# Patient Record
Sex: FEMALE | URBAN - METROPOLITAN AREA
[De-identification: names, ages, dates, MRNs, and addresses within clinical notes are randomized per-mention and may not be internally consistent; named-entity substitution may affect disease eponyms.]

---

## 2017-02-21 ENCOUNTER — INPATIENT (INPATIENT)
Facility: HOSPITAL | Age: 67
LOS: 9 days | Discharge: HOME HEALTH | End: 2017-03-03
Attending: INTERNAL MEDICINE | Admitting: HOSPITALIST
Payer: MEDICARE

## 2017-02-21 VITALS
RESPIRATION RATE: 17 BRPM | HEART RATE: 88 BPM | DIASTOLIC BLOOD PRESSURE: 58 MMHG | OXYGEN SATURATION: 99 % | SYSTOLIC BLOOD PRESSURE: 103 MMHG | TEMPERATURE: 98 F

## 2017-02-21 LAB
ALBUMIN SERPL ELPH-MCNC: 3.2 G/DL — LOW (ref 3.3–5)
ALP SERPL-CCNC: 248 U/L — HIGH (ref 40–120)
ALT FLD-CCNC: 152 U/L — HIGH (ref 12–78)
ANION GAP SERPL CALC-SCNC: 14 MMOL/L — SIGNIFICANT CHANGE UP (ref 5–17)
APTT BLD: 36.8 SEC — SIGNIFICANT CHANGE UP (ref 27.5–37.4)
AST SERPL-CCNC: 313 U/L — HIGH (ref 15–37)
BASOPHILS # BLD AUTO: 0 K/UL — SIGNIFICANT CHANGE UP (ref 0–0.2)
BASOPHILS NFR BLD AUTO: 0.3 % — SIGNIFICANT CHANGE UP (ref 0–2)
BILIRUB SERPL-MCNC: 5.9 MG/DL — HIGH (ref 0.2–1.2)
BUN SERPL-MCNC: 59 MG/DL — HIGH (ref 7–23)
CALCIUM SERPL-MCNC: 9.1 MG/DL — SIGNIFICANT CHANGE UP (ref 8.5–10.1)
CHLORIDE SERPL-SCNC: 91 MMOL/L — LOW (ref 96–108)
CO2 SERPL-SCNC: 22 MMOL/L — SIGNIFICANT CHANGE UP (ref 22–31)
CREAT SERPL-MCNC: 2.44 MG/DL — HIGH (ref 0.5–1.3)
EOSINOPHIL # BLD AUTO: 0 K/UL — SIGNIFICANT CHANGE UP (ref 0–0.5)
EOSINOPHIL NFR BLD AUTO: 0.2 % — SIGNIFICANT CHANGE UP (ref 0–6)
GLUCOSE SERPL-MCNC: 116 MG/DL — HIGH (ref 70–99)
HCT VFR BLD CALC: 41.3 % — SIGNIFICANT CHANGE UP (ref 34.5–45)
HGB BLD-MCNC: 14.2 G/DL — SIGNIFICANT CHANGE UP (ref 11.5–15.5)
INR BLD: 1.4 RATIO — HIGH (ref 0.88–1.16)
LACTATE SERPL-SCNC: 2.1 MMOL/L — HIGH (ref 0.7–2)
LIDOCAIN IGE QN: 287 U/L — SIGNIFICANT CHANGE UP (ref 73–393)
LYMPHOCYTES # BLD AUTO: 1.6 K/UL — SIGNIFICANT CHANGE UP (ref 1–3.3)
LYMPHOCYTES # BLD AUTO: 13 % — SIGNIFICANT CHANGE UP (ref 13–44)
MACROCYTES BLD QL: SLIGHT — SIGNIFICANT CHANGE UP
MANUAL DIF COMMENT BLD-IMP: SIGNIFICANT CHANGE UP
MCHC RBC-ENTMCNC: 34.3 GM/DL — SIGNIFICANT CHANGE UP (ref 32–36)
MCHC RBC-ENTMCNC: 36.8 PG — HIGH (ref 27–34)
MCV RBC AUTO: 107.2 FL — HIGH (ref 80–100)
MONOCYTES # BLD AUTO: 0.2 K/UL — SIGNIFICANT CHANGE UP (ref 0–0.9)
MONOCYTES NFR BLD AUTO: 1.5 % — LOW (ref 2–14)
NEUTROPHILS # BLD AUTO: 10.2 K/UL — HIGH (ref 1.8–7.4)
NEUTROPHILS NFR BLD AUTO: 85 % — HIGH (ref 43–77)
PLAT MORPH BLD: NORMAL — SIGNIFICANT CHANGE UP
PLATELET # BLD AUTO: 84 K/UL — LOW (ref 150–400)
POTASSIUM SERPL-MCNC: 3.7 MMOL/L — SIGNIFICANT CHANGE UP (ref 3.5–5.3)
POTASSIUM SERPL-SCNC: 3.7 MMOL/L — SIGNIFICANT CHANGE UP (ref 3.5–5.3)
PROT SERPL-MCNC: 7.4 GM/DL — SIGNIFICANT CHANGE UP (ref 6–8.3)
PROTHROM AB SERPL-ACNC: 15.4 SEC — HIGH (ref 10–13.1)
RBC # BLD: 3.86 M/UL — SIGNIFICANT CHANGE UP (ref 3.8–5.2)
RBC # FLD: 13.4 % — SIGNIFICANT CHANGE UP (ref 10.3–14.5)
RBC BLD AUTO: (no result)
SODIUM SERPL-SCNC: 127 MMOL/L — LOW (ref 135–145)
WBC # BLD: 12 K/UL — HIGH (ref 3.8–10.5)
WBC # FLD AUTO: 12 K/UL — HIGH (ref 3.8–10.5)

## 2017-02-21 PROCEDURE — 99285 EMERGENCY DEPT VISIT HI MDM: CPT

## 2017-02-21 PROCEDURE — 71010: CPT | Mod: 26

## 2017-02-21 PROCEDURE — 74176 CT ABD & PELVIS W/O CONTRAST: CPT | Mod: 26

## 2017-02-21 RX ORDER — SODIUM CHLORIDE 9 MG/ML
1000 INJECTION INTRAMUSCULAR; INTRAVENOUS; SUBCUTANEOUS ONCE
Qty: 0 | Refills: 0 | Status: COMPLETED | OUTPATIENT
Start: 2017-02-21 | End: 2017-02-21

## 2017-02-21 RX ORDER — PIPERACILLIN AND TAZOBACTAM 4; .5 G/20ML; G/20ML
3.38 INJECTION, POWDER, LYOPHILIZED, FOR SOLUTION INTRAVENOUS ONCE
Qty: 0 | Refills: 0 | Status: COMPLETED | OUTPATIENT
Start: 2017-02-21 | End: 2017-02-21

## 2017-02-21 RX ORDER — SODIUM CHLORIDE 9 MG/ML
3 INJECTION INTRAMUSCULAR; INTRAVENOUS; SUBCUTANEOUS ONCE
Qty: 0 | Refills: 0 | Status: COMPLETED | OUTPATIENT
Start: 2017-02-21 | End: 2017-02-21

## 2017-02-21 RX ADMIN — PIPERACILLIN AND TAZOBACTAM 200 GRAM(S): 4; .5 INJECTION, POWDER, LYOPHILIZED, FOR SOLUTION INTRAVENOUS at 22:54

## 2017-02-21 RX ADMIN — SODIUM CHLORIDE 1000 MILLILITER(S): 9 INJECTION INTRAMUSCULAR; INTRAVENOUS; SUBCUTANEOUS at 18:31

## 2017-02-21 RX ADMIN — SODIUM CHLORIDE 1000 MILLILITER(S): 9 INJECTION INTRAMUSCULAR; INTRAVENOUS; SUBCUTANEOUS at 21:00

## 2017-02-21 RX ADMIN — SODIUM CHLORIDE 3 MILLILITER(S): 9 INJECTION INTRAMUSCULAR; INTRAVENOUS; SUBCUTANEOUS at 18:31

## 2017-02-21 NOTE — ED ADULT NURSE REASSESSMENT NOTE - NS ED NURSE REASSESS COMMENT FT1
report rec'd from Christopher OTERO RN , pt pending CT scan tolerating oral contrast at this time will cont to monitor

## 2017-02-21 NOTE — ED STATDOCS - DETAILS:
I, Jose Guadalupe Palacios DO, performed the initial face to face bedside interview with this patient regarding history of present illness and determined that the patient should be seen in the main ED.  The history, was documented by the scribe in my presence and I attest to the accuracy of the documentation.

## 2017-02-21 NOTE — ED PROVIDER NOTE - DETAILS:
I, Duke Smalls, performed the initial face to face bedside interview with this patient regarding history of present illness and determined that the patient should be seen in the main ED.  The history, was documented by the scribe in my presence and I attest to the accuracy of the documentation.

## 2017-02-21 NOTE — ED PROVIDER NOTE - CARE PLAN
Principal Discharge DX:	Proctocolitis without complication  Secondary Diagnosis:	Acute renal failure, unspecified acute renal failure type  Secondary Diagnosis:	Hepatitis

## 2017-02-21 NOTE — ED PROVIDER NOTE - PROGRESS NOTE DETAILS
Obed Hauser: Discussed case with Dr. Salomon, agrees with Hospitalist admission. I Jorge Hauser, am scribing for and in the presence of Dr. Smalls the following sections: Progress note.

## 2017-02-21 NOTE — ED ADULT TRIAGE NOTE - CHIEF COMPLAINT QUOTE
Pt states that she has had nausea, vomitting and diarrhea for the past two weeks. Was seen by Dr. Martinez and was sent to ED today for worsening symptoms and to r/o bowel obstruction. pt is alert and orientedx4,  pt was given a decongestant medication two weeks ago and started on antiviotics yesterday. Pt states she has not been able to eat in 2 weeks

## 2017-02-21 NOTE — ED STATDOCS - PROGRESS NOTE DETAILS
65 y/o female with hx of rheumatoid arthritis presents to the ED c/o n/v/d, generalized weakness, minimal PO intake, and chills for 3 weeks.  No recent travel. Saw PMD yesterday and given abx.  Takes methotrexate and prednisone. Currently pt has no other complaints and denies dysuria, blood in stool, CP and SOB. PMD= [place].

## 2017-02-21 NOTE — ED PROVIDER NOTE - OBJECTIVE STATEMENT
65 y/o F presents to ED c/o --- 65 y/o F with a PMHx of RA on methotrexate, prednisone presents to ED c/o 3 weeks dry heaving. Unable to tolerate PO except for ice cold water. Pt also has had generalized weakness, chills, intermittent loose stools for weeks. Denies abd pain, hematuria, melena and has no other constitutional complaints at this time. Sent for admission by Dr. Lees. PMD- [place].

## 2017-02-21 NOTE — ED PROVIDER NOTE - NS ED MD SCRIBE ATTENDING SCRIBE SECTIONS
HISTORY OF PRESENT ILLNESS/PROGRESS NOTE/DISPOSITION/RESULTS/PHYSICAL EXAM/CONSULTATIONS/SHIFT CHANGE/PAST MEDICAL/SURGICAL/SOCIAL HISTORY/REVIEW OF SYSTEMS

## 2017-02-22 LAB
ALBUMIN SERPL ELPH-MCNC: 2.4 G/DL — LOW (ref 3.3–5)
ALBUMIN SERPL ELPH-MCNC: 2.7 G/DL — LOW (ref 3.3–5)
ALP SERPL-CCNC: 182 U/L — HIGH (ref 40–120)
ALP SERPL-CCNC: 208 U/L — HIGH (ref 40–120)
ALT FLD-CCNC: 117 U/L — HIGH (ref 12–78)
ALT FLD-CCNC: 135 U/L — HIGH (ref 12–78)
ANION GAP SERPL CALC-SCNC: 11 MMOL/L — SIGNIFICANT CHANGE UP (ref 5–17)
ANION GAP SERPL CALC-SCNC: 11 MMOL/L — SIGNIFICANT CHANGE UP (ref 5–17)
ANISOCYTOSIS BLD QL: SLIGHT — SIGNIFICANT CHANGE UP
APPEARANCE UR: (no result)
AST SERPL-CCNC: 246 U/L — HIGH (ref 15–37)
AST SERPL-CCNC: 270 U/L — HIGH (ref 15–37)
BACTERIA # UR AUTO: (no result)
BASOPHILS # BLD AUTO: 0 K/UL — SIGNIFICANT CHANGE UP (ref 0–0.2)
BASOPHILS NFR BLD AUTO: 0.2 % — SIGNIFICANT CHANGE UP (ref 0–2)
BILIRUB DIRECT SERPL-MCNC: 4.3 MG/DL — HIGH (ref 0–0.2)
BILIRUB DIRECT SERPL-MCNC: 4.3 MG/DL — HIGH (ref 0–0.2)
BILIRUB INDIRECT FLD-MCNC: 1.7 MG/DL — HIGH (ref 0.2–1)
BILIRUB SERPL-MCNC: 6 MG/DL — HIGH (ref 0.2–1.2)
BILIRUB SERPL-MCNC: 6.3 MG/DL — HIGH (ref 0.2–1.2)
BILIRUB UR-MCNC: (no result)
BUN SERPL-MCNC: 43 MG/DL — HIGH (ref 7–23)
BUN SERPL-MCNC: 51 MG/DL — HIGH (ref 7–23)
CALCIUM SERPL-MCNC: 8.3 MG/DL — LOW (ref 8.5–10.1)
CALCIUM SERPL-MCNC: 8.4 MG/DL — LOW (ref 8.5–10.1)
CHLORIDE SERPL-SCNC: 100 MMOL/L — SIGNIFICANT CHANGE UP (ref 96–108)
CHLORIDE SERPL-SCNC: 95 MMOL/L — LOW (ref 96–108)
CHLORIDE UR-SCNC: <20 MMOL/L — SIGNIFICANT CHANGE UP
CHOLEST SERPL-MCNC: 160 MG/DL — SIGNIFICANT CHANGE UP (ref 10–199)
CO2 SERPL-SCNC: 23 MMOL/L — SIGNIFICANT CHANGE UP (ref 22–31)
CO2 SERPL-SCNC: 24 MMOL/L — SIGNIFICANT CHANGE UP (ref 22–31)
COLOR SPEC: YELLOW — SIGNIFICANT CHANGE UP
COMMENT - URINE: SIGNIFICANT CHANGE UP
CREAT ?TM UR-MCNC: 94 MG/DL — SIGNIFICANT CHANGE UP
CREAT SERPL-MCNC: 1.64 MG/DL — HIGH (ref 0.5–1.3)
CREAT SERPL-MCNC: 2.09 MG/DL — HIGH (ref 0.5–1.3)
DIFF PNL FLD: (no result)
EOSINOPHIL # BLD AUTO: 0.1 K/UL — SIGNIFICANT CHANGE UP (ref 0–0.5)
EOSINOPHIL NFR BLD AUTO: 0.4 % — SIGNIFICANT CHANGE UP (ref 0–6)
EPI CELLS # UR: SIGNIFICANT CHANGE UP
GIANT PLATELETS BLD QL SMEAR: PRESENT — SIGNIFICANT CHANGE UP
GLUCOSE SERPL-MCNC: 121 MG/DL — HIGH (ref 70–99)
GLUCOSE SERPL-MCNC: 142 MG/DL — HIGH (ref 70–99)
GLUCOSE UR QL: NEGATIVE MG/DL — SIGNIFICANT CHANGE UP
HAV IGM SER-ACNC: SIGNIFICANT CHANGE UP
HBV CORE IGM SER-ACNC: SIGNIFICANT CHANGE UP
HBV SURFACE AG SER-ACNC: SIGNIFICANT CHANGE UP
HCT VFR BLD CALC: 34 % — LOW (ref 34.5–45)
HCV AB S/CO SERPL IA: 0.24 S/CO — SIGNIFICANT CHANGE UP
HCV AB SERPL-IMP: SIGNIFICANT CHANGE UP
HDLC SERPL-MCNC: 11 MG/DL — LOW (ref 40–125)
HGB BLD-MCNC: 12.4 G/DL — SIGNIFICANT CHANGE UP (ref 11.5–15.5)
KETONES UR-MCNC: (no result)
LACTATE SERPL-SCNC: 1.1 MMOL/L — SIGNIFICANT CHANGE UP (ref 0.7–2)
LEUKOCYTE ESTERASE UR-ACNC: (no result)
LIPID PNL WITH DIRECT LDL SERPL: 125 MG/DL — SIGNIFICANT CHANGE UP
LYMPHOCYTES # BLD AUTO: 1.2 K/UL — SIGNIFICANT CHANGE UP (ref 1–3.3)
LYMPHOCYTES # BLD AUTO: 9.2 % — LOW (ref 13–44)
MACROCYTES BLD QL: SLIGHT — SIGNIFICANT CHANGE UP
MAGNESIUM SERPL-MCNC: 2.6 MG/DL — HIGH (ref 1.8–2.4)
MANUAL DIF COMMENT BLD-IMP: SIGNIFICANT CHANGE UP
MCHC RBC-ENTMCNC: 36.6 GM/DL — HIGH (ref 32–36)
MCHC RBC-ENTMCNC: 38.4 PG — HIGH (ref 27–34)
MCV RBC AUTO: 104.9 FL — HIGH (ref 80–100)
MICROCYTES BLD QL: SLIGHT — SIGNIFICANT CHANGE UP
MONOCYTES # BLD AUTO: 0.2 K/UL — SIGNIFICANT CHANGE UP (ref 0–0.9)
MONOCYTES NFR BLD AUTO: 1.9 % — LOW (ref 2–14)
NEUTROPHILS # BLD AUTO: 11.6 K/UL — HIGH (ref 1.8–7.4)
NEUTROPHILS NFR BLD AUTO: 88.2 % — HIGH (ref 43–77)
NITRITE UR-MCNC: NEGATIVE — SIGNIFICANT CHANGE UP
OSMOLALITY SERPL: 284 MOS/KG — SIGNIFICANT CHANGE UP (ref 275–300)
PH UR: 5 — SIGNIFICANT CHANGE UP (ref 4.8–8)
PHOSPHATE SERPL-MCNC: 2.1 MG/DL — LOW (ref 2.5–4.5)
PLAT MORPH BLD: (no result)
PLATELET # BLD AUTO: 55 K/UL — LOW (ref 150–400)
PLATELET COUNT - ESTIMATE: (no result)
POIKILOCYTOSIS BLD QL AUTO: SLIGHT — SIGNIFICANT CHANGE UP
POTASSIUM SERPL-MCNC: 3.5 MMOL/L — SIGNIFICANT CHANGE UP (ref 3.5–5.3)
POTASSIUM SERPL-MCNC: 3.6 MMOL/L — SIGNIFICANT CHANGE UP (ref 3.5–5.3)
POTASSIUM SERPL-SCNC: 3.5 MMOL/L — SIGNIFICANT CHANGE UP (ref 3.5–5.3)
POTASSIUM SERPL-SCNC: 3.6 MMOL/L — SIGNIFICANT CHANGE UP (ref 3.5–5.3)
POTASSIUM UR-SCNC: 28 MMOL/L — SIGNIFICANT CHANGE UP
PROT SERPL-MCNC: 5.7 GM/DL — LOW (ref 6–8.3)
PROT SERPL-MCNC: 6.7 GM/DL — SIGNIFICANT CHANGE UP (ref 6–8.3)
PROT UR-MCNC: 15 MG/DL
RAPID RVP RESULT: SIGNIFICANT CHANGE UP
RBC # BLD: 3.24 M/UL — LOW (ref 3.8–5.2)
RBC # FLD: 13.6 % — SIGNIFICANT CHANGE UP (ref 10.3–14.5)
RBC BLD AUTO: (no result)
RBC CASTS # UR COMP ASSIST: (no result) /HPF (ref 0–4)
SODIUM SERPL-SCNC: 130 MMOL/L — LOW (ref 135–145)
SODIUM SERPL-SCNC: 134 MMOL/L — LOW (ref 135–145)
SODIUM UR-SCNC: <20 MMOL/L — SIGNIFICANT CHANGE UP
SP GR SPEC: 1.01 — SIGNIFICANT CHANGE UP (ref 1.01–1.02)
TARGETS BLD QL SMEAR: SLIGHT — SIGNIFICANT CHANGE UP
TOTAL CHOLESTEROL/HDL RATIO MEASUREMENT: 14.5 RATIO — HIGH (ref 3.3–7.1)
TRIGL SERPL-MCNC: 122 MG/DL — SIGNIFICANT CHANGE UP (ref 10–149)
UROBILINOGEN FLD QL: 4 MG/DL
WBC # BLD: 13.1 K/UL — HIGH (ref 3.8–10.5)
WBC # FLD AUTO: 13.1 K/UL — HIGH (ref 3.8–10.5)
WBC UR QL: (no result)

## 2017-02-22 PROCEDURE — 76705 ECHO EXAM OF ABDOMEN: CPT | Mod: 26

## 2017-02-22 RX ORDER — ONDANSETRON 8 MG/1
4 TABLET, FILM COATED ORAL EVERY 6 HOURS
Qty: 0 | Refills: 0 | Status: DISCONTINUED | OUTPATIENT
Start: 2017-02-22 | End: 2017-03-03

## 2017-02-22 RX ORDER — FOLIC ACID 0.8 MG
1 TABLET ORAL DAILY
Qty: 0 | Refills: 0 | Status: DISCONTINUED | OUTPATIENT
Start: 2017-02-22 | End: 2017-03-03

## 2017-02-22 RX ORDER — METRONIDAZOLE 500 MG
500 TABLET ORAL EVERY 8 HOURS
Qty: 0 | Refills: 0 | Status: DISCONTINUED | OUTPATIENT
Start: 2017-02-22 | End: 2017-02-23

## 2017-02-22 RX ORDER — CIPROFLOXACIN LACTATE 400MG/40ML
400 VIAL (ML) INTRAVENOUS ONCE
Qty: 0 | Refills: 0 | Status: COMPLETED | OUTPATIENT
Start: 2017-02-22 | End: 2017-02-22

## 2017-02-22 RX ORDER — SODIUM CHLORIDE 9 MG/ML
1000 INJECTION INTRAMUSCULAR; INTRAVENOUS; SUBCUTANEOUS
Qty: 0 | Refills: 0 | Status: DISCONTINUED | OUTPATIENT
Start: 2017-02-22 | End: 2017-02-23

## 2017-02-22 RX ORDER — HEPARIN SODIUM 5000 [USP'U]/ML
5000 INJECTION INTRAVENOUS; SUBCUTANEOUS EVERY 12 HOURS
Qty: 0 | Refills: 0 | Status: DISCONTINUED | OUTPATIENT
Start: 2017-02-22 | End: 2017-02-22

## 2017-02-22 RX ORDER — CIPROFLOXACIN LACTATE 400MG/40ML
VIAL (ML) INTRAVENOUS
Qty: 0 | Refills: 0 | Status: DISCONTINUED | OUTPATIENT
Start: 2017-02-22 | End: 2017-02-23

## 2017-02-22 RX ORDER — THIAMINE MONONITRATE (VIT B1) 100 MG
100 TABLET ORAL DAILY
Qty: 0 | Refills: 0 | Status: COMPLETED | OUTPATIENT
Start: 2017-02-22 | End: 2017-02-25

## 2017-02-22 RX ORDER — CIPROFLOXACIN LACTATE 400MG/40ML
400 VIAL (ML) INTRAVENOUS EVERY 24 HOURS
Qty: 0 | Refills: 0 | Status: DISCONTINUED | OUTPATIENT
Start: 2017-02-23 | End: 2017-02-23

## 2017-02-22 RX ADMIN — Medication 500 MILLIGRAM(S): at 10:53

## 2017-02-22 RX ADMIN — Medication 2 MILLIGRAM(S): at 18:56

## 2017-02-22 RX ADMIN — Medication 5 MILLIGRAM(S): at 13:43

## 2017-02-22 RX ADMIN — Medication 1 TABLET(S): at 18:56

## 2017-02-22 RX ADMIN — Medication 100 MILLIGRAM(S): at 18:56

## 2017-02-22 RX ADMIN — SODIUM CHLORIDE 100 MILLILITER(S): 9 INJECTION INTRAMUSCULAR; INTRAVENOUS; SUBCUTANEOUS at 10:52

## 2017-02-22 RX ADMIN — Medication 1 MILLIGRAM(S): at 18:56

## 2017-02-22 RX ADMIN — Medication 200 MILLIGRAM(S): at 10:53

## 2017-02-22 RX ADMIN — Medication 2 MILLIGRAM(S): at 22:05

## 2017-02-22 RX ADMIN — Medication 500 MILLIGRAM(S): at 22:06

## 2017-02-22 NOTE — CONSULT NOTE ADULT - SUBJECTIVE AND OBJECTIVE BOX
HPI:  66y female w/ PMH HTN, RA, on MTX who states she was sent to ED by her PMD b/c of cough.  Discussed w/ PMD/PA who state she called w/ c/o worsening diarrhea and they advised her to come to ED.    Pt reports having chronic diarrhea for a year which she denies has recently worsened.  However, overall a very poor historian in this regard. Came to ER where labs showed elevated LFTs and CT c/w cirrhosis.  Notes drinking 3 glasses of vodka a day. On MTX since 2009 for RA.      PAST MEDICAL & SURGICAL HISTORY:  HTN (hypertension)  No significant past surgical history      MEDICATIONS  (STANDING):  sodium chloride 0.9%. 1000milliLiter(s) IV Continuous <Continuous>  ciprofloxacin   IVPB  IV Intermittent   metroNIDAZOLE    Tablet 500milliGRAM(s) Oral every 8 hours  predniSONE   Tablet 5milliGRAM(s) Oral daily    MEDICATIONS  (PRN):  ondansetron Injectable 4milliGRAM(s) IV Push every 6 hours PRN Nausea and/or Vomiting  oxyCODONE  5 mG/acetaminophen 325 mG 1Tablet(s) Oral every 6 hours PRN Moderate Pain (4 - 6)      Allergies    sulfa drugs (Unknown)    Intolerances        SOCIAL HISTORY:  3 drinks of vodka a day    FAMILY HISTORY:  No pertinent family history in first degree relatives      As above  Otherwise unremarkable    Vital Signs Last 24 Hrs  T(C): 36.4, Max: 36.8 (02-22 @ 07:22)  T(F): 97.6, Max: 98.3 (02-22 @ 11:04)  HR: 81 (67 - 85)  BP: 99/58 (97/54 - 115/68)  BP(mean): --  RR: 18 (14 - 18)  SpO2: 98% (98% - 100%)    Constitutional: A little disheveled  Respiratory: CTAB  Cardiovascular: S1 and S2, RRR  Gastrointestinal: BS+, soft, NT/ND  Extremities: No peripheral edema  Psychiatric: Normal mood, normal affect  Skin: No rashes    LABS:                        12.4   13.1  )-----------( 55       ( 22 Feb 2017 11:29 )             34.0     22 Feb 2017 13:17    130    |  95     |  51     ----------------------------<  142    3.5     |  24     |  2.09     Ca    8.4        22 Feb 2017 13:17    TPro  6.7    /  Alb  2.7    /  TBili  6.3    /  DBili  4.3    /  AST  270    /  ALT  135    /  AlkPhos  208    22 Feb 2017 13:17    PT/INR - ( 21 Feb 2017 18:07 )   PT: 15.4 sec;   INR: 1.40 ratio         PTT - ( 21 Feb 2017 18:07 )  PTT:36.8 sec  LIVER FUNCTIONS - ( 22 Feb 2017 13:17 )  Alb: 2.7 g/dL / Pro: 6.7 gm/dL / ALK PHOS: 208 U/L / ALT: 135 U/L / AST: 270 U/L / GGT: x             RADIOLOGY & ADDITIONAL STUDIES:  CT abd/pel  1. Proctocolitis. No bowel obstruction.   2. Hepatic morphology suspicious for cirrhosis and fatty liver.   3. Cholelithiasis without cholecystitis.

## 2017-02-22 NOTE — H&P ADULT - NSHPREVIEWOFSYSTEMS_GEN_ALL_CORE
General: No fevers, chills;  generalized weakness  HEENT: No HA, neck pain, no visual changes, no hearing loss   Resp: cough resolved, No  wheezing, hemoptysis; No shortness of breath  CV: No chest pain or palpitations  GI:  diarrhea few days ago, denies abd pain/n/v  : No f/u/d, reports good uop  Neuro: no numbness  MSK: chronic arthragias due to RA   SKIN: No itching, rashes, lesions  All other ROS negative unless indicated above.

## 2017-02-22 NOTE — CONSULT NOTE ADULT - ASSESSMENT
Imp:  Chronic diarrhea with proctocolitis on imaging -- prior workup unavailable to me at this time  Cirrhosis, and likely superimposed alcoholic hepatitis, Maddrey score 17, does not meet criteria for steroids at this time    Rec:  Observe for diarrhea  Check hepatic serologies, follow LFTs  Stop MTX permanently  Stop alcohol - d/w patient  D/W Dr. Joseph

## 2017-02-22 NOTE — H&P ADULT - NSHPSOCIALHISTORY_GEN_ALL_CORE
etoh-   tobacco- denies etoh or tobacco use  -lives at home w/ significant other  - ambulates w/ cane;  limited mobility

## 2017-02-22 NOTE — H&P ADULT - ASSESSMENT
66y female admitted with    *diarrhea, proctocolitis   - CT abd noted  - IVF  - stool studies pending  - IV abx renally dosed  - blood cx pending  - repeat lactate and cbc pending     *elevated LFTs, abd pain  - CT abd suspicious for cirrhosis, fatty liver;  cholelithiasis w/o cholecystitis  - repeat lfts, fractionate bili  - RUQ US to eval for cholecystitis, cbd dilation  - check hep panel, lipids  - IVF, IV abx  - GI consult     *hyponatremia  - likely hypovolemic due to decreased intake  - repeat bmp now  - IVF  - check serum osm, urine studies    *RADHA  - baseline Cr 0.91 in 2013  - likely prerenal due to hypovolemia  - IVF, repeat bmp     *dvt px  - heparin sc 66y female admitted with    *diarrhea, proctocolitis   (pt reports diarrhea is chronic)  - CT abd noted  - IVF  - stool studies pending  - IV abx renally dosed  - blood cx pending  - repeat lactate and cbc pending   - GI eval     *elevated LFTs, abd pain  - CT abd suspicious for cirrhosis, fatty liver;  cholelithiasis w/o cholecystitis  - ?also possibly adverse effect of methotrexate   - repeat lfts, fractionate bili  - RUQ US to eval for cholecystitis, cbd dilation  - check hep panel, lipids  - IVF, IV abx  - GI consult     *hyponatremia  - likely hypovolemic due to decreased intake  - repeat bmp now  - IVF  - check serum osm, urine studies    *RADHA  - baseline Cr 0.91 in 2013  - likely prerenal due to hypovolemia  - hold triamterene   - renally dose meds  - IVF, repeat bmp     *RA  -hold mtx (took last Tues- weekly dose)  - continue daily dose prednisone    *HTN  - hold triamterene due to RADHA    *dvt px  - heparin sc 66y female admitted with    *diarrhea, proctocolitis   (pt reports diarrhea is chronic)  - CT abd noted  - IVF  - stool studies pending  - IV abx renally dosed  - blood cx pending  - repeat lactate and cbc pending   - GI eval     *elevated LFTs, abd pain  - CT abd suspicious for cirrhosis, fatty liver;  cholelithiasis w/o cholecystitis  - ?also possibly adverse effect of methotrexate   - repeat lfts, fractionate bili  - RUQ US to eval for cholecystitis, cbd dilation  - check hep panel, lipids  - IVF, IV abx  - GI consult     *hyponatremia  - likely hypovolemic due to decreased intake  - repeat bmp now  - IVF  - check serum osm, urine studies    *RADHA  - baseline Cr 0.91 in 2013  - likely prerenal due to hypovolemia  - hold triamterene   - renally dose meds  - IVF, repeat bmp     *RA  -hold mtx (took last Tues- weekly dose)  - continue daily dose prednisone    *HTN  - hold triamterene due to RADHA    *dvt px  -scds

## 2017-02-22 NOTE — ED ADULT NURSE REASSESSMENT NOTE - NS ED NURSE REASSESS COMMENT FT1
results of CT discussed with pt pt pending admission to the hospital, patient of MD Lees awaiting admission orders pt assisted with bedside commode, will cont to monitor

## 2017-02-22 NOTE — H&P ADULT - NSHPPHYSICALEXAM_GEN_ALL_CORE
General: ill appearing female, nad  Neuro: AAOx3, no focal deficits  HEENT: NCAT, PERRLA, EOMI, dry mucous membranes  Neck: Soft and supple, No JVD  Respiratory: CTA b/l, no w/r/r  Cardiovascular: S1 and S2, RRR, no m/g/r  Gastrointestinal: +BS, soft, generalized tenderness to palpations, no rebound tenderness  Extremities: chronic changes, no c/c  Vascular: 2+ peripheral pulses  Musculoskeletal: 5/5 strength b/l UE and LE, sensation intact  Skin: No rashes

## 2017-02-22 NOTE — H&P ADULT - HISTORY OF PRESENT ILLNESS
66y female w/ PMH HTN, RA presents w/ diarrhea for 1 week and has been unable to tolerated po intake.  Pt called PMD who advised her to come to ED.  Pt 66y female w/ PMH HTN, RA who states she was sent to ED by her PMD b/c of cough.  Discussed w/ PMD/PA who state she called w/ c/o worsening diarrhea and they advised her to come to ED.    Pt reports having chronic diarrhea for a year which she denies has recently worsened.  She does admit to decreased overall intake within past few weeks but states that is due to her upper resp infection (phlegm, cough x 3 weeks improving w/ mucinex and resp viral panel outpatient negative).    She denies abdominal pain, nausea/vomiting, urinary f/u/d, fevers/chills.  C/o chronic pain due to RA for which she takes MTX weekly (last dose last Tues) and prednisone daily (last dose yesterday).  No recent change in meds.      In ED she reports feeling better and requests ice cream.  Denies cough, cp, sob, palp, abd pain, f/c, urinary f/u/d. Reports last episode diarrhea ~3days ago.

## 2017-02-23 LAB
AFP-TM SERPL-MCNC: 6.7 NG/ML — SIGNIFICANT CHANGE UP
ANION GAP SERPL CALC-SCNC: 11 MMOL/L — SIGNIFICANT CHANGE UP (ref 5–17)
BUN SERPL-MCNC: 35 MG/DL — HIGH (ref 7–23)
CALCIUM SERPL-MCNC: 8.4 MG/DL — LOW (ref 8.5–10.1)
CHLORIDE SERPL-SCNC: 102 MMOL/L — SIGNIFICANT CHANGE UP (ref 96–108)
CO2 SERPL-SCNC: 26 MMOL/L — SIGNIFICANT CHANGE UP (ref 22–31)
CREAT SERPL-MCNC: 1.41 MG/DL — HIGH (ref 0.5–1.3)
CULTURE RESULTS: SIGNIFICANT CHANGE UP
GLUCOSE SERPL-MCNC: 103 MG/DL — HIGH (ref 70–99)
HAV IGM SER-ACNC: SIGNIFICANT CHANGE UP
HBV CORE IGM SER-ACNC: SIGNIFICANT CHANGE UP
HBV SURFACE AG SER-ACNC: SIGNIFICANT CHANGE UP
HCV AB S/CO SERPL IA: 0.28 S/CO — SIGNIFICANT CHANGE UP
HCV AB SERPL-IMP: SIGNIFICANT CHANGE UP
LEGIONELLA AB SER-ACNC: NEGATIVE — SIGNIFICANT CHANGE UP
MAGNESIUM SERPL-MCNC: 2.6 MG/DL — HIGH (ref 1.8–2.4)
PHOSPHATE SERPL-MCNC: 1.7 MG/DL — LOW (ref 2.5–4.5)
POTASSIUM SERPL-MCNC: 3.3 MMOL/L — LOW (ref 3.5–5.3)
POTASSIUM SERPL-SCNC: 3.3 MMOL/L — LOW (ref 3.5–5.3)
SODIUM SERPL-SCNC: 139 MMOL/L — SIGNIFICANT CHANGE UP (ref 135–145)
SPECIMEN SOURCE: SIGNIFICANT CHANGE UP

## 2017-02-23 PROCEDURE — 93010 ELECTROCARDIOGRAM REPORT: CPT

## 2017-02-23 RX ORDER — SODIUM CHLORIDE 9 MG/ML
1000 INJECTION INTRAMUSCULAR; INTRAVENOUS; SUBCUTANEOUS
Qty: 0 | Refills: 0 | Status: DISCONTINUED | OUTPATIENT
Start: 2017-02-23 | End: 2017-02-28

## 2017-02-23 RX ORDER — POTASSIUM PHOSPHATE, MONOBASIC POTASSIUM PHOSPHATE, DIBASIC 236; 224 MG/ML; MG/ML
15 INJECTION, SOLUTION INTRAVENOUS
Qty: 0 | Refills: 0 | Status: COMPLETED | OUTPATIENT
Start: 2017-02-23 | End: 2017-02-24

## 2017-02-23 RX ADMIN — SODIUM CHLORIDE 100 MILLILITER(S): 9 INJECTION INTRAMUSCULAR; INTRAVENOUS; SUBCUTANEOUS at 02:01

## 2017-02-23 RX ADMIN — Medication 1 TABLET(S): at 12:18

## 2017-02-23 RX ADMIN — Medication 1.5 MILLIGRAM(S): at 18:34

## 2017-02-23 RX ADMIN — POTASSIUM PHOSPHATE, MONOBASIC POTASSIUM PHOSPHATE, DIBASIC 62.5 MILLIMOLE(S): 236; 224 INJECTION, SOLUTION INTRAVENOUS at 16:42

## 2017-02-23 RX ADMIN — Medication 2 MILLIGRAM(S): at 10:08

## 2017-02-23 RX ADMIN — Medication 5 MILLIGRAM(S): at 10:09

## 2017-02-23 RX ADMIN — Medication 2 MILLIGRAM(S): at 02:01

## 2017-02-23 RX ADMIN — Medication 100 MILLIGRAM(S): at 12:18

## 2017-02-23 RX ADMIN — SODIUM CHLORIDE 100 MILLILITER(S): 9 INJECTION INTRAMUSCULAR; INTRAVENOUS; SUBCUTANEOUS at 12:18

## 2017-02-23 RX ADMIN — SODIUM CHLORIDE 75 MILLILITER(S): 9 INJECTION INTRAMUSCULAR; INTRAVENOUS; SUBCUTANEOUS at 18:34

## 2017-02-23 RX ADMIN — Medication 2 MILLIGRAM(S): at 10:18

## 2017-02-23 RX ADMIN — Medication 1 MILLIGRAM(S): at 12:18

## 2017-02-23 NOTE — PROGRESS NOTE ADULT - SUBJECTIVE AND OBJECTIVE BOX
Patient is a 66y old  Female who presents with a chief complaint of weakness, abd pain, diarrhea (22 Feb 2017 15:54)      Subective:  No definite diarrhea. Patient confused. No bleeding. No specific complaints. On EtOH protocol    PAST MEDICAL & SURGICAL HISTORY:  HTN (hypertension)  Rheumatoid arthritis  Alcohol abuse      MEDICATIONS  (STANDING):  ciprofloxacin   IVPB 400milliGRAM(s) IV Intermittent every 24 hours  sodium chloride 0.9%. 1000milliLiter(s) IV Continuous <Continuous>  ciprofloxacin   IVPB  IV Intermittent   metroNIDAZOLE    Tablet 500milliGRAM(s) Oral every 8 hours  predniSONE   Tablet 5milliGRAM(s) Oral daily  LORazepam     Tablet 2milliGRAM(s) Oral every 4 hours  LORazepam     Tablet 1.5milliGRAM(s) Oral every 4 hours  LORazepam     Tablet milliGRAM(s) Oral   folic acid 1milliGRAM(s) Oral daily  multivitamin 1Tablet(s) Oral daily  thiamine 100milliGRAM(s) Oral daily    MEDICATIONS  (PRN):  ondansetron Injectable 4milliGRAM(s) IV Push every 6 hours PRN Nausea and/or Vomiting  oxyCODONE  5 mG/acetaminophen 325 mG 1Tablet(s) Oral every 6 hours PRN Moderate Pain (4 - 6)      REVIEW OF SYSTEMS:    RESPIRATORY: No shortness of breath  CARDIOVASCULAR: No chest pain  All other review of systems is negative unless indicated above.    Vital Signs Last 24 Hrs  T(C): 36.8, Max: 37 (02-23 @ 04:15)  T(F): 98.3, Max: 98.6 (02-23 @ 04:15)  HR: 90 (67 - 93)  BP: 101/47 (99/58 - 110/55)  BP(mean): --  RR: 16 (14 - 18)  SpO2: 97% (94% - 100%)    PHYSICAL EXAM:    Constitutional: NAD, well-developed  Respiratory: CTAB  Cardiovascular: S1 and S2, RRR  Gastrointestinal: BS+, soft, NT/ND  Extremities: No peripheral edema  Psychiatric: confused but calm. Slightly tremulous.    LABS:                        12.4   13.1  )-----------( 55       ( 22 Feb 2017 11:29 )             34.0     22 Feb 2017 19:47    134    |  100    |  43     ----------------------------<  121    3.6     |  23     |  1.64     Ca    8.3        22 Feb 2017 19:47  Phos  2.1       22 Feb 2017 19:47  Mg     2.6       22 Feb 2017 19:47    TPro  5.7    /  Alb  2.4    /  TBili  6.0    /  DBili  4.3    /  AST  246    /  ALT  117    /  AlkPhos  182    22 Feb 2017 19:47    PT/INR - ( 21 Feb 2017 18:07 )   PT: 15.4 sec;   INR: 1.40 ratio         PTT - ( 21 Feb 2017 18:07 )  PTT:36.8 sec  LIVER FUNCTIONS - ( 22 Feb 2017 19:47 )  Alb: 2.4 g/dL / Pro: 5.7 gm/dL / ALK PHOS: 182 U/L / ALT: 117 U/L / AST: 246 U/L / GGT: x

## 2017-02-23 NOTE — PROGRESS NOTE ADULT - SUBJECTIVE AND OBJECTIVE BOX
History and Physical:   Source of Information	Chart(s), Patient, Physician/Provider	  Outpatient Providers	Dr. Lees- PMD (but mostly sees DEVIN Garcia)	    Language:  · Patient/Family of Limited English Proficiency	No	      History of Present Illness:  Chief Complaint/Reason for Admission: I was sent by PMD for cough and problem w/ my kidneys.  Diarrhea.	  History of Present Illness: 	   66y female w/ PMH HTN, RA who states she was sent to ED by her PMD b/c of cough.  Discussed w/ PMD/PA who state she called w/ c/o worsening diarrhea and they advised her to come to ED.    Pt reports having chronic diarrhea for a year which she denies has recently worsened.  She does admit to decreased overall intake within past few weeks but states that is due to her upper resp infection (phlegm, cough x 3 weeks improving w/ mucinex and resp viral panel outpatient negative).    She denies abdominal pain, nausea/vomiting, urinary f/u/d, fevers/chills.  C/o chronic pain due to RA for which she takes MTX weekly (last dose last Tues) and prednisone daily (last dose yesterday).  No recent change in meds.      In ED she reports feeling better and requests ice cream.  Denies cough, cp, sob, palp, abd pain, f/c, urinary f/u/d. Reports last episode diarrhea ~3days ago.     2/23-        Review of Systems:  Review of Systems: General: No fevers, chills;  generalized weakness HEENT: No HA, neck pain, no visual changes, no hearing loss  Resp: cough resolved, No  wheezing, hemoptysis; No shortness of breath CV: No chest pain or palpitations GI:  diarrhea few days ago, denies abd pain/n/v : No f/u/d, reports good uop Neuro: no numbness MSK: chronic arthragias due to RA  SKIN: No itching, rashes, lesions All other ROS negative unless indicated above.	          Vital Signs Last 24 Hrs  T(C): 36.9, Max: 37 (02-23 @ 04:15)  T(F): 98.4, Max: 98.6 (02-23 @ 04:15)  HR: 93 (81 - 93)  BP: 114/77 (99/58 - 114/77)  RR: 16 (16 - 18)  SpO2: 96% (94% - 98%)    Physical Exam: General: ill appearing female, nad Neuro: AAOx3, no focal deficits HEENT: NCAT, PERRLA, EOMI, dry mucous membranes Neck: Soft and supple, No JVD Respiratory: CTA b/l, no w/r/r Cardiovascular: S1 and S2, RRR, no m/g/r Gastrointestinal: +BS, soft, generalized tenderness to palpations, no rebound tenderness Extremities: chronic changes, no c/c Vascular: 2+ peripheral pulses Musculoskeletal: 5/5 strength b/l UE and LE, sensation intact Skin: No rashes	          LABS: All Labs Reviewed:                        12.4   13.1  )-----------( 55       ( 22 Feb 2017 11:29 )             34.0     23 Feb 2017 07:40    139    |  102    |  35     ----------------------------<  103    3.3     |  26     |  1.41     Ca    8.4        23 Feb 2017 07:40  Phos  1.7       23 Feb 2017 07:40  Mg     2.6       23 Feb 2017 07:40    TPro  5.7    /  Alb  2.4    /  TBili  6.0    /  DBili  4.3    /  AST  246    /  ALT  117    /  AlkPhos  182    22 Feb 2017 19:47    PT/INR - ( 21 Feb 2017 18:07 )   PT: 15.4 sec;   INR: 1.40 ratio         PTT - ( 21 Feb 2017 18:07 )  PTT:36.8 sec          LABS: All Labs Reviewed:                      14.2  12.0  )-----------( 84       ( 21 Feb 2017 18:07 )            41.3   21 Feb 2017 18:07  127    |  91     |  59    ----------------------------<  116   3.7     |  22     |  2.44   Ca    9.1        21 Feb 2017 18:07  TPro  7.4    /  Alb  3.2    /  TBili  5.9    /  DBili  x      /  AST  313    /  ALT  152    /  AlkPhos  248    21 Feb 2017 18:07  PT/INR - ( 21 Feb 2017 18:07 )   PT: 15.4 sec;   INR: 1.40 ratio      PTT - ( 21 Feb 2017 18:07 )  PTT:36.8 sec  Lactate, Blood: 1.1 mmol/L (02.22.17 @ 11:29)  Acute Hepatitis Panel (02.22.17 @ 11:29)   Hepatitis C Virus Interpretation: Nonreact: Hepatitis C AB  Lipid Profile (02.22.17 @ 12:00)   HDL/Total Cholesterol Ratio Measurement: 14.5 RATIO   Cholesterol, Serum: 160 mg/dL   Triglycerides, Serum: 122 mg/dL   HDL Cholesterol, Serum: 11: TEST REPEATED. mg/dL   Direct LDL: 125: LDL Cholesterol --- Interpretive Comment (for adults 18 and over)     EXAM:  CT ABDOMEN AND PELVIS OC       02/21/2017  IMPRESSION:  1. Proctocolitis. No bowel obstruction.  2. Hepatic morphology suspicious for cirrhosis and fatty liver.  Cholelithiasis without cholecystitis.   EXAM:  US ABDOMEN LIMITED     02/22/2017   Evaluation of the study is markedly limited by overlying  bowel gas and patient condition. No definite acute abnormalities are  identified, however repeat ultrasound may when patient is n.p.o. 	    		  66y female admitted with    *diarrhea, proctocolitis   (pt reports diarrhea is chronic)- resolved   - CT abd noted  - IVF, IV abx   - stool studies pending  - blood cx pending  - lactate normalized  - GI eval appreciated      *elevated LFTs due to etoh abuse, etoh hepatitis/cirrhosis   - CT abd suspicious for cirrhosis, fatty liver;  cholelithiasis w/o cholecystitis  - repeat lfts, fractionate bili- improving   - check hep panel, lipids- nml   - etoh protocol, IVF, thiamine, MVI, folate  - GI consult appreciated    *hyponatremia  - likely hypovolemic due to decreased intake  - resolved w/ IVF     *RADHA  - baseline Cr 0.91 in 2013  - likely prerenal due to hypovolemia  - hold triamterene   - improving w/ IVF      *RA  -hold mtx (took last Tues- weekly dose)  - continue daily dose prednisone    *HTN  - hold triamterene due to RADHA    *dvt px  -scds    PT eval. History and Physical:   Source of Information	Chart(s), Patient, Physician/Provider	  Outpatient Providers	Dr. Lees- PMD (but mostly sees DEVIN Garcia)	    Language:  · Patient/Family of Limited English Proficiency	No	      History of Present Illness:  Chief Complaint/Reason for Admission: I was sent by PMD for cough and problem w/ my kidneys.  Diarrhea.	  History of Present Illness: 	   66y female w/ PMH HTN, RA who states she was sent to ED by her PMD b/c of cough.  Discussed w/ PMD/PA who state she called w/ c/o worsening diarrhea and they advised her to come to ED.    Pt reports having chronic diarrhea for a year which she denies has recently worsened.  She does admit to decreased overall intake within past few weeks but states that is due to her upper resp infection (phlegm, cough x 3 weeks improving w/ mucinex and resp viral panel outpatient negative).    She denies abdominal pain, nausea/vomiting, urinary f/u/d, fevers/chills.  C/o chronic pain due to RA for which she takes MTX weekly (last dose last Tues) and prednisone daily (last dose yesterday).  No recent change in meds.      In ED she reports feeling better and requests ice cream.  Denies cough, cp, sob, palp, abd pain, f/c, urinary f/u/d. Reports last episode diarrhea ~3days ago.     2/23-        Review of Systems:  Review of Systems: General: No fevers, chills;  generalized weakness HEENT: No HA, neck pain, no visual changes, no hearing loss  Resp: cough resolved, No  wheezing, hemoptysis; No shortness of breath CV: No chest pain or palpitations GI:  diarrhea few days ago, denies abd pain/n/v : No f/u/d, reports good uop Neuro: no numbness MSK: chronic arthragias due to RA  SKIN: No itching, rashes, lesions All other ROS negative unless indicated above.	          Vital Signs Last 24 Hrs  T(C): 36.9, Max: 37 (02-23 @ 04:15)  T(F): 98.4, Max: 98.6 (02-23 @ 04:15)  HR: 93 (81 - 93)  BP: 114/77 (99/58 - 114/77)  RR: 16 (16 - 18)  SpO2: 96% (94% - 98%)    Physical Exam: General: ill appearing female, nad Neuro: AAOx3, no focal deficits HEENT: NCAT, PERRLA, EOMI, dry mucous membranes Neck: Soft and supple, No JVD Respiratory: CTA b/l, no w/r/r Cardiovascular: S1 and S2, RRR, no m/g/r Gastrointestinal: +BS, soft, generalized tenderness to palpations, no rebound tenderness Extremities: chronic changes, no c/c Vascular: 2+ peripheral pulses Musculoskeletal: 5/5 strength b/l UE and LE, sensation intact Skin: No rashes	          LABS: All Labs Reviewed:                        12.4   13.1  )-----------( 55       ( 22 Feb 2017 11:29 )             34.0     23 Feb 2017 07:40    139    |  102    |  35     ----------------------------<  103    3.3     |  26     |  1.41     Ca    8.4        23 Feb 2017 07:40  Phos  1.7       23 Feb 2017 07:40  Mg     2.6       23 Feb 2017 07:40    TPro  5.7    /  Alb  2.4    /  TBili  6.0    /  DBili  4.3    /  AST  246    /  ALT  117    /  AlkPhos  182    22 Feb 2017 19:47    PT/INR - ( 21 Feb 2017 18:07 )   PT: 15.4 sec;   INR: 1.40 ratio         PTT - ( 21 Feb 2017 18:07 )  PTT:36.8 sec          LABS: All Labs Reviewed:                      14.2  12.0  )-----------( 84       ( 21 Feb 2017 18:07 )            41.3   21 Feb 2017 18:07  127    |  91     |  59    ----------------------------<  116   3.7     |  22     |  2.44   Ca    9.1        21 Feb 2017 18:07  TPro  7.4    /  Alb  3.2    /  TBili  5.9    /  DBili  x      /  AST  313    /  ALT  152    /  AlkPhos  248    21 Feb 2017 18:07  PT/INR - ( 21 Feb 2017 18:07 )   PT: 15.4 sec;   INR: 1.40 ratio      PTT - ( 21 Feb 2017 18:07 )  PTT:36.8 sec  Lactate, Blood: 1.1 mmol/L (02.22.17 @ 11:29)  Acute Hepatitis Panel (02.22.17 @ 11:29)   Hepatitis C Virus Interpretation: Nonreact: Hepatitis C AB  Lipid Profile (02.22.17 @ 12:00)   HDL/Total Cholesterol Ratio Measurement: 14.5 RATIO   Cholesterol, Serum: 160 mg/dL   Triglycerides, Serum: 122 mg/dL   HDL Cholesterol, Serum: 11: TEST REPEATED. mg/dL   Direct LDL: 125: LDL Cholesterol --- Interpretive Comment (for adults 18 and over)     EXAM:  CT ABDOMEN AND PELVIS OC       02/21/2017  IMPRESSION:  1. Proctocolitis. No bowel obstruction.  2. Hepatic morphology suspicious for cirrhosis and fatty liver.  Cholelithiasis without cholecystitis.   EXAM:  US ABDOMEN LIMITED     02/22/2017   Evaluation of the study is markedly limited by overlying  bowel gas and patient condition. No definite acute abnormalities are  identified, however repeat ultrasound may when patient is n.p.o. 	    		  66y female admitted with    *diarrhea, proctocolitis   (pt reports diarrhea is chronic)- resolved   - CT abd noted  - IVF, IV abx   - stool studies pending  - blood cx pending  - lactate normalized  - GI eval appreciated      *elevated LFTs due to etoh abuse, etoh hepatitis/cirrhosis   - CT abd suspicious for cirrhosis, fatty liver;  cholelithiasis w/o cholecystitis  - repeat lfts, fractionate bili- improving   - check hep panel, lipids- nml   - etoh protocol, IVF, thiamine, MVI, folate  - GI consult appreciated    *hyponatremia  - likely hypovolemic due to decreased intake  - resolved w/ IVF     *RADHA  - baseline Cr 0.91 in 2013  - likely prerenal due to hypovolemia  - hold triamterene   - improving w/ IVF      *RA  -hold mtx (took last Tues- weekly dose)  - continue daily dose prednisone    *HTN  - hold triamterene due to RADHA    *hypokalemia/hypophos  - replete    *dvt px  -scds    PT eval. History and Physical:   Source of Information	Chart(s), Patient, Physician/Provider	  Outpatient Providers	Dr. Lees- PMD (but mostly sees DEVIN Garcia)	    Language:  · Patient/Family of Limited English Proficiency	No	      History of Present Illness:  Chief Complaint/Reason for Admission: I was sent by PMD for cough and problem w/ my kidneys.  Diarrhea.	  History of Present Illness: 	   66y female w/ PMH HTN, RA who states she was sent to ED by her PMD b/c of cough.  Discussed w/ PMD/PA who state she called w/ c/o worsening diarrhea and they advised her to come to ED.    Pt reports having chronic diarrhea for a year which she denies has recently worsened.  She does admit to decreased overall intake within past few weeks but states that is due to her upper resp infection (phlegm, cough x 3 weeks improving w/ mucinex and resp viral panel outpatient negative).    She denies abdominal pain, nausea/vomiting, urinary f/u/d, fevers/chills.  C/o chronic pain due to RA for which she takes MTX weekly (last dose last Tues) and prednisone daily (last dose yesterday).  No recent change in meds.      In ED she reports feeling better and requests ice cream.  Denies cough, cp, sob, palp, abd pain, f/c, urinary f/u/d. Reports last episode diarrhea ~3days ago.     2/23-thirsty.  Offers no other complaints.         Review of Systems:  10pt ROS neg except for above. 	          Vital Signs Last 24 Hrs  T(C): 36.9, Max: 37 (02-23 @ 04:15)  T(F): 98.4, Max: 98.6 (02-23 @ 04:15)  HR: 93 (81 - 93)  BP: 114/77 (99/58 - 114/77)  RR: 16 (16 - 18)  SpO2: 96% (94% - 98%)    Physical Exam: General: ill appearing female, nad Neuro: AAOx3, no focal deficits HEENT: NCAT, PERRLA, EOMI, dry mucous membranes Neck: Soft and supple, No JVD Respiratory: CTA b/l, no w/r/r Cardiovascular: S1 and S2, RRR, no m/g/r Gastrointestinal: +BS, soft, generalized tenderness to palpations, no rebound tenderness Extremities: chronic changes, no c/c Vascular: 2+ peripheral pulses Musculoskeletal: 5/5 strength b/l UE and LE, sensation intact Skin: No rashes	          LABS: All Labs Reviewed:                        12.4   13.1  )-----------( 55       ( 22 Feb 2017 11:29 )             34.0     23 Feb 2017 07:40    139    |  102    |  35     ----------------------------<  103    3.3     |  26     |  1.41     Ca    8.4        23 Feb 2017 07:40  Phos  1.7       23 Feb 2017 07:40  Mg     2.6       23 Feb 2017 07:40    TPro  5.7    /  Alb  2.4    /  TBili  6.0    /  DBili  4.3    /  AST  246    /  ALT  117    /  AlkPhos  182    22 Feb 2017 19:47    PT/INR - ( 21 Feb 2017 18:07 )   PT: 15.4 sec;   INR: 1.40 ratio         PTT - ( 21 Feb 2017 18:07 )  PTT:36.8 sec          LABS: All Labs Reviewed:                      14.2  12.0  )-----------( 84       ( 21 Feb 2017 18:07 )            41.3   21 Feb 2017 18:07  127    |  91     |  59    ----------------------------<  116   3.7     |  22     |  2.44   Ca    9.1        21 Feb 2017 18:07  TPro  7.4    /  Alb  3.2    /  TBili  5.9    /  DBili  x      /  AST  313    /  ALT  152    /  AlkPhos  248    21 Feb 2017 18:07  PT/INR - ( 21 Feb 2017 18:07 )   PT: 15.4 sec;   INR: 1.40 ratio      PTT - ( 21 Feb 2017 18:07 )  PTT:36.8 sec  Lactate, Blood: 1.1 mmol/L (02.22.17 @ 11:29)  Acute Hepatitis Panel (02.22.17 @ 11:29)   Hepatitis C Virus Interpretation: Nonreact: Hepatitis C AB  Lipid Profile (02.22.17 @ 12:00)   HDL/Total Cholesterol Ratio Measurement: 14.5 RATIO   Cholesterol, Serum: 160 mg/dL   Triglycerides, Serum: 122 mg/dL   HDL Cholesterol, Serum: 11: TEST REPEATED. mg/dL   Direct LDL: 125: LDL Cholesterol --- Interpretive Comment (for adults 18 and over)     EXAM:  CT ABDOMEN AND PELVIS OC       02/21/2017  IMPRESSION:  1. Proctocolitis. No bowel obstruction.  2. Hepatic morphology suspicious for cirrhosis and fatty liver.  Cholelithiasis without cholecystitis.   EXAM:  US ABDOMEN LIMITED     02/22/2017   Evaluation of the study is markedly limited by overlying  bowel gas and patient condition. No definite acute abnormalities are  identified, however repeat ultrasound may when patient is n.p.o. 	    		  66y female admitted with    *diarrhea, proctocolitis   (pt reports diarrhea is chronic)- resolved   - CT abd noted  - IVF, IV abx   - stool studies pending  - blood cx pending  - lactate normalized  - GI eval appreciated      *elevated LFTs due to etoh abuse, etoh hepatitis/cirrhosis   - CT abd suspicious for cirrhosis, fatty liver;  cholelithiasis w/o cholecystitis  - repeat lfts, fractionate bili- improving   - check hep panel, lipids- nml   - etoh protocol, IVF, thiamine, MVI, folate  - GI consult appreciated    *hyponatremia  - likely hypovolemic due to decreased intake  - resolved w/ IVF     *RADHA  - baseline Cr 0.91 in 2013  - likely prerenal due to hypovolemia  - hold triamterene   - improving w/ IVF      *RA  -hold mtx (took last Tues- weekly dose)  - continue daily dose prednisone    *HTN  - hold triamterene due to RADHA    *hypokalemia/hypophos  - replete    *dvt px  -scds    PT eval. History and Physical:   Source of Information	Chart(s), Patient, Physician/Provider	  Outpatient Providers	Dr. Lees- PMD (but mostly sees DEVIN Garcia)	    Language:  · Patient/Family of Limited English Proficiency	No	      History of Present Illness:  Chief Complaint/Reason for Admission: I was sent by PMD for cough and problem w/ my kidneys.  Diarrhea.	  History of Present Illness: 	   66y female w/ PMH HTN, RA who states she was sent to ED by her PMD b/c of cough.  Discussed w/ PMD/PA who state she called w/ c/o worsening diarrhea and they advised her to come to ED.    Pt reports having chronic diarrhea for a year which she denies has recently worsened.  She does admit to decreased overall intake within past few weeks but states that is due to her upper resp infection (phlegm, cough x 3 weeks improving w/ mucinex and resp viral panel outpatient negative).    She denies abdominal pain, nausea/vomiting, urinary f/u/d, fevers/chills.  C/o chronic pain due to RA for which she takes MTX weekly (last dose last Tues) and prednisone daily (last dose yesterday).  No recent change in meds.      In ED she reports feeling better and requests ice cream.  Denies cough, cp, sob, palp, abd pain, f/c, urinary f/u/d. Reports last episode diarrhea ~3days ago.     2/23-thirsty.  Offers no other complaints.         Review of Systems:  10pt ROS neg except for above. 	          Vital Signs Last 24 Hrs  T(C): 36.9, Max: 37 (02-23 @ 04:15)  T(F): 98.4, Max: 98.6 (02-23 @ 04:15)  HR: 93 (81 - 93)  BP: 114/77 (99/58 - 114/77)  RR: 16 (16 - 18)  SpO2: 96% (94% - 98%)    Physical Exam: General: ill appearing female, nad Neuro: AAOx3, no focal deficits HEENT: NCAT, PERRLA, EOMI, dry mucous membranes Neck: Soft and supple, No JVD Respiratory: CTA b/l, no w/r/r Cardiovascular: S1 and S2, RRR, no m/g/r Gastrointestinal: +BS, soft, generalized tenderness to palpations, no rebound tenderness Extremities: chronic changes, no c/c Vascular: 2+ peripheral pulses Musculoskeletal: 5/5 strength b/l UE and LE, sensation intact Skin: No rashes	          LABS: All Labs Reviewed:                        12.4   13.1  )-----------( 55       ( 22 Feb 2017 11:29 )             34.0     23 Feb 2017 07:40    139    |  102    |  35     ----------------------------<  103    3.3     |  26     |  1.41     Ca    8.4        23 Feb 2017 07:40  Phos  1.7       23 Feb 2017 07:40  Mg     2.6       23 Feb 2017 07:40    TPro  5.7    /  Alb  2.4    /  TBili  6.0    /  DBili  4.3    /  AST  246    /  ALT  117    /  AlkPhos  182    22 Feb 2017 19:47    PT/INR - ( 21 Feb 2017 18:07 )   PT: 15.4 sec;   INR: 1.40 ratio         PTT - ( 21 Feb 2017 18:07 )  PTT:36.8 sec          LABS: All Labs Reviewed:                      14.2  12.0  )-----------( 84       ( 21 Feb 2017 18:07 )            41.3   21 Feb 2017 18:07  127    |  91     |  59    ----------------------------<  116   3.7     |  22     |  2.44   Ca    9.1        21 Feb 2017 18:07  TPro  7.4    /  Alb  3.2    /  TBili  5.9    /  DBili  x      /  AST  313    /  ALT  152    /  AlkPhos  248    21 Feb 2017 18:07  PT/INR - ( 21 Feb 2017 18:07 )   PT: 15.4 sec;   INR: 1.40 ratio      PTT - ( 21 Feb 2017 18:07 )  PTT:36.8 sec  Lactate, Blood: 1.1 mmol/L (02.22.17 @ 11:29)  Acute Hepatitis Panel (02.22.17 @ 11:29)   Hepatitis C Virus Interpretation: Nonreact: Hepatitis C AB  Lipid Profile (02.22.17 @ 12:00)   HDL/Total Cholesterol Ratio Measurement: 14.5 RATIO   Cholesterol, Serum: 160 mg/dL   Triglycerides, Serum: 122 mg/dL   HDL Cholesterol, Serum: 11: TEST REPEATED. mg/dL   Direct LDL: 125: LDL Cholesterol --- Interpretive Comment (for adults 18 and over)     EXAM:  CT ABDOMEN AND PELVIS OC       02/21/2017  IMPRESSION:  1. Proctocolitis. No bowel obstruction.  2. Hepatic morphology suspicious for cirrhosis and fatty liver.  Cholelithiasis without cholecystitis.   EXAM:  US ABDOMEN LIMITED     02/22/2017   Evaluation of the study is markedly limited by overlying  bowel gas and patient condition. No definite acute abnormalities are  identified, however repeat ultrasound may when patient is n.p.o. 	    		  66y female admitted with    *diarrhea, proctocolitis   (pt reports diarrhea is chronic)- resolved   - CT abd noted  - IVF, IV abx   - stool studies pending  - blood cx pending  - lactate normalized  - GI eval appreciated      *elevated LFTs due to etoh abuse, etoh hepatitis/cirrhosis   - CT abd suspicious for cirrhosis, fatty liver;  cholelithiasis w/o cholecystitis  - repeat lfts, fractionate bili- improving   - check hep panel, lipids- nml   - etoh protocol, IVF, thiamine, MVI, folate  - GI consult appreciated    *thrombocytopenia  - due to cirrhosis  - d/c heparin sc  - monitor     *hyponatremia  - likely hypovolemic due to decreased intake  - resolved w/ IVF     *RADHA  - baseline Cr 0.91 in 2013  - likely prerenal due to hypovolemia  - hold triamterene   - improving w/ IVF      *RA  -hold mtx (took last Tues- weekly dose)  - continue daily dose prednisone    *HTN  - hold triamterene due to RADHA    *hypokalemia/hypophos  - replete    *dvt px  -scds    PT eval.

## 2017-02-24 LAB
-  AMIKACIN: SIGNIFICANT CHANGE UP
-  AMPICILLIN/SULBACTAM: SIGNIFICANT CHANGE UP
-  AMPICILLIN: SIGNIFICANT CHANGE UP
-  AZTREONAM: SIGNIFICANT CHANGE UP
-  CEFAZOLIN: SIGNIFICANT CHANGE UP
-  CEFEPIME: SIGNIFICANT CHANGE UP
-  CEFOXITIN: SIGNIFICANT CHANGE UP
-  CEFTAZIDIME: SIGNIFICANT CHANGE UP
-  CEFTRIAXONE: SIGNIFICANT CHANGE UP
-  CIPROFLOXACIN: SIGNIFICANT CHANGE UP
-  ERTAPENEM: SIGNIFICANT CHANGE UP
-  GENTAMICIN: SIGNIFICANT CHANGE UP
-  IMIPENEM: SIGNIFICANT CHANGE UP
-  LEVOFLOXACIN: SIGNIFICANT CHANGE UP
-  MEROPENEM: SIGNIFICANT CHANGE UP
-  NITROFURANTOIN: SIGNIFICANT CHANGE UP
-  PIPERACILLIN/TAZOBACTAM: SIGNIFICANT CHANGE UP
-  TOBRAMYCIN: SIGNIFICANT CHANGE UP
-  TRIMETHOPRIM/SULFAMETHOXAZOLE: SIGNIFICANT CHANGE UP
ALBUMIN SERPL ELPH-MCNC: 2.2 G/DL — LOW (ref 3.3–5)
ALP SERPL-CCNC: 181 U/L — HIGH (ref 40–120)
ALT FLD-CCNC: 106 U/L — HIGH (ref 12–78)
ANA PAT FLD IF-IMP: (no result)
ANA TITR SER: (no result)
ANION GAP SERPL CALC-SCNC: 10 MMOL/L — SIGNIFICANT CHANGE UP (ref 5–17)
ANISOCYTOSIS BLD QL: SLIGHT — SIGNIFICANT CHANGE UP
APTT BLD: 39.9 SEC — HIGH (ref 27.5–37.4)
AST SERPL-CCNC: 240 U/L — HIGH (ref 15–37)
BASOPHILS # BLD AUTO: 0.1 K/UL — SIGNIFICANT CHANGE UP (ref 0–0.2)
BASOPHILS NFR BLD AUTO: 0.6 % — SIGNIFICANT CHANGE UP (ref 0–2)
BILIRUB DIRECT SERPL-MCNC: 4 MG/DL — HIGH (ref 0–0.2)
BILIRUB INDIRECT FLD-MCNC: 1.8 MG/DL — HIGH (ref 0.2–1)
BILIRUB SERPL-MCNC: 5.8 MG/DL — HIGH (ref 0.2–1.2)
BUN SERPL-MCNC: 27 MG/DL — HIGH (ref 7–23)
CALCIUM SERPL-MCNC: 8.1 MG/DL — LOW (ref 8.5–10.1)
CHLORIDE SERPL-SCNC: 110 MMOL/L — HIGH (ref 96–108)
CO2 SERPL-SCNC: 23 MMOL/L — SIGNIFICANT CHANGE UP (ref 22–31)
CREAT SERPL-MCNC: 1.04 MG/DL — SIGNIFICANT CHANGE UP (ref 0.5–1.3)
CULTURE RESULTS: SIGNIFICANT CHANGE UP
CULTURE RESULTS: SIGNIFICANT CHANGE UP
D DIMER BLD IA.RAPID-MCNC: 1740 NG/ML DDU — HIGH
EOSINOPHIL # BLD AUTO: 0.3 K/UL — SIGNIFICANT CHANGE UP (ref 0–0.5)
EOSINOPHIL NFR BLD AUTO: 2.5 % — SIGNIFICANT CHANGE UP (ref 0–6)
FIBRINOGEN PPP-MCNC: 487 MG/DL — SIGNIFICANT CHANGE UP (ref 255–510)
GLUCOSE SERPL-MCNC: 91 MG/DL — SIGNIFICANT CHANGE UP (ref 70–99)
HCT VFR BLD CALC: 34.6 % — SIGNIFICANT CHANGE UP (ref 34.5–45)
HGB BLD-MCNC: 11.8 G/DL — SIGNIFICANT CHANGE UP (ref 11.5–15.5)
HYPOCHROMIA BLD QL: SLIGHT — SIGNIFICANT CHANGE UP
INR BLD: 1.64 RATIO — HIGH (ref 0.88–1.16)
LG PLATELETS BLD QL AUTO: SLIGHT — SIGNIFICANT CHANGE UP
LYMPHOCYTES # BLD AUTO: 1.8 K/UL — SIGNIFICANT CHANGE UP (ref 1–3.3)
LYMPHOCYTES # BLD AUTO: 18 % — SIGNIFICANT CHANGE UP (ref 13–44)
MACROCYTES BLD QL: SIGNIFICANT CHANGE UP
MANUAL DIF COMMENT BLD-IMP: SIGNIFICANT CHANGE UP
MCHC RBC-ENTMCNC: 34 GM/DL — SIGNIFICANT CHANGE UP (ref 32–36)
MCHC RBC-ENTMCNC: 37.3 PG — HIGH (ref 27–34)
MCV RBC AUTO: 109.8 FL — HIGH (ref 80–100)
METHOD TYPE: SIGNIFICANT CHANGE UP
MITOCHONDRIA AB SER-ACNC: SIGNIFICANT CHANGE UP
MONOCYTES # BLD AUTO: 1 K/UL — HIGH (ref 0–0.9)
MONOCYTES NFR BLD AUTO: 9.7 % — SIGNIFICANT CHANGE UP (ref 2–14)
MTX SERPL-SCNC: 0.03 UMOL/L — SIGNIFICANT CHANGE UP
NEUTROPHILS # BLD AUTO: 6.8 K/UL — SIGNIFICANT CHANGE UP (ref 1.8–7.4)
NEUTROPHILS NFR BLD AUTO: 69 % — SIGNIFICANT CHANGE UP (ref 43–77)
ORGANISM # SPEC MICROSCOPIC CNT: SIGNIFICANT CHANGE UP
ORGANISM # SPEC MICROSCOPIC CNT: SIGNIFICANT CHANGE UP
PHOSPHATE SERPL-MCNC: 2.9 MG/DL — SIGNIFICANT CHANGE UP (ref 2.5–4.5)
PLAT MORPH BLD: NORMAL — SIGNIFICANT CHANGE UP
PLATELET # BLD AUTO: 32 K/UL — LOW (ref 150–400)
PLATELET COUNT - ESTIMATE: (no result)
POTASSIUM SERPL-MCNC: 3.7 MMOL/L — SIGNIFICANT CHANGE UP (ref 3.5–5.3)
POTASSIUM SERPL-SCNC: 3.7 MMOL/L — SIGNIFICANT CHANGE UP (ref 3.5–5.3)
PROT SERPL-MCNC: 5.4 GM/DL — LOW (ref 6–8.3)
PROTHROM AB SERPL-ACNC: 18.1 SEC — HIGH (ref 10–13.1)
RBC # BLD: 3.15 M/UL — LOW (ref 3.8–5.2)
RBC # FLD: 14.6 % — HIGH (ref 10.3–14.5)
RBC BLD AUTO: (no result)
SMOOTH MUSCLE AB SER-ACNC: SIGNIFICANT CHANGE UP
SODIUM SERPL-SCNC: 143 MMOL/L — SIGNIFICANT CHANGE UP (ref 135–145)
SPECIMEN SOURCE: SIGNIFICANT CHANGE UP
SPECIMEN SOURCE: SIGNIFICANT CHANGE UP
TARGETS BLD QL SMEAR: SLIGHT — SIGNIFICANT CHANGE UP
WBC # BLD: 9.8 K/UL — SIGNIFICANT CHANGE UP (ref 3.8–10.5)
WBC # FLD AUTO: 9.8 K/UL — SIGNIFICANT CHANGE UP (ref 3.8–10.5)

## 2017-02-24 RX ORDER — CIPROFLOXACIN LACTATE 400MG/40ML
400 VIAL (ML) INTRAVENOUS ONCE
Qty: 0 | Refills: 0 | Status: COMPLETED | OUTPATIENT
Start: 2017-02-24 | End: 2017-02-24

## 2017-02-24 RX ORDER — CIPROFLOXACIN LACTATE 400MG/40ML
400 VIAL (ML) INTRAVENOUS EVERY 12 HOURS
Qty: 0 | Refills: 0 | Status: DISCONTINUED | OUTPATIENT
Start: 2017-02-25 | End: 2017-03-01

## 2017-02-24 RX ORDER — ACETAMINOPHEN 500 MG
650 TABLET ORAL ONCE
Qty: 0 | Refills: 0 | Status: COMPLETED | OUTPATIENT
Start: 2017-02-24 | End: 2017-02-24

## 2017-02-24 RX ORDER — CIPROFLOXACIN LACTATE 400MG/40ML
VIAL (ML) INTRAVENOUS
Qty: 0 | Refills: 0 | Status: DISCONTINUED | OUTPATIENT
Start: 2017-02-24 | End: 2017-03-01

## 2017-02-24 RX ADMIN — Medication 200 MILLIGRAM(S): at 15:23

## 2017-02-24 RX ADMIN — Medication 650 MILLIGRAM(S): at 01:46

## 2017-02-24 RX ADMIN — Medication 1.5 MILLIGRAM(S): at 13:05

## 2017-02-24 RX ADMIN — Medication 1 MILLIGRAM(S): at 13:04

## 2017-02-24 RX ADMIN — POTASSIUM PHOSPHATE, MONOBASIC POTASSIUM PHOSPHATE, DIBASIC 62.5 MILLIMOLE(S): 236; 224 INJECTION, SOLUTION INTRAVENOUS at 06:25

## 2017-02-24 RX ADMIN — SODIUM CHLORIDE 75 MILLILITER(S): 9 INJECTION INTRAMUSCULAR; INTRAVENOUS; SUBCUTANEOUS at 15:49

## 2017-02-24 RX ADMIN — Medication 1 MILLIGRAM(S): at 17:10

## 2017-02-24 RX ADMIN — Medication 1 TABLET(S): at 13:05

## 2017-02-24 RX ADMIN — Medication 5 MILLIGRAM(S): at 06:25

## 2017-02-24 RX ADMIN — Medication 1 MILLIGRAM(S): at 22:36

## 2017-02-24 RX ADMIN — Medication 100 MILLIGRAM(S): at 13:05

## 2017-02-24 RX ADMIN — SODIUM CHLORIDE 75 MILLILITER(S): 9 INJECTION INTRAMUSCULAR; INTRAVENOUS; SUBCUTANEOUS at 00:03

## 2017-02-24 NOTE — PROGRESS NOTE ADULT - SUBJECTIVE AND OBJECTIVE BOX
History and Physical:   Source of Information	Chart(s), Patient, Physician/Provider	  Outpatient Providers	Dr. Lees- PMD (but mostly sees DEVIN Garcia)	    Language:  · Patient/Family of Limited English Proficiency	No	      History of Present Illness:  Chief Complaint/Reason for Admission: I was sent by PMD for cough and problem w/ my kidneys.  Diarrhea.	  History of Present Illness: 	   66y female w/ PMH HTN, RA who states she was sent to ED by her PMD b/c of cough.  Discussed w/ PMD/PA who state she called w/ c/o worsening diarrhea and they advised her to come to ED.    Pt reports having chronic diarrhea for a year which she denies has recently worsened.  She does admit to decreased overall intake within past few weeks but states that is due to her upper resp infection (phlegm, cough x 3 weeks improving w/ mucinex and resp viral panel outpatient negative).    She denies abdominal pain, nausea/vomiting, urinary f/u/d, fevers/chills.  C/o chronic pain due to RA for which she takes MTX weekly (last dose last Tues) and prednisone daily (last dose yesterday).  No recent change in meds.      In ED she reports feeling better and requests ice cream.  Denies cough, cp, sob, palp, abd pain, f/c, urinary f/u/d. Reports last episode diarrhea ~3days ago.     2/23-thirsty.  Offers no other complaints.     2/24- lethargic but responding appropriately to questions.  Requests that I call her significant other to update him.  Tmax 101 overnight.  No complaints.   Now admits to drinking at least three 8oz glasses vodka on ice daily.         Review of Systems:  10pt ROS neg except for above. 	      vital Signs Last 24 Hrs  T(C): 37.3, Max: 38.4 (02-24 @ 01:00)  T(F): 99.2, Max: 101.1 (02-24 @ 01:00)  HR: 89 (89 - 100)  BP: 109/59 (109/59 - 132/53)  RR: 18 (16 - 18)  SpO2: 94% (93% - 95%)      Physical Exam: General: ill appearing female, nad, lethargic but responding to questions Neuro: AAOx3, no focal deficits HEENT: NCAT, PERRLA, EOMI, dry mucous membranes Neck: Soft and supple, No JVD Respiratory: CTA b/l, no w/r/r Cardiovascular: S1 and S2, RRR, no m/g/r Gastrointestinal: +BS, soft, generalized tenderness to palpations, no rebound tenderness Extremities: chronic changes, no c/c Vascular: 2+ peripheral pulses Musculoskeletal: 5/5 strength b/l UE and LE, sensation intact Skin: No rashes	          LABS: All Labs Reviewed:                        11.8   9.8   )-----------( 32       ( 24 Feb 2017 07:07 )             34.6     24 Feb 2017 07:07    143    |  110    |  27     ----------------------------<  91     3.7     |  23     |  1.04     Ca    8.1        24 Feb 2017 07:07  Phos  2.9       24 Feb 2017 07:07  Mg     2.6       23 Feb 2017 07:40    TPro  5.4    /  Alb  2.2    /  TBili  5.8    /  DBili  4.0    /  AST  240    /  ALT  106    /  AlkPhos  181    24 Feb 2017 07:07      Culture - Urine (02.22.17 @ 11:26)    -  Cefepime: S <=4    -  Ceftazidime: S <=1    -  Ceftriaxone: S <=1    -  Gentamicin: S <=4    -  Levofloxacin: S <=2    -  Nitrofurantoin: S <=32    -  Piperacillin/Tazobactam: S <=16    -  Ampicillin: S <=8    -  Ampicillin/Sulbactam: S <=8/4    -  Cefazolin: S <=8    -  Ciprofloxacin: S <=1    -  Tobramycin: S <=4    -  Aztreonam: S <=4    -  Meropenem: S <=1    -  Trimethoprim/Sulfamethoxazole: S <=2/38    -  Amikacin: S <=16    -  Cefoxitin: S <=8    -  Ertapenem: S <=1    -  Imipenem: S <=1    Specimen Source: .Urine None    Culture Results:   10,000 - 49,000 CFU/mL Escherichia coli  Revised urine counts reflect clinically significant counts  established by evidence based medicine.    Organism Identification: Escherichia coli    Organism: Escherichia coli    Method Type: JULIUS    Culture - Blood (02.22.17 @ 13:17)    Specimen Source: .Blood None    Culture Results:   No growth to date.                              12.4   13.1  )-----------( 55       ( 22 Feb 2017 11:29 )             34.0     23 Feb 2017 07:40    139    |  102    |  35     ----------------------------<  103    3.3     |  26     |  1.41     Ca    8.4        23 Feb 2017 07:40  Phos  1.7       23 Feb 2017 07:40  Mg     2.6       23 Feb 2017 07:40    TPro  5.7    /  Alb  2.4    /  TBili  6.0    /  DBili  4.3    /  AST  246    /  ALT  117    /  AlkPhos  182    22 Feb 2017 19:47    PT/INR - ( 21 Feb 2017 18:07 )   PT: 15.4 sec;   INR: 1.40 ratio         PTT - ( 21 Feb 2017 18:07 )  PTT:36.8 sec          LABS: All Labs Reviewed:                      14.2  12.0  )-----------( 84       ( 21 Feb 2017 18:07 )            41.3   21 Feb 2017 18:07  127    |  91     |  59    ----------------------------<  116   3.7     |  22     |  2.44   Ca    9.1        21 Feb 2017 18:07  TPro  7.4    /  Alb  3.2    /  TBili  5.9    /  DBili  x      /  AST  313    /  ALT  152    /  AlkPhos  248    21 Feb 2017 18:07  PT/INR - ( 21 Feb 2017 18:07 )   PT: 15.4 sec;   INR: 1.40 ratio      PTT - ( 21 Feb 2017 18:07 )  PTT:36.8 sec  Lactate, Blood: 1.1 mmol/L (02.22.17 @ 11:29)  Acute Hepatitis Panel (02.22.17 @ 11:29)   Hepatitis C Virus Interpretation: Nonreact: Hepatitis C AB  Lipid Profile (02.22.17 @ 12:00)   HDL/Total Cholesterol Ratio Measurement: 14.5 RATIO   Cholesterol, Serum: 160 mg/dL   Triglycerides, Serum: 122 mg/dL   HDL Cholesterol, Serum: 11: TEST REPEATED. mg/dL   Direct LDL: 125: LDL Cholesterol --- Interpretive Comment (for adults 18 and over)     EXAM:  CT ABDOMEN AND PELVIS OC       02/21/2017  IMPRESSION:  1. Proctocolitis. No bowel obstruction.  2. Hepatic morphology suspicious for cirrhosis and fatty liver.  Cholelithiasis without cholecystitis.  Culture - Blood (02.22.17 @ 13:17)   Specimen Source: .Blood None   Culture Results:  No growth to date.   EXAM:  US ABDOMEN LIMITED     02/22/2017   Evaluation of the study is markedly limited by overlying  bowel gas and patient condition. No definite acute abnormalities are  identified, however repeat ultrasound may when patient is n.p.o. 	    		  66y female admitted with    *diarrhea, proctocolitis   (pt reports diarrhea is chronic)- resolved   - CT abd noted  - IVF, IV abx   - stool studies negative   - blood cx ngtd  - lactate normalized  - GI eval appreciated     *ecoli uti  - IV abx, IVF  - blood cx ngtd    *elevated LFTs due to etoh abuse, etoh hepatitis/cirrhosis   - CT abd suspicious for cirrhosis, fatty liver;  cholelithiasis w/o cholecystitis  - repeat lfts- monitor   - hep panel, lipids- nml   - CIWA protocol, IVF, thiamine, MVI, folate  - GI consult appreciated    *thrombocytopenia  - due to cirrhosis; no splenomegaly  - monitor closely      *hyponatremia  - likely hypovolemic due to decreased intake  - resolved w/ IVF     *RADHA  - baseline Cr 0.91 in 2013  - likely prerenal due to hypovolemia  - hold triamterene   - resolved w/ IVF      *RA  -hold mtx (took last Tues- weekly dose)  - continue daily dose prednisone    *HTN  - hold triamterene due to RADHA    *hypokalemia/hypophos  - repleted    *dvt px  -scds    PT eval when more alert.

## 2017-02-24 NOTE — PROGRESS NOTE ADULT - SUBJECTIVE AND OBJECTIVE BOX
Patient is a 66y old  Female who presents with a chief complaint of weakness, abd pain, diarrhea (22 Feb 2017 15:54)      Subective:  No definite diarrhea. No bleeding. No specific complaints. Calm now.    PAST MEDICAL & SURGICAL HISTORY:  HTN (hypertension)  Rheumatoid arthritis  Alcohol abuse      MEDICATIONS  (STANDING):  ciprofloxacin   IVPB 400milliGRAM(s) IV Intermittent every 24 hours  sodium chloride 0.9%. 1000milliLiter(s) IV Continuous <Continuous>  ciprofloxacin   IVPB  IV Intermittent   metroNIDAZOLE    Tablet 500milliGRAM(s) Oral every 8 hours  predniSONE   Tablet 5milliGRAM(s) Oral daily  LORazepam     Tablet 2milliGRAM(s) Oral every 4 hours  LORazepam     Tablet 1.5milliGRAM(s) Oral every 4 hours  LORazepam     Tablet milliGRAM(s) Oral   folic acid 1milliGRAM(s) Oral daily  multivitamin 1Tablet(s) Oral daily  thiamine 100milliGRAM(s) Oral daily    MEDICATIONS  (PRN):  ondansetron Injectable 4milliGRAM(s) IV Push every 6 hours PRN Nausea and/or Vomiting  oxyCODONE  5 mG/acetaminophen 325 mG 1Tablet(s) Oral every 6 hours PRN Moderate Pain (4 - 6)      REVIEW OF SYSTEMS:    RESPIRATORY: No shortness of breath  CARDIOVASCULAR: No chest pain  All other review of systems is negative unless indicated above.    Vital Signs Last 24 Hrs  T(C): 36.8, Max: 37 (02-23 @ 04:15)  T(F): 98.3, Max: 98.6 (02-23 @ 04:15)  HR: 90 (67 - 93)  BP: 101/47 (99/58 - 110/55)  BP(mean): --  RR: 16 (14 - 18)  SpO2: 97% (94% - 100%)    PHYSICAL EXAM:    Constitutional: somnolent, arousable  Respiratory: CTAB  Cardiovascular: S1 and S2, RRR  Gastrointestinal: BS+, soft, NT/ND  Extremities: No peripheral edema    LABS:                        12.4   13.1  )-----------( 55       ( 22 Feb 2017 11:29 )             34.0     22 Feb 2017 19:47    134    |  100    |  43     ----------------------------<  121    3.6     |  23     |  1.64     Ca    8.3        22 Feb 2017 19:47  Phos  2.1       22 Feb 2017 19:47  Mg     2.6       22 Feb 2017 19:47    TPro  5.7    /  Alb  2.4    /  TBili  6.0    /  DBili  4.3    /  AST  246    /  ALT  117    /  AlkPhos  182    22 Feb 2017 19:47    PT/INR - ( 21 Feb 2017 18:07 )   PT: 15.4 sec;   INR: 1.40 ratio         PTT - ( 21 Feb 2017 18:07 )  PTT:36.8 sec  LIVER FUNCTIONS - ( 22 Feb 2017 19:47 )  Alb: 2.4 g/dL / Pro: 5.7 gm/dL / ALK PHOS: 182 U/L / ALT: 117 U/L / AST: 246 U/L / GGT: x

## 2017-02-25 LAB
ALBUMIN SERPL ELPH-MCNC: 2 G/DL — LOW (ref 3.3–5)
ALP SERPL-CCNC: 176 U/L — HIGH (ref 40–120)
ALT FLD-CCNC: 94 U/L — HIGH (ref 12–78)
ANION GAP SERPL CALC-SCNC: 9 MMOL/L — SIGNIFICANT CHANGE UP (ref 5–17)
APTT BLD: 39 SEC — HIGH (ref 27.5–37.4)
AST SERPL-CCNC: 212 U/L — HIGH (ref 15–37)
BILIRUB DIRECT SERPL-MCNC: 3.1 MG/DL — HIGH (ref 0–0.2)
BILIRUB INDIRECT FLD-MCNC: 1.4 MG/DL — HIGH (ref 0.2–1)
BILIRUB SERPL-MCNC: 4.5 MG/DL — HIGH (ref 0.2–1.2)
BUN SERPL-MCNC: 20 MG/DL — SIGNIFICANT CHANGE UP (ref 7–23)
CALCIUM SERPL-MCNC: 8 MG/DL — LOW (ref 8.5–10.1)
CHLORIDE SERPL-SCNC: 113 MMOL/L — HIGH (ref 96–108)
CO2 SERPL-SCNC: 23 MMOL/L — SIGNIFICANT CHANGE UP (ref 22–31)
CREAT SERPL-MCNC: 0.93 MG/DL — SIGNIFICANT CHANGE UP (ref 0.5–1.3)
GLUCOSE SERPL-MCNC: 120 MG/DL — HIGH (ref 70–99)
HCT VFR BLD CALC: 31.6 % — LOW (ref 34.5–45)
HCT VFR BLD CALC: 33 % — LOW (ref 34.5–45)
HGB BLD-MCNC: 10.8 G/DL — LOW (ref 11.5–15.5)
HGB BLD-MCNC: 11.2 G/DL — LOW (ref 11.5–15.5)
INR BLD: 1.74 RATIO — HIGH (ref 0.88–1.16)
MCHC RBC-ENTMCNC: 34 GM/DL — SIGNIFICANT CHANGE UP (ref 32–36)
MCHC RBC-ENTMCNC: 34.3 GM/DL — SIGNIFICANT CHANGE UP (ref 32–36)
MCHC RBC-ENTMCNC: 37.9 PG — HIGH (ref 27–34)
MCHC RBC-ENTMCNC: 38.1 PG — HIGH (ref 27–34)
MCV RBC AUTO: 110.4 FL — HIGH (ref 80–100)
MCV RBC AUTO: 112 FL — HIGH (ref 80–100)
PLATELET # BLD AUTO: 19 K/UL — CRITICAL LOW (ref 150–400)
PLATELET # BLD AUTO: 22 K/UL — LOW (ref 150–400)
POTASSIUM SERPL-MCNC: 3.9 MMOL/L — SIGNIFICANT CHANGE UP (ref 3.5–5.3)
POTASSIUM SERPL-SCNC: 3.9 MMOL/L — SIGNIFICANT CHANGE UP (ref 3.5–5.3)
PROT SERPL-MCNC: 5.1 GM/DL — LOW (ref 6–8.3)
PROTHROM AB SERPL-ACNC: 19.2 SEC — HIGH (ref 10–13.1)
RBC # BLD: 2.86 M/UL — LOW (ref 3.8–5.2)
RBC # BLD: 2.95 M/UL — LOW (ref 3.8–5.2)
RBC # FLD: 14.2 % — SIGNIFICANT CHANGE UP (ref 10.3–14.5)
RBC # FLD: 15.1 % — HIGH (ref 10.3–14.5)
SODIUM SERPL-SCNC: 145 MMOL/L — SIGNIFICANT CHANGE UP (ref 135–145)
WBC # BLD: 8.7 K/UL — SIGNIFICANT CHANGE UP (ref 3.8–10.5)
WBC # BLD: 9.4 K/UL — SIGNIFICANT CHANGE UP (ref 3.8–10.5)
WBC # FLD AUTO: 8.7 K/UL — SIGNIFICANT CHANGE UP (ref 3.8–10.5)
WBC # FLD AUTO: 9.4 K/UL — SIGNIFICANT CHANGE UP (ref 3.8–10.5)

## 2017-02-25 RX ORDER — ACETAMINOPHEN 500 MG
650 TABLET ORAL ONCE
Qty: 0 | Refills: 0 | Status: COMPLETED | OUTPATIENT
Start: 2017-02-25 | End: 2017-02-25

## 2017-02-25 RX ORDER — PREGABALIN 225 MG/1
1000 CAPSULE ORAL ONCE
Qty: 0 | Refills: 0 | Status: COMPLETED | OUTPATIENT
Start: 2017-02-25 | End: 2017-02-25

## 2017-02-25 RX ORDER — SODIUM FERRIC GLUCONAT/SUCROSE 62.5MG/5ML
125 AMPUL (ML) INTRAVENOUS DAILY
Qty: 0 | Refills: 0 | Status: COMPLETED | OUTPATIENT
Start: 2017-02-25 | End: 2017-02-27

## 2017-02-25 RX ORDER — PHYTONADIONE (VIT K1) 5 MG
5 TABLET ORAL ONCE
Qty: 0 | Refills: 0 | Status: COMPLETED | OUTPATIENT
Start: 2017-02-25 | End: 2017-02-25

## 2017-02-25 RX ADMIN — Medication 200 MILLIGRAM(S): at 05:05

## 2017-02-25 RX ADMIN — Medication 1 MILLIGRAM(S): at 02:59

## 2017-02-25 RX ADMIN — Medication 5 MILLIGRAM(S): at 05:04

## 2017-02-25 RX ADMIN — Medication 1 TABLET(S): at 11:22

## 2017-02-25 RX ADMIN — Medication 5 MILLIGRAM(S): at 11:21

## 2017-02-25 RX ADMIN — Medication 1 MILLIGRAM(S): at 02:58

## 2017-02-25 RX ADMIN — PREGABALIN 1000 MICROGRAM(S): 225 CAPSULE ORAL at 12:31

## 2017-02-25 RX ADMIN — Medication 110 MILLIGRAM(S): at 12:32

## 2017-02-25 RX ADMIN — SODIUM CHLORIDE 75 MILLILITER(S): 9 INJECTION INTRAMUSCULAR; INTRAVENOUS; SUBCUTANEOUS at 09:04

## 2017-02-25 RX ADMIN — Medication 1 MILLIGRAM(S): at 11:22

## 2017-02-25 RX ADMIN — Medication 650 MILLIGRAM(S): at 06:07

## 2017-02-25 RX ADMIN — Medication 100 MILLIGRAM(S): at 11:22

## 2017-02-25 RX ADMIN — Medication 200 MILLIGRAM(S): at 18:29

## 2017-02-25 RX ADMIN — Medication 0.5 MILLIGRAM(S): at 18:26

## 2017-02-25 NOTE — PROGRESS NOTE ADULT - SUBJECTIVE AND OBJECTIVE BOX
Patient is a 66y old  Female who presents with a chief complaint of weakness, abd pain, diarrhea (22 Feb 2017 15:54)      Subective:  No complaints. Calm but confused. Still getting ativan.    PAST MEDICAL & SURGICAL HISTORY:  HTN (hypertension)  Rheumatoid arthritis  Alcohol abuse  Alcoholic cirrhosis    MEDICATIONS  (STANDING):  ciprofloxacin   IVPB 400milliGRAM(s) IV Intermittent every 24 hours  sodium chloride 0.9%. 1000milliLiter(s) IV Continuous <Continuous>  ciprofloxacin   IVPB  IV Intermittent   metroNIDAZOLE    Tablet 500milliGRAM(s) Oral every 8 hours  predniSONE   Tablet 5milliGRAM(s) Oral daily  LORazepam     Tablet 2milliGRAM(s) Oral every 4 hours  LORazepam     Tablet 1.5milliGRAM(s) Oral every 4 hours  LORazepam     Tablet milliGRAM(s) Oral   folic acid 1milliGRAM(s) Oral daily  multivitamin 1Tablet(s) Oral daily  thiamine 100milliGRAM(s) Oral daily    MEDICATIONS  (PRN):  ondansetron Injectable 4milliGRAM(s) IV Push every 6 hours PRN Nausea and/or Vomiting  oxyCODONE  5 mG/acetaminophen 325 mG 1Tablet(s) Oral every 6 hours PRN Moderate Pain (4 - 6)      REVIEW OF SYSTEMS:    RESPIRATORY: No shortness of breath  CARDIOVASCULAR: No chest pain  All other review of systems is negative unless indicated above.    Vital Signs Last 24 Hrs  T(C): 36.8, Max: 37 (02-23 @ 04:15)  T(F): 98.3, Max: 98.6 (02-23 @ 04:15)  HR: 90 (67 - 93)  BP: 101/47 (99/58 - 110/55)  BP(mean): --  RR: 16 (14 - 18)  SpO2: 97% (94% - 100%)    PHYSICAL EXAM:    Constitutional: somnolent, awake, confused  Respiratory: CTAB  Cardiovascular: S1 and S2, RRR  Gastrointestinal: BS+, soft, NT/ND  Extremities: No peripheral edema    Hepatic Function Panel in AM (02.25.17 @ 07:17)    Indirect Reacting Bilirubin: 1.4 mg/dL    Protein Total, Serum: 5.1 gm/dL    Albumin, Serum: 2.0 g/dL    Bilirubin Total, Serum: 4.5 mg/dL    Bilirubin Direct, Serum: 3.1 mg/dL    Alkaline Phosphatase, Serum: 176 U/L    Aspartate Aminotransferase (AST/SGOT): 212 U/L    Alanine Aminotransferase (ALT/SGPT): 94 U/L

## 2017-02-25 NOTE — PROGRESS NOTE ADULT - NSHPATTENDINGPLANDISCUSS_GEN_ALL_CORE
patient, nurse, Jorge (pt's significant other, as per pt's request) patient, nurse, Jorge (pt's significant other, as per pt's request), GI,

## 2017-02-25 NOTE — PROGRESS NOTE ADULT - SUBJECTIVE AND OBJECTIVE BOX
History and Physical:   Source of Information	Chart(s), Patient, Physician/Provider	  Outpatient Providers	Dr. Lees- PMD (but mostly sees DEVIN Garcia)	    Language:  · Patient/Family of Limited English Proficiency	No	      History of Present Illness:  Chief Complaint/Reason for Admission: I was sent by PMD for cough and problem w/ my kidneys.  Diarrhea.	  History of Present Illness: 	   66y female w/ PMH HTN, RA who states she was sent to ED by her PMD b/c of cough.  Discussed w/ PMD/PA who state she called w/ c/o worsening diarrhea and they advised her to come to ED.    Pt reports having chronic diarrhea for a year which she denies has recently worsened.  She does admit to decreased overall intake within past few weeks but states that is due to her upper resp infection (phlegm, cough x 3 weeks improving w/ mucinex and resp viral panel outpatient negative).    She denies abdominal pain, nausea/vomiting, urinary f/u/d, fevers/chills.  C/o chronic pain due to RA for which she takes MTX weekly (last dose last Tues) and prednisone daily (last dose yesterday).  No recent change in meds.      In ED she reports feeling better and requests ice cream.  Denies cough, cp, sob, palp, abd pain, f/c, urinary f/u/d. Reports last episode diarrhea ~3days ago.     2/23-thirsty.  Offers no other complaints.     2/24- lethargic but responding appropriately to questions.  Requests that I call her significant other to update him.  Tmax 101 overnight.  No complaints.   Now admits to drinking at least three 8oz glasses vodka on ice daily.   2/25- lethargic yet slightly more alert today- seated in bed attempting to eat breakfast.  Tmax 101 early this morning.  No complaints.         Review of Systems:  10pt ROS neg except for above. 	      Vital Signs Last 24 Hrs  T(C): 36.4, Max: 38.3 (02-25 @ 04:40)  T(F): 97.5, Max: 101 (02-25 @ 04:40)  HR: 89 (86 - 110)  BP: 103/49 (103/49 - 127/58)  RR: 18 (16 - 19)  SpO2: 96% (94% - 97%)v    T(C): 37.3, Max: 38.4 (02-24 @ 01:00)  T(F): 99.2, Max: 101.1 (02-24 @ 01:00)  HR: 89 (89 - 100)  BP: 109/59 (109/59 - 132/53)  RR: 18 (16 - 18)  SpO2: 94% (93% - 95%)      Physical Exam: General: ill appearing female, nad, seated in bed eating breakfast, lethargic Neuro: AAOx3, but forgetful, no focal deficits/able to follow commands HEENT: NCAT, PERRLA, EOMI, moist mucous membranes/no petechiae Neck: Soft and supple, No JVD Respiratory: CTA b/l, no w/r/r Cardiovascular: S1 and S2, RRR, no m/g/r Gastrointestinal: +BS, soft, nonttp, no rebound tenderness Extremities: chronic changes, no c/c Vascular: 2+ peripheral pulses Musculoskeletal: 5/5 strength b/l UE and LE, sensation intact Skin: No rashes, no petechiae , no signs bleeding	            LABS: All Labs Reviewed:                        10.8   9.4   )-----------( 19       ( 25 Feb 2017 07:17 )             31.6     25 Feb 2017 07:17    145    |  113    |  20     ----------------------------<  120    3.9     |  23     |  0.93     Ca    8.0        25 Feb 2017 07:17  Phos  2.9       24 Feb 2017 07:07    TPro  5.1    /  Alb  2.0    /  TBili  4.5    /  DBili  3.1    /  AST  212    /  ALT  94     /  AlkPhos  176    25 Feb 2017 07:17    PT/INR - ( 25 Feb 2017 07:17 )   PT: 19.2 sec;   INR: 1.74 ratio         PTT - ( 25 Feb 2017 07:17 )  PTT:39.0 sec          Culture - Blood (02.22.17 @ 13:17)    Specimen Source: .Blood None    Culture Results:   No growth to date.    Culture - Stool (02.22.17 @ 13:14)    Specimen Source: .Stool None    Culture Results:   No enteric pathogens isolated.  (Stool culture examined for Salmonella,  Shigella, Campylobacter, Aeromonas, Plesiomonas,  Vibrio, E.coli O157 and Yersinia)    Culture - Urine (02.22.17 @ 11:26)    -  Amikacin: S <=16    -  Cefoxitin: S <=8    -  Ertapenem: S <=1    -  Imipenem: S <=1    -  Aztreonam: S <=4    -  Meropenem: S <=1    -  Trimethoprim/Sulfamethoxazole: S <=2/38    -  Cefepime: S <=4    -  Ceftazidime: S <=1    -  Ceftriaxone: S <=1    -  Gentamicin: S <=4    -  Levofloxacin: S <=2    -  Nitrofurantoin: S <=32    -  Piperacillin/Tazobactam: S <=16    -  Ampicillin: S <=8    -  Ampicillin/Sulbactam: S <=8/4    -  Cefazolin: S <=8    -  Ciprofloxacin: S <=1    -  Tobramycin: S <=4    Specimen Source: .Urine None    Culture Results:   10,000 - 49,000 CFU/mL Escherichia coli  Revised urine counts reflect clinically significant counts  established by evidence based medicine.    Organism Identification: Escherichia coli    Organism: Escherichia coli    Method Type: JULIUS                                11.8   9.8   )-----------( 32       ( 24 Feb 2017 07:07 )             34.6     24 Feb 2017 07:07    143    |  110    |  27     ----------------------------<  91     3.7     |  23     |  1.04     Ca    8.1        24 Feb 2017 07:07  Phos  2.9       24 Feb 2017 07:07  Mg     2.6       23 Feb 2017 07:40    TPro  5.4    /  Alb  2.2    /  TBili  5.8    /  DBili  4.0    /  AST  240    /  ALT  106    /  AlkPhos  181    24 Feb 2017 07:07                                12.4   13.1  )-----------( 55       ( 22 Feb 2017 11:29 )             34.0     23 Feb 2017 07:40    139    |  102    |  35     ----------------------------<  103    3.3     |  26     |  1.41     Ca    8.4        23 Feb 2017 07:40  Phos  1.7       23 Feb 2017 07:40  Mg     2.6       23 Feb 2017 07:40    TPro  5.7    /  Alb  2.4    /  TBili  6.0    /  DBili  4.3    /  AST  246    /  ALT  117    /  AlkPhos  182    22 Feb 2017 19:47    PT/INR - ( 21 Feb 2017 18:07 )   PT: 15.4 sec;   INR: 1.40 ratio         PTT - ( 21 Feb 2017 18:07 )  PTT:36.8 sec          Acute Hepatitis Panel (02.22.17 @ 11:29)   Hepatitis C Virus Interpretation: Nonreact: Hepatitis C AB  Lipid Profile (02.22.17 @ 12:00)   HDL/Total Cholesterol Ratio Measurement: 14.5 RATIO   Cholesterol, Serum: 160 mg/dL   Triglycerides, Serum: 122 mg/dL   HDL Cholesterol, Serum: 11: TEST REPEATED. mg/dL   Direct LDL: 125: LDL Cholesterol --- Interpretive Comment (for adults 18 and over)     EXAM:  CT ABDOMEN AND PELVIS OC       02/21/2017  IMPRESSION:  1. Proctocolitis. No bowel obstruction.  2. Hepatic morphology suspicious for cirrhosis and fatty liver.  Cholelithiasis without cholecystitis.   EXAM:  US ABDOMEN LIMITED     02/22/2017   Evaluation of the study is markedly limited by overlying  bowel gas and patient condition. No definite acute abnormalities are  identified, however repeat ultrasound may when patient is n.p.o. 	    		  66y female admitted with    *diarrhea, proctocolitis   (pt reports diarrhea is chronic)- resolved   - CT abd noted  - IVF, IVabx (for uti)  - stool studies negative   - blood cx ngtd  - lactate normalized  - GI eval appreciated     *ecoli uti, fevers  - continue IV abx  - blood cx ngtd  - if continues to spike temps will pancx again     *elevated LFTs due to etoh hepatitis/cirrhosis  - CT abd suspicious for cirrhosis, fatty liver;  cholelithiasis w/o cholecystitis  - also due to mtx (last dose 1 week ago, level normal)- d/c mtx   - lfts, tb improving slowly  - hep panel, lipids- nml   - CIWA protocol, thiamine, MVI, folate  - IVF until po intake improves  - GI consult appreciated    *coagulopathy  - likely due to liver failure  - monitor     *thrombocytopenia  - due to cirrhosis  - no active signs bleeding  - monitor closely  - repeat cbc this afternoon, may need to transfuse    *hyponatremia  - likely hypovolemic due to decreased intake  - resolved w/ IVF     *RADHA  - baseline Cr 0.91 in 2013  - likely prerenal due to hypovolemia  - hold triamterene   - resolved w/ IVF      *RA  -d/c mtx (took last Tues- weekly dose)  - continue daily dose prednisone  - discussed w/ pt's significant other- she will need to f/u w/ Rheum for other options     *HTN  - hold triamterene due to RADHA    *hypokalemia/hypophos  - repleted    *dvt px  -scds    PT eval when more alert. History and Physical:   Source of Information	Chart(s), Patient, Physician/Provider	  Outpatient Providers	Dr. Lees- PMD (but mostly sees DEVIN Garcia)	    Language:  · Patient/Family of Limited English Proficiency	No	      History of Present Illness:  Chief Complaint/Reason for Admission: I was sent by PMD for cough and problem w/ my kidneys.  Diarrhea.	  History of Present Illness: 	   66y female w/ PMH HTN, RA who states she was sent to ED by her PMD b/c of cough.  Discussed w/ PMD/PA who state she called w/ c/o worsening diarrhea and they advised her to come to ED.    Pt reports having chronic diarrhea for a year which she denies has recently worsened.  She does admit to decreased overall intake within past few weeks but states that is due to her upper resp infection (phlegm, cough x 3 weeks improving w/ mucinex and resp viral panel outpatient negative).    She denies abdominal pain, nausea/vomiting, urinary f/u/d, fevers/chills.  C/o chronic pain due to RA for which she takes MTX weekly (last dose last Tues) and prednisone daily (last dose yesterday).  No recent change in meds.      In ED she reports feeling better and requests ice cream.  Denies cough, cp, sob, palp, abd pain, f/c, urinary f/u/d. Reports last episode diarrhea ~3days ago.     2/23-thirsty.  Offers no other complaints.     2/24- lethargic but responding appropriately to questions.  Requests that I call her significant other to update him.  Tmax 101 overnight.  No complaints.   Now admits to drinking at least three 8oz glasses vodka on ice daily.   2/25- lethargic yet slightly more alert today- seated in bed attempting to eat breakfast.  Tmax 101 early this morning.  No complaints.         Review of Systems:  10pt ROS neg except for above. 	      Vital Signs Last 24 Hrs  T(C): 36.4, Max: 38.3 (02-25 @ 04:40)  T(F): 97.5, Max: 101 (02-25 @ 04:40)  HR: 89 (86 - 110)  BP: 103/49 (103/49 - 127/58)  RR: 18 (16 - 19)  SpO2: 96% (94% - 97%)v    T(C): 37.3, Max: 38.4 (02-24 @ 01:00)  T(F): 99.2, Max: 101.1 (02-24 @ 01:00)  HR: 89 (89 - 100)  BP: 109/59 (109/59 - 132/53)  RR: 18 (16 - 18)  SpO2: 94% (93% - 95%)      Physical Exam: General: ill appearing female, nad, seated in bed eating breakfast, lethargic Neuro: AAOx3, but forgetful, no focal deficits/able to follow commands HEENT: NCAT, PERRLA, EOMI, moist mucous membranes/no petechiae Neck: Soft and supple, No JVD Respiratory: CTA b/l, no w/r/r Cardiovascular: S1 and S2, RRR, no m/g/r Gastrointestinal: +BS, soft, nonttp, no rebound tenderness Extremities: chronic changes, no c/c Vascular: 2+ peripheral pulses Musculoskeletal: 5/5 strength b/l UE and LE, sensation intact Skin: No rashes, no petechiae , no signs bleeding	            LABS: All Labs Reviewed:                        10.8   9.4   )-----------( 19       ( 25 Feb 2017 07:17 )             31.6     25 Feb 2017 07:17    145    |  113    |  20     ----------------------------<  120    3.9     |  23     |  0.93     Ca    8.0        25 Feb 2017 07:17  Phos  2.9       24 Feb 2017 07:07    TPro  5.1    /  Alb  2.0    /  TBili  4.5    /  DBili  3.1    /  AST  212    /  ALT  94     /  AlkPhos  176    25 Feb 2017 07:17    PT/INR - ( 25 Feb 2017 07:17 )   PT: 19.2 sec;   INR: 1.74 ratio         PTT - ( 25 Feb 2017 07:17 )  PTT:39.0 sec  D-Dimer Assay, Quantitative (02.24.17 @ 13:25)    D-Dimer Assay, Quantitative: 1740  Fibrinogen Assay (02.24.17 @ 13:25)    Fibrinogen Assay: 487 mg/dL        Culture - Blood (02.22.17 @ 13:17)    Specimen Source: .Blood None    Culture Results:   No growth to date.    Culture - Stool (02.22.17 @ 13:14)    Specimen Source: .Stool None    Culture Results:   No enteric pathogens isolated.  (Stool culture examined for Salmonella,  Shigella, Campylobacter, Aeromonas, Plesiomonas,  Vibrio, E.coli O157 and Yersinia)    Culture - Urine (02.22.17 @ 11:26)    -  Amikacin: S <=16    -  Cefoxitin: S <=8    -  Ertapenem: S <=1    -  Imipenem: S <=1    -  Aztreonam: S <=4    -  Meropenem: S <=1    -  Trimethoprim/Sulfamethoxazole: S <=2/38    -  Cefepime: S <=4    -  Ceftazidime: S <=1    -  Ceftriaxone: S <=1    -  Gentamicin: S <=4    -  Levofloxacin: S <=2    -  Nitrofurantoin: S <=32    -  Piperacillin/Tazobactam: S <=16    -  Ampicillin: S <=8    -  Ampicillin/Sulbactam: S <=8/4    -  Cefazolin: S <=8    -  Ciprofloxacin: S <=1    -  Tobramycin: S <=4    Specimen Source: .Urine None    Culture Results:   10,000 - 49,000 CFU/mL Escherichia coli  Revised urine counts reflect clinically significant counts  established by evidence based medicine.    Organism Identification: Escherichia coli    Organism: Escherichia coli    Method Type: JULIUS                                11.8   9.8   )-----------( 32       ( 24 Feb 2017 07:07 )             34.6     24 Feb 2017 07:07    143    |  110    |  27     ----------------------------<  91     3.7     |  23     |  1.04     Ca    8.1        24 Feb 2017 07:07  Phos  2.9       24 Feb 2017 07:07  Mg     2.6       23 Feb 2017 07:40    TPro  5.4    /  Alb  2.2    /  TBili  5.8    /  DBili  4.0    /  AST  240    /  ALT  106    /  AlkPhos  181    24 Feb 2017 07:07                                12.4   13.1  )-----------( 55       ( 22 Feb 2017 11:29 )             34.0     23 Feb 2017 07:40    139    |  102    |  35     ----------------------------<  103    3.3     |  26     |  1.41     Ca    8.4        23 Feb 2017 07:40  Phos  1.7       23 Feb 2017 07:40  Mg     2.6       23 Feb 2017 07:40    TPro  5.7    /  Alb  2.4    /  TBili  6.0    /  DBili  4.3    /  AST  246    /  ALT  117    /  AlkPhos  182    22 Feb 2017 19:47    PT/INR - ( 21 Feb 2017 18:07 )   PT: 15.4 sec;   INR: 1.40 ratio         PTT - ( 21 Feb 2017 18:07 )  PTT:36.8 sec          Acute Hepatitis Panel (02.22.17 @ 11:29)   Hepatitis C Virus Interpretation: Nonreact: Hepatitis C AB  Lipid Profile (02.22.17 @ 12:00)   HDL/Total Cholesterol Ratio Measurement: 14.5 RATIO   Cholesterol, Serum: 160 mg/dL   Triglycerides, Serum: 122 mg/dL   HDL Cholesterol, Serum: 11: TEST REPEATED. mg/dL   Direct LDL: 125: LDL Cholesterol --- Interpretive Comment (for adults 18 and over)     EXAM:  CT ABDOMEN AND PELVIS OC       02/21/2017  IMPRESSION:  1. Proctocolitis. No bowel obstruction.  2. Hepatic morphology suspicious for cirrhosis and fatty liver.  Cholelithiasis without cholecystitis.   EXAM:  US ABDOMEN LIMITED     02/22/2017   Evaluation of the study is markedly limited by overlying  bowel gas and patient condition. No definite acute abnormalities are  identified, however repeat ultrasound may when patient is n.p.o. 	    		  66y female admitted with    *diarrhea, proctocolitis   (pt reports diarrhea is chronic)- resolved   - CT abd noted  - IVF, IVabx (for uti)  - stool studies negative   - blood cx ngtd  - lactate normalized  - GI eval appreciated     *ecoli uti, fevers  - continue IV abx  - blood cx ngtd  - if continues to spike temps will pancx again     *elevated LFTs due to etoh hepatitis/cirrhosis  - CT abd suspicious for cirrhosis, fatty liver;  cholelithiasis w/o cholecystitis  - also due to mtx (last dose 1 week ago, level normal)- d/c mtx   - lfts, tb improving slowly  - hep panel, lipids- nml   - CIWA protocol, thiamine, MVI, folate  - IVF until po intake improves  - GI consult appreciated    *coagulopathy  - possibly due to liver failure vs  infxn  - DIC panel 2/24 w/ increased PT, ddimer  - consulted w/ Heme/Onc yesterday    *thrombocytopenia  - possibly due to cirrhosis vs infx (dic panel as above)  - (of note, pt was on mtx but off since last tues- level nml and renal function stable/lfts improving)  - no active signs bleeding  - monitor closely  - repeat cbc this afternoon, may need to transfuse  - consulted w/ Heme/Onc yest    *hyponatremia  - likely hypovolemic due to decreased intake  - resolved w/ IVF     *RADHA  - baseline Cr 0.91 in 2013  - likely prerenal due to hypovolemia  - hold triamterene   - resolved w/ IVF      *RA  -d/c mtx (took last Tues- weekly dose)  - continue daily dose prednisone  - discussed w/ pt's significant other- she will need to f/u w/ Rheum for other options     *HTN  - hold triamterene due to RADHA    *hypokalemia/hypophos  - repleted    *dvt px  -scds    PT eval when more alert.

## 2017-02-26 DIAGNOSIS — F10.231 ALCOHOL DEPENDENCE WITH WITHDRAWAL DELIRIUM: ICD-10-CM

## 2017-02-26 LAB
ALBUMIN SERPL ELPH-MCNC: 2.1 G/DL — LOW (ref 3.3–5)
ALP SERPL-CCNC: 174 U/L — HIGH (ref 40–120)
ALT FLD-CCNC: 95 U/L — HIGH (ref 12–78)
AMMONIA BLD-MCNC: 18 UMOL/L — SIGNIFICANT CHANGE UP (ref 11–32)
ANION GAP SERPL CALC-SCNC: 11 MMOL/L — SIGNIFICANT CHANGE UP (ref 5–17)
APTT BLD: 47.7 SEC — HIGH (ref 27.5–37.4)
AST SERPL-CCNC: 207 U/L — HIGH (ref 15–37)
BILIRUB DIRECT SERPL-MCNC: 3 MG/DL — HIGH (ref 0–0.2)
BILIRUB INDIRECT FLD-MCNC: 1.4 MG/DL — HIGH (ref 0.2–1)
BILIRUB SERPL-MCNC: 4.4 MG/DL — HIGH (ref 0.2–1.2)
BUN SERPL-MCNC: 18 MG/DL — SIGNIFICANT CHANGE UP (ref 7–23)
CALCIUM SERPL-MCNC: 7.8 MG/DL — LOW (ref 8.5–10.1)
CHLORIDE SERPL-SCNC: 110 MMOL/L — HIGH (ref 96–108)
CO2 SERPL-SCNC: 21 MMOL/L — LOW (ref 22–31)
CREAT SERPL-MCNC: 0.8 MG/DL — SIGNIFICANT CHANGE UP (ref 0.5–1.3)
GLUCOSE SERPL-MCNC: 122 MG/DL — HIGH (ref 70–99)
HCT VFR BLD CALC: 34.3 % — LOW (ref 34.5–45)
HGB BLD-MCNC: 11.6 G/DL — SIGNIFICANT CHANGE UP (ref 11.5–15.5)
INR BLD: 1.58 RATIO — HIGH (ref 0.88–1.16)
MCHC RBC-ENTMCNC: 33.9 GM/DL — SIGNIFICANT CHANGE UP (ref 32–36)
MCHC RBC-ENTMCNC: 38 PG — HIGH (ref 27–34)
MCV RBC AUTO: 112.2 FL — HIGH (ref 80–100)
PLATELET # BLD AUTO: 29 K/UL — LOW (ref 150–400)
POTASSIUM SERPL-MCNC: 4.2 MMOL/L — SIGNIFICANT CHANGE UP (ref 3.5–5.3)
POTASSIUM SERPL-SCNC: 4.2 MMOL/L — SIGNIFICANT CHANGE UP (ref 3.5–5.3)
PROT SERPL-MCNC: 5.4 GM/DL — LOW (ref 6–8.3)
PROTHROM AB SERPL-ACNC: 17.5 SEC — HIGH (ref 10–13.1)
RBC # BLD: 3.06 M/UL — LOW (ref 3.8–5.2)
RBC # FLD: 15.8 % — HIGH (ref 10.3–14.5)
SODIUM SERPL-SCNC: 142 MMOL/L — SIGNIFICANT CHANGE UP (ref 135–145)
WBC # BLD: 10.8 K/UL — HIGH (ref 3.8–10.5)
WBC # FLD AUTO: 10.8 K/UL — HIGH (ref 3.8–10.5)

## 2017-02-26 RX ORDER — SODIUM CHLORIDE 9 MG/ML
1000 INJECTION INTRAMUSCULAR; INTRAVENOUS; SUBCUTANEOUS
Qty: 0 | Refills: 0 | Status: DISCONTINUED | OUTPATIENT
Start: 2017-02-26 | End: 2017-02-28

## 2017-02-26 RX ADMIN — Medication 110 MILLIGRAM(S): at 14:34

## 2017-02-26 RX ADMIN — SODIUM CHLORIDE 75 MILLILITER(S): 9 INJECTION INTRAMUSCULAR; INTRAVENOUS; SUBCUTANEOUS at 05:45

## 2017-02-26 RX ADMIN — Medication 200 MILLIGRAM(S): at 17:41

## 2017-02-26 RX ADMIN — Medication 0.5 MILLIGRAM(S): at 01:17

## 2017-02-26 RX ADMIN — Medication 5 MILLIGRAM(S): at 06:21

## 2017-02-26 RX ADMIN — Medication 0.5 MILLIGRAM(S): at 06:21

## 2017-02-26 RX ADMIN — Medication 0.5 MILLIGRAM(S): at 05:45

## 2017-02-26 RX ADMIN — Medication 200 MILLIGRAM(S): at 05:45

## 2017-02-26 RX ADMIN — Medication 1 TABLET(S): at 12:23

## 2017-02-26 RX ADMIN — Medication 1 MILLIGRAM(S): at 12:23

## 2017-02-26 NOTE — PROGRESS NOTE ADULT - SUBJECTIVE AND OBJECTIVE BOX
History and Physical:   Source of Information	Chart(s), Patient, Physician/Provider	  Outpatient Providers	Dr. Lees- PMD (but mostly sees DEVIN Garcia)	    Language:  · Patient/Family of Limited English Proficiency	No	      History of Present Illness:  Chief Complaint/Reason for Admission: I was sent by PMD for cough and problem w/ my kidneys.  Diarrhea.	  History of Present Illness: 	   66y female w/ PMH HTN, RA who states she was sent to ED by her PMD b/c of cough.  Discussed w/ PMD/PA who state she called w/ c/o worsening diarrhea and they advised her to come to ED.    Pt reports having chronic diarrhea for a year which she denies has recently worsened.  She does admit to decreased overall intake within past few weeks but states that is due to her upper resp infection (phlegm, cough x 3 weeks improving w/ mucinex and resp viral panel outpatient negative).    She denies abdominal pain, nausea/vomiting, urinary f/u/d, fevers/chills.  C/o chronic pain due to RA for which she takes MTX weekly (last dose last Tues) and prednisone daily (last dose yesterday).  No recent change in meds.      In ED she reports feeling better and requests ice cream.  Denies cough, cp, sob, palp, abd pain, f/c, urinary f/u/d. Reports last episode diarrhea ~3days ago.     2/23-thirsty.  Offers no other complaints.     2/24- lethargic but responding appropriately to questions.  Requests that I call her significant other to update him.  Tmax 101 overnight.  No complaints.   Now admits to drinking at least three 8oz glasses vodka on ice daily.   2/25- lethargic yet slightly more alert today- seated in bed attempting to eat breakfast.  Tmax 101 early this morning.  No complaints.  2/26- more alert, seated in bed asking about her triamterene.  Denies abd pain, diarrhea, n/v.   Afebrile o/n.         Review of Systems:  10pt ROS neg except for above.   Vital Signs Last 24 Hrs T(C): 37, Max: 37 (02-26 @ 02:15) T(F): 98.6, Max: 98.6 (02-26 @ 02:15) HR: 98 (74 - 114) BP: 96/48 (96/48 - 127/51) RR: 18 (16 - 18) SpO2: 94% (94% - 96%)	      Vital Signs Last 24 Hrs  T(C): 36.4, Max: 38.3 (02-25 @ 04:40)  T(F): 97.5, Max: 101 (02-25 @ 04:40)  HR: 89 (86 - 110)  BP: 103/49 (103/49 - 127/58)  RR: 18 (16 - 19)  SpO2: 96% (94% - 97%)        Physical Exam: General: ill appearing female, nad, seated in bed eating breakfast Neuro: AAOx3, but forgetful, no focal deficits/able to follow commands HEENT: NCAT, PERRLA, EOMI, moist mucous membranes/no petechiae Neck: Soft and supple, No JVD Respiratory: CTA b/l, no w/r/r Cardiovascular: S1 and S2, RRR, no m/g/r Gastrointestinal: +BS, soft, nonttp, no rebound tenderness Extremities: chronic changes, no c/c Vascular: 2+ peripheral pulses Musculoskeletal: 5/5 strength b/l UE and LE, sensation intact Skin: No rashes, few areas ecchymoses abd, extrem.	            LABS: All Labs Reviewed:                        11.2   8.7   )-----------( 22       ( 25 Feb 2017 12:42 )             33.0     25 Feb 2017 07:17    145    |  113    |  20     ----------------------------<  120    3.9     |  23     |  0.93     Ca    8.0        25 Feb 2017 07:17    TPro  5.4    /  Alb  2.1    /  TBili  4.4    /  DBili  3.0    /  AST  207    /  ALT  95     /  AlkPhos  174    26 Feb 2017 06:51    PT/INR - ( 25 Feb 2017 07:17 )   PT: 19.2 sec;   INR: 1.74 ratio         PTT - ( 25 Feb 2017 07:17 )  PTT:39.0 sec        Culture - Urine (02.22.17 @ 11:26)    -  Amikacin: S <=16    -  Cefoxitin: S <=8    -  Ertapenem: S <=1    -  Imipenem: S <=1    -  Aztreonam: S <=4    -  Meropenem: S <=1    -  Trimethoprim/Sulfamethoxazole: S <=2/38    -  Cefepime: S <=4    -  Ceftazidime: S <=1    -  Ceftriaxone: S <=1    -  Gentamicin: S <=4    -  Levofloxacin: S <=2    -  Nitrofurantoin: S <=32    -  Piperacillin/Tazobactam: S <=16    -  Ampicillin: S <=8    -  Ampicillin/Sulbactam: S <=8/4    -  Cefazolin: S <=8    -  Ciprofloxacin: S <=1    -  Tobramycin: S <=4    Specimen Source: .Urine None    Culture Results:   10,000 - 49,000 CFU/mL Escherichia coli  Revised urine counts reflect clinically significant counts  established by evidence based medicine.    Organism Identification: Escherichia coli    Organism: Escherichia coli    Method Type: JULIUS        Culture - Blood (02.22.17 @ 13:17)    Specimen Source: .Blood None    Culture Results:   No growth to date.       PTT - ( 25 Feb 2017 07:17 )  PTT:39.0 sec  D-Dimer Assay, Quantitative (02.24.17 @ 13:25)    D-Dimer Assay, Quantitative: 1740  Fibrinogen Assay (02.24.17 @ 13:25)    Fibrinogen Assay: 487 mg/dL    Culture - Stool (02.22.17 @ 13:14)    Specimen Source: .Stool None    Culture Results:   No enteric pathogens isolated.  (Stool culture examined for Salmonella,  Shigella, Campylobacter, Aeromonas, Plesiomonas,  Vibrio, E.coli O157 and Yersinia)          Acute Hepatitis Panel (02.22.17 @ 11:29)   Hepatitis C Virus Interpretation: Nonreact: Hepatitis C AB  Lipid Profile (02.22.17 @ 12:00)   HDL/Total Cholesterol Ratio Measurement: 14.5 RATIO   Cholesterol, Serum: 160 mg/dL   Triglycerides, Serum: 122 mg/dL   HDL Cholesterol, Serum: 11: TEST REPEATED. mg/dL   Direct LDL: 125: LDL Cholesterol --- Interpretive Comment (for adults 18 and over)    EXAM:  CT ABDOMEN AND PELVIS OC       02/21/2017  IMPRESSION:  1. Proctocolitis. No bowel obstruction.  2. Hepatic morphology suspicious for cirrhosis and fatty liver.  Cholelithiasis without cholecystitis.   EXAM:  US ABDOMEN LIMITED     02/22/2017   Evaluation of the study is markedly limited by overlying  bowel gas and patient condition. No definite acute abnormalities are  identified, however repeat ultrasound may when patient is n.p.o. 	    		  66y female admitted with    *diarrhea, proctocolitis   (pt reports diarrhea is chronic)- resolved   - CT abd noted  - IVF, IVabx (for uti)  - stool studies negative   - blood cx ngtd  - lactate normalized  - GI eval appreciated     *ecoli uti  - fevers resolved   - continue IV abx  - blood cx ngtd    *elevated LFTs due to etoh hepatitis/cirrhosis  - CT abd suspicious for cirrhosis, fatty liver;  cholelithiasis w/o cholecystitis  - also due to mtx (last dose 1 week ago, level normal)- d/c mtx   - lfts, tb improving slowly  - hep panel, lipids- nml   - CIWA protocol, thiamine, MVI, folate  - IVF until po intake improves  - GI consult appreciated    *coagulopathy  - possibly due to liver failure vs  infxn  - DIC panel 2/24 w/ increased PT, ddimer  - consulted w/ Heme/Onc yesterday  2/26-  d/w heme/onc- likely compensated DIC due to severe liver disease,  vit K given, monitor     *thrombocytopenia  - possibly due to cirrhosis vs infxn (dic panel as above)  - (of note, pt was on mtx but off since last tues- level nml and renal function stable/lfts improving)  - 2/26- d/w Heme/onc yesterday,  give IV iron few days, B12, folate, monitor     *hyponatremia  - likely hypovolemic due to decreased intake  - resolved w/ IVF     *RADHA  - baseline Cr 0.91 in 2013  - likely prerenal due to hypovolemia  - hold triamterene   - resolved w/ IVF      *RA  -d/c mtx (took last Tues- weekly dose)  - continue daily dose prednisone  - discussed w/ pt's significant other- she will need to f/u w/ Rheum for other options     *HTN  - hold triamterene due to RADHA    *hypokalemia/hypophos  - repleted    *dvt px  -scds    PT eval when more alert.     dispo 2/26- etoh hepatitis/ cirrhosis (new for pt).   CIWA protocol.   Monitor platelets.

## 2017-02-26 NOTE — PROGRESS NOTE ADULT - SUBJECTIVE AND OBJECTIVE BOX
Patient is a 66y old  Female who presents with a chief complaint of weakness, abd pain, diarrhea (22 Feb 2017 15:54)      Subective:  Feels better. Eating now. No diarrhea or bleeding.    PAST MEDICAL & SURGICAL HISTORY:  HTN (hypertension)  Rheumatoid arthritis      MEDICATIONS  (STANDING):  predniSONE   Tablet 5milliGRAM(s) Oral daily  LORazepam     Tablet 0.5milliGRAM(s) Oral every 4 hours  LORazepam     Tablet milliGRAM(s) Oral   folic acid 1milliGRAM(s) Oral daily  multivitamin 1Tablet(s) Oral daily  sodium chloride 0.9%. 1000milliLiter(s) IV Continuous <Continuous>  ciprofloxacin   IVPB     ciprofloxacin   IVPB 400milliGRAM(s) IV Intermittent every 12 hours  sodium ferric gluconate complex IVPB 125milliGRAM(s) IV Intermittent daily    MEDICATIONS  (PRN):  ondansetron Injectable 4milliGRAM(s) IV Push every 6 hours PRN Nausea and/or Vomiting  oxyCODONE  5 mG/acetaminophen 325 mG 1Tablet(s) Oral every 6 hours PRN Moderate Pain (4 - 6)      REVIEW OF SYSTEMS:    RESPIRATORY: No shortness of breath  CARDIOVASCULAR: No chest pain  All other review of systems is negative unless indicated above.    Vital Signs Last 24 Hrs  T(C): 37, Max: 37 (02-26 @ 02:15)  T(F): 98.6, Max: 98.6 (02-26 @ 02:15)  HR: 98 (74 - 114)  BP: 96/48 (96/48 - 127/51)  BP(mean): --  RR: 18 (16 - 18)  SpO2: 94% (94% - 96%)    PHYSICAL EXAM:    Constitutional: NAD, well-developed  Respiratory: CTAB  Cardiovascular: S1 and S2, RRR  Gastrointestinal: BS+, soft, NT/ND  Extremities: No peripheral edema  Psychiatric: Normal mood, normal affect    LABS:                        11.2   8.7   )-----------( 22       ( 25 Feb 2017 12:42 )             33.0     25 Feb 2017 07:17    145    |  113    |  20     ----------------------------<  120    3.9     |  23     |  0.93     Ca    8.0        25 Feb 2017 07:17    TPro  5.4    /  Alb  2.1    /  TBili  4.4    /  DBili  3.0    /  AST  207    /  ALT  95     /  AlkPhos  174    26 Feb 2017 06:51    PT/INR - ( 25 Feb 2017 07:17 )   PT: 19.2 sec;   INR: 1.74 ratio         PTT - ( 25 Feb 2017 07:17 )  PTT:39.0 sec  LIVER FUNCTIONS - ( 26 Feb 2017 06:51 )  Alb: 2.1 g/dL / Pro: 5.4 gm/dL / ALK PHOS: 174 U/L / ALT: 95 U/L / AST: 207 U/L / GGT: x             RADIOLOGY & ADDITIONAL STUDIES:

## 2017-02-26 NOTE — PROVIDER CONTACT NOTE (MEDICATION) - SITUATION
, regular, asymptomatic, on ciwa protocol, ativan held during day shift and at 2200 for lethargy. Pt has been awake since 2300. 6am Ativan 0.5 mg already given.

## 2017-02-27 LAB
ALBUMIN SERPL ELPH-MCNC: 2.1 G/DL — LOW (ref 3.3–5)
ALP SERPL-CCNC: 178 U/L — HIGH (ref 40–120)
ALT FLD-CCNC: 96 U/L — HIGH (ref 12–78)
ANION GAP SERPL CALC-SCNC: 9 MMOL/L — SIGNIFICANT CHANGE UP (ref 5–17)
ANISOCYTOSIS BLD QL: SLIGHT — SIGNIFICANT CHANGE UP
APTT BLD: 52.6 SEC — HIGH (ref 27.5–37.4)
AST SERPL-CCNC: 205 U/L — HIGH (ref 15–37)
BASOPHILS # BLD AUTO: 0.1 K/UL — SIGNIFICANT CHANGE UP (ref 0–0.2)
BASOPHILS NFR BLD AUTO: 0.4 % — SIGNIFICANT CHANGE UP (ref 0–2)
BILIRUB DIRECT SERPL-MCNC: 2.8 MG/DL — HIGH (ref 0–0.2)
BILIRUB INDIRECT FLD-MCNC: 1.5 MG/DL — HIGH (ref 0.2–1)
BILIRUB SERPL-MCNC: 4.3 MG/DL — HIGH (ref 0.2–1.2)
BUN SERPL-MCNC: 15 MG/DL — SIGNIFICANT CHANGE UP (ref 7–23)
CALCIUM SERPL-MCNC: 8.2 MG/DL — LOW (ref 8.5–10.1)
CHLORIDE SERPL-SCNC: 110 MMOL/L — HIGH (ref 96–108)
CO2 SERPL-SCNC: 23 MMOL/L — SIGNIFICANT CHANGE UP (ref 22–31)
CREAT SERPL-MCNC: 0.78 MG/DL — SIGNIFICANT CHANGE UP (ref 0.5–1.3)
CULTURE RESULTS: SIGNIFICANT CHANGE UP
EOSINOPHIL # BLD AUTO: 0.4 K/UL — SIGNIFICANT CHANGE UP (ref 0–0.5)
EOSINOPHIL NFR BLD AUTO: 3.1 % — SIGNIFICANT CHANGE UP (ref 0–6)
GLUCOSE SERPL-MCNC: 84 MG/DL — SIGNIFICANT CHANGE UP (ref 70–99)
HCT VFR BLD CALC: 35.8 % — SIGNIFICANT CHANGE UP (ref 34.5–45)
HGB BLD-MCNC: 12 G/DL — SIGNIFICANT CHANGE UP (ref 11.5–15.5)
INR BLD: 1.53 RATIO — HIGH (ref 0.88–1.16)
LG PLATELETS BLD QL AUTO: SLIGHT — SIGNIFICANT CHANGE UP
LYMPHOCYTES # BLD AUTO: 1.6 K/UL — SIGNIFICANT CHANGE UP (ref 1–3.3)
LYMPHOCYTES # BLD AUTO: 12.8 % — LOW (ref 13–44)
MACROCYTES BLD QL: SIGNIFICANT CHANGE UP
MANUAL DIF COMMENT BLD-IMP: SIGNIFICANT CHANGE UP
MANUAL DIF COMMENT BLD-IMP: SIGNIFICANT CHANGE UP
MCHC RBC-ENTMCNC: 33.6 GM/DL — SIGNIFICANT CHANGE UP (ref 32–36)
MCHC RBC-ENTMCNC: 38 PG — HIGH (ref 27–34)
MCV RBC AUTO: 113.1 FL — HIGH (ref 80–100)
MONOCYTES # BLD AUTO: 1.2 K/UL — HIGH (ref 0–0.9)
MONOCYTES NFR BLD AUTO: 9.9 % — SIGNIFICANT CHANGE UP (ref 2–14)
NEUTROPHILS # BLD AUTO: 9.1 K/UL — HIGH (ref 1.8–7.4)
NEUTROPHILS NFR BLD AUTO: 73.8 % — SIGNIFICANT CHANGE UP (ref 43–77)
PLAT MORPH BLD: (no result)
PLATELET # BLD AUTO: 53 K/UL — LOW (ref 150–400)
POTASSIUM SERPL-MCNC: 4.2 MMOL/L — SIGNIFICANT CHANGE UP (ref 3.5–5.3)
POTASSIUM SERPL-SCNC: 4.2 MMOL/L — SIGNIFICANT CHANGE UP (ref 3.5–5.3)
PROT SERPL-MCNC: 5.6 GM/DL — LOW (ref 6–8.3)
PROTHROM AB SERPL-ACNC: 16.9 SEC — HIGH (ref 10–13.1)
RBC # BLD: 3.17 M/UL — LOW (ref 3.8–5.2)
RBC # FLD: 15.7 % — HIGH (ref 10.3–14.5)
RBC BLD AUTO: (no result)
SODIUM SERPL-SCNC: 142 MMOL/L — SIGNIFICANT CHANGE UP (ref 135–145)
SPECIMEN SOURCE: SIGNIFICANT CHANGE UP
TARGETS BLD QL SMEAR: SLIGHT — SIGNIFICANT CHANGE UP
WBC # BLD: 12.3 K/UL — HIGH (ref 3.8–10.5)
WBC # FLD AUTO: 12.3 K/UL — HIGH (ref 3.8–10.5)

## 2017-02-27 RX ADMIN — Medication 110 MILLIGRAM(S): at 12:22

## 2017-02-27 RX ADMIN — Medication 0.5 MILLIGRAM(S): at 05:14

## 2017-02-27 RX ADMIN — SODIUM CHLORIDE 75 MILLILITER(S): 9 INJECTION INTRAMUSCULAR; INTRAVENOUS; SUBCUTANEOUS at 05:19

## 2017-02-27 RX ADMIN — Medication 200 MILLIGRAM(S): at 18:03

## 2017-02-27 RX ADMIN — Medication 200 MILLIGRAM(S): at 05:13

## 2017-02-27 RX ADMIN — Medication 1 TABLET(S): at 12:23

## 2017-02-27 RX ADMIN — Medication 1 MILLIGRAM(S): at 12:23

## 2017-02-27 RX ADMIN — Medication 5 MILLIGRAM(S): at 05:14

## 2017-02-27 NOTE — PROGRESS NOTE ADULT - SUBJECTIVE AND OBJECTIVE BOX
Patient is a 66y old  Female who presents with a chief complaint of weakness, abd pain, diarrhea (22 Feb 2017 15:54)      HPI:  66y female w/ PMH HTN, RA who states she was sent to ED by her PMD b/c of cough.  Discussed w/ PMD/PA who state she called w/ c/o worsening diarrhea and they advised her to come to ED.    Pt reports having chronic diarrhea for a year which she denies has recently worsened.  She does admit to decreased overall intake within past few weeks but states that is due to her upper resp infection (phlegm, cough x 3 weeks improving w/ mucinex and resp viral panel outpatient negative).    She denies abdominal pain, nausea/vomiting, urinary f/u/d, fevers/chills.  C/o chronic pain due to RA for which she takes MTX weekly (last dose last Tues) and prednisone daily (last dose yesterday).  No recent change in meds.      In ED she reports feeling better and requests ice cream.  Denies cough, cp, sob, palp, abd pain, f/c, urinary f/u/d. Reports last episode diarrhea ~3days ago. (22 Feb 2017 10:18)    pt is confused but pleasant  seems agitated and tremulous  wants to have BM  Per Rn, not having diarrhea    had colonoscopy for diarrhea last year, results will need to be checked in EMR ( I will obtain)      PAST MEDICAL & SURGICAL HISTORY:  HTN (hypertension)  Rheumatoid arthritis  No significant past surgical history      MEDICATIONS  (STANDING):  predniSONE   Tablet 5milliGRAM(s) Oral daily  LORazepam     Tablet 0.5milliGRAM(s) Oral every 12 hours  LORazepam     Tablet milliGRAM(s) Oral   folic acid 1milliGRAM(s) Oral daily  multivitamin 1Tablet(s) Oral daily  sodium chloride 0.9%. 1000milliLiter(s) IV Continuous <Continuous>  ciprofloxacin   IVPB     ciprofloxacin   IVPB 400milliGRAM(s) IV Intermittent every 12 hours  sodium ferric gluconate complex IVPB 125milliGRAM(s) IV Intermittent daily  sodium chloride 0.9%. 1000milliLiter(s) IV Continuous <Continuous>    MEDICATIONS  (PRN):  ondansetron Injectable 4milliGRAM(s) IV Push every 6 hours PRN Nausea and/or Vomiting  oxyCODONE  5 mG/acetaminophen 325 mG 1Tablet(s) Oral every 6 hours PRN Moderate Pain (4 - 6)      Allergies    sulfa drugs (Unknown)    Intolerances        SOCIAL HISTORY:ETOH use, will not quantify    FAMILY HISTORY:  No pertinent family history in first degree relatives      REVIEW OF SYSTEMS:    CONSTITUTIONAL: No weakness, fevers or chills  EYES/ENT: No visual changes;  No vertigo or throat pain   NECK: No pain or stiffness  RESPIRATORY: No cough, wheezing, hemoptysis; No shortness of breath  CARDIOVASCULAR: No chest pain or palpitations  GENITOURINARY: No dysuria, frequency or hematuria  NEUROLOGICAL: No numbness or weakness  SKIN: No itching, burning, rashes, or lesions   All other review of systems is negative unless indicated above, but pt is unreliable    Vital Signs Last 24 Hrs  T(C): 36.2, Max: 36.9 (02-26 @ 11:58)  T(F): 97.1, Max: 98.4 (02-26 @ 11:58)  HR: 95 (86 - 95)  BP: 100/50 (100/50 - 122/72)  BP(mean): --  RR: 16 (15 - 16)  SpO2: 97% (93% - 99%)    PHYSICAL EXAM:    Constitutional: NAD, well-developed, icteric  HEENT: EOMI, throat clear  Neck: No LAD, supple  Respiratory: CTA and P  Cardiovascular: S1 and S2, RRR, no M  Gastrointestinal: BS+, soft, NT/ND, neg HSM, liver edge firm  Extremities: No peripheral edema, neg clubing, cyanosis  Vascular: 2+ peripheral pulses  Neurological: A/O x 3, no focal deficits  Psychiatric: Normal mood, normal affect  Skin: No rashes    LABS:  CBC Full  -  ( 27 Feb 2017 06:38 )  WBC Count : 12.3 K/uL  Hemoglobin : 12.0 g/dL  Hematocrit : 35.8 %  Platelet Count - Automated : 53 K/uL  Mean Cell Volume : 113.1 fl  Mean Cell Hemoglobin : 38.0 pg  Mean Cell Hemoglobin Concentration : 33.6 gm/dL  Auto Neutrophil # : x  Auto Lymphocyte # : x  Auto Monocyte # : x  Auto Eosinophil # : x  Auto Basophil # : x  Auto Neutrophil % : x  Auto Lymphocyte % : x  Auto Monocyte % : x  Auto Eosinophil % : x  Auto Basophil % : x    27 Feb 2017 06:38    142    |  110    |  15     ----------------------------<  84     4.2     |  23     |  0.78     Ca    8.2        27 Feb 2017 06:38    TPro  5.6    /  Alb  2.1    /  TBili  4.3    /  DBili  2.8    /  AST  205    /  ALT  96     /  AlkPhos  178    27 Feb 2017 06:38    PT/INR - ( 27 Feb 2017 06:38 )   PT: 16.9 sec;   INR: 1.53 ratio         PTT - ( 27 Feb 2017 06:38 )  PTT:52.6 sec    02-23 @ 07:40  Hep A Igm Nonreact  Hep A total ab, IgA and M --  Hep B core Ab total --  Hep B core IgM Nonreact  Hep B DNA PCR --  Hep BSAg --  Hep BSAb --  Hep BSAb --  HCV Ab --  HCV RNA Log --  HCV RNA interp --            02-22 @ 11:29  Hep A Igm Nonreact  Hep A total ab, IgA and M --  Hep B core Ab total --  Hep B core IgM Nonreact  Hep B DNA PCR --  Hep BSAg --  Hep BSAb --  Hep BSAb --  HCV Ab --  HCV RNA Log --  HCV RNA interp --                RADIOLOGY & ADDITIONAL STUDIES:EXAM:  CT ABDOMEN AND PELVIS OC                            PROCEDURE DATE:  02/21/2017        INTERPRETATION:  CLINICAL INFORMATION: Persistent vomiting and diarrhea.   Trouble eating.    TECHNIQUE: Noncontrast CT of the abdomen and pelvis was performed with   coronal and sagittal reformats. Oral contrast was administered.    COMPARISON: CT abdomen and pelvis 1/8/2013.    FINDINGS:  Assessment of solid organs and viscera is limited without intravenous   contrast enhancement.    LOWER CHEST: Coronary artery calcifications.    HEPATOBILIARY: Nodular hepatic contour suspicious for cirrhosis. Fatty   liver. Sludge and stones in the gallbladder without signs of   cholecystitis. No biliary ductal dilation.  SPLEEN: Within normal limits.  PANCREAS: Within normal limits.  ADRENALS: Within normal limits.    KIDNEYS/URETERS/BLADDER: Within normal limits.  REPRODUCTIVE ORGANS: Uterus and adnexa within normal limits.    BOWEL/PERITONEUM:  Portions of the gastrointestinal tract are collapsed,   limiting evaluation. No bowel obstruction, inflammation or   pneumoperitoneum.  Appendix normal. The colon is collapsed, limiting   evaluation, however there is mild diffuse pericolonic fat stranding most   notably along the ascending colon, descending and sigmoid colon and   rectum which may indicate combination of portal hypertensive colopathy   and proctocolitis. There is colonic diverticulosis without diverticulitis.    VESSELS:  Moderate atherosclerotic calcifications. No abdominal aortic   aneurysm.  LYMPHATICS/RETROPERITONEUM: No lymphadenopathy. No retroperitoneal   hematoma.      SOFT TISSUES: Within normal limits.  BONES: Lumbar dextroscoliosis. Bilateral hip arthrosis, severe on the   right. Spinal degenerative changes.    IMPRESSION:   1. Proctocolitis. No bowel obstruction.   2. Hepatic morphology suspicious for cirrhosis and fatty liver.   Cholelithiasis without cholecystitis.                    VANDANA ARIAS   This document has been electronically signed. Feb 21 2017  9:37PM

## 2017-02-28 LAB
APTT BLD: 48.4 SEC — HIGH (ref 27.5–37.4)
INR BLD: 1.54 RATIO — HIGH (ref 0.88–1.16)
PROTHROM AB SERPL-ACNC: 17 SEC — HIGH (ref 10–13.1)

## 2017-02-28 RX ORDER — METHOTREXATE 2.5 MG/1
4 TABLET ORAL
Qty: 0 | Refills: 0 | COMMUNITY

## 2017-02-28 RX ORDER — METHOTREXATE 2.5 MG/1
5 TABLET ORAL
Qty: 0 | Refills: 0 | COMMUNITY

## 2017-02-28 RX ORDER — CLARITHROMYCIN 500 MG
1 TABLET ORAL
Qty: 0 | Refills: 0 | COMMUNITY

## 2017-02-28 RX ORDER — POTASSIUM CHLORIDE 20 MEQ
2 PACKET (EA) ORAL
Qty: 0 | Refills: 0 | COMMUNITY

## 2017-02-28 RX ORDER — NEBIVOLOL HYDROCHLORIDE 5 MG/1
1 TABLET ORAL
Qty: 0 | Refills: 0 | COMMUNITY

## 2017-02-28 RX ORDER — TRIAMTERENE 100 MG/1
375 CAPSULE ORAL
Qty: 0 | Refills: 0 | COMMUNITY

## 2017-02-28 RX ADMIN — Medication 1 TABLET(S): at 13:28

## 2017-02-28 RX ADMIN — Medication 200 MILLIGRAM(S): at 06:24

## 2017-02-28 RX ADMIN — Medication 1 MILLIGRAM(S): at 13:28

## 2017-02-28 RX ADMIN — Medication 200 MILLIGRAM(S): at 17:54

## 2017-02-28 RX ADMIN — Medication 5 MILLIGRAM(S): at 06:24

## 2017-02-28 NOTE — PROGRESS NOTE ADULT - SUBJECTIVE AND OBJECTIVE BOX
History of Present Illness:  Chief Complaint/Reason for Admission: I was sent by PMD for cough and problem w/ my kidneys.  Diarrhea.	  History of Present Illness: 	   66y female w/ PMH HTN, RA who states she was sent to ED by her PMD b/c of cough.  Discussed w/ PMD/PA who state she called w/ c/o worsening diarrhea and they advised her to come to ED.    Pt reports having chronic diarrhea for a year which she denies has recently worsened.  She does admit to decreased overall intake within past few weeks but states that is due to her upper resp infection (phlegm, cough x 3 weeks improving w/ mucinex and resp viral panel outpatient negative).     C/o chronic pain due to RA for which she takes MTX weekly (last dose last Tues) and prednisone daily (last dose yesterday).        2/28- no overnight events - no new complaints.           Review of Systems:  - negative      Vital Signs Last 24 Hrs  T(C): 36.7, Max: 36.9 (02-27 @ 23:00)  T(F): 98.1, Max: 98.5 (02-27 @ 23:00)  HR: 95 (86 - 95)  BP: 115/62 (97/44 - 120/58)  BP(mean): 70 (70 - 70)  RR: 16 (16 - 18)  SpO2: 97% (96% - 97%)      Physical Exam:  General: ill appearing female, nad, Neuro: AAOx3, but forgetful, no focal deficits/able to follow commands HEENT: NCAT, PERRLA, EOMI, moist mucous membranes/no petechiae Neck: Soft and supple, No JVD Respiratory: CTA b/l, no w/r/r Cardiovascular: S1 and S2, RRR, no m/g/r Gastrointestinal: +BS, soft, non tender- non distended, no rebound tenderness Extremities: chronic changes, no c/c Vascular: 2+ peripheral pulses Musculoskeletal: 5/5 strength b/l UE and LE, sensation intact Skin: No rashes, few areas ecchymoses abd, extrem.	            27 Feb 2017 06:38    142    |  110    |  15     ----------------------------<  84     4.2     |  23     |  0.78     Ca    8.2        27 Feb 2017 06:38    TPro  5.6    /  Alb  2.1    /  TBili  4.3    /  DBili  2.8    /  AST  205    /  ALT  96     /  AlkPhos  178    27 Feb 2017 06:38                          12.0   12.3  )-----------( 53       ( 27 Feb 2017 06:38 )             35.8       Culture - Urine (02.22.17 @ 11:26)    -  Amikacin: S <=16    -  Cefoxitin: S <=8    -  Ertapenem: S <=1    -  Imipenem: S <=1    -  Aztreonam: S <=4    -  Meropenem: S <=1    -  Trimethoprim/Sulfamethoxazole: S <=2/38    -  Cefepime: S <=4    -  Ceftazidime: S <=1    -  Ceftriaxone: S <=1    -  Gentamicin: S <=4    -  Levofloxacin: S <=2    -  Nitrofurantoin: S <=32    -  Piperacillin/Tazobactam: S <=16    -  Ampicillin: S <=8    -  Ampicillin/Sulbactam: S <=8/4    -  Cefazolin: S <=8    -  Ciprofloxacin: S <=1    -  Tobramycin: S <=4    Specimen Source: .Urine None    Culture Results:   10,000 - 49,000 CFU/mL Escherichia coli  Revised urine counts reflect clinically significant counts  established by evidence based medicine.    Organism Identification: Escherichia coli    Organism: Escherichia coli    Method Type: JULIUS        Culture - Blood (02.22.17 @ 13:17)    Specimen Source: .Blood None    Culture Results:   No growth to date.       PTT - ( 25 Feb 2017 07:17 )  PTT:39.0 sec  D-Dimer Assay, Quantitative (02.24.17 @ 13:25)    D-Dimer Assay, Quantitative: 1740  Fibrinogen Assay (02.24.17 @ 13:25)    Fibrinogen Assay: 487 mg/dL    Culture - Stool (02.22.17 @ 13:14)    Specimen Source: .Stool None    Culture Results:   No enteric pathogens isolated.  (Stool culture examined for Salmonella,  Shigella, Campylobacter, Aeromonas, Plesiomonas,  Vibrio, E.coli O157 and Yersinia)          Acute Hepatitis Panel (02.22.17 @ 11:29)   Hepatitis C Virus Interpretation: Nonreact: Hepatitis C AB  Lipid Profile (02.22.17 @ 12:00)   HDL/Total Cholesterol Ratio Measurement: 14.5 RATIO   Cholesterol, Serum: 160 mg/dL   Triglycerides, Serum: 122 mg/dL   HDL Cholesterol, Serum: 11: TEST REPEATED. mg/dL   Direct LDL: 125: LDL Cholesterol --- Interpretive Comment (for adults 18 and over)    EXAM:  CT ABDOMEN AND PELVIS OC       02/21/2017  IMPRESSION:  1. Proctocolitis. No bowel obstruction.  2. Hepatic morphology suspicious for cirrhosis and fatty liver.  Cholelithiasis without cholecystitis.  	    		  66y female admitted with    *diarrhea, proctocolitis   (pt reports diarrhea is chronic)- resolved   - CT abd noted  - continue cipro  - stool studies negative   - blood cx ngtd  - GI eval appreciated     *ecoli uti  - continue IV abx  - blood cx ngtd    *elevated LFTs due to etoh hepatitis/cirrhosis  - CT abd suspicious for cirrhosis, fatty liver;  cholelithiasis w/o cholecystitis  - also due to mtx (last dose 1 week ago, level normal)- d/c mtx   - lfts, tb improving slowly  - hep panel, lipids- nml   - [facility] protocol,- no requiring Ativan  - GI consult appreciated    *coagulopathy  - possibly due to liver failure vs  infxn  - DIC panel 2/24 w/ increased PT, ddimer  - consulted w/ Heme/Onc yesterday  2/26-  d/w heme/onc- likely compensated DIC due to severe liver disease,  vit K given, monitor     *thrombocytopenia  - possibly due to cirrhosis vs infxn (dic panel as above)  - (of note, pt was on mtx but off since last tues- level nml and renal function stable/lfts improving)  - 2/26- d/w Heme/onc yesterday,  give IV iron few days, B12, folate, monitor     *hyponatremia- resolved  - likely hypovolemic due to decreased intake      *RADHA  - baseline Cr 0.91 in 2013  - likely prerenal due to hypovolemia  - hold triamterene   - resolved w/ IVF      *RA  - d/c mtx (took last Tues- weekly dose)  - continue daily dose prednisone  - discussed w/ pt's significant other- she will need to f/u w/ Rheum for other options     *HTN  - hold triamterene due to RADHA        *dvt px  -scds      Case discussed with Dr Downs. PT consult today - awaiting recommendations.

## 2017-02-28 NOTE — PROGRESS NOTE ADULT - SUBJECTIVE AND OBJECTIVE BOX
Patient is a 66y old  Female who presents with a chief complaint of weakness, abd pain, diarrhea (22 Feb 2017 15:54)      HPI:  66y female w/ PMH HTN, RA who states she was sent to ED by her PMD b/c of cough.  Discussed w/ PMD/PA who state she called w/ c/o worsening diarrhea and they advised her to come to ED.    Pt reports having chronic diarrhea for a year which she denies has recently worsened.  She does admit to decreased overall intake within past few weeks but states that is due to her upper resp infection (phlegm, cough x 3 weeks improving w/ mucinex and resp viral panel outpatient negative).    She denies abdominal pain, nausea/vomiting, urinary f/u/d, fevers/chills.  C/o chronic pain due to RA for which she takes MTX weekly (last dose last Tues) and prednisone daily (last dose yesterday).  No recent change in meds.      In ED she reports feeling better and requests ice cream.  Denies cough, cp, sob, palp, abd pain, f/c, urinary f/u/d. Reports last episode diarrhea ~3days ago. (22 Feb 2017 10:18)    pt reports loose stool twice today, per RN, small amount with some loose and formed stool mixed  neg blood  neg abd pain  more alert and less agitated  fails WORLD backwards          PAST MEDICAL & SURGICAL HISTORY:  HTN (hypertension)  Rheumatoid arthritis  No significant past surgical history      MEDICATIONS  (STANDING):  predniSONE   Tablet 5milliGRAM(s) Oral daily  folic acid 1milliGRAM(s) Oral daily  multivitamin 1Tablet(s) Oral daily  sodium chloride 0.9%. 1000milliLiter(s) IV Continuous <Continuous>  ciprofloxacin   IVPB     ciprofloxacin   IVPB 400milliGRAM(s) IV Intermittent every 12 hours  sodium chloride 0.9%. 1000milliLiter(s) IV Continuous <Continuous>    MEDICATIONS  (PRN):  ondansetron Injectable 4milliGRAM(s) IV Push every 6 hours PRN Nausea and/or Vomiting  oxyCODONE  5 mG/acetaminophen 325 mG 1Tablet(s) Oral every 6 hours PRN Moderate Pain (4 - 6)      Allergies    sulfa drugs (Unknown)    Intolerances        SOCIAL HISTORY:ETOH abuse    FAMILY HISTORY:  No pertinent family history in first degree relatives      REVIEW OF SYSTEMS:    CONSTITUTIONAL: No weakness, fevers or chills  EYES/ENT: No visual changes;  No vertigo or throat pain   NECK: No pain or stiffness  RESPIRATORY: No cough, wheezing, hemoptysis; No shortness of breath  CARDIOVASCULAR: No chest pain or palpitations  GENITOURINARY: No dysuria, frequency or hematuria  NEUROLOGICAL: No numbness or weakness  SKIN: No itching, burning, rashes, or lesions   All other review of systems is negative unless indicated above.  not very reliable      Vital Signs Last 24 Hrs  T(C): 36.9, Max: 36.9 (02-27 @ 23:00)  T(F): 98.4, Max: 98.5 (02-27 @ 23:00)  HR: 90 (86 - 110)  BP: 97/44 (97/44 - 120/58)  BP(mean): 70 (70 - 70)  RR: 18 (16 - 18)  SpO2: 96% (94% - 96%)    PHYSICAL EXAM:    Constitutional: NAD, well-developed  HEENT: EOMI, throat clear  Neck: No LAD, supple  Respiratory: CTA and P  Cardiovascular: S1 and S2, RRR, no M  Gastrointestinal: BS+, soft, NT/ND, neg HSM,  Extremities: No peripheral edema, neg clubing, cyanosis  Vascular: 2+ peripheral pulses  Neurological: A/O x 3, no focal deficits  Psychiatric: Normal mood, normal affect  Skin: No rashes    LABS:  CBC Full  -  ( 27 Feb 2017 06:38 )  WBC Count : 12.3 K/uL  Hemoglobin : 12.0 g/dL  Hematocrit : 35.8 %  Platelet Count - Automated : 53 K/uL  Mean Cell Volume : 113.1 fl  Mean Cell Hemoglobin : 38.0 pg  Mean Cell Hemoglobin Concentration : 33.6 gm/dL  Auto Neutrophil # : 9.1 K/uL  Auto Lymphocyte # : 1.6 K/uL  Auto Monocyte # : 1.2 K/uL  Auto Eosinophil # : 0.4 K/uL  Auto Basophil # : 0.1 K/uL  Auto Neutrophil % : 73.8 %  Auto Lymphocyte % : 12.8 %  Auto Monocyte % : 9.9 %  Auto Eosinophil % : 3.1 %  Auto Basophil % : 0.4 %    27 Feb 2017 06:38    142    |  110    |  15     ----------------------------<  84     4.2     |  23     |  0.78     Ca    8.2        27 Feb 2017 06:38    TPro  5.6    /  Alb  2.1    /  TBili  4.3    /  DBili  2.8    /  AST  205    /  ALT  96     /  AlkPhos  178    27 Feb 2017 06:38    PT/INR - ( 27 Feb 2017 06:38 )   PT: 16.9 sec;   INR: 1.53 ratio         PTT - ( 27 Feb 2017 06:38 )  PTT:52.6 sec    02-23 @ 07:40  Hep A Igm Nonreact  Hep A total ab, IgA and M --  Hep B core Ab total --  Hep B core IgM Nonreact  Hep B DNA PCR --  Hep BSAg --  Hep BSAb --  Hep BSAb --  HCV Ab --  HCV RNA Log --  HCV RNA interp --            02-22 @ 11:29  Hep A Igm Nonreact  Hep A total ab, IgA and M --  Hep B core Ab total --  Hep B core IgM Nonreact  Hep B DNA PCR --  Hep BSAg --  Hep BSAb --  Hep BSAb --  HCV Ab --  HCV RNA Log --  HCV RNA interp --                RADIOLOGY & ADDITIONAL STUDIES:

## 2017-03-01 ENCOUNTER — RESULT REVIEW (OUTPATIENT)
Age: 67
End: 2017-03-01

## 2017-03-01 LAB
ANION GAP SERPL CALC-SCNC: 11 MMOL/L — SIGNIFICANT CHANGE UP (ref 5–17)
BUN SERPL-MCNC: 11 MG/DL — SIGNIFICANT CHANGE UP (ref 7–23)
CALCIUM SERPL-MCNC: 8.3 MG/DL — LOW (ref 8.5–10.1)
CHLORIDE SERPL-SCNC: 110 MMOL/L — HIGH (ref 96–108)
CO2 SERPL-SCNC: 20 MMOL/L — LOW (ref 22–31)
CREAT SERPL-MCNC: 0.69 MG/DL — SIGNIFICANT CHANGE UP (ref 0.5–1.3)
GLUCOSE SERPL-MCNC: 98 MG/DL — SIGNIFICANT CHANGE UP (ref 70–99)
HCT VFR BLD CALC: 33 % — LOW (ref 34.5–45)
HCT VFR BLD CALC: 35.7 % — SIGNIFICANT CHANGE UP (ref 34.5–45)
HGB BLD-MCNC: 11.4 G/DL — LOW (ref 11.5–15.5)
HGB BLD-MCNC: 12 G/DL — SIGNIFICANT CHANGE UP (ref 11.5–15.5)
MCHC RBC-ENTMCNC: 33.8 GM/DL — SIGNIFICANT CHANGE UP (ref 32–36)
MCHC RBC-ENTMCNC: 34.4 GM/DL — SIGNIFICANT CHANGE UP (ref 32–36)
MCHC RBC-ENTMCNC: 37.8 PG — HIGH (ref 27–34)
MCHC RBC-ENTMCNC: 38.4 PG — HIGH (ref 27–34)
MCV RBC AUTO: 111.6 FL — HIGH (ref 80–100)
MCV RBC AUTO: 111.9 FL — HIGH (ref 80–100)
PLATELET # BLD AUTO: 191 K/UL — SIGNIFICANT CHANGE UP (ref 150–400)
PLATELET # BLD AUTO: 221 K/UL — SIGNIFICANT CHANGE UP (ref 150–400)
POTASSIUM SERPL-MCNC: 4 MMOL/L — SIGNIFICANT CHANGE UP (ref 3.5–5.3)
POTASSIUM SERPL-SCNC: 4 MMOL/L — SIGNIFICANT CHANGE UP (ref 3.5–5.3)
RBC # BLD: 2.96 M/UL — LOW (ref 3.8–5.2)
RBC # BLD: 3.19 M/UL — LOW (ref 3.8–5.2)
RBC # FLD: 15.9 % — HIGH (ref 10.3–14.5)
RBC # FLD: 15.9 % — HIGH (ref 10.3–14.5)
SODIUM SERPL-SCNC: 141 MMOL/L — SIGNIFICANT CHANGE UP (ref 135–145)
WBC # BLD: 13.1 K/UL — HIGH (ref 3.8–10.5)
WBC # BLD: 14.4 K/UL — HIGH (ref 3.8–10.5)
WBC # FLD AUTO: 13.1 K/UL — HIGH (ref 3.8–10.5)
WBC # FLD AUTO: 14.4 K/UL — HIGH (ref 3.8–10.5)

## 2017-03-01 RX ORDER — CIPROFLOXACIN LACTATE 400MG/40ML
500 VIAL (ML) INTRAVENOUS EVERY 12 HOURS
Qty: 0 | Refills: 0 | Status: DISCONTINUED | OUTPATIENT
Start: 2017-03-01 | End: 2017-03-03

## 2017-03-01 RX ORDER — METRONIDAZOLE 500 MG
500 TABLET ORAL EVERY 8 HOURS
Qty: 0 | Refills: 0 | Status: DISCONTINUED | OUTPATIENT
Start: 2017-03-01 | End: 2017-03-03

## 2017-03-01 RX ORDER — SOD SULF/SODIUM/NAHCO3/KCL/PEG
4000 SOLUTION, RECONSTITUTED, ORAL ORAL ONCE
Qty: 0 | Refills: 0 | Status: COMPLETED | OUTPATIENT
Start: 2017-03-01 | End: 2017-03-01

## 2017-03-01 RX ADMIN — Medication 500 MILLIGRAM(S): at 17:09

## 2017-03-01 RX ADMIN — Medication 1 MILLIGRAM(S): at 12:25

## 2017-03-01 RX ADMIN — Medication 5 MILLIGRAM(S): at 06:33

## 2017-03-01 RX ADMIN — Medication 500 MILLIGRAM(S): at 14:23

## 2017-03-01 RX ADMIN — Medication 4000 MILLILITER(S): at 17:09

## 2017-03-01 RX ADMIN — Medication 1 TABLET(S): at 12:25

## 2017-03-01 RX ADMIN — Medication 200 MILLIGRAM(S): at 06:33

## 2017-03-01 RX ADMIN — Medication 500 MILLIGRAM(S): at 23:14

## 2017-03-01 NOTE — PROGRESS NOTE ADULT - SUBJECTIVE AND OBJECTIVE BOX
Patient is a 66y old  Female who presents with a chief complaint of weakness, abd pain, diarrhea (22 Feb 2017 15:54)      HPI:  66y female w/ PMH HTN, RA who states she was sent to ED by her PMD b/c of cough.  Discussed w/ PMD/PA who state she called w/ c/o worsening diarrhea and they advised her to come to ED.    Pt reports having chronic diarrhea for a year which she denies has recently worsened.  She does admit to decreased overall intake within past few weeks but states that is due to her upper resp infection (phlegm, cough x 3 weeks improving w/ mucinex and resp viral panel outpatient negative).    She denies abdominal pain, nausea/vomiting, urinary f/u/d, fevers/chills.  C/o chronic pain due to RA for which she takes MTX weekly (last dose last Tues) and prednisone daily (last dose yesterday).  No recent change in meds.      In ED she reports feeling better and requests ice cream.  Denies cough, cp, sob, palp, abd pain, f/c, urinary f/u/d. Reports last episode diarrhea ~3days ago. (22 Feb 2017 10:18)      pt more alert and comf  diarrhea improved and had formed BM yesterday  neg N V  neg blood  agreeable to ETOH dependence eval        PAST MEDICAL & SURGICAL HISTORY:  HTN (hypertension)  Rheumatoid arthritis  No significant past surgical history      MEDICATIONS  (STANDING):  predniSONE   Tablet 5milliGRAM(s) Oral daily  folic acid 1milliGRAM(s) Oral daily  multivitamin 1Tablet(s) Oral daily  ciprofloxacin   IVPB     ciprofloxacin   IVPB 400milliGRAM(s) IV Intermittent every 12 hours  polyethylene glycol/electrolyte Solution. 4000milliLiter(s) Oral once    MEDICATIONS  (PRN):  ondansetron Injectable 4milliGRAM(s) IV Push every 6 hours PRN Nausea and/or Vomiting  oxyCODONE  5 mG/acetaminophen 325 mG 1Tablet(s) Oral every 6 hours PRN Moderate Pain (4 - 6)      Allergies    sulfa drugs (Unknown)    Intolerances        SOCIAL HISTORY:ETOH use    FAMILY HISTORY:  No pertinent family history in first degree relatives      REVIEW OF SYSTEMS:    CONSTITUTIONAL: No weakness, fevers or chills  EYES/ENT: No visual changes;  No vertigo or throat pain   NECK: No pain or stiffness  RESPIRATORY: No cough, wheezing, hemoptysis; No shortness of breath  CARDIOVASCULAR: No chest pain or palpitations  GENITOURINARY: No dysuria, frequency or hematuria  NEUROLOGICAL: No numbness or weakness  SKIN: No itching, burning, rashes, or lesions   All other review of systems is negative unless indicated above.    Vital Signs Last 24 Hrs  T(C): 36.9, Max: 37 (02-28 @ 22:30)  T(F): 98.5, Max: 98.6 (02-28 @ 22:30)  HR: 100 (95 - 101)  BP: 111/58 (111/58 - 125/67)  BP(mean): --  RR: 20 (16 - 20)  SpO2: 96% (96% - 98%)    PHYSICAL EXAM:    Constitutional: NAD, well-developed  HEENT: EOMI, throat clear  Neck: No LAD, supple  Respiratory: CTA and P  Cardiovascular: S1 and S2, RRR, no M  Gastrointestinal: BS+, soft, NT/ND, neg HSM,  Extremities: No peripheral edema, neg clubing, cyanosis  Vascular: 2+ peripheral pulses  Neurological: A/O x 3, no focal deficits  Psychiatric: Normal mood, normal affect  Skin: No rashes    LABS:          PT/INR - ( 28 Feb 2017 07:42 )   PT: 17.0 sec;   INR: 1.54 ratio         PTT - ( 28 Feb 2017 07:42 )  PTT:48.4 sec    02-23 @ 07:40  Hep A Igm Nonreact  Hep A total ab, IgA and M --  Hep B core Ab total --  Hep B core IgM Nonreact  Hep B DNA PCR --  Hep BSAg --  Hep BSAb --  Hep BSAb --  HCV Ab --  HCV RNA Log --  HCV RNA interp --            02-22 @ 11:29  Hep A Igm Nonreact  Hep A total ab, IgA and M --  Hep B core Ab total --  Hep B core IgM Nonreact  Hep B DNA PCR --  Hep BSAg --  Hep BSAb --  Hep BSAb --  HCV Ab --  HCV RNA Log --  HCV RNA interp --                RADIOLOGY & ADDITIONAL STUDIES:  EXAM:  CT ABDOMEN AND PELVIS OC                            PROCEDURE DATE:  02/21/2017        INTERPRETATION:  CLINICAL INFORMATION: Persistent vomiting and diarrhea.   Trouble eating.    TECHNIQUE: Noncontrast CT of the abdomen and pelvis was performed with   coronal and sagittal reformats. Oral contrast was administered.    COMPARISON: CT abdomen and pelvis 1/8/2013.    FINDINGS:  Assessment of solid organs and viscera is limited without intravenous   contrast enhancement.    LOWER CHEST: Coronary artery calcifications.    HEPATOBILIARY: Nodular hepatic contour suspicious for cirrhosis. Fatty   liver. Sludge and stones in the gallbladder without signs of   cholecystitis. No biliary ductal dilation.  SPLEEN: Within normal limits.  PANCREAS: Within normal limits.  ADRENALS: Within normal limits.    KIDNEYS/URETERS/BLADDER: Within normal limits.  REPRODUCTIVE ORGANS: Uterus and adnexa within normal limits.    BOWEL/PERITONEUM:  Portions of the gastrointestinal tract are collapsed,   limiting evaluation. No bowel obstruction, inflammation or   pneumoperitoneum.  Appendix normal. The colon is collapsed, limiting   evaluation, however there is mild diffuse pericolonic fat stranding most   notably along the ascending colon, descending and sigmoid colon and   rectum which may indicate combination of portal hypertensive colopathy   and proctocolitis. There is colonic diverticulosis without diverticulitis.    VESSELS:  Moderate atherosclerotic calcifications. No abdominal aortic   aneurysm.  LYMPHATICS/RETROPERITONEUM: No lymphadenopathy. No retroperitoneal   hematoma.      SOFT TISSUES: Within normal limits.  BONES: Lumbar dextroscoliosis. Bilateral hip arthrosis, severe on the   right. Spinal degenerative changes.    IMPRESSION:   1. Proctocolitis. No bowel obstruction.   2. Hepatic morphology suspicious for cirrhosis and fatty liver.   Cholelithiasis without cholecystitis.                    VANDANA ARIAS   This document has been electronically signed. Feb 21 2017  9:37PM

## 2017-03-01 NOTE — PROGRESS NOTE ADULT - SUBJECTIVE AND OBJECTIVE BOX
History of Present Illness:  Chief Complaint/Reason for Admission: I was sent by PMD for cough and problem w/ my kidneys.  Diarrhea.	  History of Present Illness: 	   66y female w/ PMH HTN, RA who states she was sent to ED by her PMD b/c of cough.  Discussed w/ PMD/PA who state she called w/ c/o worsening diarrhea and they advised her to come to ED.    Pt reports having chronic diarrhea for a year which she denies has recently worsened.  She does admit to decreased overall intake within past few weeks but states that is due to her upper resp infection (phlegm, cough x 3 weeks improving w/ mucinex and resp viral panel outpatient negative).     C/o chronic pain due to RA for which she takes MTX weekly (last dose last Tues) and prednisone daily (last dose yesterday).        2/28- no overnight events - no new complaints.   3/1- no overnight events, complaining that she is weak. no diarrhea          Review of Systems:  - negative      Vital Signs Last 24 Hrs  T(C): 36.9, Max: 37 (02-28 @ 22:30)  T(F): 98.5, Max: 98.6 (02-28 @ 22:30)  HR: 100 (100 - 101)  BP: 111/58 (111/58 - 125/67)  BP(mean): --  RR: 20 (18 - 20)  SpO2: 96% (96% - 98%)    Physical Exam:  General: ill appearing female, nad, Neuro: AAOx3, but forgetful, no focal deficits/able to follow commands HEENT: NCAT, PERRLA, EOMI, moist mucous membranes/no petechiae Neck: Soft and supple, No JVD Respiratory: CTA b/l, no w/r/r Cardiovascular: S1 and S2, RRR, no m/g/r Gastrointestinal: +BS, soft, non tender- non distended, no rebound tenderness Extremities: chronic changes, no c/c Vascular: 2+ peripheral pulses Musculoskeletal: 5/5 strength b/l UE and LE, sensation intact Skin: No rashes, few areas ecchymoses abd, extremities	                                11.4   14.4  )-----------( 191      ( 01 Mar 2017 09:12 )             33.0       01 Mar 2017 09:12    141    |  110    |  11     ----------------------------<  98     4.0     |  20     |  0.69     Ca    8.3        01 Mar 2017 09:12      27 Feb 2017 06:38    142    |  110    |  15     ----------------------------<  84     4.2     |  23     |  0.78     Ca    8.2        27 Feb 2017 06:38    TPro  5.6    /  Alb  2.1    /  TBili  4.3    /  DBili  2.8    /  AST  205    /  ALT  96     /  AlkPhos  178    27 Feb 2017 06:38                          12.0   12.3  )-----------( 53       ( 27 Feb 2017 06:38 )             35.8       Culture - Urine (02.22.17 @ 11:26)    -  Amikacin: S <=16    -  Cefoxitin: S <=8    -  Ertapenem: S <=1    -  Imipenem: S <=1    -  Aztreonam: S <=4    -  Meropenem: S <=1    -  Trimethoprim/Sulfamethoxazole: S <=2/38    -  Cefepime: S <=4    -  Ceftazidime: S <=1    -  Ceftriaxone: S <=1    -  Gentamicin: S <=4    -  Levofloxacin: S <=2    -  Nitrofurantoin: S <=32    -  Piperacillin/Tazobactam: S <=16    -  Ampicillin: S <=8    -  Ampicillin/Sulbactam: S <=8/4    -  Cefazolin: S <=8    -  Ciprofloxacin: S <=1    -  Tobramycin: S <=4    Specimen Source: .Urine None    Culture Results:   10,000 - 49,000 CFU/mL Escherichia coli  Revised urine counts reflect clinically significant counts  established by evidence based medicine.    Organism Identification: Escherichia coli    Organism: Escherichia coli    Method Type: JULIUS        Culture - Blood (02.22.17 @ 13:17)    Specimen Source: .Blood None    Culture Results:   No growth to date.       PTT - ( 25 Feb 2017 07:17 )  PTT:39.0 sec  D-Dimer Assay, Quantitative (02.24.17 @ 13:25)    D-Dimer Assay, Quantitative: 1740  Fibrinogen Assay (02.24.17 @ 13:25)    Fibrinogen Assay: 487 mg/dL    Culture - Stool (02.22.17 @ 13:14)    Specimen Source: .Stool None    Culture Results:   No enteric pathogens isolated.  (Stool culture examined for Salmonella,  Shigella, Campylobacter, Aeromonas, Plesiomonas,  Vibrio, E.coli O157 and Yersinia)          Acute Hepatitis Panel (02.22.17 @ 11:29)   Hepatitis C Virus Interpretation: Nonreact: Hepatitis C AB  Lipid Profile (02.22.17 @ 12:00)   HDL/Total Cholesterol Ratio Measurement: 14.5 RATIO   Cholesterol, Serum: 160 mg/dL   Triglycerides, Serum: 122 mg/dL   HDL Cholesterol, Serum: 11: TEST REPEATED. mg/dL   Direct LDL: 125: LDL Cholesterol --- Interpretive Comment (for adults 18 and over)    EXAM:  CT ABDOMEN AND PELVIS OC       02/21/2017  IMPRESSION:  1. Proctocolitis. No bowel obstruction.  2. Hepatic morphology suspicious for cirrhosis and fatty liver.  Cholelithiasis without cholecystitis.  	    		  66y female admitted with    *diarrhea, proctocolitis  +leukocytosis  (pt reports diarrhea is chronic)- resolved   - CT abd noted  - start flagyl continue po cipro  - stool studies negative   - blood cx ngtd  - GI eval appreciated - plan for endoscopy and colonocopy in AM with GI  - NPO after midnight    *ecoli uti  - resolved  -+leukocysotis but afebrile    *elevated LFTs due to etoh hepatitis/cirrhosis  - CT abd suspicious for cirrhosis, fatty liver;  cholelithiasis w/o cholecystitis  - also due to mtx (last dose 1 week ago, level normal)- d/c mtx   - lfts, tb improving   - hep panel, lipids- nml   - CIWA protocol completed,- not requiring Ativan  - GI consult appreciated    *coagulopathy  - possibly due to liver failure vs  infxn  - DIC panel 2/24 w/ increased PT, ddimer  - consulted w/ Heme/Onc yesterday       *thrombocytopenia  - possibly due to cirrhosis vs infxn (dic panel as above)  - (of note, pt was on mtx but off since last tues- level nml and renal function stable/lfts improving)    *hyponatremia- resolved  - likely hypovolemic due to decreased intake      *RADHA  - baseline Cr 0.91 in 2013  - likely prerenal due to hypovolemia  - hold triamterene   - resolved w/ IVF      *RA  - d/c mtx (took last Tues- weekly dose)  - continue daily dose prednisone  - f/u with rheum as an outpatient    *HTN  - hold triamterene due to RADHA    *dvt px  -scds      Case discussed with Dr Downs. PT consult with recommendations for short term rehab. Plan expaliend to patient

## 2017-03-02 DIAGNOSIS — N17.9 ACUTE KIDNEY FAILURE, UNSPECIFIED: ICD-10-CM

## 2017-03-02 DIAGNOSIS — K51.80 OTHER ULCERATIVE COLITIS WITHOUT COMPLICATIONS: ICD-10-CM

## 2017-03-02 DIAGNOSIS — M06.9 RHEUMATOID ARTHRITIS, UNSPECIFIED: ICD-10-CM

## 2017-03-02 DIAGNOSIS — I10 ESSENTIAL (PRIMARY) HYPERTENSION: ICD-10-CM

## 2017-03-02 LAB
ANION GAP SERPL CALC-SCNC: 12 MMOL/L — SIGNIFICANT CHANGE UP (ref 5–17)
BUN SERPL-MCNC: 13 MG/DL — SIGNIFICANT CHANGE UP (ref 7–23)
CALCIUM SERPL-MCNC: 8 MG/DL — LOW (ref 8.5–10.1)
CHLORIDE SERPL-SCNC: 108 MMOL/L — SIGNIFICANT CHANGE UP (ref 96–108)
CO2 SERPL-SCNC: 22 MMOL/L — SIGNIFICANT CHANGE UP (ref 22–31)
CREAT SERPL-MCNC: 0.73 MG/DL — SIGNIFICANT CHANGE UP (ref 0.5–1.3)
GLUCOSE SERPL-MCNC: 95 MG/DL — SIGNIFICANT CHANGE UP (ref 70–99)
HCT VFR BLD CALC: 33.6 % — LOW (ref 34.5–45)
HGB BLD-MCNC: 11.4 G/DL — LOW (ref 11.5–15.5)
MCHC RBC-ENTMCNC: 34 GM/DL — SIGNIFICANT CHANGE UP (ref 32–36)
MCHC RBC-ENTMCNC: 37.9 PG — HIGH (ref 27–34)
MCV RBC AUTO: 111.4 FL — HIGH (ref 80–100)
PLATELET # BLD AUTO: 267 K/UL — SIGNIFICANT CHANGE UP (ref 150–400)
POTASSIUM SERPL-MCNC: 3.7 MMOL/L — SIGNIFICANT CHANGE UP (ref 3.5–5.3)
POTASSIUM SERPL-SCNC: 3.7 MMOL/L — SIGNIFICANT CHANGE UP (ref 3.5–5.3)
RBC # BLD: 3.02 M/UL — LOW (ref 3.8–5.2)
RBC # FLD: 16.8 % — HIGH (ref 10.3–14.5)
SODIUM SERPL-SCNC: 142 MMOL/L — SIGNIFICANT CHANGE UP (ref 135–145)
WBC # BLD: 11.2 K/UL — HIGH (ref 3.8–10.5)
WBC # FLD AUTO: 11.2 K/UL — HIGH (ref 3.8–10.5)

## 2017-03-02 PROCEDURE — 88312 SPECIAL STAINS GROUP 1: CPT | Mod: 26

## 2017-03-02 PROCEDURE — 88305 TISSUE EXAM BY PATHOLOGIST: CPT | Mod: 26

## 2017-03-02 RX ORDER — PROPRANOLOL HCL 160 MG
10 CAPSULE, EXTENDED RELEASE 24HR ORAL THREE TIMES A DAY
Qty: 0 | Refills: 0 | Status: DISCONTINUED | OUTPATIENT
Start: 2017-03-03 | End: 2017-03-03

## 2017-03-02 RX ADMIN — Medication 5 MILLIGRAM(S): at 05:29

## 2017-03-02 RX ADMIN — Medication 500 MILLIGRAM(S): at 14:00

## 2017-03-02 RX ADMIN — Medication 1 TABLET(S): at 11:07

## 2017-03-02 RX ADMIN — Medication 500 MILLIGRAM(S): at 22:15

## 2017-03-02 RX ADMIN — Medication 1 MILLIGRAM(S): at 11:07

## 2017-03-02 RX ADMIN — Medication 500 MILLIGRAM(S): at 17:41

## 2017-03-02 RX ADMIN — Medication 500 MILLIGRAM(S): at 05:29

## 2017-03-02 NOTE — PROGRESS NOTE ADULT - SUBJECTIVE AND OBJECTIVE BOX
3/2- patient seen and examined- s/p endoscopy and colonoscopy with GI- patient states that she feels weak  	  	  Vital Signs Last 24 Hrs  T(C): 36.6, Max: 36.9 (03-01 @ 11:51)  T(F): 97.9, Max: 98.4 (03-01 @ 11:51)  HR: 89 (89 - 112)  BP: 120/64 (111/62 - 120/64)  BP(mean): --  RR: 20 (17 - 20)  SpO2: 97% (96% - 97%)      MEDICATIONS  (STANDING):  predniSONE   Tablet 5milliGRAM(s) Oral daily  folic acid 1milliGRAM(s) Oral daily  multivitamin 1Tablet(s) Oral daily  ciprofloxacin     Tablet 500milliGRAM(s) Oral every 12 hours  metroNIDAZOLE    Tablet 500milliGRAM(s) Oral every 8 hours    MEDICATIONS  (PRN):  ondansetron Injectable 4milliGRAM(s) IV Push every 6 hours PRN Nausea and/or Vomiting                            12.0   13.1  )-----------( 221      ( 01 Mar 2017 12:49 )             35.7   01 Mar 2017 09:12    141    |  110    |  11     ----------------------------<  98     4.0     |  20     |  0.69     Ca    8.3        01 Mar 2017 09:12        Physical Exam:  General: ill appearing female, nad, Neuro: AAOx3, but forgetful, no focal deficits/able to follow commands HEENT: NCAT, PERRLA, EOMI, moist mucous membranes/no petechiae Neck: Soft and supple, No JVD Respiratory: CTA b/l, no w/r/r Cardiovascular: S1 and S2, RRR, no m/g/r Gastrointestinal: +BS, soft, non tender- non distended, no rebound tenderness Extremities: chronic changes, no c/c Vascular: 2+ peripheral pulses Musculoskeletal: 5/5 strength b/l UE and LE, sensation intact Skin: No rashes, few areas ecchymoses abd, extremities	                         		  66y female admitted with    *diarrhea, proctocolitis  +leukocytosis  (pt reports diarrhea is chronic)- resolved   - CT abd noted  - start flagyl continue po cipro  - stool studies negative   - blood cx ngtd  - GI eval appreciated - plan for endoscopy and colonocopy in AM with GI  - NPO after midnight    *ecoli uti  - resolved  -+leukocysotis but afebrile    *elevated LFTs due to etoh hepatitis/cirrhosis  - CT abd suspicious for cirrhosis, fatty liver;  cholelithiasis w/o cholecystitis  - also due to mtx (last dose 1 week ago, level normal)- d/c mtx   - lfts, tb improving   - hep panel, lipids- nml   - CIWA protocol completed,- not requiring Ativan  - GI consult appreciated    *coagulopathy  - possibly due to liver failure vs  infxn  - DIC panel 2/24 w/ increased PT, ddimer  - consulted w/ Heme/Onc yesterday       *thrombocytopenia  - possibly due to cirrhosis vs infxn (dic panel as above)  - (of note, pt was on mtx but off since last tues- level nml and renal function stable/lfts improving)    *hyponatremia- resolved  - likely hypovolemic due to decreased intake      *RADHA  - baseline Cr 0.91 in 2013  - likely prerenal due to hypovolemia  - hold triamterene   - resolved w/ IVF      *RA  - d/c mtx (took last Tues- weekly dose)  - continue daily dose prednisone  - f/u with rheum as an outpatient    *HTN  - hold triamterene due to RADHA    *dvt px  -scds      Case discussed with Dr Downs. PT consult with recommendations for short term rehab. Plan expaliend to patient 3/2- patient seen and examined- s/p endoscopy and colonoscopy with GI- patient states that she feels weak  	  	  Vital Signs Last 24 Hrs  T(C): 36.6, Max: 36.8 (03-01 @ 17:08)  T(F): 97.9, Max: 98.3 (03-01 @ 17:08)  HR: 89 (89 - 102)  BP: 120/64 (111/62 - 120/64)  BP(mean): --  RR: 20 (17 - 20)  SpO2: 97% (96% - 97%)    REVIEW OF SYSTEMS:    CONSTITUTIONAL: + weakness, fevers or chills  EYES/ENT: No visual changes;  No vertigo or throat pain   NECK: No pain or stiffness  RESPIRATORY: No cough, wheezing, hemoptysis; No shortness of breath  CARDIOVASCULAR: No chest pain or palpitations  GASTROINTESTINAL: No abdominal or epigastric pain. No nausea, vomiting, or hematemesis; No diarrhea or constipation. No melena or hematochezia.  GENITOURINARY: No dysuria, frequency or hematuria  NEUROLOGICAL: No numbness or weakness  SKIN: No itching, burning, rashes, or lesions   All other review of systems is negative unless indicated above.    MEDICATIONS  (STANDING):  predniSONE   Tablet 5milliGRAM(s) Oral daily  folic acid 1milliGRAM(s) Oral daily  multivitamin 1Tablet(s) Oral daily  ciprofloxacin     Tablet 500milliGRAM(s) Oral every 12 hours  metroNIDAZOLE    Tablet 500milliGRAM(s) Oral every 8 hours    MEDICATIONS  (PRN):  ondansetron Injectable 4milliGRAM(s) IV Push every 6 hours PRN Nausea and/or Vomiting                            12.0   13.1  )-----------( 221      ( 01 Mar 2017 12:49 )             35.7   01 Mar 2017 09:12    141    |  110    |  11     ----------------------------<  98     4.0     |  20     |  0.69     Ca    8.3        01 Mar 2017 09:12        Physical Exam:  General: ill appearing female, nad, Neuro: AAOx3, but forgetful, no focal deficits/able to follow commands HEENT: NCAT, PERRLA, EOMI, moist mucous membranes/no petechiae Neck: Soft and supple, No JVD Respiratory: CTA b/l, no w/r/r Cardiovascular: S1 and S2, RRR, no m/g/r Gastrointestinal: +BS, soft, non tender- non distended, no rebound tenderness Extremities: chronic changes, no c/c Vascular: 2+ peripheral pulses Musculoskeletal: 5/5 strength b/l UE and LE, sensation intact Skin: No rashes, few areas ecchymoses abd, extremities	                         		  66y female admitted with    *diarrhea, proctocolitis  +leukocytosis  (pt reports diarrhea is chronic)- resolved   - CT abd noted  - contineu cipro and flagyl  - stool studies negative   - blood cx ngtd  - GI eval appreciated - s/p endoscopy and colonoscopy- Grade III varices were found in the lower third of the esophagus.cherry     red spot - bands were successfully placed with  incomplete eradication of varices.- Grade III esophageal varices. Incompletely eradicated.  Banded.  - clears liquid and advance as tolerated      *ecoli uti  - resolved  leukocytosis but afebrile    *elevated LFTs due to etoh hepatitis/cirrhosis  - CT abd suspicious for cirrhosis, fatty liver;  cholelithiasis w/o cholecystitis  - also due to mtx (last dose 1 week ago, level normal)- d/c mtx   - lfts, tb improving   - hep panel, lipids- nml   - CIWA protocol completed  - GI consult appreciated    *coagulopathy  - possibly due to liver failure vs  infxn  - DIC panel 2/24 w/ increased PT, ddimer  - consulted w/ Heme/Onc yesterday       *thrombocytopenia  - possibly due to cirrhosis vs infxn (dic panel as above)  - (of note, pt was on mtx but off since last tues- level nml and renal function stable/lfts improving)    *hyponatremia- resolved  - likely hypovolemic due to decreased intake      *RADHA  - baseline Cr 0.91 in 2013  - likely prerenal due to hypovolemia  - hold triamterene   - resolved w/ IVF      *RA  - d/c mtx (took last Tues- weekly dose)  - continue daily dose prednisone  - f/u with rheum as an outpatient    *HTN  - hold triamterene due to RADHA    *dvt px  -scds      Case discussed with Dr Downs. PT consult with recommendations for short term rehab. Plan expaliend to patient. Anticipate discahreg in AM

## 2017-03-02 NOTE — PROGRESS NOTE ADULT - ATTENDING COMMENTS
Patient seen and examined with DEREK Drew.  Agree with physical exam and assessment and plan.   - plan to d/c standing dose ativan and continue protocol  - still with diarrhea. continue cipro
Patient seen and examined with NP Rajendra.  Agree with physical exam and assessment and plan.   - feels weak.  awiating PT eval
Patient seen and examined with NP Rajendra.  Agree with physical exam and assessment and plan. -  - plan for further GI workup with c-scope in AM  - worsening leukocytosis will add flagyl
Patient seen and examined with DEREK Drew.  Agree with physical exam and assessment and plan.  - plan for home vs [facility]  - case d/w dr davenport and pt s/p EGD and s/p esohpageal varices banding and given some mild oozing will keep patient on clears and if tolerates diet in AM can go home as per GI

## 2017-03-03 ENCOUNTER — TRANSCRIPTION ENCOUNTER (OUTPATIENT)
Age: 67
End: 2017-03-03

## 2017-03-03 VITALS
OXYGEN SATURATION: 96 % | HEART RATE: 101 BPM | TEMPERATURE: 98 F | DIASTOLIC BLOOD PRESSURE: 67 MMHG | SYSTOLIC BLOOD PRESSURE: 124 MMHG | RESPIRATION RATE: 17 BRPM

## 2017-03-03 LAB
ALBUMIN SERPL ELPH-MCNC: 2 G/DL — LOW (ref 3.3–5)
ALP SERPL-CCNC: 164 U/L — HIGH (ref 40–120)
ALT FLD-CCNC: 75 U/L — SIGNIFICANT CHANGE UP (ref 12–78)
ANION GAP SERPL CALC-SCNC: 10 MMOL/L — SIGNIFICANT CHANGE UP (ref 5–17)
AST SERPL-CCNC: 163 U/L — HIGH (ref 15–37)
BILIRUB SERPL-MCNC: 3.5 MG/DL — HIGH (ref 0.2–1.2)
BUN SERPL-MCNC: 13 MG/DL — SIGNIFICANT CHANGE UP (ref 7–23)
CALCIUM SERPL-MCNC: 8.2 MG/DL — LOW (ref 8.5–10.1)
CHLORIDE SERPL-SCNC: 107 MMOL/L — SIGNIFICANT CHANGE UP (ref 96–108)
CO2 SERPL-SCNC: 23 MMOL/L — SIGNIFICANT CHANGE UP (ref 22–31)
CREAT SERPL-MCNC: 0.75 MG/DL — SIGNIFICANT CHANGE UP (ref 0.5–1.3)
GLUCOSE SERPL-MCNC: 78 MG/DL — SIGNIFICANT CHANGE UP (ref 70–99)
HCT VFR BLD CALC: 33.4 % — LOW (ref 34.5–45)
HGB BLD-MCNC: 11.3 G/DL — LOW (ref 11.5–15.5)
MCHC RBC-ENTMCNC: 33.8 GM/DL — SIGNIFICANT CHANGE UP (ref 32–36)
MCHC RBC-ENTMCNC: 38.1 PG — HIGH (ref 27–34)
MCV RBC AUTO: 112.6 FL — HIGH (ref 80–100)
PLATELET # BLD AUTO: 293 K/UL — SIGNIFICANT CHANGE UP (ref 150–400)
POTASSIUM SERPL-MCNC: 3.7 MMOL/L — SIGNIFICANT CHANGE UP (ref 3.5–5.3)
POTASSIUM SERPL-SCNC: 3.7 MMOL/L — SIGNIFICANT CHANGE UP (ref 3.5–5.3)
PROT SERPL-MCNC: 5.8 GM/DL — LOW (ref 6–8.3)
RBC # BLD: 2.97 M/UL — LOW (ref 3.8–5.2)
RBC # FLD: 16.6 % — HIGH (ref 10.3–14.5)
SODIUM SERPL-SCNC: 140 MMOL/L — SIGNIFICANT CHANGE UP (ref 135–145)
SURGICAL PATHOLOGY FINAL REPORT - CH: SIGNIFICANT CHANGE UP
WBC # BLD: 12 K/UL — HIGH (ref 3.8–10.5)
WBC # FLD AUTO: 12 K/UL — HIGH (ref 3.8–10.5)

## 2017-03-03 RX ORDER — PANTOPRAZOLE SODIUM 20 MG/1
1 TABLET, DELAYED RELEASE ORAL
Qty: 30 | Refills: 0 | COMMUNITY
Start: 2017-03-03 | End: 2017-04-02

## 2017-03-03 RX ORDER — PANTOPRAZOLE SODIUM 20 MG/1
2 TABLET, DELAYED RELEASE ORAL
Qty: 30 | Refills: 0 | COMMUNITY
Start: 2017-03-03 | End: 2017-04-02

## 2017-03-03 RX ORDER — PANTOPRAZOLE SODIUM 20 MG/1
1 TABLET, DELAYED RELEASE ORAL
Qty: 30 | Refills: 0 | OUTPATIENT
Start: 2017-03-03 | End: 2017-04-02

## 2017-03-03 RX ORDER — PANTOPRAZOLE SODIUM 20 MG/1
20 TABLET, DELAYED RELEASE ORAL
Qty: 0 | Refills: 0 | Status: DISCONTINUED | OUTPATIENT
Start: 2017-03-03 | End: 2017-03-03

## 2017-03-03 RX ORDER — POTASSIUM CHLORIDE 20 MEQ
10 PACKET (EA) ORAL
Qty: 30 | Refills: 0 | OUTPATIENT
Start: 2017-03-03

## 2017-03-03 RX ORDER — PROPRANOLOL HCL 160 MG
1 CAPSULE, EXTENDED RELEASE 24HR ORAL
Qty: 90 | Refills: 0 | OUTPATIENT
Start: 2017-03-03

## 2017-03-03 RX ORDER — METRONIDAZOLE 500 MG
1 TABLET ORAL
Qty: 14 | Refills: 0 | OUTPATIENT
Start: 2017-03-03

## 2017-03-03 RX ADMIN — Medication 500 MILLIGRAM(S): at 17:33

## 2017-03-03 RX ADMIN — PANTOPRAZOLE SODIUM 20 MILLIGRAM(S): 20 TABLET, DELAYED RELEASE ORAL at 17:28

## 2017-03-03 RX ADMIN — Medication 1 MILLIGRAM(S): at 12:34

## 2017-03-03 RX ADMIN — Medication 500 MILLIGRAM(S): at 05:26

## 2017-03-03 RX ADMIN — Medication 5 MILLIGRAM(S): at 05:26

## 2017-03-03 RX ADMIN — Medication 10 MILLIGRAM(S): at 12:32

## 2017-03-03 RX ADMIN — Medication 1 TABLET(S): at 12:33

## 2017-03-03 RX ADMIN — Medication 500 MILLIGRAM(S): at 14:38

## 2017-03-03 RX ADMIN — Medication 500 MILLIGRAM(S): at 05:25

## 2017-03-03 NOTE — DISCHARGE NOTE ADULT - MEDICATION SUMMARY - MEDICATIONS TO TAKE
I will START or STAY ON the medications listed below when I get home from the hospital:    predniSONE 5 mg oral tablet  -- 1 tab(s) by mouth once a day  -- Indication: For Rheumatoid arthritis    metroNIDAZOLE 500 mg oral tablet  -- 1 tab(s) by mouth every 8 hours  -- Indication: For colitis    propranolol 10 mg oral tablet  -- 1 tab(s) by mouth 3 times a day  -- Indication: For Hepatitis    Dyazide 37.5 mg-25 mg oral capsule  -- 1 cap(s) by mouth once a day  -- Indication: For Hepatitis    Klor-Con 10 mEq oral tablet, extended release  -- 10 milliequivalent(s) by mouth once a day  -- It is very important that you take or use this exactly as directed.  Do not skip doses or discontinue unless directed by your doctor.  Medication should be taken with plenty of water.  Take with food or milk.    -- Indication: For while on diuretics    Multiple Vitamins oral tablet  -- 1 tab(s) by mouth once a day  -- Indication: For vitamins I will START or STAY ON the medications listed below when I get home from the hospital:    predniSONE 5 mg oral tablet  -- 1 tab(s) by mouth once a day  -- Indication: For Rheumatoid arthritis    metroNIDAZOLE 500 mg oral tablet  -- 1 tab(s) by mouth every 8 hours  -- Indication: For colitis    propranolol 10 mg oral tablet  -- 1 tab(s) by mouth 3 times a day  -- Indication: For Hepatitis    Dyazide 37.5 mg-25 mg oral capsule  -- 1 cap(s) by mouth once a day  -- Indication: For Hepatitis    Klor-Con 10 mEq oral tablet, extended release  -- 10 milliequivalent(s) by mouth once a day  -- It is very important that you take or use this exactly as directed.  Do not skip doses or discontinue unless directed by your doctor.  Medication should be taken with plenty of water.  Take with food or milk.    -- Indication: For while on diuretics    Protonix 20 mg oral delayed release tablet  -- 1 tab(s) by mouth once a day  -- It is very important that you take or use this exactly as directed.  Do not skip doses or discontinue unless directed by your doctor.  Obtain medical advice before taking any non-prescription drugs as some may affect the action of this medication.  Swallow whole.  Do not crush.    -- Indication: For while on steroids for stomach protection    Multiple Vitamins oral tablet  -- 1 tab(s) by mouth once a day  -- Indication: For vitamins

## 2017-03-03 NOTE — DISCHARGE NOTE ADULT - CARE PROVIDER_API CALL
Tommie Underwood), Gastroenterology  [address] [place]  [place] [address]  Phone: [phone]  Fax: [phone]    Gatito Lees), [facility] Medicine  [address] [place] [address]  Phone: [phone]  Fax: [phone]

## 2017-03-03 NOTE — DISCHARGE NOTE ADULT - SECONDARY DIAGNOSIS.
Acute renal failure, unspecified acute renal failure type Alcohol withdrawal syndrome, with delirium HTN (hypertension) Rheumatoid arthritis

## 2017-03-03 NOTE — DISCHARGE NOTE ADULT - PATIENT PORTAL LINK FT
“You can access the [employer] Patient Portal, offered by [facility], by registering with the following website: [email or web address]”

## 2017-03-03 NOTE — DISCHARGE NOTE ADULT - HOSPITAL COURSE
66y female w/ PMH HTN, RA who was admitted on 2/22/17 send by her PMD b/c of cough,  worsening diarrhea and they advised her to come to ED.    Pt reports having chronic diarrhea for a year which she denies has recently worsened.  She does admit to decreased overall intake within past few weeks but states that is due to her upper resp infection (phlegm, cough x 3 weeks improving w/ mucinex and resp viral panel outpatient negative).  C/o chronic pain due to RA for which she takes MTX weekly  and prednisone daily.   No recent change in meds.      In ED she reports feeling better and requests ice cream.  Denies cough, cp, sob, palp, abd pain, f/c, urinary f/u/d. Reports last episode diarrhea ~3days ago. (22 Feb 2017 10:18)  3/2 s/p colonoscopy -  6 mm polyp in the descending colon. Tattooed. not  removed as polyp is on edge of tic and pt with cirrhosis  Diverticulosis in the descending colon and at the  splenic flexure.  Congested mucosa in the recto-sigmoid colon, in the  sigmoid colon and in the descending colon. Biopsied. likely congestive colapothy due to liver dx  s/p EGD  Grade III esophageal varices. Incompletely eradicated. Banded. Hiatus hernia. Gastritis. Biopsied.  Portal hypertensive gastropathy.  Await pathology results.      3/3 - pt seen and examined, tolerated regular diet, afebrile, eager to go home, mild abd distension and inc LE edema, plan of care discussed in length    Vital Signs Last 24 Hrs  T(C): 36.6, Max: 36.6 (03-02 @ 16:53)  T(F): 97.9, Max: 97.9 (03-02 @ 16:53)  HR: 101 (89 - 101)  BP: 124/67 (101/51 - 124/67)  BP(mean): --  RR: 17 (17 - 18)  SpO2: 96% (96% - 96%)    PHYSICAL EXAM:    Constitutional: NAD, well-developed  HEENT: EOMI, throat clear  Neck: No LAD, supple  Respiratory: CTA and P  Cardiovascular: S1 and S2, RRR, no M  Gastrointestinal: BS+, soft, NT/ND, neg HSM,  Extremities: No peripheral edema, neg clubing, cyanosis  Vascular: 2+ peripheral pulses  Neurological: A/O x 3, no focal deficits  Psychiatric: Normal mood, normal affect  Skin: No rashes    66y female admitted with    *Diarrhea due to proctocolitis   - CT abd noted,  s/p cipro, complete  flagyl, stool studies negative , blood cx ngtd, GI eval appreciated - s/p endoscopy and colonoscopy- Grade III varices were found in the lower third of the esophagus.cherry     red spot - bands were successfully placed with  incomplete eradication of varices.- Grade III esophageal varices. Incompletely eradicated.  Banded.  - diet advanced outpatient follow up with GI for pathology results    * Ecoli uti  - resolved, s/p cipro  leukocytosis but afebrile    * Transaminitis due to etoh hepatitis/cirrhosis  - CT abd suspicious for cirrhosis, fatty liver;  cholelithiasis w/o cholecystitis  - also due to mtx (last dose 1 week ago, level normal)- d/c mtx   - lfts, tb improving   - hep panel, lipids- nml   - CIWA protocol completed  - start propranolol, restart diuretics    *Coagulopathy  - possibly due to liver failure vs  infxn  - DIC panel 2/24 w/ increased PT, ddimer  - consulted w/ Heme/Onc yesterday       *Thrombocytopenia  - possibly due to cirrhosis vs infxn (dic panel as above)  - (of note, pt was on mtx but off since last tues- level nml and renal function stable/lfts improving)    *Hyponatremia- resolved likely hypovolemic due to decreased intake      *RADHA  - baseline Cr 0.91 in 2013  - likely prerenal due to hypovolemia  - restart triamterene , repeat BMP in 2-3 days  - resolved w/ IVF      *RA  - d/c mtx (took last Tues- weekly dose)  - continue daily dose prednisone  - f/u with rheum as an outpatient    *HTN  - restart  triamterene     Disposition - medically optimized to be discharged home with close follow up with PCP, GI within 2-3 days  complete antibiotics, return to ED if fever, abdominal pain, nausea, vomiting, chest pain, dyspnea  Discharge plan discussed with patient, RN  Patient advised to follow up with PCP within 3-7 days  time spend 40 min 66y female w/ PMH HTN, RA who was admitted on 2/22/17 send by her PMD b/c of cough,  worsening diarrhea and they advised her to come to ED.    Pt reports having chronic diarrhea for a year which she denies has recently worsened.  She does admit to decreased overall intake within past few weeks but states that is due to her upper resp infection (phlegm, cough x 3 weeks improving w/ mucinex and resp viral panel outpatient negative).  C/o chronic pain due to RA for which she takes MTX weekly  and prednisone daily.   No recent change in meds.      In ED she reports feeling better and requests ice cream.  Denies cough, cp, sob, palp, abd pain, f/c, urinary f/u/d. Reports last episode diarrhea ~3days ago. (22 Feb 2017 10:18)  3/2 s/p colonoscopy -  6 mm polyp in the descending colon. Tattooed. not  removed as polyp is on edge of tic and pt with cirrhosis  Diverticulosis in the descending colon and at the  splenic flexure.  Congested mucosa in the recto-sigmoid colon, in the  sigmoid colon and in the descending colon. Biopsied. likely congestive colapothy due to liver dx  s/p EGD  Grade III esophageal varices. Incompletely eradicated. Banded. Hiatus hernia. Gastritis. Biopsied.  Portal hypertensive gastropathy.  Await pathology results.      3/3 - pt seen and examined, tolerated regular diet, afebrile, eager to go home, mild abd distension and inc LE edema, plan of care discussed in length    Vital Signs Last 24 Hrs  T(C): 36.6, Max: 36.6 (03-02 @ 16:53)  T(F): 97.9, Max: 97.9 (03-02 @ 16:53)  HR: 101 (89 - 101)  BP: 124/67 (101/51 - 124/67)  BP(mean): --  RR: 17 (17 - 18)  SpO2: 96% (96% - 96%)    PHYSICAL EXAM:    Constitutional: NAD, well-developed  HEENT: EOMI, throat clear  Neck: No LAD, supple  Respiratory: CTA and P  Cardiovascular: S1 and S2, RRR, no M  Gastrointestinal: BS+, soft, NT/ND, neg HSM,  Extremities: No peripheral edema, neg clubing, cyanosis  Vascular: 2+ peripheral pulses  Neurological: A/O x 3, no focal deficits  Psychiatric: Normal mood, normal affect  Skin: No rashes    66y female admitted with    *Diarrhea due to proctocolitis   - CT abd noted,  s/p cipro, complete  flagyl, stool studies negative , blood cx ngtd, GI eval appreciated - s/p endoscopy and colonoscopy- Grade III varices were found in the lower third of the esophagus.cherry     red spot - bands were successfully placed with  incomplete eradication of varices.- Grade III esophageal varices. Incompletely eradicated.  Banded.  - diet advanced outpatient follow up with GI for pathology results    * Ecoli uti  - resolved, s/p cipro  leukocytosis but afebrile    * Transaminitis due to etoh hepatitis/cirrhosis  - CT abd suspicious for cirrhosis, fatty liver;  cholelithiasis w/o cholecystitis  - also due to mtx (last dose 1 week ago, level normal)- d/c mtx   - lfts, tb improving   - hep panel, lipids- nml   - CIWA protocol completed  - start propranolol, restart diuretics    *Coagulopathy  - possibly due to liver failure vs  infxn  - DIC panel 2/24 w/ increased PT, ddimer  - consulted w/ Heme/Onc yesterday       *Thrombocytopenia  - possibly due to cirrhosis vs infxn (dic panel as above)  - (of note, pt was on mtx but off since last tues- level nml and renal function stable/lfts improving)    *Hyponatremia- resolved likely hypovolemic due to decreased intake      *RADHA  - baseline Cr 0.91 in 2013  - likely prerenal due to hypovolemia  - restart triamterene , repeat BMP in 2-3 days  - resolved w/ IVF      *RA  - d/c mtx (took last Tues- weekly dose)  - continue daily dose prednisone added protonix for stomach protection  - f/u with rheum as an outpatient    *HTN  - restart  triamterene     Disposition - medically optimized to be discharged home with close follow up with PCP, GI within 2-3 days  complete antibiotics, return to ED if fever, abdominal pain, nausea, vomiting, chest pain, dyspnea  Discharge plan discussed with patient, RN  Patient advised to follow up with PCP within 3-7 days  time spend 40 min

## 2017-03-03 NOTE — DISCHARGE NOTE ADULT - MEDICATION SUMMARY - MEDICATIONS TO STOP TAKING
I will STOP taking the medications listed below when I get home from the hospital:    benzonatate 200 mg oral capsule  -- 1 cap(s) by mouth 3 times a day    clarithromycin 500 mg oral tablet  -- 1 tab(s) by mouth every 12 hours    methotrexate 2.5 mg oral tablet  -- 4 tab(s) by mouth once a week

## 2017-03-03 NOTE — DISCHARGE NOTE ADULT - PLAN OF CARE
prevent progression colitis, complete PO antibiotics, follow up with GI within 1 week repeat BMP in 3-4 days , follow up with PCP within 1 week avoid alcohol continue home medications continue home medications, added protonix 20 mg for stomach protection

## 2017-03-03 NOTE — DISCHARGE NOTE ADULT - FINDINGS/TREATMENT
Colonoscopy - 6 mm polyp in the descending colon. Tattooed. not  removed as polyp is on edge of tic and pt with cirrhosis  Diverticulosis in the descending colon and at the  splenic flexure.  Congested mucosa in the recto-sigmoid colon, in the  sigmoid colon and in the descending colon. Biopsied. likely congestive colapothy due to liver dx  EGD  Grade III esophageal varices. Incompletely eradicated. Banded. Hiatus hernia. Gastritis. Biopsied.  Portal hypertensive gastropathy.  Await pathology results.

## 2017-03-03 NOTE — DISCHARGE NOTE ADULT - OTHER SIGNIFICANT FINDINGS
CT ABDOMEN AND PELVIS OC             02/21/2017  LOWER CHEST: Coronary artery calcifications.    HEPATOBILIARY: Nodular hepatic contour suspicious for cirrhosis. Fatty   liver. Sludge and stones in the gallbladder without signs of   cholecystitis. No biliary ductal dilation.  SPLEEN: Within normal limits.  PANCREAS: Within normal limits.  ADRENALS: Within normal limits.    KIDNEYS/URETERS/BLADDER: Within normal limits.  REPRODUCTIVE ORGANS: Uterus and adnexa within normal limits.    BOWEL/PERITONEUM:  Portions of the gastrointestinal tract are collapsed,   limiting evaluation. No bowel obstruction, inflammation or   pneumoperitoneum.  Appendix normal. The colon is collapsed, limiting   evaluation, however there is mild diffuse pericolonic fat stranding most   notably along the ascending colon, descending and sigmoid colon and   rectum which may indicate combination of portal hypertensive colopathy   and proctocolitis. There is colonic diverticulosis without diverticulitis.    VESSELS:  Moderate atherosclerotic calcifications. No abdominal aortic   aneurysm.  LYMPHATICS/RETROPERITONEUM: No lymphadenopathy. No retroperitoneal   hematoma.      SOFT TISSUES: Within normal limits.  BONES: Lumbar dextroscoliosis. Bilateral hip arthrosis, severe on the   right. Spinal degenerative changes.    IMPRESSION:   1. Proctocolitis. No bowel obstruction.   2. Hepatic morphology suspicious for cirrhosis and fatty liver.   Cholelithiasis without cholecystitis.    Complete Blood Count in AM (03.03.17 @ 05:46)    WBC Count: 12.0 K/uL    RBC Count: 2.97 M/uL    Hemoglobin: 11.3 g/dL    Hematocrit: 33.4 %    Mean Cell Volume: 112.6 fl    Mean Cell Hemoglobin: 38.1 pg    Mean Cell Hemoglobin Conc: 33.8 gm/dL    Red Cell Distrib Width: 16.6 %    Platelet Count - Automated: 293 K/uL    Comprehensive Metabolic Panel in AM (03.03.17 @ 05:46)    Sodium, Serum: 140 mmol/L    Potassium, Serum: 3.7 mmol/L    Chloride, Serum: 107 mmol/L    Carbon Dioxide, Serum: 23 mmol/L    Anion Gap, Serum: 10 mmol/L    Blood Urea Nitrogen, Serum: 13 mg/dL    Creatinine, Serum: 0.75 mg/dL    Glucose, Serum: 78 mg/dL    Calcium, Total Serum: 8.2 mg/dL    Protein Total, Serum: 5.8 gm/dL    Albumin, Serum: 2.0 g/dL    Bilirubin Total, Serum: 3.5 mg/dL    Alkaline Phosphatase, Serum: 164 U/L    Aspartate Aminotransferase (AST/SGOT): 163 U/L    Alanine Aminotransferase (ALT/SGPT): 75 U/L

## 2017-03-03 NOTE — DISCHARGE NOTE ADULT - CARE PLAN
Principal Discharge DX:	Hepatitis  Goal:	prevent progression  Instructions for follow-up, activity and diet:	colitis, complete PO antibiotics, follow up with GI within 1 week  Secondary Diagnosis:	Acute renal failure, unspecified acute renal failure type  Instructions for follow-up, activity and diet:	repeat BMP in 3-4 days , follow up with PCP within 1 week  Secondary Diagnosis:	Alcohol withdrawal syndrome, with delirium  Instructions for follow-up, activity and diet:	avoid alcohol  Secondary Diagnosis:	HTN (hypertension)  Instructions for follow-up, activity and diet:	continue home medications  Secondary Diagnosis:	Rheumatoid arthritis  Instructions for follow-up, activity and diet:	continue home medications Principal Discharge DX:	Hepatitis  Goal:	prevent progression  Instructions for follow-up, activity and diet:	colitis, complete PO antibiotics, follow up with GI within 1 week  Secondary Diagnosis:	Acute renal failure, unspecified acute renal failure type  Instructions for follow-up, activity and diet:	repeat BMP in 3-4 days , follow up with PCP within 1 week  Secondary Diagnosis:	Alcohol withdrawal syndrome, with delirium  Instructions for follow-up, activity and diet:	avoid alcohol  Secondary Diagnosis:	HTN (hypertension)  Instructions for follow-up, activity and diet:	continue home medications  Secondary Diagnosis:	Rheumatoid arthritis  Instructions for follow-up, activity and diet:	continue home medications, added protonix 20 mg for stomach protection

## 2017-03-03 NOTE — PROGRESS NOTE ADULT - ASSESSMENT
*diarrhea, proctocolitis   (pt reports diarrhea is chronic)- improving   - CT abd noted with mild proctocolitis  -ON IVabx (for uti)  - stool studies negative   - blood cx ngtd  improving, await colon report    *ecoli uti  - fevers resolved   - continue IV abx  - blood cx ngtd    *elevated LFTs due to etoh hepatitis/cirrhosis    would avoid MTX in future due hepatotoxic effect and possible constributioon to liver dx    - CT abd suspicious for cirrhosis, fatty liver;  cholelithiasis w/o cholecystitis    - lfts, tb improving slowly  - hep panel, lipids- nml   - CIWA protocol, thiamine, MVI, folate  improving  encourage po intake    *coagulopathy  - possibly due to liver failure vs  infxn  - DIC panel 2/24 w/ increased PT, ddimer  await heme input    *thrombocytopenia  - possibly due to cirrhosis vs iETOH suppression and is improving  - (of note, pt was on mtx but off since last tues- level nml and renal function stable/lfts improving)      MTX and liver toxicity DW pt again    ETOH rehab broached and reviewed, pt is considering but not sure that she totally understands alcoholism yet and has little insight inot her dx    will need rheum eval to consider tx of RQ off MTX
*diarrhea, proctocolitis   (pt reports diarrhea is chronic)- improving   - CT abd noted with mild proctocolitis  -ON IVabx (for uti), may DC from GI perspective  - stool studies negative   - blood cx ngtd    old colon report ereviewed  no clear cause of diarrhea but as diarrhea has been chronic anc she has proctocolitis on CT will evaluate       plan EGD/Colon in Am  A/B/R D W pt  EGD to assess for EV  repeat PLT today      *ecoli uti  - fevers resolved   -  IV abx  - blood cx ngtd    *elevated LFTs due to etoh hepatitis/cirrhosis    would avoid MTX in future due hepatotoxic effect and possible contributioon to liver dx    - CT abd suspicious for cirrhosis, fatty liver;  cholelithiasis w/o cholecystitis    - lfts, tb improving slowly  - hep panel, lipids- nml   - CIWA protocol, thiamine, MVI, folate  improving  encourage po intake    *coagulopathy  - possibly due to liver failure vs  infxn  - DIC panel 2/24 w/ increased PT, ddimer, possible due to cirrhosis  await heme input    *thrombocytopenia  - possibly due to cirrhosis vs iETOH suppression and is improving  - (of note, pt was on mtx but off since last tues- level nml and renal function stable/lfts improving)
Assessment and Plan:   · Assessment		  ETOH WD  likely ETOH hepatits on cirrhosis  confusion due to WD, ammonia nl and neg asterixis, resolved        RA off MTX now      diarrhea and CT with mild proctocolitis  improved  EGD colon done  DW RN    PHTN with EV, s/p band  start propranolol  DW pt   inc risk bleed reviewed    ETOH rehab DW pt  wants to go home and get PT and will try AA    risk progression of ESLD DW pt    colon polyp and EV reviewed    FU with me in 2 weeks  GIL Burch
ETOH WD  likely ETOH hepatits on cirrhosis  confusion due to WD, ammonia nl and neg asterixis    cont protocol for WD    RA off MTX now      diarrhea and CT with mild proctocolitis  will check out pt [place]  monitor for D  DW RN
Imp:  1) alcohol abuse and withdrawal  2) Alcoholic hepatitis and cirrhosis - Bili continues to drop, INR rising. However, INR may be related to underlying DIC.  3) diarrhea and colitis by CT -- unclear if this is real pathology    Rec:  Cont withdrawal protocol  Heme eval  Vit K PO today  Monitor LFTs
Imp:  1) alcohol abuse and withdrawal  2) Alcoholic hepatitis and cirrhosis - Bili down, INR up a little. Overall trajectory still favorable in short run  3) diarrhea and colitis by CT -- unclear if this is real pathology    Rec:  Cont withdrawal protocol  Monitor LFTs  Monitor for diarrhea
Imp:  1) alcohol abuse and withdrawal  2) Alcoholic hepatitis and cirrhosis - LFTs and creatinine improving today  3) diarrhea and colitis by CT -- unclear if this is real pathology    Rec:  Cont withdrawal protocol  Monitor LFTs  Monitor for diarrhea  Stop abx for now  Will obtain outpatients regarding prior ?colitis history  Await stool specimes
Imp:  Alcoholic cirrhosis  Alcoholic withdrawal  severe thromobocytopenia, likely DIC  ?colitis -- no diarrhea now    Rec:  Follow LFTs and INR  Dr. Underwood returns tomorrow
66y female w/ PMH HTN, RA who states she was sent to ED by her PMD b/c of cough.  Discussed w/ PMD/PA who state she called w/ c/o worsening diarrhea and they advised her to come to ED.    Pt reports having chronic diarrhea for a year which she denies has recently worsened.  She does admit to decreased overall intake within past few weeks but states that is due to her upper resp infection (phlegm, cough x 3 weeks improving w/ mucinex and resp viral panel outpatient negative).     C/o chronic pain due to RA for which she takes MTX weekly (last dose last Tues) and prednisone daily (last dose yesterday).  Workup showed proctocolitis and patient was started on IV ABT

## 2017-03-03 NOTE — PROGRESS NOTE ADULT - SUBJECTIVE AND OBJECTIVE BOX
Patient is a 66y old  Female who presents with a chief complaint of weakness, abd pain, diarrhea (22 Feb 2017 15:54)      HPI:  66y female w/ PMH HTN, RA who states she was sent to ED by her PMD b/c of cough.  Discussed w/ PMD/PA who state she called w/ c/o worsening diarrhea and they advised her to come to ED.    Pt reports having chronic diarrhea for a year which she denies has recently worsened.  She does admit to decreased overall intake within past few weeks but states that is due to her upper resp infection (phlegm, cough x 3 weeks improving w/ mucinex and resp viral panel outpatient negative).    She denies abdominal pain, nausea/vomiting, urinary f/u/d, fevers/chills.  C/o chronic pain due to RA for which she takes MTX weekly (last dose last Tues) and prednisone daily (last dose yesterday).  No recent change in meds.      In ED she reports feeling better and requests ice cream.  Denies cough, cp, sob, palp, abd pain, f/c, urinary f/u/d. Reports last episode diarrhea ~3days ago. (22 Feb 2017 10:18)    pt comf   mild abd distension and inc LE edema  neg CCP or sob  kyle diet and should advance  pos jt pain, chronic  wants to go home and get in house PT    PAST MEDICAL & SURGICAL HISTORY:  HTN (hypertension)  Rheumatoid arthritis  No significant past surgical history      MEDICATIONS  (STANDING):  predniSONE   Tablet 5milliGRAM(s) Oral daily  folic acid 1milliGRAM(s) Oral daily  multivitamin 1Tablet(s) Oral daily  ciprofloxacin     Tablet 500milliGRAM(s) Oral every 12 hours  metroNIDAZOLE    Tablet 500milliGRAM(s) Oral every 8 hours  propranolol 10milliGRAM(s) Oral three times a day    MEDICATIONS  (PRN):  ondansetron Injectable 4milliGRAM(s) IV Push every 6 hours PRN Nausea and/or Vomiting      Allergies    sulfa drugs (Unknown)    Intolerances        SOCIAL HISTORY:unchanged    FAMILY HISTORY:  No pertinent family history in first degree relatives      REVIEW OF SYSTEMS:    CONSTITUTIONAL: No weakness, fevers or chills  EYES/ENT: No visual changes;  No vertigo or throat pain   NECK: No pain or stiffness  RESPIRATORY: No cough, wheezing, hemoptysis; No shortness of breath  CARDIOVASCULAR: No chest pain or palpitations  GENITOURINARY: No dysuria, frequency or hematuria  NEUROLOGICAL: No numbness or weakness  SKIN: No itching, burning, rashes, or lesions   All other review of systems is negative unless indicated above.    Vital Signs Last 24 Hrs  T(C): 36.1, Max: 36.6 (03-02 @ 16:53)  T(F): 97, Max: 97.9 (03-02 @ 16:53)  HR: 89 (89 - 92)  BP: 101/51 (101/51 - 117/62)  BP(mean): --  RR: 18 (18 - 18)  SpO2: --    PHYSICAL EXAM:    Constitutional: NAD, well-developed  HEENT: EOMI, throat clear  Neck: No LAD, supple  Respiratory: CTA and P  Cardiovascular: S1 and S2, RRR, no M  Gastrointestinal: BS+, soft, NT/ND, neg HSM,  Extremities: No peripheral edema, neg clubing, cyanosis  Vascular: 2+ peripheral pulses  Neurological: A/O x 3, no focal deficits  Psychiatric: Normal mood, normal affect  Skin: No rashes    LABS:  CBC Full  -  ( 03 Mar 2017 05:46 )  WBC Count : 12.0 K/uL  Hemoglobin : 11.3 g/dL  Hematocrit : 33.4 %  Platelet Count - Automated : 293 K/uL  Mean Cell Volume : 112.6 fl  Mean Cell Hemoglobin : 38.1 pg  Mean Cell Hemoglobin Concentration : 33.8 gm/dL  Auto Neutrophil # : x  Auto Lymphocyte # : x  Auto Monocyte # : x  Auto Eosinophil # : x  Auto Basophil # : x  Auto Neutrophil % : x  Auto Lymphocyte % : x  Auto Monocyte % : x  Auto Eosinophil % : x  Auto Basophil % : x    03 Mar 2017 05:46    140    |  107    |  13     ----------------------------<  78     3.7     |  23     |  0.75     Ca    8.2        03 Mar 2017 05:46    TPro  5.8    /  Alb  2.0    /  TBili  3.5    /  DBili  x      /  AST  163    /  ALT  75     /  AlkPhos  164    03 Mar 2017 05:46        02-23 @ 07:40  Hep A Igm Nonreact  Hep A total ab, IgA and M --  Hep B core Ab total --  Hep B core IgM Nonreact  Hep B DNA PCR --  Hep BSAg --  Hep BSAb --  Hep BSAb --  HCV Ab --  HCV RNA Log --  HCV RNA interp --            02-22 @ 11:29  Hep A Igm Nonreact  Hep A total ab, IgA and M --  Hep B core Ab total --  Hep B core IgM Nonreact  Hep B DNA PCR --  Hep BSAg --  Hep BSAb --  Hep BSAb --  HCV Ab --  HCV RNA Log --  HCV RNA interp --                RADIOLOGY & ADDITIONAL STUDIES:

## 2017-03-07 DIAGNOSIS — I85.10 SECONDARY ESOPHAGEAL VARICES WITHOUT BLEEDING: ICD-10-CM

## 2017-03-07 DIAGNOSIS — D12.4 BENIGN NEOPLASM OF DESCENDING COLON: ICD-10-CM

## 2017-03-07 DIAGNOSIS — K29.70 GASTRITIS, UNSPECIFIED, WITHOUT BLEEDING: ICD-10-CM

## 2017-03-07 DIAGNOSIS — E87.1 HYPO-OSMOLALITY AND HYPONATREMIA: ICD-10-CM

## 2017-03-07 DIAGNOSIS — N17.9 ACUTE KIDNEY FAILURE, UNSPECIFIED: ICD-10-CM

## 2017-03-07 DIAGNOSIS — I10 ESSENTIAL (PRIMARY) HYPERTENSION: ICD-10-CM

## 2017-03-07 DIAGNOSIS — Z88.2 ALLERGY STATUS TO SULFONAMIDES: ICD-10-CM

## 2017-03-07 DIAGNOSIS — Z79.899 OTHER LONG TERM (CURRENT) DRUG THERAPY: ICD-10-CM

## 2017-03-07 DIAGNOSIS — M06.9 RHEUMATOID ARTHRITIS, UNSPECIFIED: ICD-10-CM

## 2017-03-07 DIAGNOSIS — K31.89 OTHER DISEASES OF STOMACH AND DUODENUM: ICD-10-CM

## 2017-03-07 DIAGNOSIS — B96.20 UNSPECIFIED ESCHERICHIA COLI [E. COLI] AS THE CAUSE OF DISEASES CLASSIFIED ELSEWHERE: ICD-10-CM

## 2017-03-07 DIAGNOSIS — E83.39 OTHER DISORDERS OF PHOSPHORUS METABOLISM: ICD-10-CM

## 2017-03-07 DIAGNOSIS — K70.30 ALCOHOLIC CIRRHOSIS OF LIVER WITHOUT ASCITES: ICD-10-CM

## 2017-03-07 DIAGNOSIS — K52.9 NONINFECTIVE GASTROENTERITIS AND COLITIS, UNSPECIFIED: ICD-10-CM

## 2017-03-07 DIAGNOSIS — K57.30 DIVERTICULOSIS OF LARGE INTESTINE WITHOUT PERFORATION OR ABSCESS WITHOUT BLEEDING: ICD-10-CM

## 2017-03-07 DIAGNOSIS — E87.6 HYPOKALEMIA: ICD-10-CM

## 2017-03-07 DIAGNOSIS — D69.6 THROMBOCYTOPENIA, UNSPECIFIED: ICD-10-CM

## 2017-03-07 DIAGNOSIS — F10.231 ALCOHOL DEPENDENCE WITH WITHDRAWAL DELIRIUM: ICD-10-CM

## 2017-03-07 DIAGNOSIS — N39.0 URINARY TRACT INFECTION, SITE NOT SPECIFIED: ICD-10-CM

## 2017-03-07 DIAGNOSIS — K44.9 DIAPHRAGMATIC HERNIA WITHOUT OBSTRUCTION OR GANGRENE: ICD-10-CM

## 2017-03-07 DIAGNOSIS — K63.89 OTHER SPECIFIED DISEASES OF INTESTINE: ICD-10-CM

## 2017-03-07 DIAGNOSIS — K51.30 ULCERATIVE (CHRONIC) RECTOSIGMOIDITIS WITHOUT COMPLICATIONS: ICD-10-CM

## 2017-03-07 DIAGNOSIS — K70.10 ALCOHOLIC HEPATITIS WITHOUT ASCITES: ICD-10-CM

## 2017-03-07 DIAGNOSIS — K76.6 PORTAL HYPERTENSION: ICD-10-CM

## 2017-03-07 DIAGNOSIS — K31.7 POLYP OF STOMACH AND DUODENUM: ICD-10-CM

## 2017-03-07 DIAGNOSIS — R19.7 DIARRHEA, UNSPECIFIED: ICD-10-CM

## 2017-03-09 DIAGNOSIS — I85.11 SECONDARY ESOPHAGEAL VARICES WITH BLEEDING: ICD-10-CM

## 2017-04-02 ENCOUNTER — INPATIENT (INPATIENT)
Facility: HOSPITAL | Age: 67
LOS: 15 days | Discharge: NURSING, REHAB OR LONG-TERM CARE | End: 2017-04-18
Attending: HOSPITALIST | Admitting: FAMILY MEDICINE
Payer: MEDICARE

## 2017-04-02 VITALS
HEIGHT: 66 IN | DIASTOLIC BLOOD PRESSURE: 73 MMHG | WEIGHT: 154.98 LBS | OXYGEN SATURATION: 99 % | SYSTOLIC BLOOD PRESSURE: 107 MMHG | RESPIRATION RATE: 18 BRPM | TEMPERATURE: 98 F | HEART RATE: 82 BPM

## 2017-04-02 PROBLEM — I10 ESSENTIAL (PRIMARY) HYPERTENSION: Chronic | Status: ACTIVE | Noted: 2017-02-21

## 2017-04-02 PROBLEM — M06.9 RHEUMATOID ARTHRITIS, UNSPECIFIED: Chronic | Status: ACTIVE | Noted: 2017-02-21

## 2017-04-02 LAB
ALBUMIN SERPL ELPH-MCNC: 2.2 G/DL — LOW (ref 3.3–5)
ALP SERPL-CCNC: 141 U/L — HIGH (ref 40–120)
ALT FLD-CCNC: 21 U/L — SIGNIFICANT CHANGE UP (ref 12–78)
AMYLASE P1 CFR SERPL: 56 U/L — SIGNIFICANT CHANGE UP (ref 25–115)
ANION GAP SERPL CALC-SCNC: 11 MMOL/L — SIGNIFICANT CHANGE UP (ref 5–17)
APTT BLD: 40.3 SEC — HIGH (ref 27.5–37.4)
AST SERPL-CCNC: 85 U/L — HIGH (ref 15–37)
BASOPHILS # BLD AUTO: 0.1 K/UL — SIGNIFICANT CHANGE UP (ref 0–0.2)
BASOPHILS NFR BLD AUTO: 0.6 % — SIGNIFICANT CHANGE UP (ref 0–2)
BILIRUB SERPL-MCNC: 2.6 MG/DL — HIGH (ref 0.2–1.2)
BLD GP AB SCN SERPL QL: SIGNIFICANT CHANGE UP
BUN SERPL-MCNC: 9 MG/DL — SIGNIFICANT CHANGE UP (ref 7–23)
CALCIUM SERPL-MCNC: 8.7 MG/DL — SIGNIFICANT CHANGE UP (ref 8.5–10.1)
CHLORIDE SERPL-SCNC: 104 MMOL/L — SIGNIFICANT CHANGE UP (ref 96–108)
CK SERPL-CCNC: 56 U/L — SIGNIFICANT CHANGE UP (ref 26–192)
CO2 SERPL-SCNC: 23 MMOL/L — SIGNIFICANT CHANGE UP (ref 22–31)
CREAT SERPL-MCNC: 0.93 MG/DL — SIGNIFICANT CHANGE UP (ref 0.5–1.3)
EOSINOPHIL # BLD AUTO: 0 K/UL — SIGNIFICANT CHANGE UP (ref 0–0.5)
EOSINOPHIL NFR BLD AUTO: 0.2 % — SIGNIFICANT CHANGE UP (ref 0–6)
ETHANOL SERPL-MCNC: <10 MG/DL — SIGNIFICANT CHANGE UP (ref 0–10)
GLUCOSE SERPL-MCNC: 113 MG/DL — HIGH (ref 70–99)
HCT VFR BLD CALC: 32.5 % — LOW (ref 34.5–45)
HGB BLD-MCNC: 11.6 G/DL — SIGNIFICANT CHANGE UP (ref 11.5–15.5)
INR BLD: 1.6 RATIO — HIGH (ref 0.88–1.16)
LACTATE SERPL-SCNC: 1.9 MMOL/L — SIGNIFICANT CHANGE UP (ref 0.7–2)
LIDOCAIN IGE QN: 239 U/L — SIGNIFICANT CHANGE UP (ref 73–393)
LYMPHOCYTES # BLD AUTO: 2.5 K/UL — SIGNIFICANT CHANGE UP (ref 1–3.3)
LYMPHOCYTES # BLD AUTO: 24.5 % — SIGNIFICANT CHANGE UP (ref 13–44)
MCHC RBC-ENTMCNC: 35.8 GM/DL — SIGNIFICANT CHANGE UP (ref 32–36)
MCHC RBC-ENTMCNC: 41.2 PG — HIGH (ref 27–34)
MCV RBC AUTO: 114.9 FL — HIGH (ref 80–100)
MONOCYTES # BLD AUTO: 0.9 K/UL — SIGNIFICANT CHANGE UP (ref 0–0.9)
MONOCYTES NFR BLD AUTO: 8.7 % — SIGNIFICANT CHANGE UP (ref 2–14)
NEUTROPHILS # BLD AUTO: 6.7 K/UL — SIGNIFICANT CHANGE UP (ref 1.8–7.4)
NEUTROPHILS NFR BLD AUTO: 66 % — SIGNIFICANT CHANGE UP (ref 43–77)
PLATELET # BLD AUTO: 188 K/UL — SIGNIFICANT CHANGE UP (ref 150–400)
POTASSIUM SERPL-MCNC: 3.8 MMOL/L — SIGNIFICANT CHANGE UP (ref 3.5–5.3)
POTASSIUM SERPL-SCNC: 3.8 MMOL/L — SIGNIFICANT CHANGE UP (ref 3.5–5.3)
PROT SERPL-MCNC: 7.9 GM/DL — SIGNIFICANT CHANGE UP (ref 6–8.3)
PROTHROM AB SERPL-ACNC: 17.5 SEC — HIGH (ref 9.8–12.7)
RBC # BLD: 2.83 M/UL — LOW (ref 3.8–5.2)
RBC # FLD: 16.1 % — HIGH (ref 10.3–14.5)
SODIUM SERPL-SCNC: 138 MMOL/L — SIGNIFICANT CHANGE UP (ref 135–145)
TROPONIN I SERPL-MCNC: 0.17 NG/ML — HIGH (ref 0.01–0.04)
TYPE + AB SCN PNL BLD: SIGNIFICANT CHANGE UP
WBC # BLD: 10.1 K/UL — SIGNIFICANT CHANGE UP (ref 3.8–10.5)
WBC # FLD AUTO: 10.1 K/UL — SIGNIFICANT CHANGE UP (ref 3.8–10.5)

## 2017-04-02 PROCEDURE — 73600 X-RAY EXAM OF ANKLE: CPT | Mod: 26

## 2017-04-02 PROCEDURE — 71260 CT THORAX DX C+: CPT | Mod: 26

## 2017-04-02 PROCEDURE — 93010 ELECTROCARDIOGRAM REPORT: CPT

## 2017-04-02 PROCEDURE — 71010: CPT | Mod: 26

## 2017-04-02 PROCEDURE — 74177 CT ABD & PELVIS W/CONTRAST: CPT | Mod: 26

## 2017-04-02 PROCEDURE — 70450 CT HEAD/BRAIN W/O DYE: CPT | Mod: 26

## 2017-04-02 PROCEDURE — 99285 EMERGENCY DEPT VISIT HI MDM: CPT

## 2017-04-02 RX ORDER — ASPIRIN/CALCIUM CARB/MAGNESIUM 324 MG
325 TABLET ORAL DAILY
Qty: 0 | Refills: 0 | Status: DISCONTINUED | OUTPATIENT
Start: 2017-04-02 | End: 2017-04-18

## 2017-04-02 RX ORDER — SODIUM CHLORIDE 9 MG/ML
250 INJECTION INTRAMUSCULAR; INTRAVENOUS; SUBCUTANEOUS ONCE
Qty: 0 | Refills: 0 | Status: COMPLETED | OUTPATIENT
Start: 2017-04-02 | End: 2017-04-02

## 2017-04-02 RX ADMIN — Medication 325 MILLIGRAM(S): at 22:29

## 2017-04-02 RX ADMIN — SODIUM CHLORIDE 250 MILLILITER(S): 9 INJECTION INTRAMUSCULAR; INTRAVENOUS; SUBCUTANEOUS at 10:15

## 2017-04-02 NOTE — H&P ADULT - HISTORY OF PRESENT ILLNESS
Pt is a 65 yo wf with hx liver cirrohissis, variceal esophageal bvanding, ra, who was admitted recently 2-29 to 3-3 for cirrhosis and vomiting.  Pt c/o decreased energy and inabilty to walk (legs feel like jelly). Has been incontinent since d/c.  Pt has had three fall since d/c-upon getting out of car to home in driveway, last pm when she slid off couch, struck a hard box with left chest and twisted left ankle, and today when she fell of couch again.  Pt has pt coming but is very weak and unable to support her own weight. Nonsmoker Stopped drinking 5 days prior to last admission.  No head injury during any fall.  Allergic sulfa.  PMD tiff Lees. Pt is a 65 yo woman with hx liver cirrhosis, variceal esophageal bvanding, RA on prednisone, who was admitted recently 2-29 to 3-3 for cirrhosis and vomiting.   She reports weakness and inability to walk (legs feel like jelly). She has been incontinent of urine and stool for a few weeks as  she does not make it to the bathroom in time.  Pt fell off the couch his morning.  She also had two other falls, including 3/3/17 getting out of car to home in driveway, and  last evening when she slid off couch, struck a hard box with left chest and twisted her left ankle.  Pt's significant other reports she is  unable to support her own weight and is having trouble with her balance.  She denies cough, resp symptoms.  No fever/chills, n/v, cpain, SOB.  She has 1-3 loose stools/day.   Dr. Osorio discontinued her methotrexate, bystolic, and HCTZ on 3/10/17.            She  stopped drinking 5 days prior to last admission.  No head injury during any fall.

## 2017-04-02 NOTE — H&P ADULT - NSHPREVIEWOFSYSTEMS_GEN_ALL_CORE
Vital Signs Last 24 Hrs  T(C): 36.6, Max: 36.6 (04-02 @ 13:15)  T(F): 97.9, Max: 97.9 (04-02 @ 13:15)  HR: 80 (80 - 82)  BP: 109/77 (107/73 - 109/77)  BP(mean): --  RR: 19 (18 - 19)  SpO2: 98% (98% - 99%)

## 2017-04-02 NOTE — ED ADULT NURSE NOTE - OBJECTIVE STATEMENT
pt c/o falling off couch today and yesterday.  pt is not on any blood thinners, pt denies head trauma.  Pt states she has difficulty ambulating due to generalized weakness and that falls are unusual for her.

## 2017-04-02 NOTE — ED PROVIDER NOTE - OBJECTIVE STATEMENT
Pt is a 67 yo wf with hx liver cirrohissis, variceal esophageal bvanding, ra, who was admitted recently 2-29 to 3-3 for cirrhosis and vomiting.  Pt c/o decreased energy and inabilty to walk (legs feel like jelly). Has been incontinent since d/c.  Pt has had three fall since d/c-upon getting out of car to home in driveway, last pm when she slid off couch, struck a hard box with left chest and twisted left ankle, and today when she fell of couch again.  Pt has pt coming but is very weak and unable to support her own weight. Nonsmoker Stopped drinking 5 days prior to last admission.  No head injury during any fall.  Allergic sulfa.  PMD tiff Lees. Pt is a 65 yo wf with hx liver cirrhosis, variceal esophageal banding, ra, who was admitted recently 2-29 to 3-3 for cirrhosis and vomiting.  Pt c/o decreased energy and inabilty to walk (legs feel like jelly). Has been incontinent since d/c.  Pt has had three fall since d/c-upon getting out of car to home in driveway, last pm when she slid off couch, struck a hard box with left chest and twisted left ankle, and today when she fell of couch again.  Pt has physical therapt coming to the house but is very weak and unable to support her own weight. Nonsmoker Stopped drinking 5 days prior to last admission.  No head injury during any fall.  Allergic sulfa.  PMD tiff Lees.

## 2017-04-02 NOTE — ED PROVIDER NOTE - MEDICAL DECISION MAKING DETAILS
65 yo wf with hx chronic alcohol abuse, not currently drinking, recent admission for cirrhosis and vomiting, with variceal banding, here for multiple fallls, weakness.  No injuries noted on ct but pt is unable to ambulate and unsafe for return to home.  Must r/o alcoholic myopathy, steroid myopathy, deconditioning.  Pt is able to flex at hip and push distally and has no sensory deficit.  Doubt cord syndrome, transverse myelitis or Walsh Pipestem syndrome.

## 2017-04-02 NOTE — H&P ADULT - ASSESSMENT
Pt is admitted w/    I. Weakness,   - Physical therapy  - consider SNF    II. Alcoholism  - CIWA    III. DVT prophylaxis  - no heparin due to coagulapathy, elevated INR Pt is a 65 yo woman with hx liver cirrhosis, variceal esophageal bvanding, RA on prednisone, who was admitted recently 2-29 to 3-3 for cirrhosis and vomiting.   She reports weakness and inability to walk (legs feel like jelly). She has been incontinent of urine and stool for a few weeks as  she does not make it to the bathroom in time.  Pt fell off the couch his morning.  She also had two other falls, including 3/3/17 getting out of car to home in driveway, and  last evening when she slid off couch, struck a hard box with left chest and twisted her left ankle.  Pt's significant other reports she is  unable to support her own weight and is having trouble with her balance.  She denies cough, resp symptoms.  No fever/chills, n/v, cpain, SOB.  She has 1-3 loose stools/day.   Dr. Osorio discontinued her methotrexate, bystolic, and HCTZ on 3/10/17. Per office note her blood pressure then was 96/64.            She  stopped drinking 5 days prior to last admission.  No head injury during any fall.   	    Troponins are noted to be mildly positive in the ER    Pt is admitted w/    I. Weakness, recent deconditioning, and decline since last admission.  Likely due to advanced liver disease and alcohol related heart disease.  - bystolic, and HCTZ on 3/10/17 discontinued likely due to low blood pressure.  - GI consult Dr. Osorio, consider evaluation for liver transplant  - Physical therapy  - consider SNF    II. 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  Prior Alcoholism  - CIWA as needed    IV. RA  - on prednisone    V. DVT prophylaxis  - no heparin due to coagulapathy, elevated INR  - Dupplex LLE r/o DVT

## 2017-04-02 NOTE — ED PROVIDER NOTE - DIAGNOSTIC INTERPRETATION
CXR: + left sided pleural effusion CXR: + left sided pleural effusion  Left Ankle XR: No acute abnormality

## 2017-04-02 NOTE — ED PROVIDER NOTE - ENMT, MLM
Airway patent, Nasal mucosa clear. Mouth with normal mucosa. Throat has no vesicles, no oropharyngeal exudates and uvula is midline. Airway patent, Nasal mucosa clear. Mouth with normal mucosa. Throat has no vesicles, no oropharyngeal exudates and uvula is midline.Icteric.

## 2017-04-03 LAB
ANION GAP SERPL CALC-SCNC: 11 MMOL/L — SIGNIFICANT CHANGE UP (ref 5–17)
BLD GP AB SCN SERPL QL: SIGNIFICANT CHANGE UP
BUN SERPL-MCNC: 10 MG/DL — SIGNIFICANT CHANGE UP (ref 7–23)
CALCIUM SERPL-MCNC: 8 MG/DL — LOW (ref 8.5–10.1)
CHLORIDE SERPL-SCNC: 107 MMOL/L — SIGNIFICANT CHANGE UP (ref 96–108)
CO2 SERPL-SCNC: 26 MMOL/L — SIGNIFICANT CHANGE UP (ref 22–31)
CREAT SERPL-MCNC: 0.79 MG/DL — SIGNIFICANT CHANGE UP (ref 0.5–1.3)
FOLATE SERPL-MCNC: 5.9 NG/ML — SIGNIFICANT CHANGE UP (ref 4.8–24.2)
GLUCOSE SERPL-MCNC: 100 MG/DL — HIGH (ref 70–99)
MAGNESIUM SERPL-MCNC: 1.7 MG/DL — LOW (ref 1.8–2.4)
POTASSIUM SERPL-MCNC: 3.4 MMOL/L — LOW (ref 3.5–5.3)
POTASSIUM SERPL-SCNC: 3.4 MMOL/L — LOW (ref 3.5–5.3)
SODIUM SERPL-SCNC: 144 MMOL/L — SIGNIFICANT CHANGE UP (ref 135–145)
TROPONIN I SERPL-MCNC: 0.09 NG/ML — HIGH (ref 0.01–0.04)
TROPONIN I SERPL-MCNC: 0.13 NG/ML — HIGH (ref 0.01–0.04)
TROPONIN I SERPL-MCNC: 0.13 NG/ML — HIGH (ref 0.01–0.04)
TYPE + AB SCN PNL BLD: SIGNIFICANT CHANGE UP

## 2017-04-03 PROCEDURE — 93306 TTE W/DOPPLER COMPLETE: CPT | Mod: 26

## 2017-04-03 PROCEDURE — 93971 EXTREMITY STUDY: CPT | Mod: 26,LT

## 2017-04-03 PROCEDURE — 93010 ELECTROCARDIOGRAM REPORT: CPT

## 2017-04-03 RX ORDER — MAGNESIUM OXIDE 400 MG ORAL TABLET 241.3 MG
400 TABLET ORAL ONCE
Qty: 0 | Refills: 0 | Status: COMPLETED | OUTPATIENT
Start: 2017-04-03 | End: 2017-04-03

## 2017-04-03 RX ORDER — POTASSIUM CHLORIDE 20 MEQ
40 PACKET (EA) ORAL ONCE
Qty: 0 | Refills: 0 | Status: COMPLETED | OUTPATIENT
Start: 2017-04-03 | End: 2017-04-03

## 2017-04-03 RX ORDER — CLOPIDOGREL BISULFATE 75 MG/1
75 TABLET, FILM COATED ORAL DAILY
Qty: 0 | Refills: 0 | Status: DISCONTINUED | OUTPATIENT
Start: 2017-04-03 | End: 2017-04-18

## 2017-04-03 RX ORDER — MAGNESIUM SULFATE 500 MG/ML
1 VIAL (ML) INJECTION ONCE
Qty: 0 | Refills: 0 | Status: DISCONTINUED | OUTPATIENT
Start: 2017-04-03 | End: 2017-04-03

## 2017-04-03 RX ORDER — POTASSIUM CHLORIDE 20 MEQ
20 PACKET (EA) ORAL ONCE
Qty: 0 | Refills: 0 | Status: COMPLETED | OUTPATIENT
Start: 2017-04-03 | End: 2017-04-03

## 2017-04-03 RX ORDER — POTASSIUM CHLORIDE 20 MEQ
10 PACKET (EA) ORAL DAILY
Qty: 0 | Refills: 0 | Status: DISCONTINUED | OUTPATIENT
Start: 2017-04-03 | End: 2017-04-18

## 2017-04-03 RX ORDER — PROPRANOLOL HCL 160 MG
10 CAPSULE, EXTENDED RELEASE 24HR ORAL THREE TIMES A DAY
Qty: 0 | Refills: 0 | Status: DISCONTINUED | OUTPATIENT
Start: 2017-04-03 | End: 2017-04-07

## 2017-04-03 RX ORDER — PANTOPRAZOLE SODIUM 20 MG/1
40 TABLET, DELAYED RELEASE ORAL
Qty: 0 | Refills: 0 | Status: DISCONTINUED | OUTPATIENT
Start: 2017-04-03 | End: 2017-04-18

## 2017-04-03 RX ORDER — SODIUM CHLORIDE 9 MG/ML
1000 INJECTION INTRAMUSCULAR; INTRAVENOUS; SUBCUTANEOUS
Qty: 0 | Refills: 0 | Status: DISCONTINUED | OUTPATIENT
Start: 2017-04-03 | End: 2017-04-06

## 2017-04-03 RX ORDER — POTASSIUM CHLORIDE 20 MEQ
10 PACKET (EA) ORAL
Qty: 0 | Refills: 0 | Status: COMPLETED | OUTPATIENT
Start: 2017-04-03 | End: 2017-04-03

## 2017-04-03 RX ORDER — FUROSEMIDE 40 MG
20 TABLET ORAL DAILY
Qty: 0 | Refills: 0 | Status: DISCONTINUED | OUTPATIENT
Start: 2017-04-03 | End: 2017-04-08

## 2017-04-03 RX ORDER — SPIRONOLACTONE 25 MG/1
50 TABLET, FILM COATED ORAL DAILY
Qty: 0 | Refills: 0 | Status: DISCONTINUED | OUTPATIENT
Start: 2017-04-03 | End: 2017-04-05

## 2017-04-03 RX ADMIN — CLOPIDOGREL BISULFATE 75 MILLIGRAM(S): 75 TABLET, FILM COATED ORAL at 19:21

## 2017-04-03 RX ADMIN — Medication 100 MILLIEQUIVALENT(S): at 11:13

## 2017-04-03 RX ADMIN — MAGNESIUM OXIDE 400 MG ORAL TABLET 400 MILLIGRAM(S): 241.3 TABLET ORAL at 19:21

## 2017-04-03 RX ADMIN — Medication 325 MILLIGRAM(S): at 11:38

## 2017-04-03 RX ADMIN — Medication 20 MILLIEQUIVALENT(S): at 19:21

## 2017-04-03 RX ADMIN — Medication 5 MILLIGRAM(S): at 05:20

## 2017-04-03 RX ADMIN — PANTOPRAZOLE SODIUM 40 MILLIGRAM(S): 20 TABLET, DELAYED RELEASE ORAL at 05:20

## 2017-04-03 RX ADMIN — Medication 100 MILLIEQUIVALENT(S): at 11:12

## 2017-04-03 RX ADMIN — Medication 1 TABLET(S): at 11:38

## 2017-04-03 RX ADMIN — Medication 40 MILLIEQUIVALENT(S): at 11:12

## 2017-04-03 NOTE — CHART NOTE - NSCHARTNOTEFT_GEN_A_CORE
Nurse Practitioner Progress note:     HPI:  Pt is a 65 yo woman with hx liver cirrhosis, variceal esophageal banding, RA on prednisone, recently admitted (2/29-3/3) who was admitted recently 2-29 to 3-3 for cirrhosis and vomiting. She reports weakness and inability to walk (legs feel like jelly). She has been incontinent of urine and stool for a few weeks as  she does not make it to the bathroom in time.  Pt fell off the couch his morning.  She also had two other falls, including 3/3/17 getting out of car to home in driveway, and  last evening when she slid off couch, struck a hard box with left chest and twisted her left ankle.  Pt's significant other reports she is  unable to support her own weight and is having trouble with her balance.  She denies cough, resp symptoms.  No fever/chills, n/v, cpain, SOB.  She has 1-3 loose stools/day.   Dr. Osorio discontinued her methotrexate, bystolic, and HCTZ on 3/10/17.            She  stopped drinking 5 days prior to last admission.  No head injury during any fall. (02 Apr 2017 18:48)              T(C): 36.9, Max: 36.9 (04-03 @ 10:33)  HR: 97 (77 - 103)  BP: 94/65 (87/57 - 111/51)  RR: 16 (16 - 18)  SpO2: 99% (96% - 100%)  Wt(kg): --    PHYSICAL EXAM:  Neurologic: Non-focal, AxOx3.  No neuro deficits  Vascular: Peripheral pulses palpable 2+ bilaterally  Procedure Site:    12 lead EKG:  	    LABS:	 	        PROCEDURE RESULTS:    ASSESSMENT/PLAN: 	  -Admit to CICU  -VS, labs, diet, activity as per PCI orders  -IV hydration  -Encourage PO fluids  -ASA   -Plavix 75mg  -Lisinopril  -Toprol XL  -Crestor 10mg   -Plan of care D/W pt. and MD  -Discussed therapeutic lifestyle changes to reduce risk factors such as following a cardiac diet, weight loss, maintaining a healthy weight, exercise, smoking cessation, medication compliance, and regular follow-up  with MD to know our numbers (BP, cholesterol, weight, and glucose  -If pt. remains stable overnight possible D/C in AM  - Follow-up AM labs/EKG/site check  -Follow-up with attending Nurse Practitioner Progress note:     HPI:  Pt is a 65 yo woman with hx liver cirrhosis, variceal esophageal banding, RA on prednisone, recently admitted (2/29-3/3) who  for cirrhosis and vomiting. Pt. reports weakness and inability to walk. She has been incontinent of urine and stool for a few weeks, last evening she slid off the couch, struck a hard box with left chest and twisted her left ankle.  Pt's significant other reports she is unable to support her own weight and is having trouble with her balance.                T(C): 36.9, Max: 36.9 (04-03 @ 10:33)  HR: 97 (77 - 103)  BP: 94/65 (87/57 - 111/51)  RR: 16 (16 - 18)  SpO2: 99% (96% - 100%)  Wt(kg): --    PHYSICAL EXAM:  Neurologic: Non-focal, AxOx3.  No neuro deficits  Vascular: Peripheral pulses palpable 2+ bilaterally  Procedure Site:    12 lead EKG:  	    LABS:	 	        PROCEDURE RESULTS:    ASSESSMENT/PLAN: 	  -Admit to CICU  -VS, labs, diet, activity as per PCI orders  -IV hydration  -Encourage PO fluids  -ASA   -Plavix 75mg  -Lisinopril  -Toprol XL  -Crestor 10mg   -Plan of care D/W pt. and MD  -Discussed therapeutic lifestyle changes to reduce risk factors such as following a cardiac diet, weight loss, maintaining a healthy weight, exercise, smoking cessation, medication compliance, and regular follow-up  with MD to know our numbers (BP, cholesterol, weight, and glucose  -If pt. remains stable overnight possible D/C in AM  - Follow-up AM labs/EKG/site check  -Follow-up with attending Nurse Practitioner Progress note:     HPI:  Pt is a 67 yo woman with hx liver cirrhosis, variceal esophageal banding, RA on prednisone, recently admitted (2/29-3/3) who  for cirrhosis and vomiting. Pt. reports weakness and inability to walk. She has been incontinent of urine and stool for a few weeks, last evening she slid off the couch, struck a hard box with left chest and twisted her left ankle. Pt's significant other reports she is unable to support her own weight and is having trouble with her balance.                T(C): 36.9, Max: 36.9 (04-03 @ 10:33)  HR: 97 (77 - 103)  BP: 94/65 (87/57 - 111/51)  RR: 16 (16 - 18)  SpO2: 99% (96% - 100%)  Wt(kg): --    PHYSICAL EXAM:  Neurologic: Non-focal, AxOx3.  No neuro deficits  Vascular: Peripheral pulses palpable 2+ bilaterally  Procedure Site:    12 lead EKG:  	    LABS:	 	        PROCEDURE RESULTS:    ASSESSMENT/PLAN: 	  -Admit to CICU  -VS, labs, diet, activity as per PCI orders  -IV hydration  -Encourage PO fluids  -ASA   -Plavix 75mg  -Lisinopril  -Toprol XL  -Crestor 10mg   -Plan of care D/W pt. and MD  -Discussed therapeutic lifestyle changes to reduce risk factors such as following a cardiac diet, weight loss, maintaining a healthy weight, exercise, smoking cessation, medication compliance, and regular follow-up  with MD to know our numbers (BP, cholesterol, weight, and glucose  -If pt. remains stable overnight possible D/C in AM  - Follow-up AM labs/EKG/site check  -Follow-up with attending Nurse Practitioner Progress note:     HPI:  Pt is a 67 yo woman with hx liver cirrhosis, variceal esophageal banding, RA on prednisone, recently admitted (2/29-3/3) who  for cirrhosis and vomiting. Pt. reports weakness and inability to walk. She has been incontinent of urine and stool for a few weeks, last evening she slid off the couch, struck a hard box with left chest and twisted her left ankle. Pt's significant other reports she is unable to support her own weight and is having trouble with her balance. Troponin 0.128/0.130/0.094               T(C): 36.9, Max: 36.9 (04-03 @ 10:33)  HR: 97 (77 - 103)  BP: 94/65 (87/57 - 111/51)  RR: 16 (16 - 18)  SpO2: 99% (96% - 100%)  Wt(kg): --    PHYSICAL EXAM:  Neurologic: Non-focal, AxOx3.  No neuro deficits  Vascular: Peripheral pulses palpable 2+ bilaterally  Procedure Site:    12 lead EKG:  	    LABS:	 	        PROCEDURE RESULTS:    ASSESSMENT/PLAN: 	  -Admit to CICU  -VS, labs, diet, activity as per PCI orders  -IV hydration  -Encourage PO fluids  -ASA   -Plavix 75mg  -Lisinopril  -Toprol XL  -Crestor 10mg   -Plan of care D/W pt. and MD  -Discussed therapeutic lifestyle changes to reduce risk factors such as following a cardiac diet, weight loss, maintaining a healthy weight, exercise, smoking cessation, medication compliance, and regular follow-up  with MD to know our numbers (BP, cholesterol, weight, and glucose  -If pt. remains stable overnight possible D/C in AM  - Follow-up AM labs/EKG/site check  -Follow-up with attending Nurse Practitioner Progress note:     HPI:  Pt is a 65 yo woman with hx liver cirrhosis, variceal esophageal banding, RA on prednisone, recently admitted (2/29-3/3) who  for cirrhosis and vomiting. Pt. reports weakness and inability to walk. She has been incontinent of urine and stool for a few weeks, last evening she slid off the couch, struck a hard box with left chest and twisted her left ankle. Pt's significant other reports she is unable to support her own weight and is having trouble with her balance. Troponin 0.128/0.130/0.094   NSTEMI- mildly elevated troponins in the setting of ongoing clinical issues without any symptoms suggestive of ACS.  Troponins trending down.  Check EKG this am .  WIll hold off on ischemic workup for now given abscence of symptoms.  Continue ASA and beta blocker.  Check 2 D echo.                T(C): 36.9, Max: 36.9 (04-03 @ 10:33)  HR: 97 (77 - 103)  BP: 94/65 (87/57 - 111/51)  RR: 16 (16 - 18)  SpO2: 99% (96% - 100%)  Wt(kg): --    PHYSICAL EXAM:  Neurologic: Non-focal, AxOx3.  No neuro deficits  Vascular: Peripheral pulses palpable 2+ bilaterally  Procedure Site:    12 lead EKG:  	    LABS:	 	        PROCEDURE RESULTS:    ASSESSMENT/PLAN: 	  -Admit to CICU  -VS, labs, diet, activity as per PCI orders  -IV hydration  -Encourage PO fluids  -ASA   -Plavix 75mg  -Lisinopril  -Toprol XL  -Crestor 10mg   -Plan of care D/W pt. and MD  -Discussed therapeutic lifestyle changes to reduce risk factors such as following a cardiac diet, weight loss, maintaining a healthy weight, exercise, smoking cessation, medication compliance, and regular follow-up  with MD to know our numbers (BP, cholesterol, weight, and glucose  -If pt. remains stable overnight possible D/C in AM  - Follow-up AM labs/EKG/site check  -Follow-up with attending Nurse Practitioner Progress note:     HPI:  Pt is a 67 yo woman with hx liver cirrhosis, variceal esophageal banding, RA on prednisone, recently admitted (2/29-3/3) who  for cirrhosis and vomiting. Pt. reports weakness and inability to walk. She has been incontinent of urine and stool for a few weeks, last evening she slid off the couch, struck a hard box with left chest and twisted her left ankle. Pt's significant other reports she is unable to support her own weight and is having trouble with her balance. Troponin 0.128/0.130/0.094 +NSTEMI- mildly elevated troponins in the setting of ongoing clinical issues without any symptoms suggestive of ACS.    2-D echo revealed apical wall motion abnormalities with a noted LVEF of 35-40%.    PHYSICAL EXAM:  Neurologic: Non-focal, AxOx3.  No neuro deficits  Vascular: Peripheral pulses palpable 2+ bilaterally  Procedure Site: Rt. femoral sheath pulled manual pressure applied x20 minutes site benign soft no bleeding no hematoma +1PP. 	    PROCEDURE RESULTS: S/P C     ASSESSMENT/PLAN: 	  -Admit to CICU  -VS, labs, diet, activity as per PCI orders  -IV hydration  -Encourage PO fluids  -ASA 325mg  -Plavix 75mg  -Lisinopril  -Toprol XL  -Crestor 10mg   -Plan of care D/W pt. and MD  -Discussed therapeutic lifestyle changes to reduce risk factors such as following a cardiac diet, weight loss, maintaining a healthy weight, exercise, smoking cessation, medication compliance, and regular follow-up  with MD to know our numbers (BP, cholesterol, weight, and glucose  -Follow-up AM labs/EKG/site check  -Follow-up with attending Nurse Practitioner Progress note:     HPI:  Pt is a 65 yo woman with hx liver cirrhosis, variceal esophageal banding, RA on prednisone, recently admitted (2/29-3/3) who  for cirrhosis and vomiting. Pt. reports weakness and inability to walk. She has been incontinent of urine and stool for a few weeks, last evening she slid off the couch, struck a hard box with left chest and twisted her left ankle. Pt's significant other reports she is unable to support her own weight and is having trouble with her balance. Troponin 0.128/0.130/0.094 +NSTEMI- mildly elevated troponins in the setting of ongoing clinical issues without any symptoms suggestive of ACS.    2-D echo revealed apical wall motion abnormalities with a noted LVEF of 35-40%.    PHYSICAL EXAM:  Neurologic: Non-focal, AxOx3.  No neuro deficits  Vascular: Peripheral pulses palpable 2+ bilaterally  Procedure Site: Rt. femoral sheath pulled manual pressure applied x20 minutes site benign soft no bleeding no hematoma +1PP. 	    PROCEDURE RESULTS: S/P [facility] SHIRLEY to LAD    ASSESSMENT/PLAN: Pt is a 65 yo woman with hx liver cirrhosis, variceal esophageal banding, RA on prednisone, recently admitted (2/29-3/3) who  for cirrhosis and vomiting. Troponin 0.128/0.130/0.094 +NSTEMI- mildly elevated troponins in the setting of ongoing clinical issues without any symptoms suggestive of ACS.  2-D echo revealed apical wall motion abnormalities with a noted LVEF of 35-40%.  -Admit to CCU  -VS, labs, diet, activity as per PCI orders  -IV hydration  -Encourage PO fluids  -ASA 325mg  -Plavix 75mg  -Aldactone 50mg  -Propanolol 10mg TID  -Plan of care D/W pt. and MD  -Discussed therapeutic lifestyle changes to reduce risk factors such as following a cardiac diet, weight loss, maintaining a healthy weight, exercise, smoking cessation, medication compliance, and regular follow-up  with MD to know our numbers (BP, cholesterol, weight, and glucose  -Follow-up AM labs/EKG/site check  -Follow-up with attending

## 2017-04-03 NOTE — PHYSICAL THERAPY INITIAL EVALUATION ADULT - CRITERIA FOR SKILLED THERAPEUTIC INTERVENTIONS
will attempt completion of Eval @ later date as per Dr. Flood; Cardio w/u in progress; further course of PT intervention pending

## 2017-04-03 NOTE — CONSULT NOTE ADULT - SUBJECTIVE AND OBJECTIVE BOX
Full note to be dictated    Diarrhea and has improved  had empiric tx for c diff and she could not do stool tests      Hx of chronic diarrhea that has also improved    Fall, HEADLEY in progress        pos troponins  DW cardioogy and await evaluation      Cirrhosis due to ETOH and has stopped    ascites and LLE edema and would consider low dose diuretics        EV, large and banded a month ago, will plan repeat banding after CV eval, DW cardiology

## 2017-04-03 NOTE — CONSULT NOTE ADULT - SUBJECTIVE AND OBJECTIVE BOX
Patient is a 66y old  Female who presents with a chief complaint of weakness.    HPI:  Pt is a 67 yo woman with hx liver cirrhosis, variceal esophageal bvanding, RA on prednisone, who was admitted recently 2-29 to 3-3 for cirrhosis and vomiting.   She reports weakness and inability to walk (legs feel like jelly). She has been incontinent of urine and stool for a few weeks as  she does not make it to the bathroom in time.  Pt fell off the couch his morning.  She also had two other falls, including 3/3/17 getting out of car to home in driveway, and  last evening when she slid off couch, struck a hard box with left chest and twisted her left ankle.  Pt's significant other reports she is  unable to support her own weight and is having trouble with her balance.  She denies cough, resp symptoms.  No fever/chills, n/v, cpain, SOB.  She has 1-3 loose stools/day.   Dr. Osorio discontinued her methotrexate, bystolic, and HCTZ on 3/10/17.            She  stopped drinking 5 days prior to last admission.  No head injury during any fall.     Since presentation to the hospital she was noted to have no fractures per imaging. Pt denies any CP, SOB or prior cardiac history.  Was a smoker before and quit in 2005.      PAST MEDICAL & SURGICAL HISTORY:  HTN (hypertension)  Rheumatoid arthritis  No significant past surgical history      MEDICATIONS  (STANDING):  multivitamin 1Tablet(s) Oral daily  aspirin 325milliGRAM(s) Oral daily  predniSONE   Tablet 5milliGRAM(s) Oral daily  propranolol 10milliGRAM(s) Oral three times a day  potassium chloride    Tablet ER 10milliEquivalent(s) Oral daily  pantoprazole    Tablet 40milliGRAM(s) Oral before breakfast  furosemide    Tablet 20milliGRAM(s) Oral daily  spironolactone 50milliGRAM(s) Oral daily    MEDICATIONS  (PRN):      FAMILY HISTORY:  No pertinent family history in first degree relatives      SOCIAL HISTORY:  quit smoking 2005, last drink of alcohol 5 days ago.    REVIEW OF SYSTEMS:  CONSTITUTIONAL:  No night sweats.  No fatigue, malaise, lethargy.  No fever or chills.  HEENT:  Eyes:  No visual changes.  No eye pain.      ENT:  No runny nose.  No epistaxis.  No sinus pain.  No sore throat.  No odynophagia.  No ear pain.  No congestion.  RESPIRATORY:  No cough.  No wheeze.  No hemoptysis.  No shortness of breath.  CARDIOVASCULAR:  No chest pains.  No palpitations. No shortness of breath, No orthopnea or PND.  GASTROINTESTINAL:  No abdominal pain.  No nausea or vomiting.  No diarrhea or constipation.  No hematemesis.  No hematochezia.  No melena.  GENITOURINARY:  No urgency.  No frequency.  No dysuria.  No hematuria.  No obstructive symptoms.  No discharge.  No pain.  No significant abnormal bleeding.  MUSCULOSKELETAL:  No musculoskeletal pain.  No joint swelling.  No arthritis.  NEUROLOGICAL:  No tingling or numbness or weakness. c/o dysequilibrium  PSYCHIATRIC:  No confusion  SKIN:  No rashes.  No lesions.  No wounds.  ENDOCRINE:  No unexplained weight loss.  No polydipsia.  No polyuria.  No polyphagia.  HEMATOLOGIC:  No anemia.  No purpura.  No petechiae.  No prolonged or excessive bleeding.   ALLERGIC AND IMMUNOLOGIC:  No pruritus.  No swelling.         Vital Signs Last 24 Hrs  T(C): 36.7, Max: 36.7 (04-02 @ 18:27)  T(F): 98, Max: 98 (04-02 @ 18:27)  HR: 92 (78 - 100)  BP: 87/57 (87/57 - 109/77)  BP(mean): --  RR: 16 (16 - 19)  SpO2: 96% (96% - 99%)    PHYSICAL EXAM-    Constitutional: The patient appears to be normal, well developed, well nourished and alert and oriented to time, place and person. The patient does not appear acutely ill.     Head: Head is normocephalic and atraumatic.      Neck: The patient's neck is supple without enlargement, has no palpable thyromegaly nor thyroid nodules and has no jugular venous distention. No audible carotid bruits. There are strong carotid pulses bilaterally. No JVD.     Cardiovascular: Regular rate and rhythm without S3, S4. No murmurs or rubs are appreciated.      Respiratory: Breath sounds are normal. No rales. No wheezing.    Abdomen: Soft, nontender, nondistended with positive bowel sounds.      Extremity: No tenderness.trace pitting edema b/l  No skin discoloration No clubbing No cyanosis.     Neurologic: The patient is alert and oriented.      Skin: No rash, no obvious lesions noted.      Psychiatric: The patient appears to be emotionally stable.      INTERPRETATION OF TELEMETRY: sinus rythm, atrial tachycardia and NSVT.    ECG: sinus rythm, baseline artifact in limb leads and T wave inversion in V2-4, prololnged QT interval. This was compared to her EKG from 2/23/17 From [facility] which did not reveal any gross T wave inversion in precordial leads.        I&O's Detail      LABS:                        11.6   10.1  )-----------( 188      ( 02 Apr 2017 11:22 )             32.5     03 Apr 2017 04:56    144    |  107    |  10     ----------------------------<  100    3.4     |  26     |  0.79     Ca    8.0        03 Apr 2017 04:56    TPro  7.9    /  Alb  2.2    /  TBili  2.6    /  DBili  x      /  AST  85     /  ALT  21     /  AlkPhos  141    02 Apr 2017 10:01    CARDIAC MARKERS ( 03 Apr 2017 08:34 )  0.094 ng/mL / x     / x     / x     / x      CARDIAC MARKERS ( 03 Apr 2017 04:56 )  0.130 ng/mL / x     / x     / x     / x      CARDIAC MARKERS ( 03 Apr 2017 02:19 )  0.128 ng/mL / x     / x     / x     / x      CARDIAC MARKERS ( 02 Apr 2017 10:01 )  0.166 ng/mL / x     / 56 U/L / x     / x          PT/INR - ( 02 Apr 2017 11:22 )   PT: 17.5 sec;   INR: 1.60 ratio         PTT - ( 02 Apr 2017 11:22 )  PTT:40.3 sec    I&O's Summary    BNP  RADIOLOGY & ADDITIONAL STUDIES:

## 2017-04-03 NOTE — PHYSICAL THERAPY INITIAL EVALUATION ADULT - MODALITIES TREATMENT COMMENTS
pt left in bed supine post Eval; bed alarm on; HM in place; callbell in reach; pt instructed not to get up alone; call nursing for assist; kyle well; pt denied pain

## 2017-04-03 NOTE — CONSULT NOTE ADULT - ASSESSMENT
NSTEMI- mildly elevated troponins in the setting of ongoing clinical issues without any symptoms suggestive of ACS.  Troponins trending down.  Check EKG this am .  WIll hold off on ischemic workup for now given abscence of symptoms.  Continue ASA and beta blocker.  Check 2 D echo.    Alcohol abuse and liver cirrhosis- will check 2 D echo to asses for alcoholic cardiomyopathy and ischemic HD.    Prolonged QTc - today on tele it was 482msec. Check EKG.  Check mangnesium stat level.  WIll order potassium repletion.  No on any meds that can prolong QTc.    Thank you for allowing me to participate in the care of this patient. Please feel free to contact me with any questions. NSTEMI- mildly elevated troponins in the setting of ongoing clinical issues without any symptoms suggestive of ACS.  Troponins trending down.  Check EKG this am .  WIll hold off on ischemic workup for now given abscence of symptoms.  Continue ASA and beta blocker.  Check 2 D echo.    Alcohol abuse and liver cirrhosis- will check 2 D echo to asses for alcoholic cardiomyopathy and ischemic HD.    Prolonged QTc - today on tele it was 482msec. Check EKG.  Check mangnesium stat level.  WIll order potassium repletion.  No on any meds that can prolong QTc.    Thank you for allowing me to participate in the care of this patient. Please feel free to contact me with any questions.    Addendum- Her 2 D echo revealed apical wall motion abnormalities with a noted LVEF of 35-40%, Needs to be started on guideline directed therapy for HFrEF, FOr left heart cath today for further evaluation.

## 2017-04-04 LAB
ALBUMIN SERPL ELPH-MCNC: 2.1 G/DL — LOW (ref 3.3–5)
ALP SERPL-CCNC: 116 U/L — SIGNIFICANT CHANGE UP (ref 40–120)
ALT FLD-CCNC: 17 U/L — SIGNIFICANT CHANGE UP (ref 12–78)
ANION GAP SERPL CALC-SCNC: 9 MMOL/L — SIGNIFICANT CHANGE UP (ref 5–17)
ANISOCYTOSIS BLD QL: SLIGHT — SIGNIFICANT CHANGE UP
AST SERPL-CCNC: 64 U/L — HIGH (ref 15–37)
BASOPHILS # BLD AUTO: 0.1 K/UL — SIGNIFICANT CHANGE UP (ref 0–0.2)
BASOPHILS NFR BLD AUTO: 1 % — SIGNIFICANT CHANGE UP (ref 0–2)
BILIRUB SERPL-MCNC: 2.1 MG/DL — HIGH (ref 0.2–1.2)
BUN SERPL-MCNC: 10 MG/DL — SIGNIFICANT CHANGE UP (ref 7–23)
CALCIUM SERPL-MCNC: 8 MG/DL — LOW (ref 8.5–10.1)
CHLORIDE SERPL-SCNC: 110 MMOL/L — HIGH (ref 96–108)
CO2 SERPL-SCNC: 25 MMOL/L — SIGNIFICANT CHANGE UP (ref 22–31)
CREAT SERPL-MCNC: 0.72 MG/DL — SIGNIFICANT CHANGE UP (ref 0.5–1.3)
EOSINOPHIL # BLD AUTO: 0.2 K/UL — SIGNIFICANT CHANGE UP (ref 0–0.5)
EOSINOPHIL NFR BLD AUTO: 1.8 % — SIGNIFICANT CHANGE UP (ref 0–6)
FOLATE SERPL-MCNC: 7.8 NG/ML — SIGNIFICANT CHANGE UP (ref 4.8–24.2)
GIANT PLATELETS BLD QL SMEAR: PRESENT — SIGNIFICANT CHANGE UP
GLUCOSE SERPL-MCNC: 90 MG/DL — SIGNIFICANT CHANGE UP (ref 70–99)
HCT VFR BLD CALC: 31.1 % — LOW (ref 34.5–45)
HGB BLD-MCNC: 10.5 G/DL — LOW (ref 11.5–15.5)
HYPOCHROMIA BLD QL: SLIGHT — SIGNIFICANT CHANGE UP
INR BLD: 1.85 RATIO — HIGH (ref 0.88–1.16)
LYMPHOCYTES # BLD AUTO: 29.8 % — SIGNIFICANT CHANGE UP (ref 13–44)
LYMPHOCYTES # BLD AUTO: 3.3 K/UL — SIGNIFICANT CHANGE UP (ref 1–3.3)
MACROCYTES BLD QL: SIGNIFICANT CHANGE UP
MAGNESIUM SERPL-MCNC: 1.6 MG/DL — LOW (ref 1.8–2.4)
MANUAL DIF COMMENT BLD-IMP: SIGNIFICANT CHANGE UP
MCHC RBC-ENTMCNC: 33.8 GM/DL — SIGNIFICANT CHANGE UP (ref 32–36)
MCHC RBC-ENTMCNC: 38.5 PG — HIGH (ref 27–34)
MCV RBC AUTO: 113.8 FL — HIGH (ref 80–100)
MICROCYTES BLD QL: SLIGHT — SIGNIFICANT CHANGE UP
MONOCYTES # BLD AUTO: 1.1 K/UL — HIGH (ref 0–0.9)
MONOCYTES NFR BLD AUTO: 9.6 % — SIGNIFICANT CHANGE UP (ref 2–14)
NEUTROPHILS # BLD AUTO: 6.5 K/UL — SIGNIFICANT CHANGE UP (ref 1.8–7.4)
NEUTROPHILS NFR BLD AUTO: 57.8 % — SIGNIFICANT CHANGE UP (ref 43–77)
PLAT MORPH BLD: (no result)
PLATELET # BLD AUTO: 132 K/UL — LOW (ref 150–400)
PLATELET COUNT - ESTIMATE: (no result)
POIKILOCYTOSIS BLD QL AUTO: SLIGHT — SIGNIFICANT CHANGE UP
POTASSIUM SERPL-MCNC: 4.7 MMOL/L — SIGNIFICANT CHANGE UP (ref 3.5–5.3)
POTASSIUM SERPL-SCNC: 4.7 MMOL/L — SIGNIFICANT CHANGE UP (ref 3.5–5.3)
PROT SERPL-MCNC: 6.8 GM/DL — SIGNIFICANT CHANGE UP (ref 6–8.3)
PROTHROM AB SERPL-ACNC: 20.2 SEC — HIGH (ref 9.8–12.7)
RBC # BLD: 2.73 M/UL — LOW (ref 3.8–5.2)
RBC # FLD: 15.2 % — HIGH (ref 10.3–14.5)
RBC BLD AUTO: (no result)
SODIUM SERPL-SCNC: 144 MMOL/L — SIGNIFICANT CHANGE UP (ref 135–145)
TARGETS BLD QL SMEAR: SLIGHT — SIGNIFICANT CHANGE UP
VIT B12 SERPL-MCNC: 1087 PG/ML — HIGH (ref 243–894)
WBC # BLD: 11.2 K/UL — HIGH (ref 3.8–10.5)
WBC # FLD AUTO: 11.2 K/UL — HIGH (ref 3.8–10.5)

## 2017-04-04 PROCEDURE — 93010 ELECTROCARDIOGRAM REPORT: CPT

## 2017-04-04 RX ORDER — SODIUM CHLORIDE 9 MG/ML
1000 INJECTION INTRAMUSCULAR; INTRAVENOUS; SUBCUTANEOUS
Qty: 0 | Refills: 0 | Status: COMPLETED | OUTPATIENT
Start: 2017-04-04 | End: 2017-04-04

## 2017-04-04 RX ORDER — ATORVASTATIN CALCIUM 80 MG/1
20 TABLET, FILM COATED ORAL AT BEDTIME
Qty: 0 | Refills: 0 | Status: DISCONTINUED | OUTPATIENT
Start: 2017-04-04 | End: 2017-04-18

## 2017-04-04 RX ORDER — MAGNESIUM SULFATE 500 MG/ML
2 VIAL (ML) INJECTION ONCE
Qty: 0 | Refills: 0 | Status: COMPLETED | OUTPATIENT
Start: 2017-04-04 | End: 2017-04-04

## 2017-04-04 RX ADMIN — Medication 325 MILLIGRAM(S): at 15:03

## 2017-04-04 RX ADMIN — Medication 5 MILLIGRAM(S): at 05:46

## 2017-04-04 RX ADMIN — ATORVASTATIN CALCIUM 20 MILLIGRAM(S): 80 TABLET, FILM COATED ORAL at 21:32

## 2017-04-04 RX ADMIN — Medication 50 GRAM(S): at 15:07

## 2017-04-04 RX ADMIN — SODIUM CHLORIDE 70 MILLILITER(S): 9 INJECTION INTRAMUSCULAR; INTRAVENOUS; SUBCUTANEOUS at 21:32

## 2017-04-04 RX ADMIN — CLOPIDOGREL BISULFATE 75 MILLIGRAM(S): 75 TABLET, FILM COATED ORAL at 15:04

## 2017-04-04 RX ADMIN — PANTOPRAZOLE SODIUM 40 MILLIGRAM(S): 20 TABLET, DELAYED RELEASE ORAL at 15:02

## 2017-04-04 RX ADMIN — Medication 10 MILLIEQUIVALENT(S): at 15:06

## 2017-04-04 RX ADMIN — Medication 1 TABLET(S): at 15:05

## 2017-04-04 NOTE — PROGRESS NOTE ADULT - SUBJECTIVE AND OBJECTIVE BOX
Patient is a 66y old  Female who presents with a chief complaint of weakness.    HPI:  Pt is a 65 yo woman with hx liver cirrhosis, variceal esophageal bvanding, RA on prednisone, who was admitted recently 2-29 to 3-3 for cirrhosis and vomiting.   She reports weakness and inability to walk (legs feel like jelly). She has been incontinent of urine and stool for a few weeks as  she does not make it to the bathroom in time.  Pt fell off the couch his morning.  She also had two other falls, including 3/3/17 getting out of car to home in driveway, and  last evening when she slid off couch, struck a hard box with left chest and twisted her left ankle.  Pt's significant other reports she is  unable to support her own weight and is having trouble with her balance.  She denies cough, resp symptoms.  No fever/chills, n/v, cpain, SOB.  She has 1-3 loose stools/day.   Dr. Osorio discontinued her methotrexate, bystolic, and HCTZ on 3/10/17.            She  stopped drinking 5 days prior to last admission.  No head injury during any fall.     Since presentation to the hospital she was noted to have no fractures per imaging. Pt denies any CP, SOB or prior cardiac history.  Was a smoker before and quit in 2005.    4/4- Pt seen and examined by me today. Denies any symptoms.       PAST MEDICAL & SURGICAL HISTORY:  HTN (hypertension)  Rheumatoid arthritis  No significant past surgical history      MEDICATIONS  (STANDING):  multivitamin 1Tablet(s) Oral daily  aspirin 325milliGRAM(s) Oral daily  predniSONE   Tablet 5milliGRAM(s) Oral daily  propranolol 10milliGRAM(s) Oral three times a day  potassium chloride    Tablet ER 10milliEquivalent(s) Oral daily  pantoprazole    Tablet 40milliGRAM(s) Oral before breakfast  furosemide    Tablet 20milliGRAM(s) Oral daily  spironolactone 50milliGRAM(s) Oral daily    MEDICATIONS  (PRN):      FAMILY HISTORY:  No pertinent family history in first degree relatives      SOCIAL HISTORY:  quit smoking 2005, last drink of alcohol 5 days ago.    REVIEW OF SYSTEMS:  CONSTITUTIONAL:  No night sweats.  No fatigue, malaise, lethargy.  No fever or chills.  HEENT:  Eyes:  No visual changes.  No eye pain.      ENT:  No runny nose.  No epistaxis.  No sinus pain.  No sore throat.  No odynophagia.  No ear pain.  No congestion.  RESPIRATORY:  No cough.  No wheeze.  No hemoptysis.  No shortness of breath.  CARDIOVASCULAR:  No chest pains.  No palpitations. No shortness of breath, No orthopnea or PND.  GASTROINTESTINAL:  No abdominal pain.  No nausea or vomiting.  No diarrhea or constipation.  No hematemesis.  No hematochezia.  No melena.  GENITOURINARY:  No urgency.  No frequency.  No dysuria.  No hematuria.  No obstructive symptoms.  No discharge.  No pain.  No significant abnormal bleeding.  MUSCULOSKELETAL:  No musculoskeletal pain.  No joint swelling.  No arthritis.  NEUROLOGICAL:  No tingling or numbness or weakness. c/o dysequilibrium  PSYCHIATRIC:  No confusion  SKIN:  No rashes.  No lesions.  No wounds.  ENDOCRINE:  No unexplained weight loss.  No polydipsia.  No polyuria.  No polyphagia.  HEMATOLOGIC:  No anemia.  No purpura.  No petechiae.  No prolonged or excessive bleeding.   ALLERGIC AND IMMUNOLOGIC:  No pruritus.  No swelling.         Vital Signs Last 24 Hrs  T(C): 36.7, Max: 36.7 (04-02 @ 18:27)  T(F): 98, Max: 98 (04-02 @ 18:27)  HR: 92 (78 - 100)  BP: 87/57 (87/57 - 109/77)  BP(mean): --  RR: 16 (16 - 19)  SpO2: 96% (96% - 99%)    PHYSICAL EXAM-    Constitutional: The patient appears to be normal, well developed, well nourished and alert and oriented to time, place and person. The patient does not appear acutely ill.     Head: Head is normocephalic and atraumatic.      Neck: The patient's neck is supple without enlargement, has no palpable thyromegaly nor thyroid nodules and has no jugular venous distention. No audible carotid bruits. There are strong carotid pulses bilaterally. No JVD.     Cardiovascular: Regular rate and rhythm without S3, S4. No murmurs or rubs are appreciated.      Respiratory: Breath sounds are normal. No rales. No wheezing.    Abdomen: Soft, nontender, nondistended with positive bowel sounds.      Extremity: No tenderness,trace pitting edema b/l  No skin discoloration No clubbing No cyanosis.     Neurologic: The patient is alert and oriented.      Skin: No rash, no obvious lesions noted.      Psychiatric: The patient appears to be emotionally stable.      INTERPRETATION OF TELEMETRY: sinus rythm, atrial tachycardia and NSVT.    ECG: sinus rythm, baseline artifact in limb leads and T wave inversion in V2-4, prololnged QT interval. This was compared to her EKG from 2/23/17 From [facility] which did not reveal any gross T wave inversion in precordial leads.        I&O's Detail      LABS:                        11.6   10.1  )-----------( 188      ( 02 Apr 2017 11:22 )             32.5     03 Apr 2017 04:56    144    |  107    |  10     ----------------------------<  100    3.4     |  26     |  0.79     Ca    8.0        03 Apr 2017 04:56    TPro  7.9    /  Alb  2.2    /  TBili  2.6    /  DBili  x      /  AST  85     /  ALT  21     /  AlkPhos  141    02 Apr 2017 10:01    CARDIAC MARKERS ( 03 Apr 2017 08:34 )  0.094 ng/mL / x     / x     / x     / x      CARDIAC MARKERS ( 03 Apr 2017 04:56 )  0.130 ng/mL / x     / x     / x     / x      CARDIAC MARKERS ( 03 Apr 2017 02:19 )  0.128 ng/mL / x     / x     / x     / x      CARDIAC MARKERS ( 02 Apr 2017 10:01 )  0.166 ng/mL / x     / 56 U/L / x     / x          PT/INR - ( 02 Apr 2017 11:22 )   PT: 17.5 sec;   INR: 1.60 ratio         PTT - ( 02 Apr 2017 11:22 )  PTT:40.3 sec    I&O's Summary    BNP  RADIOLOGY & ADDITIONAL STUDIES:

## 2017-04-04 NOTE — PROGRESS NOTE ADULT - ASSESSMENT
Diarrhea and has improved  had empiric tx for c diff and she could not do stool tests      Hx of chronic diarrhea that has also improved and will follow        KAYODE Shipley in progress        pos troponins  DW cardioogy, pt had bare metal stent      Cirrhosis due to ETOH and has stopped    ascites and LLE edema and would consider low dose diuretics        EV, large and banded a month ago, will delay banding due to stent

## 2017-04-04 NOTE — PROGRESS NOTE ADULT - ASSESSMENT
NSTEMI- s/p left heart cath and PCI of proximal LAD.  Continue statin.  She will need ASA and plavix for one month and later ASA to be continued.    Cardiomyopathy with LVEF 35-40%- alcoholic vs ischemic in nature.  Will start low dose ACEI.  Continue aldactone.  WIll switch propronalol to toprol XL if ok by GI team for GDMT for HFrEF.  On lasix.    HTN- continue meds as above.    Other medical issues- cirrhosis , oesophageal varices s/p banding.    Prolonged QTc - will order po magnesium standing. Not on meds that could cause the QT prolongation.    Thank you for allowing me to participate in the care of this patient. Please feel free to contact me with any questions.

## 2017-04-04 NOTE — PROGRESS NOTE ADULT - SUBJECTIVE AND OBJECTIVE BOX
Inpatient Consultants:   Cardiologist: Dr. Anderson ([facility] intervention)  GI: (Dr. Ceballos)    HPI:  Pt is a 67 yo woman with hx liver cirrhosis, variceal esophageal banding, RA on prednisone, recently admitted (2/29-3/3) who  for cirrhosis and vomiting. Pt. reports weakness and inability to walk. She has been incontinent of urine and stool for a few weeks, last evening she slid off the couch, struck a hard box with left chest and twisted her left ankle. Pt's significant other reports she is unable to support her own weight and is having trouble with her balance. Troponin 0.128/0.130/0.094 +NSTEMI- mildly elevated troponins in the setting of ongoing clinical issues without any symptoms suggestive of ACS.    2-D echo revealed apical wall motion abnormalities with a noted LVEF of 35-40%.    4/3: s/p PCI and LAD stent      PHYSICAL EXAM:    Daily     Daily       Vital Signs Last 24 Hrs  T(C): 36.3, Max: 36.3 (04-03 @ 20:42)  T(F): 97.3, Max: 97.4 (04-03 @ 20:42)  HR: 108 (0 - 111)  BP: 100/66 (63/48 - 104/70)  BP(mean): 74 (52 - 80)  RR: 21 (13 - 23)  SpO2: 96% (93% - 100%)    Constitutional: Well appearing   HEENT: Atraumatic, STEPHANY, Normal, No congestion  Respiratory: Breath Sounds normal, no rhonchi/wheeze  Cardiovascular: N S1S2; SHERI present  Gastrointestinal: Abdomen soft, non tender, Bowel Sounds present  Extremities: 1+ edema, peripheral pulses present  Neurological: AAO x 3, no gross focal motor deficits  Skin: Non cellulitic, no rash, ulcers  Lymph Nodes: No lymphadenopathy noted  Back: No CVA tenderness   Musculoskeletal: non tender  Breasts: Deferred  Genitourinary: deferred  Rectal: Deferred                          11.6   10.1  )-----------( 188      ( 02 Apr 2017 11:22 )             32.5       CBC Full  -  ( 02 Apr 2017 11:22 )  WBC Count : 10.1 K/uL  Hemoglobin : 11.6 g/dL  Hematocrit : 32.5 %  Platelet Count - Automated : 188 K/uL  Mean Cell Volume : 114.9 fl  Mean Cell Hemoglobin : 41.2 pg  Mean Cell Hemoglobin Concentration : 35.8 gm/dL  Auto Neutrophil # : 6.7 K/uL  Auto Lymphocyte # : 2.5 K/uL  Auto Monocyte # : 0.9 K/uL  Auto Eosinophil # : 0.0 K/uL  Auto Basophil # : 0.1 K/uL  Auto Neutrophil % : 66.0 %  Auto Lymphocyte % : 24.5 %  Auto Monocyte % : 8.7 %  Auto Eosinophil % : 0.2 %  Auto Basophil % : 0.6 %      03 Apr 2017 04:56    144    |  107    |  10     ----------------------------<  100    3.4     |  26     |  0.79     Ca    8.0        03 Apr 2017 04:56  Mg     1.7       03 Apr 2017 11:33    TPro  7.9    /  Alb  2.2    /  TBili  2.6    /  DBili  x      /  AST  85     /  ALT  21     /  AlkPhos  141    02 Apr 2017 10:01      LIVER FUNCTIONS - ( 02 Apr 2017 10:01 )  Alb: 2.2 g/dL / Pro: 7.9 gm/dL / ALK PHOS: 141 U/L / ALT: 21 U/L / AST: 85 U/L / GGT: x             PT/INR - ( 02 Apr 2017 11:22 )   PT: 17.5 sec;   INR: 1.60 ratio         PTT - ( 02 Apr 2017 11:22 )  PTT:40.3 sec    CARDIAC MARKERS ( 03 Apr 2017 08:34 )  0.094 ng/mL / x     / x     / x     / x      CARDIAC MARKERS ( 03 Apr 2017 04:56 )  0.130 ng/mL / x     / x     / x     / x      CARDIAC MARKERS ( 03 Apr 2017 02:19 )  0.128 ng/mL / x     / x     / x     / x      CARDIAC MARKERS ( 02 Apr 2017 10:01 )  0.166 ng/mL / x     / 56 U/L / x     / x                    MEDICATIONS  (STANDING):  multivitamin 1Tablet(s) Oral daily  aspirin 325milliGRAM(s) Oral daily  predniSONE   Tablet 5milliGRAM(s) Oral daily  propranolol 10milliGRAM(s) Oral three times a day  potassium chloride    Tablet ER 10milliEquivalent(s) Oral daily  pantoprazole    Tablet 40milliGRAM(s) Oral before breakfast  furosemide    Tablet 20milliGRAM(s) Oral daily  spironolactone 50milliGRAM(s) Oral daily  sodium chloride 0.9%. 1000milliLiter(s) IV Continuous <Continuous>  magnesium sulfate  IVPB 1Gram(s) IV Intermittent once Inpatient Consultants:   Cardiologist: Dr. Anderson ([facility] intervention)  GI: (Dr. Ceballos)    HPI:  Pt is a 67 yo woman with hx liver cirrhosis, variceal esophageal banding, RA on prednisone, recently admitted (2/29-3/3) who  for cirrhosis and vomiting. Pt. reports weakness and inability to walk over the past month. She has been incontinent of urine and stool for a few weeks, last evening she slid off the couch, struck a hard box with left chest and twisted her left ankle. Pt's significant other reports she is unable to support her own weight and is having trouble with her balance. Troponin 0.128/0.130/0.094 +NSTEMI- mildly elevated troponins in the setting of ongoing clinical issues without any symptoms suggestive of ACS.    2-D echo revealed apical wall motion abnormalities with a noted LVEF of 35-40%.  Patient underwent [facility] on 4/3 with Dr. Anderson and found to have LAD occlusion s/p BMS.    4/4/17: No CP or SOB. Still with persistent Left leg weakness, no numbness. Able to lift leg up to gravity but needs assistance with walking. Understands will likely need ARSH.     ROS; negative unless stated above.     Vital Signs Last 24 Hrs  T(C): 36.3, Max: 36.3 (04-03 @ 20:42)  T(F): 97.3, Max: 97.4 (04-03 @ 20:42)  HR: 108 (0 - 111)  BP: 100/66 (63/48 - 104/70)  BP(mean): 74 (52 - 80)  RR: 21 (13 - 23)  SpO2: 96% (93% - 100%)    Constitutional: Dishelved appearing in no acute distress.   HEENT: Atraumatic, STEPHANY, Normal, No congestion  Respiratory: Breath Sounds normal, no rhonchi/wheeze  Cardiovascular: N S1S2; SHERI present  Gastrointestinal: Abdomen soft, non tender, Bowel Sounds present  Extremities: 1+ edema, peripheral pulses present  Neurological: AAO x 3, no gross focal motor deficits  Skin: Non cellulitic, no rash, ulcers  Lymph Nodes: No lymphadenopathy noted  Back: No CVA tenderness   Musculoskeletal: non tender, 3/5 muscle strength LLE, 5/5 on Right LE.   Breasts: Deferred  Genitourinary: deferred  Rectal: Deferred     LABS:                         10.5   11.2  )-----------( 132      ( 04 Apr 2017 05:12 )             31.1     04 Apr 2017 05:12    144    |  110    |  10     ----------------------------<  90     4.7     |  25     |  0.72     Ca    8.0        04 Apr 2017 05:12  Mg     1.6       04 Apr 2017 05:12    TPro  6.8    /  Alb  2.1    /  TBili  2.1    /  DBili  x      /  AST  64     /  ALT  17     /  AlkPhos  116    04 Apr 2017 05:12    CARDIAC MARKERS ( 03 Apr 2017 08:34 )  0.094 ng/mL / x     / x     / x     / x      CARDIAC MARKERS ( 03 Apr 2017 04:56 )  0.130 ng/mL / x     / x     / x     / x      CARDIAC MARKERS ( 03 Apr 2017 02:19 )  0.128 ng/mL / x     / x     / x     / x      CARDIAC MARKERS ( 02 Apr 2017 10:01 )  0.166 ng/mL / x     / 56 U/L / x           MEDICATIONS  (STANDING):  multivitamin 1Tablet(s) Oral daily  aspirin 325milliGRAM(s) Oral daily  predniSONE   Tablet 5milliGRAM(s) Oral daily  propranolol 10milliGRAM(s) Oral three times a day  potassium chloride    Tablet ER 10milliEquivalent(s) Oral daily  pantoprazole    Tablet 40milliGRAM(s) Oral before breakfast  furosemide    Tablet 20milliGRAM(s) Oral daily  spironolactone 50milliGRAM(s) Oral daily    Cath results noted.

## 2017-04-04 NOTE — PROGRESS NOTE ADULT - ASSESSMENT
Pt is a 65 yo woman with hx liver cirrhosis, variceal esophageal bvanding, RA on prednisone, who was admitted recently 2-29 to 3-3 for cirrhosis and vomiting admitted with     1) NSTEMI: s/p PCI and LAD stent; EF 35-40 %  cont asa/plavix as per cardio. appreciate cardio consult    2) Cirrhosis + edema: Alcoholic liver cirrhosis; appreciate GI consult; cont lasix.     3) Hypokalemia of 3.4: replace and recheck    4) Hypomagnesemia of 1.7: replace and recheck    poc discussed with pt, team Pt is a 65 yo woman with hx liver cirrhosis, variceal esophageal bvanding, RA on prednisone, who was admitted recently 2-29 to 3-3 for cirrhosis and vomiting admitted with     1) NSTEMI: s/p PCI and LAD stent; EF 35-40 %  cont asa/plavix as per cardio. appreciate cardio consult  - propanolol TID. On Spironolactone as well.   - importance of med adherence advised.     2) Cirrhosis + edema: Alcoholic liver cirrhosis; appreciate GI consult; cont lasix and Spironolactone.   - last banding 1 month ago.     3) Hypokalemia- resolved s/p repletion.     4) Hypomagnesemia of 1.6:- persistent. Will replace and recheck in AM after rider.     5) Chronic Diarrhea - s/p empiric treatment for C. diff outpatient. Stable. Euvolemic and instead with ongoing pedal edema back on diurectics.   - GI following, no acute changes.     poc discussed with pt, team    Dispo: remain in CICU.   Total time 45 mins.

## 2017-04-04 NOTE — PROGRESS NOTE ADULT - ASSESSMENT
HPI:  Pt is a 65 yo woman with hx liver cirrhosis, variceal esophageal bvanding, RA on prednisone, who was admitted recently 2-29 to 3-3 for cirrhosis and vomiting.   She reports weakness and inability to walk (legs feel like jelly). She has been incontinent of urine and stool for a few weeks as  she does not make it to the bathroom in time.  Pt fell off the couch his morning.  She also had two other falls, including 3/3/17 getting out of car to home in driveway, and  last evening when she slid off couch, struck a hard box with left chest and twisted her left ankle.  Pt's significant other reports she is  unable to support her own weight and is having trouble with her balance.  She denies cough, resp symptoms.  No fever/chills, n/v, cpain, SOB.  She has 1-3 loose stools/day.   Dr. Osorio discontinued her methotrexate, bystolic, and HCTZ on 3/10/17.            She  stopped drinking 5 days prior to last admission.  No head injury during any fall. (02 Apr 2017 18:48)    now s/p [facility] with succesful PCI and BMS to    ASSESSMENT/PLAN:    Coronary artery disease  -Admit to CICU  -VS, labs, diet, activity as per PCI orders  -IV hydration  -Encourage PO fluids  -Aspirin mg PO daily  -Plavix 75mg PO daily  -BB mg PO daily  -ACE mg PO daily  -atorvastatin 10mg PO QHS   -Plan of care discussed with patient, family and MD  -likely d/c in AM if patient remains stable overnight  -NP to see in AM to evaluate labs, EKG and procedure site check  -Follow-up with attending MD   within 1 week  -Discussed therapeutic lifestyle changes to reduce risk factors such as following a cardiac diet, weight loss, maintaining a healthy weight, exercise, smoking cessation, medication compliance, and regular follow-up  with MD to know your numbers (BP, cholesterol, weight, and glucose) HPI:  Pt is a 67 yo woman with hx liver cirrhosis, variceal esophageal bvanding, RA on prednisone, who was admitted recently 2-29 to 3-3 for cirrhosis and vomiting.   She reports weakness and inability to walk (legs feel like jelly). She has been incontinent of urine and stool for a few weeks as  she does not make it to the bathroom in time.  Pt fell off the couch his morning.  She also had two other falls, including 3/3/17 getting out of car to home in driveway, and  last evening when she slid off couch, struck a hard box with left chest and twisted her left ankle.  Pt's significant other reports she is  unable to support her own weight and is having trouble with her balance.  She denies cough, resp symptoms.  No fever/chills, n/v, cpain, SOB.  She has 1-3 loose stools/day.   Dr. Osorio discontinued her methotrexate, bystolic, and HCTZ on 3/10/17.            She  stopped drinking 5 days prior to last admission.  No head injury during any fall. (02 Apr 2017 18:48)    now s/p C with succesful PCI and BMS to LAD    ASSESSMENT/PLAN:    Coronary artery disease  -continue CICU  -gentle IV hydration  -Encourage PO fluids  -continue aspirin, plavix, BB, PPI  -PT evaluation  -continue hospitalist service  -Plan of care discussed with patient and MD Anderson  -Discussed therapeutic lifestyle changes to reduce risk factors such as following a cardiac diet, weight loss, maintaining a healthy weight, exercise, smoking cessation, medication compliance, and regular follow-up  with MD to know your numbers (BP, cholesterol, weight, and glucose)

## 2017-04-04 NOTE — PROGRESS NOTE ADULT - SUBJECTIVE AND OBJECTIVE BOX
Nurse Practitioner Progress note:   s/p C with successful PCI and BMS to       Patient feels well.  Denies chest pain, shortness of breath, dizziness or palpitations at this time    Right groin procedure site CDI.  no bleeding, no hematoma, site soft, non tender, positive pedal pulses bilaterally        T(C): 36.3, Max: 36.9 (04-03 @ 10:33)  HR: 103 (0 - 111)  BP: 87/53 (83/58 - 111/51)  RR: 15 (13 - 21)  SpO2: 93% (93% - 100%)  Wt(kg): --  CBC Full  -  ( 04 Apr 2017 05:12 )  WBC Count : 11.2 K/uL  Hemoglobin : 10.5 g/dL  Hematocrit : 31.1 %  Platelet Count - Automated : 132 K/uL  Mean Cell Volume : 113.8 fl  Mean Cell Hemoglobin : 38.5 pg  Mean Cell Hemoglobin Concentration : 33.8 gm/dL      04 Apr 2017 05:12    144    |  110    |  10     ----------------------------<  90     4.7     |  25     |  0.72     Ca    8.0        04 Apr 2017 05:12  Mg     1.6       04 Apr 2017 05:12    TPro  6.8    /  Alb  2.1    /  TBili  2.1    /  DBili  x      /  AST  64     /  ALT  17     /  AlkPhos  116    04 Apr 2017 05:12    CARDIAC MARKERS ( 03 Apr 2017 08:34 )  0.094 ng/mL / x     / x     / x     / x      CARDIAC MARKERS ( 03 Apr 2017 04:56 )  0.130 ng/mL / x     / x     / x     / x      CARDIAC MARKERS ( 03 Apr 2017 02:19 )  0.128 ng/mL / x     / x     / x     / x      CARDIAC MARKERS ( 02 Apr 2017 10:01 )  0.166 ng/mL / x     / 56 U/L / x     / x              MEDICATIONS  (STANDING):  multivitamin 1Tablet(s) Oral daily  aspirin 325milliGRAM(s) Oral daily  predniSONE   Tablet 5milliGRAM(s) Oral daily  propranolol 10milliGRAM(s) Oral three times a day  potassium chloride    Tablet ER 10milliEquivalent(s) Oral daily  pantoprazole    Tablet 40milliGRAM(s) Oral before breakfast  furosemide    Tablet 20milliGRAM(s) Oral daily  spironolactone 50milliGRAM(s) Oral daily  sodium chloride 0.9%. 1000milliLiter(s) IV Continuous <Continuous>  clopidogrel Tablet 75milliGRAM(s) Oral daily  magnesium sulfate  IVPB 2Gram(s) IV Intermittent once    MEDICATIONS  (PRN): Nurse Practitioner Progress note:   s/p [facility] with successful PCI and BMS to LAD      Patient feels well.  Denies chest pain, shortness of breath, dizziness or palpitations at this time, unable to get out of bed and reporting L hip pain and lower extremity swelling.    Right groin dressing removed procedure site CDI.  no bleeding, no hematoma, site soft, non tender, positive pedal pulses bilaterally        T(C): 36.3, Max: 36.9 (04-03 @ 10:33)  HR: 103 (0 - 111)  BP: 87/53 (83/58 - 111/51)  RR: 15 (13 - 21)  SpO2: 93% (93% - 100%)  Wt(kg): --  CBC Full  -  ( 04 Apr 2017 05:12 )  WBC Count : 11.2 K/uL  Hemoglobin : 10.5 g/dL  Hematocrit : 31.1 %  Platelet Count - Automated : 132 K/uL  Mean Cell Volume : 113.8 fl  Mean Cell Hemoglobin : 38.5 pg  Mean Cell Hemoglobin Concentration : 33.8 gm/dL      04 Apr 2017 05:12    144    |  110    |  10     ----------------------------<  90     4.7     |  25     |  0.72     Ca    8.0        04 Apr 2017 05:12  Mg     1.6       04 Apr 2017 05:12    TPro  6.8    /  Alb  2.1    /  TBili  2.1    /  DBili  x      /  AST  64     /  ALT  17     /  AlkPhos  116    04 Apr 2017 05:12    CARDIAC MARKERS ( 03 Apr 2017 08:34 )  0.094 ng/mL / x     / x     / x     / x      CARDIAC MARKERS ( 03 Apr 2017 04:56 )  0.130 ng/mL / x     / x     / x     / x      CARDIAC MARKERS ( 03 Apr 2017 02:19 )  0.128 ng/mL / x     / x     / x     / x      CARDIAC MARKERS ( 02 Apr 2017 10:01 )  0.166 ng/mL / x     / 56 U/L / x     / x              MEDICATIONS  (STANDING):  multivitamin 1Tablet(s) Oral daily  aspirin 325milliGRAM(s) Oral daily  predniSONE   Tablet 5milliGRAM(s) Oral daily  propranolol 10milliGRAM(s) Oral three times a day  potassium chloride    Tablet ER 10milliEquivalent(s) Oral daily  pantoprazole    Tablet 40milliGRAM(s) Oral before breakfast  furosemide    Tablet 20milliGRAM(s) Oral daily  spironolactone 50milliGRAM(s) Oral daily  sodium chloride 0.9%. 1000milliLiter(s) IV Continuous <Continuous>  clopidogrel Tablet 75milliGRAM(s) Oral daily  magnesium sulfate  IVPB 2Gram(s) IV Intermittent once    MEDICATIONS  (PRN):

## 2017-04-04 NOTE — PROGRESS NOTE ADULT - SUBJECTIVE AND OBJECTIVE BOX
Patient is a 66y old  Female who presents with a chief complaint of weakness (02 Apr 2017 18:48)      HPI:  Pt is a 65 yo woman with hx liver cirrhosis, variceal esophageal bvanding, RA on prednisone, who was admitted recently 2-29 to 3-3 for cirrhosis and vomiting.   She reports weakness and inability to walk (legs feel like jelly). She has been incontinent of urine and stool for a few weeks as  she does not make it to the bathroom in time.  Pt fell off the couch his morning.  She also had two other falls, including 3/3/17 getting out of car to home in driveway, and  last evening when she slid off couch, struck a hard box with left chest and twisted her left ankle.  Pt's significant other reports she is  unable to support her own weight and is having trouble with her balance.  She denies cough, resp symptoms.  No fever/chills, n/v, cpain, SOB.  She has 1-3 loose stools/day.   Dr. Osorio discontinued her methotrexate, bystolic, and HCTZ on 3/10/17.            She  stopped drinking 5 days prior to last admission.  No head injury during any fall. (02 Apr 2017 18:48)      feels stronger  has mild lowr abd discomfort that inc prior to BM    S/P empiric c diff tx and diarrhea resolved    s/p stent, bare metal    PAST MEDICAL & SURGICAL HISTORY:  HTN (hypertension)  Rheumatoid arthritis  No significant past surgical history      MEDICATIONS  (STANDING):  multivitamin 1Tablet(s) Oral daily  aspirin 325milliGRAM(s) Oral daily  predniSONE   Tablet 5milliGRAM(s) Oral daily  propranolol 10milliGRAM(s) Oral three times a day  potassium chloride    Tablet ER 10milliEquivalent(s) Oral daily  pantoprazole    Tablet 40milliGRAM(s) Oral before breakfast  furosemide    Tablet 20milliGRAM(s) Oral daily  spironolactone 50milliGRAM(s) Oral daily  sodium chloride 0.9%. 1000milliLiter(s) IV Continuous <Continuous>  clopidogrel Tablet 75milliGRAM(s) Oral daily    MEDICATIONS  (PRN):      Allergies    sulfa drugs (Unknown)    Intolerances        SOCIAL HISTORY:unchanged    FAMILY HISTORY:  No pertinent family history in first degree relatives      REVIEW OF SYSTEMS:    CONSTITUTIONAL: No weakness, fevers or chills  EYES/ENT: No visual changes;  No vertigo or throat pain   NECK: No pain or stiffness  RESPIRATORY: No cough, wheezing, hemoptysis; No shortness of breath  CARDIOVASCULAR: No chest pain or palpitations  GENITOURINARY: No dysuria, frequency or hematuria  NEUROLOGICAL: No numbness or weakness  SKIN: No itching, burning, rashes, or lesions   All other review of systems is negative unless indicated above.    Vital Signs Last 24 Hrs  T(C): 36.3, Max: 36.9 (04-03 @ 10:33)  T(F): 97.3, Max: 98.4 (04-03 @ 10:33)  HR: 103 (0 - 111)  BP: 87/53 (83/58 - 111/51)  BP(mean): 58 (58 - 80)  RR: 15 (13 - 21)  SpO2: 93% (93% - 100%)    PHYSICAL EXAM:    Constitutional: NAD, well-developed  HEENT: EOMI, throat clear  Neck: No LAD, supple  Respiratory: CTA and P  Cardiovascular: S1 and S2, RRR, no M  Gastrointestinal: BS+, soft, NT/ND, neg HSM,  Extremities: No peripheral edema, neg clubing, cyanosis  Vascular: 2+ peripheral pulses  Neurological: A/O x 3, no focal deficits  Psychiatric: Normal mood, normal affect  Skin: No rashes    LABS:  CBC Full  -  ( 04 Apr 2017 05:12 )  WBC Count : 11.2 K/uL  Hemoglobin : 10.5 g/dL  Hematocrit : 31.1 %  Platelet Count - Automated : 132 K/uL  Mean Cell Volume : 113.8 fl  Mean Cell Hemoglobin : 38.5 pg  Mean Cell Hemoglobin Concentration : 33.8 gm/dL  Auto Neutrophil # : x  Auto Lymphocyte # : x  Auto Monocyte # : x  Auto Eosinophil # : x  Auto Basophil # : x  Auto Neutrophil % : x  Auto Lymphocyte % : x  Auto Monocyte % : x  Auto Eosinophil % : x  Auto Basophil % : x    04 Apr 2017 05:12    144    |  110    |  10     ----------------------------<  90     4.7     |  25     |  0.72     Ca    8.0        04 Apr 2017 05:12  Mg     1.6       04 Apr 2017 05:12    TPro  6.8    /  Alb  2.1    /  TBili  2.1    /  DBili  x      /  AST  64     /  ALT  17     /  AlkPhos  116    04 Apr 2017 05:12    PT/INR - ( 04 Apr 2017 05:12 )   PT: 20.2 sec;   INR: 1.85 ratio         PTT - ( 02 Apr 2017 11:22 )  PTT:40.3 sec        RADIOLOGY & ADDITIONAL STUDIES:EXAM:  US DPLX LWR EXT VEINS LTD LT                            PROCEDURE DATE:  04/03/2017        INTERPRETATION:  Ultrasound of the lower extremity deep venous system       CPT 76867    CLINICAL INFORMATION: Progressive left leg swelling         TECHNIQUE:   Doppler ultrasonography of the left lower extremity deep   venous system was performed.    FINDINGS:    No previous examinations are available for review.        The left deep venous system demonstrated no abnormality.  The veins were   patent with intact Doppler flow.  The flow varied with respiration and   augmented with distal calf compression.  The veins were directly   compressible by the ultrasound transducer.          The veins evaluated included the common femoral vein, the inflow of the   greater saphenous vein, the inflow of the deep femoral vein, the   superficial femoral vein and the popliteal vein.           IMPRESSION:   Unremarkable ultrasound of the deep venous system of the   left lower extremity.

## 2017-04-05 LAB
ANION GAP SERPL CALC-SCNC: 10 MMOL/L — SIGNIFICANT CHANGE UP (ref 5–17)
BUN SERPL-MCNC: 11 MG/DL — SIGNIFICANT CHANGE UP (ref 7–23)
CALCIUM SERPL-MCNC: 8.1 MG/DL — LOW (ref 8.5–10.1)
CHLORIDE SERPL-SCNC: 109 MMOL/L — HIGH (ref 96–108)
CO2 SERPL-SCNC: 22 MMOL/L — SIGNIFICANT CHANGE UP (ref 22–31)
CREAT SERPL-MCNC: 0.72 MG/DL — SIGNIFICANT CHANGE UP (ref 0.5–1.3)
GLUCOSE SERPL-MCNC: 113 MG/DL — HIGH (ref 70–99)
MAGNESIUM SERPL-MCNC: 2 MG/DL — SIGNIFICANT CHANGE UP (ref 1.8–2.4)
POTASSIUM SERPL-MCNC: 4.4 MMOL/L — SIGNIFICANT CHANGE UP (ref 3.5–5.3)
POTASSIUM SERPL-SCNC: 4.4 MMOL/L — SIGNIFICANT CHANGE UP (ref 3.5–5.3)
SODIUM SERPL-SCNC: 141 MMOL/L — SIGNIFICANT CHANGE UP (ref 135–145)

## 2017-04-05 RX ORDER — MAGNESIUM OXIDE 400 MG ORAL TABLET 241.3 MG
400 TABLET ORAL DAILY
Qty: 0 | Refills: 0 | Status: DISCONTINUED | OUTPATIENT
Start: 2017-04-05 | End: 2017-04-08

## 2017-04-05 RX ORDER — SPIRONOLACTONE 25 MG/1
25 TABLET, FILM COATED ORAL DAILY
Qty: 0 | Refills: 0 | Status: DISCONTINUED | OUTPATIENT
Start: 2017-04-05 | End: 2017-04-07

## 2017-04-05 RX ADMIN — Medication 5 MILLIGRAM(S): at 06:08

## 2017-04-05 RX ADMIN — CLOPIDOGREL BISULFATE 75 MILLIGRAM(S): 75 TABLET, FILM COATED ORAL at 11:17

## 2017-04-05 RX ADMIN — SPIRONOLACTONE 25 MILLIGRAM(S): 25 TABLET, FILM COATED ORAL at 17:59

## 2017-04-05 RX ADMIN — Medication 325 MILLIGRAM(S): at 11:17

## 2017-04-05 RX ADMIN — Medication 1 TABLET(S): at 11:17

## 2017-04-05 RX ADMIN — Medication 10 MILLIGRAM(S): at 04:12

## 2017-04-05 RX ADMIN — Medication 20 MILLIGRAM(S): at 11:21

## 2017-04-05 RX ADMIN — ATORVASTATIN CALCIUM 20 MILLIGRAM(S): 80 TABLET, FILM COATED ORAL at 21:43

## 2017-04-05 RX ADMIN — Medication 10 MILLIEQUIVALENT(S): at 11:17

## 2017-04-05 RX ADMIN — MAGNESIUM OXIDE 400 MG ORAL TABLET 400 MILLIGRAM(S): 241.3 TABLET ORAL at 11:17

## 2017-04-05 RX ADMIN — PANTOPRAZOLE SODIUM 40 MILLIGRAM(S): 20 TABLET, DELAYED RELEASE ORAL at 06:08

## 2017-04-05 NOTE — PROGRESS NOTE ADULT - SUBJECTIVE AND OBJECTIVE BOX
Patient is a 66y old  Female who presents with a chief complaint of weakness (02 Apr 2017 18:48)      HPI:  Pt is a 65 yo woman with hx liver cirrhosis, variceal esophageal bvanding, RA on prednisone, who was admitted recently 2-29 to 3-3 for cirrhosis and vomiting.   She reports weakness and inability to walk (legs feel like jelly). She has been incontinent of urine and stool for a few weeks as  she does not make it to the bathroom in time.  Pt fell off the couch his morning.  She also had two other falls, including 3/3/17 getting out of car to home in driveway, and  last evening when she slid off couch, struck a hard box with left chest and twisted her left ankle.  Pt's significant other reports she is  unable to support her own weight and is having trouble with her balance.  She denies cough, resp symptoms.  No fever/chills, n/v, cpain, SOB.  She has 1-3 loose stools/day.   Dr. Osorio discontinued her methotrexate, bystolic, and HCTZ on 3/10/17.            She  stopped drinking 5 days prior to last admission.  No head injury during any fall. (02 Apr 2017 18:48)    pt comf  has some fatigue   neg abd pain x mild prior to BM, lower abd and crampy  diarrhea resolved  neg dizziness  has LBP and propranolol has been held      PAST MEDICAL & SURGICAL HISTORY:  HTN (hypertension)  Rheumatoid arthritis  No significant past surgical history      MEDICATIONS  (STANDING):  multivitamin 1Tablet(s) Oral daily  aspirin 325milliGRAM(s) Oral daily  predniSONE   Tablet 5milliGRAM(s) Oral daily  propranolol 10milliGRAM(s) Oral three times a day  potassium chloride    Tablet ER 10milliEquivalent(s) Oral daily  pantoprazole    Tablet 40milliGRAM(s) Oral before breakfast  furosemide    Tablet 20milliGRAM(s) Oral daily  spironolactone 50milliGRAM(s) Oral daily  sodium chloride 0.9%. 1000milliLiter(s) IV Continuous <Continuous>  clopidogrel Tablet 75milliGRAM(s) Oral daily  atorvastatin 20milliGRAM(s) Oral at bedtime  magnesium oxide 400milliGRAM(s) Oral daily    MEDICATIONS  (PRN):      Allergies    sulfa drugs (Unknown)    Intolerances        SOCIAL HISTORY:unchanged    FAMILY HISTORY:  No pertinent family history in first degree relatives      REVIEW OF SYSTEMS:    CONSTITUTIONAL: No weakness, fevers or chills  EYES/ENT: No visual changes;  No vertigo or throat pain   NECK: No pain or stiffness  RESPIRATORY: No cough, wheezing, hemoptysis; No shortness of breath  CARDIOVASCULAR: No chest pain or palpitations  GENITOURINARY: No dysuria, frequency or hematuria  NEUROLOGICAL: No numbness or weakness  SKIN: No itching, burning, rashes, or lesions   All other review of systems is negative unless indicated above.    Vital Signs Last 24 Hrs  T(C): 36.6, Max: 37.2 (04-04 @ 23:10)  T(F): 97.9, Max: 98.9 (04-04 @ 23:10)  HR: 93 (0 - 124)  BP: 85/55 (80/55 - 110/73)  BP(mean): 63 (59 - 76)  RR: 18 (14 - 23)  SpO2: 91% (91% - 98%)    PHYSICAL EXAM:    Constitutional: NAD, well-developed  HEENT: EOMI, throat clear  Neck: No LAD, supple  Respiratory: CTA and P  Cardiovascular: S1 and S2, RRR, no M  Gastrointestinal: BS+, soft, NT/ND, neg HSM,  Extremities: keesha LE peripheral edema, neg clubing, cyanosis  Vascular: 2+ peripheral pulses  Neurological: A/O x 3, no focal deficits  Psychiatric: Normal mood, normal affect  Skin: No rashes    LABS:  CBC Full  -  ( 04 Apr 2017 05:12 )  WBC Count : 11.2 K/uL  Hemoglobin : 10.5 g/dL  Hematocrit : 31.1 %  Platelet Count - Automated : 132 K/uL  Mean Cell Volume : 113.8 fl  Mean Cell Hemoglobin : 38.5 pg  Mean Cell Hemoglobin Concentration : 33.8 gm/dL  Auto Neutrophil # : 6.5 K/uL  Auto Lymphocyte # : 3.3 K/uL  Auto Monocyte # : 1.1 K/uL  Auto Eosinophil # : 0.2 K/uL  Auto Basophil # : 0.1 K/uL  Auto Neutrophil % : 57.8 %  Auto Lymphocyte % : 29.8 %  Auto Monocyte % : 9.6 %  Auto Eosinophil % : 1.8 %  Auto Basophil % : 1.0 %    05 Apr 2017 05:49    141    |  109    |  11     ----------------------------<  113    4.4     |  22     |  0.72     Ca    8.1        05 Apr 2017 05:49  Mg     2.0       05 Apr 2017 05:49    TPro  6.8    /  Alb  2.1    /  TBili  2.1    /  DBili  x      /  AST  64     /  ALT  17     /  AlkPhos  116    04 Apr 2017 05:12    PT/INR - ( 04 Apr 2017 05:12 )   PT: 20.2 sec;   INR: 1.85 ratio                 RADIOLOGY & ADDITIONAL STUDIES:

## 2017-04-05 NOTE — PROGRESS NOTE ADULT - ASSESSMENT
Diarrhea and has improved  had empiric tx for c diff and she could not do stool tests      Hx of chronic diarrhea that has also improved and will follow      hypotension  RO adrenal insuff, on chronic steroids        pos troponins  DW cardioogy, pt had bare metal stent and they are considering durationof double anti plt with inc risk bleed  DW cardiology      Cirrhosis due to ETOH and has stopped    ascites and LLE edema

## 2017-04-05 NOTE — PROGRESS NOTE ADULT - ASSESSMENT
Pt is a 67 yo woman with hx liver cirrhosis, variceal esophageal bvanding, RA on prednisone since 2009, who was admitted recently 2-29 to 3-3 for cirrhosis and vomiting admitted with     1) NSTEMI: s/p PCI and LAD stent; EF 35-40 %  cont asa/plavix as per cardio. appreciate cardio consult  - propanolol TID held in the setting of hypotension. On Spironolactone as well ( hold today).   - importance of med adherence advised.     # Hypotension  - more persistent than expected in a cirrhotic. No signs of sepsis. Afebrile.   - likely with component of chronic adrenal insufficiency 2/2 chronic steroid use for RA (no flare noted). Would be cautious with increasing steroids in a patient with ascites and heart failure if not needed. Would like to start low dose Midodrine. Discussed with Cards and GI. GI request Endo involvement for adrenal insufficiency testing prior to starting Midodrine.   - unclear if cosyntroponin testing would be useful.   - endo consulted.     2) Cirrhosis + edema: Alcoholic liver cirrhosis; appreciate GI consult; cont lasix and Spironolactone if BP permits.   - last banding 1 month ago.     3) Hypokalemia- resolved s/p repletion.     4) Hypomagnesemia of 1.6:- persistent. Will replace and recheck in AM after rider.     5) Chronic Diarrhea - s/p empiric treatment for C. diff outpatient. Stable. Euvolemic and instead with ongoing pedal edema back on diurectics.   - GI following, no acute changes.     poc discussed with pt, team    Dispo: remain in CICU.   Total time 45 mins.

## 2017-04-05 NOTE — PROGRESS NOTE ADULT - SUBJECTIVE AND OBJECTIVE BOX
Patient is a 66y old  Female who presents with a chief complaint of weakness.    HPI:  Pt is a 67 yo woman with hx liver cirrhosis, variceal esophageal bvanding, RA on prednisone, who was admitted recently 2-29 to 3-3 for cirrhosis and vomiting.   She reports weakness and inability to walk (legs feel like jelly). She has been incontinent of urine and stool for a few weeks as  she does not make it to the bathroom in time.  Pt fell off the couch his morning.  She also had two other falls, including 3/3/17 getting out of car to home in driveway, and  last evening when she slid off couch, struck a hard box with left chest and twisted her left ankle.  Pt's significant other reports she is  unable to support her own weight and is having trouble with her balance.  She denies cough, resp symptoms.  No fever/chills, n/v, cpain, SOB.  She has 1-3 loose stools/day.   Dr. Osorio discontinued her methotrexate, bystolic, and HCTZ on 3/10/17.            She  stopped drinking 5 days prior to last admission.  No head injury during any fall.     Since presentation to the hospital she was noted to have no fractures per imaging. Pt denies any CP, SOB or prior cardiac history.  Was a smoker before and quit in 2005.    4/4- Pt seen and examined by me today. Denies any symptoms.   4/5- Pt seen and examined. She is having low BP here but denies any dizziness or CP.      PAST MEDICAL & SURGICAL HISTORY:  HTN (hypertension)  Rheumatoid arthritis  No significant past surgical history      MEDICATIONS  (STANDING):  multivitamin 1Tablet(s) Oral daily  aspirin 325milliGRAM(s) Oral daily  predniSONE   Tablet 5milliGRAM(s) Oral daily  propranolol 10milliGRAM(s) Oral three times a day  potassium chloride    Tablet ER 10milliEquivalent(s) Oral daily  pantoprazole    Tablet 40milliGRAM(s) Oral before breakfast  furosemide    Tablet 20milliGRAM(s) Oral daily  spironolactone 50milliGRAM(s) Oral daily    MEDICATIONS  (PRN):      FAMILY HISTORY:  No pertinent family history in first degree relatives      SOCIAL HISTORY:  quit smoking 2005, last drink of alcohol 5 days ago.    REVIEW OF SYSTEMS:  CONSTITUTIONAL:  No night sweats.  No fatigue, malaise, lethargy.  No fever or chills.  HEENT:  Eyes:  No visual changes.  No eye pain.      ENT:  No runny nose.  No epistaxis.  No sinus pain.  No sore throat.  No odynophagia.  No ear pain.  No congestion.  RESPIRATORY:  No cough.  No wheeze.  No hemoptysis.  No shortness of breath.  CARDIOVASCULAR:  No chest pains.  No palpitations. No shortness of breath, No orthopnea or PND.  GASTROINTESTINAL:  No abdominal pain.  No nausea or vomiting.  No diarrhea or constipation.  No hematemesis.  No hematochezia.  No melena.  GENITOURINARY:  No urgency.  No frequency.  No dysuria.  No hematuria.  No obstructive symptoms.  No discharge.  No pain.  No significant abnormal bleeding.  MUSCULOSKELETAL:  No musculoskeletal pain.  No joint swelling.  No arthritis.  NEUROLOGICAL:  No tingling or numbness or weakness. c/o dysequilibrium  PSYCHIATRIC:  No confusion  SKIN:  No rashes.  No lesions.  No wounds.  ENDOCRINE:  No unexplained weight loss.  No polydipsia.  No polyuria.  No polyphagia.  HEMATOLOGIC:  No anemia.  No purpura.  No petechiae.  No prolonged or excessive bleeding.   ALLERGIC AND IMMUNOLOGIC:  No pruritus.  No swelling.         Vital Signs Last 24 Hrs  T(C): 36.7, Max: 36.7 (04-02 @ 18:27)  T(F): 98, Max: 98 (04-02 @ 18:27)  HR: 92 (78 - 100)  BP: 87/57 (87/57 - 109/77)  BP(mean): --  RR: 16 (16 - 19)  SpO2: 96% (96% - 99%)    PHYSICAL EXAM-    Constitutional: The patient appears to be normal, well developed, well nourished and alert and oriented to time, place and person. The patient does not appear acutely ill.     Head: Head is normocephalic and atraumatic.      Neck: The patient's neck is supple without enlargement, has no palpable thyromegaly nor thyroid nodules and has no jugular venous distention. No audible carotid bruits. There are strong carotid pulses bilaterally. No JVD.     Cardiovascular: Regular rate and rhythm without S3, S4. No murmurs or rubs are appreciated.      Respiratory: Breath sounds are normal. No rales. No wheezing.    Abdomen: Soft, nontender, nondistended with positive bowel sounds.      Extremity: No tenderness,trace pitting edema b/l  No skin discoloration No clubbing No cyanosis.     Neurologic: The patient is alert and oriented.      Skin: No rash, no obvious lesions noted.      Psychiatric: The patient appears to be emotionally stable.      INTERPRETATION OF TELEMETRY: sinus rythm, atrial tachycardia and NSVT.    ECG: sinus rythm, baseline artifact in limb leads and T wave inversion in V2-4, prololnged QT interval. This was compared to her EKG from 2/23/17 From [facility] which did not reveal any gross T wave inversion in precordial leads.        I&O's Detail      LABS:                        11.6   10.1  )-----------( 188      ( 02 Apr 2017 11:22 )             32.5     03 Apr 2017 04:56    144    |  107    |  10     ----------------------------<  100    3.4     |  26     |  0.79     Ca    8.0        03 Apr 2017 04:56    TPro  7.9    /  Alb  2.2    /  TBili  2.6    /  DBili  x      /  AST  85     /  ALT  21     /  AlkPhos  141    02 Apr 2017 10:01    CARDIAC MARKERS ( 03 Apr 2017 08:34 )  0.094 ng/mL / x     / x     / x     / x      CARDIAC MARKERS ( 03 Apr 2017 04:56 )  0.130 ng/mL / x     / x     / x     / x      CARDIAC MARKERS ( 03 Apr 2017 02:19 )  0.128 ng/mL / x     / x     / x     / x      CARDIAC MARKERS ( 02 Apr 2017 10:01 )  0.166 ng/mL / x     / 56 U/L / x     / x          PT/INR - ( 02 Apr 2017 11:22 )   PT: 17.5 sec;   INR: 1.60 ratio         PTT - ( 02 Apr 2017 11:22 )  PTT:40.3 sec    I&O's Summary    BNP  RADIOLOGY & ADDITIONAL STUDIES:

## 2017-04-05 NOTE — PROGRESS NOTE ADULT - SUBJECTIVE AND OBJECTIVE BOX
Inpatient Consultants:   Cardiologist: Dr. Andreson ([facility] intervention)  GI: (Dr. Ceballos)    HPI:  Pt is a 67 yo woman with hx liver cirrhosis, variceal esophageal banding, RA on prednisone, recently admitted (2/29-3/3) who  for cirrhosis and vomiting. Pt. reports weakness and inability to walk over the past month. She has been incontinent of urine and stool for a few weeks, last evening she slid off the couch, struck a hard box with left chest and twisted her left ankle. Pt's significant other reports she is unable to support her own weight and is having trouble with her balance. Troponin 0.128/0.130/0.094 +NSTEMI- mildly elevated troponins in the setting of ongoing clinical issues without any symptoms suggestive of ACS.    2-D echo revealed apical wall motion abnormalities with a noted LVEF of 35-40%.  Patient underwent [facility] on 4/3 with Dr. Anderson and found to have LAD occlusion s/p BMS.    4/4/17: No CP or SOB. Still with persistent Left leg weakness, no numbness. Able to lift leg up to gravity but needs assistance with walking. Understands will likely need ARSH.     4/5/17: Feels tired, did not participate much with PT yesterday. NO CP or SOB. Still with persistent hypotension.     ROS; negative unless stated above.     Vital Signs Last 24 Hrs  T(C): 36.6, Max: 37.2 (04-04 @ 23:10)  T(F): 97.9, Max: 98.9 (04-04 @ 23:10)  HR: 93 (0 - 124)  BP: 85/55 (80/55 - 110/73)  BP(mean): 63 (59 - 76)  RR: 18 (14 - 23)  SpO2: 91% (91% - 98%)    Constitutional: Dishelved appearing in no acute distress.   HEENT: Atraumatic, STEPHANY, Normal, No congestion  Respiratory: Breath Sounds normal, no rhonchi/wheeze  Cardiovascular: N S1S2; SHERI present  Gastrointestinal: Abdomen soft, non tender, Bowel Sounds present, mildly distended.   Extremities: 1+ edema, peripheral pulses present. RUE with small thrombophlebitis, mildly tender.   Neurological: AAO x 3, no gross focal motor deficits  Skin: Non cellulitic, no rash, ulcers  Lymph Nodes: No lymphadenopathy noted  Back: No CVA tenderness   Musculoskeletal: non tender, 3/5 muscle strength LLE, 5/5 on Right LE.   Breasts: Deferred  Genitourinary: deferred  Rectal: Deferred     LABS:                                      10.5   11.2  )-----------( 132      ( 04 Apr 2017 05:12 )             31.1     04 Apr 2017 05:12  04-05    141  |  109<H>  |  11  ----------------------------<  113<H>  4.4   |  22  |  0.72    Ca    8.1<L>      05 Apr 2017 05:49  Mg     2.0     04-05    TPro  6.8  /  Alb  2.1<L>  /  TBili  2.1<H>  /  DBili  x   /  AST  64<H>  /  ALT  17  /  AlkPhos  116  04-04      144    |  110    |  10     ----------------------------<  90     4.7     |  25     |  0.72     Ca    8.0        04 Apr 2017 05:12  Mg     1.6       04 Apr 2017 05:12    TPro  6.8    /  Alb  2.1    /  TBili  2.1    /  DBili  x      /  AST  64     /  ALT  17     /  AlkPhos  116    04 Apr 2017 05:12    CARDIAC MARKERS ( 03 Apr 2017 08:34 )  0.094 ng/mL / x     / x     / x     / x      CARDIAC MARKERS ( 03 Apr 2017 04:56 )  0.130 ng/mL / x     / x     / x     / x      CARDIAC MARKERS ( 03 Apr 2017 02:19 )  0.128 ng/mL / x     / x     / x     / x      CARDIAC MARKERS ( 02 Apr 2017 10:01 )  0.166 ng/mL / x     / 56 U/L / x           MEDICATIONS  (STANDING):  multivitamin 1Tablet(s) Oral daily  aspirin 325milliGRAM(s) Oral daily  predniSONE   Tablet 5milliGRAM(s) Oral daily  propranolol 10milliGRAM(s) Oral three times a day  potassium chloride    Tablet ER 10milliEquivalent(s) Oral daily  pantoprazole    Tablet 40milliGRAM(s) Oral before breakfast  furosemide    Tablet 20milliGRAM(s) Oral daily  spironolactone 50milliGRAM(s) Oral daily    Cath results noted.

## 2017-04-05 NOTE — PROGRESS NOTE ADULT - ASSESSMENT
NSTEMI- s/p left heart cath and PCI of proximal LAD.  Continue statin.  She will need ASA and plavix for one month and later ASA to be continued.    Cardiomyopathy with LVEF 35-40%- alcoholic vs ischemic in nature.  UNable to start ACEI since her BP is low.  Per Dr Underwood pt needs propronalol for decreasing variceal pressure since she is high risk for bleeding.  Will continue it for now instead of switching to GDMT for HFrEF.  Continue aldactone and lasix.      HTN- continue meds as above.    Other medical issues- cirrhosis , oesophageal varices s/p banding.    Prolonged QTc - will order po magnesium standing. Not on meds that could cause the QT prolongation.    Thank you for allowing me to participate in the care of this patient. Please feel free to contact me with any questions.

## 2017-04-06 LAB
ANION GAP SERPL CALC-SCNC: 9 MMOL/L — SIGNIFICANT CHANGE UP (ref 5–17)
BUN SERPL-MCNC: 11 MG/DL — SIGNIFICANT CHANGE UP (ref 7–23)
CALCIUM SERPL-MCNC: 8 MG/DL — LOW (ref 8.5–10.1)
CHLORIDE SERPL-SCNC: 108 MMOL/L — SIGNIFICANT CHANGE UP (ref 96–108)
CO2 SERPL-SCNC: 23 MMOL/L — SIGNIFICANT CHANGE UP (ref 22–31)
CREAT SERPL-MCNC: 0.75 MG/DL — SIGNIFICANT CHANGE UP (ref 0.5–1.3)
GLUCOSE SERPL-MCNC: 116 MG/DL — HIGH (ref 70–99)
HCT VFR BLD CALC: 32.8 % — LOW (ref 34.5–45)
HGB BLD-MCNC: 10.9 G/DL — LOW (ref 11.5–15.5)
MCHC RBC-ENTMCNC: 33.3 GM/DL — SIGNIFICANT CHANGE UP (ref 32–36)
MCHC RBC-ENTMCNC: 38.6 PG — HIGH (ref 27–34)
MCV RBC AUTO: 115.8 FL — HIGH (ref 80–100)
PLATELET # BLD AUTO: 114 K/UL — LOW (ref 150–400)
POTASSIUM SERPL-MCNC: 3.8 MMOL/L — SIGNIFICANT CHANGE UP (ref 3.5–5.3)
POTASSIUM SERPL-SCNC: 3.8 MMOL/L — SIGNIFICANT CHANGE UP (ref 3.5–5.3)
RBC # BLD: 2.83 M/UL — LOW (ref 3.8–5.2)
RBC # FLD: 15.5 % — HIGH (ref 10.3–14.5)
SODIUM SERPL-SCNC: 140 MMOL/L — SIGNIFICANT CHANGE UP (ref 135–145)
WBC # BLD: 11.7 K/UL — HIGH (ref 3.8–10.5)
WBC # FLD AUTO: 11.7 K/UL — HIGH (ref 3.8–10.5)

## 2017-04-06 PROCEDURE — 93010 ELECTROCARDIOGRAM REPORT: CPT

## 2017-04-06 RX ADMIN — MAGNESIUM OXIDE 400 MG ORAL TABLET 400 MILLIGRAM(S): 241.3 TABLET ORAL at 11:47

## 2017-04-06 RX ADMIN — Medication 10 MILLIGRAM(S): at 21:16

## 2017-04-06 RX ADMIN — Medication 10 MILLIEQUIVALENT(S): at 11:48

## 2017-04-06 RX ADMIN — Medication 1 TABLET(S): at 11:48

## 2017-04-06 RX ADMIN — Medication 10 MILLIGRAM(S): at 14:05

## 2017-04-06 RX ADMIN — Medication 325 MILLIGRAM(S): at 11:47

## 2017-04-06 RX ADMIN — PANTOPRAZOLE SODIUM 40 MILLIGRAM(S): 20 TABLET, DELAYED RELEASE ORAL at 06:16

## 2017-04-06 RX ADMIN — CLOPIDOGREL BISULFATE 75 MILLIGRAM(S): 75 TABLET, FILM COATED ORAL at 11:47

## 2017-04-06 RX ADMIN — Medication 5 MILLIGRAM(S): at 06:16

## 2017-04-06 RX ADMIN — Medication 10 MILLIGRAM(S): at 14:18

## 2017-04-06 RX ADMIN — ATORVASTATIN CALCIUM 20 MILLIGRAM(S): 80 TABLET, FILM COATED ORAL at 21:16

## 2017-04-06 NOTE — CONSULT NOTE ADULT - SUBJECTIVE AND OBJECTIVE BOX
67 yo female with h/o EtOH-induced cirrhosis with esophageal varices, RA (on chronic prednisone) a/w severe weakness with persistent hypotension asked to evaluate adrenal status.      Pt has been on prednisone 5 mg daily since 2009 for RA (sees Dr. Ballard).  No past h/o adrenal crisis.  c/o increased lethargy, muscle weakness for months.  Denies recent major weight changes, dizziness or nausea.  Has had worsening edema for the past month.  BP has been low for much of this admission which apparently is not her baseline.     PAST MEDICAL & SURGICAL HISTORY:  HTN (hypertension)  Rheumatoid arthritis  MEDICATIONS  (STANDING):  multivitamin 1Tablet(s) Oral daily  aspirin 325milliGRAM(s) Oral daily  predniSONE   Tablet 5milliGRAM(s) Oral daily  propranolol 10milliGRAM(s) Oral three times a day  potassium chloride    Tablet ER 10milliEquivalent(s) Oral daily  pantoprazole    Tablet 40milliGRAM(s) Oral before breakfast  furosemide    Tablet 20milliGRAM(s) Oral daily  sodium chloride 0.9%. 1000milliLiter(s) IV Continuous <Continuous>  clopidogrel Tablet 75milliGRAM(s) Oral daily  atorvastatin 20milliGRAM(s) Oral at bedtime  magnesium oxide 400milliGRAM(s) Oral daily  spironolactone 25milliGRAM(s) Oral daily  Allergies: sulfa drugs (Unknown)  FAMILY HISTORY: No pertinent family history in first degree relatives  SOCIAL HISTORY: +EtOH  ROS: per HPI, others negative    PE:  Vital Signs Last 24 Hrs  T(C): 36.2, Max: 36.2 (04-05 @ 22:50)  T(F): 97.2, Max: 97.2 (04-06 @ 05:35)  HR: 105 (35 - 125)  BP: 86/55 (82/48 - 106/79)  BP(mean): 56 (49 - 84)  RR: 12 (12 - 25)  SpO2: 96% (88% - 98%)  AAOx3. No TM. S1+S2. CTAB. Soft. Obese. +ttp of epigastrum. +LE edema B/L 67 yo female with h/o EtOH-induced cirrhosis with esophageal varices, RA (on chronic prednisone), CAD a/w severe weakness with persistent hypotension asked to evaluate adrenal status.      Pt has been on prednisone 5 mg daily since 2009 for RA (sees Dr. Ballard).  No past h/o adrenal crisis.  c/o increased lethargy, muscle weakness for months.  Denies recent major weight changes, dizziness or nausea.  Has had worsening edema for the past month.  A cardiac stent was placed earlier this admission for a NSTEMI.  BP has been low for much of this admission which apparently is not her baseline.     PAST MEDICAL & SURGICAL HISTORY:  HTN (hypertension)  Rheumatoid arthritis  MEDICATIONS  (STANDING):  multivitamin 1Tablet(s) Oral daily  aspirin 325milliGRAM(s) Oral daily  predniSONE   Tablet 5milliGRAM(s) Oral daily  propranolol 10milliGRAM(s) Oral three times a day  potassium chloride    Tablet ER 10milliEquivalent(s) Oral daily  pantoprazole    Tablet 40milliGRAM(s) Oral before breakfast  furosemide    Tablet 20milliGRAM(s) Oral daily  sodium chloride 0.9%. 1000milliLiter(s) IV Continuous <Continuous>  clopidogrel Tablet 75milliGRAM(s) Oral daily  atorvastatin 20milliGRAM(s) Oral at bedtime  magnesium oxide 400milliGRAM(s) Oral daily  spironolactone 25milliGRAM(s) Oral daily  Allergies: sulfa drugs (Unknown)  FAMILY HISTORY: No pertinent family history in first degree relatives  SOCIAL HISTORY: +EtOH  ROS: per HPI, others negative    PE:  Vital Signs Last 24 Hrs  T(C): 36.2, Max: 36.2 (04-05 @ 22:50)  T(F): 97.2, Max: 97.2 (04-06 @ 05:35)  HR: 105 (35 - 125)  BP: 86/55 (82/48 - 106/79)  BP(mean): 56 (49 - 84)  RR: 12 (12 - 25)  SpO2: 96% (88% - 98%)  AAOx3. No TM. S1+S2. CTAB. Soft. Obese. +ttp of epigastrum. +LE edema B/L

## 2017-04-06 NOTE — PROGRESS NOTE ADULT - ASSESSMENT
Pt is a 65 yo woman with hx liver cirrhosis, variceal esophageal bvanding, RA on prednisone since 2009, who was admitted recently 2-29 to 3-3 for cirrhosis and vomiting admitted with     1) NSTEMI: s/p PCI and LAD stent; EF 35-40 %  cont asa/plavix as per cardio. appreciate cardio consult  - propanolol TID held in the setting of hypotension --> discussed with RN that patient should recieve med in order to prevent esophageal variceal bleed..   On Spironolactone 25mg (decreased dose)   - importance of med adherence advised.     # Hypotension  # Chronic Adrenal Insufficiency - given steroid dependence pred 5mg daily X 8yrs. No signs of RA flare.   - discussed with Endo.   - will start increased pred dose to 15mg day #1 / 3, if BP not improved decrease back to home dose 5mg and will start midodrine.   - no signs of sepsis.  - encourage mobility for LE weakness. Negative doppler US for DVT.     2) Cirrhosis + edema: Alcoholic liver cirrhosis; appreciate GI consult; cont lasix and Spironolactone if BP permits.   - last banding 1 month ago.     3) Hypokalemia- resolved s/p repletion.     4) Hypomagnesemia - resolved s/p repletion.     5) Chronic Diarrhea - s/p empiric treatment for C. diff outpatient. Stable. Euvolemic and instead with ongoing pedal edema back on diurectics.   - GI following, no acute changes.     poc discussed with pt, team    Dispo: remain in CICU.   Total time 45 mins.

## 2017-04-06 NOTE — PROGRESS NOTE ADULT - SUBJECTIVE AND OBJECTIVE BOX
Patient is a 66y old  Female who presents with a chief complaint of weakness.    HPI:  Pt is a 67 yo woman with hx liver cirrhosis, variceal esophageal bvanding, RA on prednisone, who was admitted recently 2-29 to 3-3 for cirrhosis and vomiting.   She reports weakness and inability to walk (legs feel like jelly). She has been incontinent of urine and stool for a few weeks as  she does not make it to the bathroom in time.  Pt fell off the couch his morning.  She also had two other falls, including 3/3/17 getting out of car to home in driveway, and  last evening when she slid off couch, struck a hard box with left chest and twisted her left ankle.  Pt's significant other reports she is  unable to support her own weight and is having trouble with her balance.  She denies cough, resp symptoms.  No fever/chills, n/v, cpain, SOB.  She has 1-3 loose stools/day.   Dr. Osorio discontinued her methotrexate, bystolic, and HCTZ on 3/10/17.            She  stopped drinking 5 days prior to last admission.  No head injury during any fall.     Since presentation to the hospital she was noted to have no fractures per imaging. Pt denies any CP, SOB or prior cardiac history.  Was a smoker before and quit in 2005.    4/4- Pt seen and examined by me today. Denies any symptoms.   4/5- Pt seen and examined. She is having low BP here but denies any dizziness or CP.  4/6- Pt sen and examined this am. She c/o swelling in her L foot and inability to walk.      PAST MEDICAL & SURGICAL HISTORY:  HTN (hypertension)  Rheumatoid arthritis  No significant past surgical history      MEDICATIONS  (STANDING):  multivitamin 1Tablet(s) Oral daily  aspirin 325milliGRAM(s) Oral daily  predniSONE   Tablet 5milliGRAM(s) Oral daily  propranolol 10milliGRAM(s) Oral three times a day  potassium chloride    Tablet ER 10milliEquivalent(s) Oral daily  pantoprazole    Tablet 40milliGRAM(s) Oral before breakfast  furosemide    Tablet 20milliGRAM(s) Oral daily  spironolactone 50milliGRAM(s) Oral daily    MEDICATIONS  (PRN):      FAMILY HISTORY:  No pertinent family history in first degree relatives      SOCIAL HISTORY:  quit smoking 2005, last drink of alcohol 5 days ago.    REVIEW OF SYSTEMS:  CONSTITUTIONAL:  No night sweats.  No fatigue, malaise, lethargy.  No fever or chills.  HEENT:  Eyes:  No visual changes.  No eye pain.      ENT:  No runny nose.  No epistaxis.  No sinus pain.  No sore throat.  No odynophagia.  No ear pain.  No congestion.  RESPIRATORY:  No cough.  No wheeze.  No hemoptysis.  No shortness of breath.  CARDIOVASCULAR:  No chest pains.  No palpitations. No shortness of breath, No orthopnea or PND.  GASTROINTESTINAL:  No abdominal pain.  No nausea or vomiting.  No diarrhea or constipation.  No hematemesis.  No hematochezia.  No melena.  GENITOURINARY:  No urgency.  No frequency.  No dysuria.  No hematuria.  No obstructive symptoms.  No discharge.  No pain.  No significant abnormal bleeding.  MUSCULOSKELETAL:  No musculoskeletal pain.  No joint swelling.  No arthritis.  NEUROLOGICAL:  No tingling or numbness or weakness. c/o dysequilibrium  PSYCHIATRIC:  No confusion  SKIN:  No rashes.  No lesions.  No wounds.  ENDOCRINE:  No unexplained weight loss.  No polydipsia.  No polyuria.  No polyphagia.  HEMATOLOGIC:  No anemia.  No purpura.  No petechiae.  No prolonged or excessive bleeding.   ALLERGIC AND IMMUNOLOGIC:  No pruritus.  No swelling.         Vital Signs Last 24 Hrs  T(C): 36.7, Max: 36.7 (04-02 @ 18:27)  T(F): 98, Max: 98 (04-02 @ 18:27)  HR: 92 (78 - 100)  BP: 87/57 (87/57 - 109/77)  BP(mean): --  RR: 16 (16 - 19)  SpO2: 96% (96% - 99%)    PHYSICAL EXAM-    Constitutional: The patient appears to be normal, well developed, well nourished and alert and oriented to time, place and person. The patient does not appear acutely ill.     Head: Head is normocephalic and atraumatic.      Neck: The patient's neck is supple without enlargement, has no palpable thyromegaly nor thyroid nodules and has no jugular venous distention. No audible carotid bruits. There are strong carotid pulses bilaterally. No JVD.     Cardiovascular: Regular rate and rhythm without S3, S4. No murmurs or rubs are appreciated.      Respiratory: Breath sounds are normal. No rales. No wheezing.    Abdomen: Soft, nontender, nondistended with positive bowel sounds.      Extremity: No tenderness,trace pitting edema b/l  No skin discoloration No clubbing No cyanosis.     Neurologic: The patient is alert and oriented.      Skin: No rash, no obvious lesions noted.      Psychiatric: The patient appears to be emotionally stable.      INTERPRETATION OF TELEMETRY: sinus rythm, atrial tachycardia and NSVT.    ECG: sinus rythm, baseline artifact in limb leads and T wave inversion in V2-4, prololnged QT interval. This was compared to her EKG from 2/23/17 From [facility] which did not reveal any gross T wave inversion in precordial leads.        I&O's Detail      LABS:                        11.6   10.1  )-----------( 188      ( 02 Apr 2017 11:22 )             32.5     03 Apr 2017 04:56    144    |  107    |  10     ----------------------------<  100    3.4     |  26     |  0.79     Ca    8.0        03 Apr 2017 04:56    TPro  7.9    /  Alb  2.2    /  TBili  2.6    /  DBili  x      /  AST  85     /  ALT  21     /  AlkPhos  141    02 Apr 2017 10:01    CARDIAC MARKERS ( 03 Apr 2017 08:34 )  0.094 ng/mL / x     / x     / x     / x      CARDIAC MARKERS ( 03 Apr 2017 04:56 )  0.130 ng/mL / x     / x     / x     / x      CARDIAC MARKERS ( 03 Apr 2017 02:19 )  0.128 ng/mL / x     / x     / x     / x      CARDIAC MARKERS ( 02 Apr 2017 10:01 )  0.166 ng/mL / x     / 56 U/L / x     / x          PT/INR - ( 02 Apr 2017 11:22 )   PT: 17.5 sec;   INR: 1.60 ratio         PTT - ( 02 Apr 2017 11:22 )  PTT:40.3 sec    I&O's Summary    BNP  RADIOLOGY & ADDITIONAL STUDIES:

## 2017-04-06 NOTE — PROGRESS NOTE ADULT - ASSESSMENT
Diarrhea and has improved  had empiric tx for c diff and she could not do stool tests      Hx of chronic diarrhea that has also improved and will follow      hypotension  RO adrenal insuff, on chronic steroids  endocrine note appreciated and it is reasonable to inc steroids to see if BP improves,   inc risk fluid retention DW pt        pos troponins  DW cardioogy, pt had bare metal stent and they are considering duration of double anti plt with inc risk bleed  DW cardiology and they recommend plavix for at least one month and will possibly stop it after that  DW pt      Cirrhosis due to ETOH and has stopped    ascites and LLE edema      likely adrenal insuff with years of steroids

## 2017-04-06 NOTE — PROGRESS NOTE ADULT - SUBJECTIVE AND OBJECTIVE BOX
Patient is a 66y old  Female who presents with a chief complaint of weakness (02 Apr 2017 18:48)      HPI:  Pt is a 65 yo woman with hx liver cirrhosis, variceal esophageal bvanding, RA on prednisone, who was admitted recently 2-29 to 3-3 for cirrhosis and vomiting.   She reports weakness and inability to walk (legs feel like jelly). She has been incontinent of urine and stool for a few weeks as  she does not make it to the bathroom in time.  Pt fell off the couch his morning.  She also had two other falls, including 3/3/17 getting out of car to home in driveway, and  last evening when she slid off couch, struck a hard box with left chest and twisted her left ankle.  Pt's significant other reports she is  unable to support her own weight and is having trouble with her balance.  She denies cough, resp symptoms.  No fever/chills, n/v, cpain, SOB.  She has 1-3 loose stools/day.   Dr. Osorio discontinued her methotrexate, bystolic, and HCTZ on 3/10/17.            She  stopped drinking 5 days prior to last admission.  No head injury during any fall. (02 Apr 2017 18:48)    pt comf  feels sensitivity in hands  neg cp or sob  kyle diet  pos fatigue    low BP notes and endocrine note appreciated        PAST MEDICAL & SURGICAL HISTORY:  HTN (hypertension)  Rheumatoid arthritis  No significant past surgical history      MEDICATIONS  (STANDING):  multivitamin 1Tablet(s) Oral daily  aspirin 325milliGRAM(s) Oral daily  predniSONE   Tablet 5milliGRAM(s) Oral daily  propranolol 10milliGRAM(s) Oral three times a day  potassium chloride    Tablet ER 10milliEquivalent(s) Oral daily  pantoprazole    Tablet 40milliGRAM(s) Oral before breakfast  furosemide    Tablet 20milliGRAM(s) Oral daily  sodium chloride 0.9%. 1000milliLiter(s) IV Continuous <Continuous>  clopidogrel Tablet 75milliGRAM(s) Oral daily  atorvastatin 20milliGRAM(s) Oral at bedtime  magnesium oxide 400milliGRAM(s) Oral daily  spironolactone 25milliGRAM(s) Oral daily    MEDICATIONS  (PRN):      Allergies    sulfa drugs (Unknown)    Intolerances        SOCIAL HISTORY:unchanged    FAMILY HISTORY:  No pertinent family history in first degree relatives      REVIEW OF SYSTEMS:    CONSTITUTIONAL: No weakness, fevers or chills  EYES/ENT: No visual changes;  No vertigo or throat pain   NECK: No pain or stiffness  RESPIRATORY: No cough, wheezing, hemoptysis; No shortness of breath  CARDIOVASCULAR: No chest pain or palpitations  GENITOURINARY: No dysuria, frequency or hematuria  NEUROLOGICAL: No numbness or weakness  SKIN: No itching, burning, rashes, or lesions   All other review of systems is negative unless indicated above.    Vital Signs Last 24 Hrs  T(C): 36.2, Max: 36.2 (04-05 @ 22:50)  T(F): 97.2, Max: 97.2 (04-06 @ 05:35)  HR: 97 (35 - 125)  BP: 92/56 (82/48 - 106/79)  BP(mean): 65 (49 - 84)  RR: 14 (12 - 25)  SpO2: 93% (88% - 98%)    PHYSICAL EXAM:    Constitutional: NAD, well-developed  HEENT: EOMI, throat clear  Neck: No LAD, supple  Respiratory: CTA and P  Cardiovascular: S1 and S2, RRR, no M  Gastrointestinal: BS+, soft, NT/ND, neg HSM,  Extremities: pos peripheral edema, neg clubing, cyanosis  Vascular: 2+ peripheral pulses  Neurological: A/O x 3, no focal deficits  Psychiatric: Normal mood, normal affect  Skin: No rashes    LABS:  CBC Full  -  ( 06 Apr 2017 04:58 )  WBC Count : 11.7 K/uL  Hemoglobin : 10.9 g/dL  Hematocrit : 32.8 %  Platelet Count - Automated : 114 K/uL  Mean Cell Volume : 115.8 fl  Mean Cell Hemoglobin : 38.6 pg  Mean Cell Hemoglobin Concentration : 33.3 gm/dL  Auto Neutrophil # : x  Auto Lymphocyte # : x  Auto Monocyte # : x  Auto Eosinophil # : x  Auto Basophil # : x  Auto Neutrophil % : x  Auto Lymphocyte % : x  Auto Monocyte % : x  Auto Eosinophil % : x  Auto Basophil % : x    04-06    140  |  108  |  11  ----------------------------<  116<H>  3.8   |  23  |  0.75    Ca    8.0<L>      06 Apr 2017 04:58  Mg     2.0     04-05              RADIOLOGY & ADDITIONAL STUDIES:

## 2017-04-06 NOTE — PROGRESS NOTE ADULT - SUBJECTIVE AND OBJECTIVE BOX
Inpatient Consultants:   Cardiologist: Dr. Anderson ([facility] intervention)  GI: (Dr. Ceballos)    HPI:  Pt is a 65 yo woman with hx liver cirrhosis, variceal esophageal banding, RA on prednisone, recently admitted (2/29-3/3) who  for cirrhosis and vomiting. Pt. reports weakness and inability to walk over the past month. She has been incontinent of urine and stool for a few weeks, last evening she slid off the couch, struck a hard box with left chest and twisted her left ankle. Pt's significant other reports she is unable to support her own weight and is having trouble with her balance. Troponin 0.128/0.130/0.094 +NSTEMI- mildly elevated troponins in the setting of ongoing clinical issues without any symptoms suggestive of ACS.    2-D echo revealed apical wall motion abnormalities with a noted LVEF of 35-40%.  Patient underwent C on 4/3 with Dr. Anderson and found to have LAD occlusion s/p BMS.    4/4/17: No CP or SOB. Still with persistent Left leg weakness, no numbness. Able to lift leg up to gravity but needs assistance with walking. Understands will likely need ARSH.     4/5/17: Feels tired, did not participate much with PT yesterday. NO CP or SOB. Still with persistent hypotension.     4/6/17: Patient without dyspnea at rest. Still with difficulty ambulating. Discussed with Endo - will start small trial of inc pred for 3 days to see if BP improves.    ROS; negative unless stated above.     Vital Signs Last 24 Hrs  T(C): 36.2, Max: 36.2 (04-05 @ 22:50)  T(F): 97.2, Max: 97.2 (04-06 @ 05:35)  HR: 112 (86 - 112)  BP: 105/75 (82/48 - 106/79)  BP(mean): 82 (54 - 84)  RR: 22 (12 - 22)  SpO2: 94% (88% - 96%)    Constitutional: Middle age female Disheveled appearing in no acute distress.   HEENT: Atraumatic, STEPHANY, Normal, No congestion  Respiratory: Breath Sounds normal, no rhonchi/wheeze  Cardiovascular: N S1S2; SHERI present  Gastrointestinal: Abdomen soft, non tender, Bowel Sounds present, mildly distended.   Extremities: 1+ edema, peripheral pulses present. RUE with small thrombophlebitis, mildly tender.   Neurological: AAO x 3, no gross focal motor deficits  Skin: Non cellulitic, no rash, ulcers  Lymph Nodes: No lymphadenopathy noted  Back: No CVA tenderness   Musculoskeletal: non tender, 3/5 muscle strength LLE, 5/5 on Right LE.   Breasts: Deferred  Genitourinary: deferred  Rectal: Deferred     LABS:              All labs reviewed.                                  10.9   11.7  )-----------( 114      ( 06 Apr 2017 04:58 )             32.8   04-06    140  |  108  |  11  ----------------------------<  116<H>  3.8   |  23  |  0.75    Ca    8.0<L>      06 Apr 2017 04:58  Mg     2.0     04-05      CARDIAC MARKERS ( 03 Apr 2017 08:34 )  0.094 ng/mL / x     / x     / x     / x      CARDIAC MARKERS ( 03 Apr 2017 04:56 )  0.130 ng/mL / x     / x     / x     / x      CARDIAC MARKERS ( 03 Apr 2017 02:19 )  0.128 ng/mL / x     / x     / x     / x      CARDIAC MARKERS ( 02 Apr 2017 10:01 )  0.166 ng/mL / x     / 56 U/L / x           MEDICATIONS  (STANDING):  multivitamin 1Tablet(s) Oral daily  aspirin 325milliGRAM(s) Oral daily  predniSONE   Tablet 5milliGRAM(s) Oral daily  propranolol 10milliGRAM(s) Oral three times a day  potassium chloride    Tablet ER 10milliEquivalent(s) Oral daily  pantoprazole    Tablet 40milliGRAM(s) Oral before breakfast  furosemide    Tablet 20milliGRAM(s) Oral daily  spironolactone 50milliGRAM(s) Oral daily    Cath results noted.

## 2017-04-06 NOTE — ADVANCED PRACTICE NURSE CONSULT - RECOMMEDATIONS
1) Turn and position every 2 hours  2) Elevate heels off mattress  3) Apply moisture barrier to gluteal cleft after incontinent episodes

## 2017-04-06 NOTE — PROVIDER CONTACT NOTE (CHANGE IN STATUS NOTIFICATION) - SITUATION
pt resting in bed alert and oriented, BP 86/55, HR 96, RR 13, 96 % on RA, pt SBP in 80-90s trough out the night, pt asymptomatic, Lasix, spironolactone and propanolol held due to low BP.

## 2017-04-06 NOTE — PROGRESS NOTE ADULT - ASSESSMENT
NSTEMI- s/p left heart cath and PCI of proximal LAD.  Continue statin.  She will need ASA and plavix for one month and later ASA to be continued.  Her BP had been borderline and her HR in low 100/min, If her HR is perisistently high in the next few hrs she might need increase in her betablocker dose.    Cardiomyopathy with LVEF 35-40%- alcoholic vs ischemic in nature.  Per Dr Underwood pt needs propronalol for decreasing variceal pressure since she is high risk for bleeding.  Will continue it for now instead of switching to GDMT for HFrEF.  Continue aldactone and lasix.      HTN- continue meds as above.    Other medical issues- cirrhosis , oesophageal varices s/p banding.    Prolonged QTc - check EKG today am. continue po magnesium.    Thank you for allowing me to participate in the care of this patient. Please feel free to contact me with any questions. NSTEMI- s/p left heart cath and PCI of proximal LAD.  Continue statin.  Continue ASA 325mg po for one month and PLavix 75mg po daily for one month and then decrease ASA to 81mg po daily.   Pt can proceed with her banding in one month from cardiac standpoint.  Pt to f/genaroith me in 1-2 weeks from cardiac stand point in my office.  Her BP had been borderline and her HR in low 100/min, If her HR is persistently high in the next few hrs she might need increase in her betablocker dose.    Cardiomyopathy with LVEF 35-40%- alcoholic vs ischemic in nature.  Increase betablocker if the HR is persistently above 90/min.  Per Dr Underwood pt needs propronalol for decreasing variceal pressure since she is high risk for bleeding.  Will continue it for now instead of switching to GDMT for HFrEF.  Continue aldactone and lasix.      HTN- continue meds as above.    Other medical issues- cirrhosis , oesophageal varices s/p banding.    Prolonged QTc - check EKG today am. continue po magnesium.    Thank you for allowing me to participate in the care of this patient. Please feel free to contact me with any questions.

## 2017-04-06 NOTE — ADVANCED PRACTICE NURSE CONSULT - ASSESSMENT
This is a 66 year old female that was admitted to the hospital on 4/2/2017 for weakness and cirrhosis. PMH- hx liver cirrhosis, variceal esophageal banding, RA on prednisone.    Requested by RN staff to assess patient's inner buttocks. Patient presents on an [employer] 9000 MRS on her backside. Patient is able to independently in bed. Both heels elevated off mattress. Patient's gluteal cleft assessed to have a linear wound. Patient reports that she is incontinent of urine and stool. Patient reports that she wears briefs at home as well. Due to the etiology of wound, this is related to moisture and is intertrigo. Moisture barrier applied to area to help heal open area. Patient positioned to her right side with a pillow. Both heels remain elevated off mattress.

## 2017-04-06 NOTE — CONSULT NOTE ADULT - ASSESSMENT
65 yo female with h/o EtOH-induced cirrhosis with esophageal varices, RA (on chronic prednisone) a/w severe weakness with persistent hypotension asked to evaluate adrenal status.         --has been on prednisone 5 g daily for the past 8 years.  Would consider her relatively adrenally insufficient.  While on prednisone, there is no formal way to evaluate the need for increasing steroid doses as this is a clinical decision.  She is not in an adrenal crisis as BS and electrolytes have been ok for the most part.  Given the inability to regulate her BP, a trial of increased glucocorticoid dose would be justified.  Would increase prednisone from 5 to 15 mg daily for 1-3 days to see if BP responds.  Need to monitor for worsening edema if this is done (likely third spacing from chronic liver disease).  If BP does not increase on 3X GC dose, would return to previous dose. 65 yo female with h/o EtOH-induced cirrhosis with esophageal varices, RA (on chronic prednisone), CAD a/w severe weakness with persistent hypotension asked to evaluate adrenal status.         --has been on prednisone 5 g daily for the past 8 years.  Would consider her relatively adrenally insufficient.  While on prednisone, there is no formal way to evaluate the need for increasing steroid doses as this is a clinical decision.  She is not in an adrenal crisis as BS is normal, sodium has not been low and K+ has not been elevated.  Given the inability to regulate her BP, a trial of increased glucocorticoid dose would be justified.  Would increase prednisone from 5 to 15 mg daily for 1-3 days to see if BP responds.  Need to monitor for worsening edema if this is done (likely third spacing from chronic liver disease).  If BP does not increase on 3X GC dose, would return to previous dose.

## 2017-04-07 DIAGNOSIS — I25.10 ATHEROSCLEROTIC HEART DISEASE OF NATIVE CORONARY ARTERY WITHOUT ANGINA PECTORIS: ICD-10-CM

## 2017-04-07 DIAGNOSIS — K74.60 UNSPECIFIED CIRRHOSIS OF LIVER: ICD-10-CM

## 2017-04-07 DIAGNOSIS — R53.1 WEAKNESS: ICD-10-CM

## 2017-04-07 DIAGNOSIS — M06.9 RHEUMATOID ARTHRITIS, UNSPECIFIED: ICD-10-CM

## 2017-04-07 DIAGNOSIS — E27.40 UNSPECIFIED ADRENOCORTICAL INSUFFICIENCY: ICD-10-CM

## 2017-04-07 LAB
ANION GAP SERPL CALC-SCNC: 9 MMOL/L — SIGNIFICANT CHANGE UP (ref 5–17)
BUN SERPL-MCNC: 13 MG/DL — SIGNIFICANT CHANGE UP (ref 7–23)
CALCIUM SERPL-MCNC: 8 MG/DL — LOW (ref 8.5–10.1)
CHLORIDE SERPL-SCNC: 107 MMOL/L — SIGNIFICANT CHANGE UP (ref 96–108)
CO2 SERPL-SCNC: 23 MMOL/L — SIGNIFICANT CHANGE UP (ref 22–31)
CREAT SERPL-MCNC: 0.74 MG/DL — SIGNIFICANT CHANGE UP (ref 0.5–1.3)
GLUCOSE SERPL-MCNC: 146 MG/DL — HIGH (ref 70–99)
HCT VFR BLD CALC: 32.2 % — LOW (ref 34.5–45)
HGB BLD-MCNC: 10.3 G/DL — LOW (ref 11.5–15.5)
MCHC RBC-ENTMCNC: 32.1 GM/DL — SIGNIFICANT CHANGE UP (ref 32–36)
MCHC RBC-ENTMCNC: 37.8 PG — HIGH (ref 27–34)
MCV RBC AUTO: 118 FL — HIGH (ref 80–100)
PLATELET # BLD AUTO: 125 K/UL — LOW (ref 150–400)
POTASSIUM SERPL-MCNC: 4.3 MMOL/L — SIGNIFICANT CHANGE UP (ref 3.5–5.3)
POTASSIUM SERPL-SCNC: 4.3 MMOL/L — SIGNIFICANT CHANGE UP (ref 3.5–5.3)
RBC # BLD: 2.73 M/UL — LOW (ref 3.8–5.2)
RBC # FLD: 16 % — HIGH (ref 10.3–14.5)
SODIUM SERPL-SCNC: 139 MMOL/L — SIGNIFICANT CHANGE UP (ref 135–145)
URATE SERPL-MCNC: 7.8 MG/DL — HIGH (ref 2.5–7)
WBC # BLD: 10.4 K/UL — SIGNIFICANT CHANGE UP (ref 3.8–10.5)
WBC # FLD AUTO: 10.4 K/UL — SIGNIFICANT CHANGE UP (ref 3.8–10.5)

## 2017-04-07 PROCEDURE — 93970 EXTREMITY STUDY: CPT | Mod: 26

## 2017-04-07 RX ADMIN — Medication 325 MILLIGRAM(S): at 13:27

## 2017-04-07 RX ADMIN — MAGNESIUM OXIDE 400 MG ORAL TABLET 400 MILLIGRAM(S): 241.3 TABLET ORAL at 13:27

## 2017-04-07 RX ADMIN — Medication 10 MILLIEQUIVALENT(S): at 13:29

## 2017-04-07 RX ADMIN — PANTOPRAZOLE SODIUM 40 MILLIGRAM(S): 20 TABLET, DELAYED RELEASE ORAL at 05:21

## 2017-04-07 RX ADMIN — Medication 20 MILLIGRAM(S): at 05:20

## 2017-04-07 RX ADMIN — Medication 1 TABLET(S): at 13:30

## 2017-04-07 RX ADMIN — Medication 10 MILLIGRAM(S): at 05:21

## 2017-04-07 RX ADMIN — Medication 15 MILLIGRAM(S): at 05:19

## 2017-04-07 RX ADMIN — CLOPIDOGREL BISULFATE 75 MILLIGRAM(S): 75 TABLET, FILM COATED ORAL at 13:32

## 2017-04-07 RX ADMIN — SPIRONOLACTONE 25 MILLIGRAM(S): 25 TABLET, FILM COATED ORAL at 05:19

## 2017-04-07 RX ADMIN — ATORVASTATIN CALCIUM 20 MILLIGRAM(S): 80 TABLET, FILM COATED ORAL at 21:19

## 2017-04-07 NOTE — PROGRESS NOTE ADULT - ASSESSMENT
Diarrhea and has improved  had empiric tx for c diff and she could not do stool tests  neg diarrhea      Hx of chronic diarrhea that has also improved and will follow      hypotension  RO adrenal insuff, on chronic steroids  endocrine note appreciated and pred increased, DC propranolol and aldactone (she is been missing doses often)          pos troponins  [facility] cardioogy, pt had bare metal stent and they are considering duration of double anti plt with inc risk bleed  [facility] cardiology and they recommend plavix for at least one month and will possibly stop it after that  [facility] pt again       Cirrhosis due to ETOH and has stopped    ascites and LLE edema      likely adrenal insuff with years of steroids and now on inc dose steroids,  if BP does not respond in few days, would dec to 5 mg a day again and consider midodrine

## 2017-04-07 NOTE — PROGRESS NOTE ADULT - ASSESSMENT
Pt is a 65 yo woman with hx liver cirrhosis, variceal esophageal bvanding, RA on prednisone, who was admitted recently 2-29 to 3-3 for cirrhosis and vomiting.   She reports weakness and inability to walk (legs feel like jelly). She has been incontinent of urine and stool for a few weeks as  she does not make it to the bathroom in time.  Pt fell off the couch his morning.  She also had two other falls, including 3/3/17 getting out of car to home in driveway, and  last evening when she slid off couch, struck a hard box with left chest and twisted her left ankle.  Pt's significant other reports she is  unable to support her own weight and is having trouble with her balance.  She denies cough, resp symptoms.  No fever/chills, n/v, cpain, SOB.  She has 1-3 loose stools/day.   Dr. Osorio discontinued her methotrexate, bystolic, and HCTZ on 3/10/17.            She  stopped drinking 5 days prior to last admission.  No head injury during any fall. (02 Apr 2017 18:48)

## 2017-04-07 NOTE — PROGRESS NOTE ADULT - SUBJECTIVE AND OBJECTIVE BOX
Patient is a 66y old  Female who presents with a chief complaint of weakness (02 Apr 2017 18:48)      HPI:  Pt is a 67 yo woman with hx liver cirrhosis, variceal esophageal bvanding, RA on prednisone, who was admitted recently 2-29 to 3-3 for cirrhosis and vomiting.   She reports weakness and inability to walk (legs feel like jelly). She has been incontinent of urine and stool for a few weeks as  she does not make it to the bathroom in time.  Pt fell off the couch his morning.  She also had two other falls, including 3/3/17 getting out of car to home in driveway, and  last evening when she slid off couch, struck a hard box with left chest and twisted her left ankle.  Pt's significant other reports she is  unable to support her own weight and is having trouble with her balance.  She denies cough, resp symptoms.  No fever/chills, n/v, cpain, SOB.  She has 1-3 loose stools/day.   Dr. Osorio discontinued her methotrexate, bystolic, and HCTZ on 3/10/17.            She  stopped drinking 5 days prior to last admission.  No head injury during any fall. (02 Apr 2017 18:48)    pt comf  CO fatigue and LE edema, unchanged  neg N V  kyle po  on inc dose steroids  no evidence of bleeding      PAST MEDICAL & SURGICAL HISTORY:  HTN (hypertension)  Rheumatoid arthritis  No significant past surgical history      MEDICATIONS  (STANDING):  multivitamin 1Tablet(s) Oral daily  aspirin 325milliGRAM(s) Oral daily  potassium chloride    Tablet ER 10milliEquivalent(s) Oral daily  pantoprazole    Tablet 40milliGRAM(s) Oral before breakfast  furosemide    Tablet 20milliGRAM(s) Oral daily  clopidogrel Tablet 75milliGRAM(s) Oral daily  atorvastatin 20milliGRAM(s) Oral at bedtime  magnesium oxide 400milliGRAM(s) Oral daily  predniSONE   Tablet 15milliGRAM(s) Oral daily    MEDICATIONS  (PRN):      Allergies    sulfa drugs (Unknown)    Intolerances        SOCIAL HISTORY:unchanged    FAMILY HISTORY:  No pertinent family history in first degree relatives      REVIEW OF SYSTEMS:    CONSTITUTIONAL: No weakness, fevers or chills  EYES/ENT: No visual changes;  No vertigo or throat pain   NECK: No pain or stiffness  RESPIRATORY: No cough, wheezing, hemoptysis; No shortness of breath  CARDIOVASCULAR: No chest pain or palpitations  GENITOURINARY: No dysuria, frequency or hematuria  NEUROLOGICAL: No numbness or weakness  SKIN: No itching, burning, rashes, or lesions   All other review of systems is negative unless indicated above.    Vital Signs Last 24 Hrs  T(C): 36.2, Max: 36.7 (04-06 @ 16:00)  T(F): 97.1, Max: 98.1 (04-06 @ 16:00)  HR: 81 (77 - 112)  BP: 86/59 (83/55 - 105/75)  BP(mean): 65 (61 - 82)  RR: 13 (9 - 22)  SpO2: 95% (94% - 95%)    PHYSICAL EXAM:    Constitutional: NAD, well-developed  HEENT: EOMI, throat clear  Neck: No LAD, supple  Respiratory: CTA and P  Cardiovascular: S1 and S2, RRR, no M  Gastrointestinal: BS+, soft, NT/ND, neg HSM,pos ascites  Extremities: pos peripheral edema, neg clubing, cyanosis  Vascular: 2+ peripheral pulses  Neurological: A/O x 3, no focal deficits  Psychiatric: Normal mood, normal affect  Skin: No rashes    LABS:  CBC Full  -  ( 07 Apr 2017 05:38 )  WBC Count : 10.4 K/uL  Hemoglobin : 10.3 g/dL  Hematocrit : 32.2 %  Platelet Count - Automated : 125 K/uL  Mean Cell Volume : 118.0 fl  Mean Cell Hemoglobin : 37.8 pg  Mean Cell Hemoglobin Concentration : 32.1 gm/dL  Auto Neutrophil # : x  Auto Lymphocyte # : x  Auto Monocyte # : x  Auto Eosinophil # : x  Auto Basophil # : x  Auto Neutrophil % : x  Auto Lymphocyte % : x  Auto Monocyte % : x  Auto Eosinophil % : x  Auto Basophil % : x    04-06    140  |  108  |  11  ----------------------------<  116<H>  3.8   |  23  |  0.75    Ca    8.0<L>      06 Apr 2017 04:58              RADIOLOGY & ADDITIONAL STUDIES:EXAM:  CT ABDOMEN AND PELVIS IC                            PROCEDURE DATE:  04/02/2017        INTERPRETATION:      Three examinations were performed on this patient:   1. CT scan of the chest with intravenous contrast  2. CT scan of the abdomen with intravenous contrast  3. CT scan of the pelvis with intravenous contrast        CLINICAL INFORMATION (all 3 exams): Trauma, chest pain, abdominal pain    TECHNIQUE:  Contiguous axial 3 mm sections were obtained through the   chest, abdomen and pelvis using single helical acquisition.  Images were   acquired during the rapid bolus administration of 95 cc of Omnipaque 350/   5 cc discarded.  Imaging post processing software was employed to   generate reformatted images in 3 mm sagittal and coronalimaging planes.         Oral contrast was administered.   This scan was performed using   automatic exposure control (radiation dose reduction software) to obtain   a diagnostic image quality scan with patient dose as low as reasonably   achievable.       FINDINGS:   No previous examinations are available for review.    The thyroid gland appears intact.    The lungs are significant for a small LEFT pleural effusion with   underlying atelectasis.  Lung volumes are preserved. The trachea and   major bronchi are patent.    No enlarged axillary, hilar or mediastinal lymph nodes are found.   The   heart size is normal. The chest wall and thoracic spine are unremarkable.    There is moderate ascites.    The liver demonstrates homogeneous attenuation without focal lesion or   abnormal enhancement.  Hepatic size is normal but contours are nodular.   Hepatic and portal veins are patent and not displaced.  No intrahepatic   or common ductal dilatation is recognized.  The gallbladder is   significant for minimal cholelithiasis.  The pancreas is intact without   ductal dilatation or focal lesion.  The spleen is normal in size.    The adrenal glands are intact.  The kidneys demonstrate symmetric   nephrograms.  There is a 1 cm RIGHT renal cysts.No suspicious renal mass   is found.  No renal calculus, hydronephrosis or perinephric infiltration   is found.  The ureters are not dilated.   The bladder appears   unremarkable.        No enlarged lymph nodes are found.  No ascites is present.   The osseous   structures show degenerative changes of the spine.          The bowel demonstrates scattered diverticulosis.  No obstruction,   perforation or abscess is recognized.           IMPRESSION:          1.   Chest:  small LEFT pleural effusion with underlying atelectasis          2.   Abdomen and pelvis: Moderate ascites.   Scattered   diverticulosis. Nodular liver suggesting cirrhosis, clinical correlation   needed. Cholelithiasis. 1 cm RIGHT renal cyst.              SAVITA HARRELL M.D., ATTENDING RADIOLOGIST  This document has been electronically signed. Apr 2 2017  5:11PM

## 2017-04-07 NOTE — PROGRESS NOTE ADULT - SUBJECTIVE AND OBJECTIVE BOX
TD  04/07:  IS.  no pain in legs, however, reported poor participation w/ PT.  desires to go to [facility], but disappointed she's been refused due to questionable functionality.    Allergies    sulfa drugs (Unknown)    Intolerances      MEDICATIONS  (STANDING):  multivitamin 1Tablet(s) Oral daily  aspirin 325milliGRAM(s) Oral daily  potassium chloride    Tablet ER 10milliEquivalent(s) Oral daily  pantoprazole    Tablet 40milliGRAM(s) Oral before breakfast  furosemide    Tablet 20milliGRAM(s) Oral daily  clopidogrel Tablet 75milliGRAM(s) Oral daily  atorvastatin 20milliGRAM(s) Oral at bedtime  magnesium oxide 400milliGRAM(s) Oral daily  predniSONE   Tablet 15milliGRAM(s) Oral daily    MEDICATIONS  (PRN):    Vital Signs Last 24 Hrs  T(C): 36.2, Max: 36.7 (04-06 @ 16:00)  T(F): 97.1, Max: 98.1 (04-06 @ 16:00)  HR: 80 (77 - 112)  BP: 93/54 (83/55 - 105/75)  BP(mean): 64 (61 - 82)  RR: 15 (9 - 22)  SpO2: 95% (94% - 95%)    chronically ill appearing WF in no acute distress.  NCAT.  sclera icteric, mild.  neck supple.  JVP nml.  lungs CTA.  no WRR.  heart RRR.  NS1S2.  no MRG.  distended.  soft.  NT.  no mass.  no guarding.  no rebound.  b/l LE (+) 1-2 pitting edema, L>R.    A&Ox3.                        10.3   10.4  )-----------( 125      ( 07 Apr 2017 05:38 )             32.2       04-07    139  |  107  |  13  ----------------------------<  146<H>  4.3   |  23  |  0.74    Ca    8.0<L>      07 Apr 2017 07:30

## 2017-04-08 LAB
ANION GAP SERPL CALC-SCNC: 11 MMOL/L — SIGNIFICANT CHANGE UP (ref 5–17)
BUN SERPL-MCNC: 14 MG/DL — SIGNIFICANT CHANGE UP (ref 7–23)
CALCIUM SERPL-MCNC: 8.3 MG/DL — LOW (ref 8.5–10.1)
CHLORIDE SERPL-SCNC: 107 MMOL/L — SIGNIFICANT CHANGE UP (ref 96–108)
CK SERPL-CCNC: 18 U/L — LOW (ref 26–192)
CO2 SERPL-SCNC: 22 MMOL/L — SIGNIFICANT CHANGE UP (ref 22–31)
CREAT SERPL-MCNC: 0.71 MG/DL — SIGNIFICANT CHANGE UP (ref 0.5–1.3)
GLUCOSE SERPL-MCNC: 105 MG/DL — HIGH (ref 70–99)
HCT VFR BLD CALC: 31.8 % — LOW (ref 34.5–45)
HGB BLD-MCNC: 10.1 G/DL — LOW (ref 11.5–15.5)
MCHC RBC-ENTMCNC: 31.8 GM/DL — LOW (ref 32–36)
MCHC RBC-ENTMCNC: 37.4 PG — HIGH (ref 27–34)
MCV RBC AUTO: 117.7 FL — HIGH (ref 80–100)
PLATELET # BLD AUTO: 119 K/UL — LOW (ref 150–400)
POTASSIUM SERPL-MCNC: 4 MMOL/L — SIGNIFICANT CHANGE UP (ref 3.5–5.3)
POTASSIUM SERPL-SCNC: 4 MMOL/L — SIGNIFICANT CHANGE UP (ref 3.5–5.3)
RBC # BLD: 2.7 M/UL — LOW (ref 3.8–5.2)
RBC # FLD: 15.8 % — HIGH (ref 10.3–14.5)
SODIUM SERPL-SCNC: 140 MMOL/L — SIGNIFICANT CHANGE UP (ref 135–145)
WBC # BLD: 10.5 K/UL — SIGNIFICANT CHANGE UP (ref 3.8–10.5)
WBC # FLD AUTO: 10.5 K/UL — SIGNIFICANT CHANGE UP (ref 3.8–10.5)

## 2017-04-08 PROCEDURE — 99222 1ST HOSP IP/OBS MODERATE 55: CPT

## 2017-04-08 RX ORDER — FUROSEMIDE 40 MG
20 TABLET ORAL DAILY
Qty: 0 | Refills: 0 | Status: DISCONTINUED | OUTPATIENT
Start: 2017-04-08 | End: 2017-04-18

## 2017-04-08 RX ORDER — MAGNESIUM OXIDE 400 MG ORAL TABLET 241.3 MG
800 TABLET ORAL AT BEDTIME
Qty: 0 | Refills: 0 | Status: DISCONTINUED | OUTPATIENT
Start: 2017-04-08 | End: 2017-04-18

## 2017-04-08 RX ADMIN — Medication 10 MILLIEQUIVALENT(S): at 11:14

## 2017-04-08 RX ADMIN — ATORVASTATIN CALCIUM 20 MILLIGRAM(S): 80 TABLET, FILM COATED ORAL at 21:57

## 2017-04-08 RX ADMIN — Medication 325 MILLIGRAM(S): at 11:14

## 2017-04-08 RX ADMIN — Medication 15 MILLIGRAM(S): at 08:37

## 2017-04-08 RX ADMIN — Medication 1 TABLET(S): at 11:14

## 2017-04-08 RX ADMIN — CLOPIDOGREL BISULFATE 75 MILLIGRAM(S): 75 TABLET, FILM COATED ORAL at 11:14

## 2017-04-08 RX ADMIN — PANTOPRAZOLE SODIUM 40 MILLIGRAM(S): 20 TABLET, DELAYED RELEASE ORAL at 08:36

## 2017-04-08 RX ADMIN — MAGNESIUM OXIDE 400 MG ORAL TABLET 800 MILLIGRAM(S): 241.3 TABLET ORAL at 21:57

## 2017-04-08 RX ADMIN — MAGNESIUM OXIDE 400 MG ORAL TABLET 400 MILLIGRAM(S): 241.3 TABLET ORAL at 11:14

## 2017-04-08 NOTE — CONSULT NOTE ADULT - SUBJECTIVE AND OBJECTIVE BOX
PCP:    REQUESTING PHYSICIAN: Dr. D'Amico    REASON FOR CONSULT: RA management    CHIEF COMPLAINT: weakness    HPI:  Pt is a 67 yo woman with hx liver cirrhosis, variceal esophageal baanding, RA on prednisone, who was admitted recently 2-29 to 3-3 for cirrhosis and vomiting.   She reports weakness and inability to walk. . She has been incontinent of urine and stool for a few weeks as  she does not make it to the bathroom in time.  Pt fell off the couch on the day of admission.  She also had two other falls, including 3/3/17 getting out of car to home in driveway, and the night prior to admission, when she was toileting herself.   Pt was unable to support her own weight and is having trouble with her balance.      Patient was diagnosed with RA in 2009, by Dr. Montes.  She has been on MTX and prednisone 5mg daily since 2009.  She denies using higher doses of prednisone.  Her MTX was discontinued on 3/10/17, and prednisone was increased to 15mg daily.  She reports her RA is currently quiet.  She denies joint pain, joint swelling, or significant morning stiffness.          REVIEW OF SYSTEMS:    CONSTITUTIONAL: weakness,no  fevers or chills  EYES/ENT: No visual changes;  No vertigo or throat pain   NECK: No pain or stiffness  RESPIRATORY: No cough, wheezing, hemoptysis; No shortness of breath  CARDIOVASCULAR: No chest pain or palpitations  GASTROINTESTINAL: No abdominal or epigastric pain. No nausea, vomiting, or hematemesis; No diarrhea or constipation. No melena or hematochezia.  GENITOURINARY: No dysuria, frequency or hematuria  NEUROLOGICAL: weakness in both legs  SKIN: No itching, burning, rashes, or lesions   All other review of systems is negative unless indicated above    Vital Signs Last 24 Hrs  T(C): 36.6, Max: 36.6 (04-07 @ 18:48)  T(F): 97.8, Max: 97.8 (04-07 @ 18:48)  HR: 87 (87 - 102)  BP: 92/58 (91/48 - 92/68)  BP(mean): --  RR: 18 (16 - 18)  SpO2: 96% (95% - 97%)    I&O's Summary    I & Os for current day (as of 08 Apr 2017 16:44)  =============================================  IN: 250 ml / OUT: 0 ml / NET: 250 ml      CAPILLARY BLOOD GLUCOSE      PHYSICAL EXAM:    HEENT- no oral lesions noted, no lymphadenoapthy noted  Heart- S1 S2 reg  Lungs- CTA b/l  Abd- Soft NT  Ext- +swelling LLE  Skin- no rashes  Musculoskelatal- No active synovitis or TTP appreciated in hands, wrists, elbows.  She has some mild TTP of left knee with minimal swelling.  Flexion/ext of the knee does not provoke significant pain.    Neurological- intact strength upper extremities.  Weakness in b/L legs, left >right, proximally and distally on left side.         MEDICATIONS:  MEDICATIONS  (STANDING):  multivitamin 1Tablet(s) Oral daily  aspirin 325milliGRAM(s) Oral daily  potassium chloride    Tablet ER 10milliEquivalent(s) Oral daily  pantoprazole    Tablet 40milliGRAM(s) Oral before breakfast  furosemide    Tablet 20milliGRAM(s) Oral daily  clopidogrel Tablet 75milliGRAM(s) Oral daily  atorvastatin 20milliGRAM(s) Oral at bedtime  magnesium oxide 400milliGRAM(s) Oral daily  predniSONE   Tablet 15milliGRAM(s) Oral daily      LABS: All Labs Reviewed:                        10.1   10.5  )-----------( 119      ( 08 Apr 2017 07:32 )             31.8     04-08    140  |  107  |  14  ----------------------------<  105<H>  4.0   |  22  |  0.71    Ca    8.3<L>      08 Apr 2017 07:32            Blood Culture:     RADIOLOGY/EKG:    A/P

## 2017-04-08 NOTE — PROGRESS NOTE ADULT - SUBJECTIVE AND OBJECTIVE BOX
HPI:  Pt is a 67 yo woman with hx liver cirrhosis, variceal esophageal bvanding, RA on prednisone, who was admitted recently 2-29 to 3-3 for cirrhosis and vomiting.   She reports weakness and inability to walk, multiple falls. No abdominal pain, distention. Tolerating PO. No fever. No diarrhea.     MEDICATIONS  (STANDING):  multivitamin 1Tablet(s) Oral daily  aspirin 325milliGRAM(s) Oral daily  potassium chloride    Tablet ER 10milliEquivalent(s) Oral daily  pantoprazole    Tablet 40milliGRAM(s) Oral before breakfast  furosemide    Tablet 20milliGRAM(s) Oral daily  clopidogrel Tablet 75milliGRAM(s) Oral daily  atorvastatin 20milliGRAM(s) Oral at bedtime  magnesium oxide 400milliGRAM(s) Oral daily  predniSONE   Tablet 15milliGRAM(s) Oral daily    MEDICATIONS  (PRN):      Allergies    sulfa drugs (Unknown)    Intolerances        REVIEW OF SYSTEMS    General: no fever    HEENT: no icterus    Respiratory and Thorax: no SOB  	  Cardiovascular: no CP    Gastrointestinal: as above    Skin: no jaundice      Vital Signs Last 24 Hrs  T(C): 36.3, Max: 36.6 (04-07 @ 18:48)  T(F): 97.4, Max: 97.8 (04-07 @ 18:48)  HR: 91 (78 - 102)  BP: 91/61 (84/55 - 98/56)  BP(mean): --  RR: 17 (13 - 25)  SpO2: 97% (94% - 97%)    PHYSICAL EXAM:    Constitutional: NAD    HEENT: anicteric    Respiratory: CTA BL    Cardiovascular:  RRR    Gastrointestinal: soft distended +BS NTTP    Extremities: LE edema    Neuro: no focal deficits    Skin: no jaundice      LABS:                        10.1   10.5  )-----------( 119      ( 08 Apr 2017 07:32 )             31.8     04-08    140  |  107  |  14  ----------------------------<  105<H>  4.0   |  22  |  0.71    Ca    8.3<L>      08 Apr 2017 07:32            RADIOLOGY & ADDITIONAL STUDIES:

## 2017-04-08 NOTE — CONSULT NOTE ADULT - ASSESSMENT
Pt is a 67 yo woman with RA since 2009, with hx liver cirrhosis, variceal esophageal banding, admitted for b/L LE weakness.  Weakness is unlikely to be due to steroid induced myopathy at hx of 5mg of prednisone, and she has both proximal and distal weakness in the lower extremities L>R.  Currently her RA does not seem to be active.  She does have mild pain and swelling in her left knee, which may be related to trauma from the past falls, but she does not feel this is that bothersome for her, and does not specifically want pain medication for it.     Plan:  Check ESR, CRP  Can apply topical Voltaren gel to left knee if needed  If prednisone is not needed for possible adrenal insufficiency, would return to her baseline dose of 5mg, as her joints are not very active. No urgency to start DMARD at this point.  Will obtain records from Dr. Montes.  MRI lumbar spine   Continue PT

## 2017-04-08 NOTE — PROGRESS NOTE ADULT - SUBJECTIVE AND OBJECTIVE BOX
TD  04/08:  5S.  concern regarding receiving too much diuretics.    Allergies    sulfa drugs (Unknown)    Intolerances      MEDICATIONS  (STANDING):  multivitamin 1Tablet(s) Oral daily  aspirin 325milliGRAM(s) Oral daily  potassium chloride    Tablet ER 10milliEquivalent(s) Oral daily  pantoprazole    Tablet 40milliGRAM(s) Oral before breakfast  clopidogrel Tablet 75milliGRAM(s) Oral daily  atorvastatin 20milliGRAM(s) Oral at bedtime  predniSONE   Tablet 15milliGRAM(s) Oral daily  magnesium oxide 800milliGRAM(s) Oral at bedtime  furosemide    Tablet 20milliGRAM(s) Oral daily    MEDICATIONS  (PRN):    Vital Signs Last 24 Hrs  T(C): 36.6, Max: 36.6 (04-07 @ 18:48)  T(F): 97.8, Max: 97.8 (04-07 @ 18:48)  HR: 87 (87 - 102)  BP: 92/58 (91/48 - 92/68)  BP(mean): --  RR: 18 (16 - 18)  SpO2: 96% (95% - 97%)    chronically ill appearing WF in no acute distress.  NCAT.  sclera icteric, mild.  neck supple.  JVP nml.  lungs CTA.  no WRR.  heart RRR.  NS1S2.  no MRG.  distended.  soft.  NT.  no mass.  no guarding.  no rebound.  b/l LE (+) 1-2 pitting edema, L>R.    A&Ox3.                        10.1   10.5  )-----------( 119      ( 08 Apr 2017 07:32 )             31.8       04-08    140  |  107  |  14  ----------------------------<  105<H>  4.0   |  22  |  0.71    Ca    8.3<L>      08 Apr 2017 07:32

## 2017-04-08 NOTE — PROGRESS NOTE ADULT - ASSESSMENT
Pt is a 67 yo woman with hx liver cirrhosis, variceal esophageal bvanding, RA on prednisone, who was admitted recently 2-29 to 3-3 for cirrhosis and vomiting.   She reports weakness and inability to walk (legs feel like jelly). She has been incontinent of urine and stool for a few weeks as  she does not make it to the bathroom in time.  Pt fell off the couch his morning.  She also had two other falls, including 3/3/17 getting out of car to home in driveway, and  last evening when she slid off couch, struck a hard box with left chest and twisted her left ankle.  Pt's significant other reports she is  unable to support her own weight and is having trouble with her balance.  She denies cough, resp symptoms.  No fever/chills, n/v, cpain, SOB.  She has 1-3 loose stools/day.   Dr. Osorio discontinued her methotrexate, bystolic, and HCTZ on 3/10/17.            She  stopped drinking 5 days prior to last admission.  No head injury during any fall. (02 Apr 2017 18:48)

## 2017-04-08 NOTE — PROGRESS NOTE ADULT - ASSESSMENT
67 yo woman with cirrhosis admitted with weakness. Diarrhea resolved.    - hypotension, RO adrenal insuff, on chronic steroids  - no signs of gi bleeding  - diet as tolerated

## 2017-04-09 DIAGNOSIS — D53.9 NUTRITIONAL ANEMIA, UNSPECIFIED: ICD-10-CM

## 2017-04-09 LAB
ANION GAP SERPL CALC-SCNC: 9 MMOL/L — SIGNIFICANT CHANGE UP (ref 5–17)
BUN SERPL-MCNC: 15 MG/DL — SIGNIFICANT CHANGE UP (ref 7–23)
CALCIUM SERPL-MCNC: 8.4 MG/DL — LOW (ref 8.5–10.1)
CHLORIDE SERPL-SCNC: 107 MMOL/L — SIGNIFICANT CHANGE UP (ref 96–108)
CK SERPL-CCNC: 22 U/L — LOW (ref 26–192)
CO2 SERPL-SCNC: 23 MMOL/L — SIGNIFICANT CHANGE UP (ref 22–31)
CREAT SERPL-MCNC: 0.67 MG/DL — SIGNIFICANT CHANGE UP (ref 0.5–1.3)
CRP SERPL-MCNC: 2.12 MG/DL — HIGH (ref 0–0.4)
ERYTHROCYTE [SEDIMENTATION RATE] IN BLOOD: 108 MM/HR — HIGH (ref 0–20)
GLUCOSE SERPL-MCNC: 99 MG/DL — SIGNIFICANT CHANGE UP (ref 70–99)
HCT VFR BLD CALC: 30.8 % — LOW (ref 34.5–45)
HGB BLD-MCNC: 10 G/DL — LOW (ref 11.5–15.5)
MCHC RBC-ENTMCNC: 32.6 GM/DL — SIGNIFICANT CHANGE UP (ref 32–36)
MCHC RBC-ENTMCNC: 37.9 PG — HIGH (ref 27–34)
MCV RBC AUTO: 116.2 FL — HIGH (ref 80–100)
PLATELET # BLD AUTO: 133 K/UL — LOW (ref 150–400)
POTASSIUM SERPL-MCNC: 3.7 MMOL/L — SIGNIFICANT CHANGE UP (ref 3.5–5.3)
POTASSIUM SERPL-SCNC: 3.7 MMOL/L — SIGNIFICANT CHANGE UP (ref 3.5–5.3)
RBC # BLD: 2.66 M/UL — LOW (ref 3.8–5.2)
RBC # FLD: 16 % — HIGH (ref 10.3–14.5)
SODIUM SERPL-SCNC: 139 MMOL/L — SIGNIFICANT CHANGE UP (ref 135–145)
WBC # BLD: 10.5 K/UL — SIGNIFICANT CHANGE UP (ref 3.8–10.5)
WBC # FLD AUTO: 10.5 K/UL — SIGNIFICANT CHANGE UP (ref 3.8–10.5)

## 2017-04-09 PROCEDURE — 99232 SBSQ HOSP IP/OBS MODERATE 35: CPT

## 2017-04-09 PROCEDURE — 72148 MRI LUMBAR SPINE W/O DYE: CPT | Mod: 26

## 2017-04-09 RX ORDER — GABAPENTIN 400 MG/1
100 CAPSULE ORAL THREE TIMES A DAY
Qty: 0 | Refills: 0 | Status: DISCONTINUED | OUTPATIENT
Start: 2017-04-09 | End: 2017-04-13

## 2017-04-09 RX ORDER — FOLIC ACID 0.8 MG
1 TABLET ORAL DAILY
Qty: 0 | Refills: 0 | Status: DISCONTINUED | OUTPATIENT
Start: 2017-04-09 | End: 2017-04-18

## 2017-04-09 RX ORDER — THIAMINE MONONITRATE (VIT B1) 100 MG
100 TABLET ORAL DAILY
Qty: 0 | Refills: 0 | Status: DISCONTINUED | OUTPATIENT
Start: 2017-04-09 | End: 2017-04-18

## 2017-04-09 RX ADMIN — ATORVASTATIN CALCIUM 20 MILLIGRAM(S): 80 TABLET, FILM COATED ORAL at 21:21

## 2017-04-09 RX ADMIN — Medication 10 MILLIEQUIVALENT(S): at 11:14

## 2017-04-09 RX ADMIN — Medication 325 MILLIGRAM(S): at 11:14

## 2017-04-09 RX ADMIN — PANTOPRAZOLE SODIUM 40 MILLIGRAM(S): 20 TABLET, DELAYED RELEASE ORAL at 09:01

## 2017-04-09 RX ADMIN — Medication 15 MILLIGRAM(S): at 05:15

## 2017-04-09 RX ADMIN — Medication 100 MILLIGRAM(S): at 18:01

## 2017-04-09 RX ADMIN — CLOPIDOGREL BISULFATE 75 MILLIGRAM(S): 75 TABLET, FILM COATED ORAL at 11:14

## 2017-04-09 RX ADMIN — Medication 10 MILLIGRAM(S): at 18:02

## 2017-04-09 RX ADMIN — MAGNESIUM OXIDE 400 MG ORAL TABLET 800 MILLIGRAM(S): 241.3 TABLET ORAL at 21:22

## 2017-04-09 RX ADMIN — Medication 1 TABLET(S): at 11:14

## 2017-04-09 RX ADMIN — GABAPENTIN 100 MILLIGRAM(S): 400 CAPSULE ORAL at 21:22

## 2017-04-09 RX ADMIN — Medication 1 MILLIGRAM(S): at 18:01

## 2017-04-09 NOTE — PROGRESS NOTE ADULT - SUBJECTIVE AND OBJECTIVE BOX
TD  04/09:  5S.  remarks that LE weakness is about the same.      Allergies    sulfa drugs (Unknown)    Intolerances      MEDICATIONS  (STANDING):  multivitamin 1Tablet(s) Oral daily  aspirin 325milliGRAM(s) Oral daily  potassium chloride    Tablet ER 10milliEquivalent(s) Oral daily  pantoprazole    Tablet 40milliGRAM(s) Oral before breakfast  clopidogrel Tablet 75milliGRAM(s) Oral daily  atorvastatin 20milliGRAM(s) Oral at bedtime  magnesium oxide 800milliGRAM(s) Oral at bedtime  furosemide    Tablet 20milliGRAM(s) Oral daily  folic acid 1milliGRAM(s) Oral daily  thiamine 100milliGRAM(s) Oral daily  predniSONE   Tablet 10milliGRAM(s) Oral once    MEDICATIONS  (PRN):    Vital Signs Last 24 Hrs  T(C): 36.3, Max: 36.6 (04-08 @ 21:10)  T(F): 97.4, Max: 97.9 (04-08 @ 21:10)  HR: 109 (91 - 109)  BP: 101/63 (92/49 - 101/63)  BP(mean): --  RR: 18 (18 - 18)  SpO2: 96% (96% - 96%)    chronically ill appearing WF in no acute distress.  NCAT.  sclera icteric, mild.  neck supple.  JVP nml.  lungs CTA.  no WRR.  heart RRR.  NS1S2.  no MRG.  distended.  soft.  NT.  no mass.  no guarding.  no rebound.  b/l LE (+) 1-2 pitting edema, L>R.    A&Ox3.                        10.0   10.5  )-----------( 133      ( 09 Apr 2017 05:56 )             30.8       04-09    139  |  107  |  15  ----------------------------<  99  3.7   |  23  |  0.67    Ca    8.4<L>      09 Apr 2017 05:56      CARDIAC MARKERS ( 09 Apr 2017 05:56 )  x     / x     / 22 U/L / x     / x      CARDIAC MARKERS ( 08 Apr 2017 20:16 )  x     / x     / 18 U/L / x     / x

## 2017-04-09 NOTE — PROGRESS NOTE ADULT - ASSESSMENT
Diarrhea and has improved  had empiric tx for c diff and she could not do stool tests  neg diarrhea todaya nd will follow      Hx of chronic diarrhea that has also improved and will follow      hypotension  RO adrenal insuff, on chronic steroids  endocrine note appreciated and pred increased, DCed propranolol and aldactone (she is been missing doses often)          pos troponins  [facility] cardioogy, pt had bare metal stent and they are considering duration of double anti plt with inc risk bleed  [facility] cardiology and they recommend plavix for at least one month and will possibly stop it after that  DW pt again       Cirrhosis due to ETOH and has stopped    ascites and LLE edema      likely adrenal insuff with years of steroids and now on inc dose steroids,  BP is improving    consider slow taper of pred    Rheum note appreciated  inc ESR noted

## 2017-04-09 NOTE — PROGRESS NOTE ADULT - SUBJECTIVE AND OBJECTIVE BOX
Patient is a 66y old  Female who presents with a chief complaint of weakness (02 Apr 2017 18:48)      HPI:  Pt is a 65 yo woman with hx liver cirrhosis, variceal esophageal bvanding, RA on prednisone, who was admitted recently 2-29 to 3-3 for cirrhosis and vomiting.   She reports weakness and inability to walk (legs feel like jelly). She has been incontinent of urine and stool for a few weeks as  she does not make it to the bathroom in time.  Pt fell off the couch his morning.  She also had two other falls, including 3/3/17 getting out of car to home in driveway, and  last evening when she slid off couch, struck a hard box with left chest and twisted her left ankle.  Pt's significant other reports she is  unable to support her own weight and is having trouble with her balance.  She denies cough, resp symptoms.  No fever/chills, n/v, cpain, SOB.  She has 1-3 loose stools/day.   Dr. Osorio discontinued her methotrexate, bystolic, and HCTZ on 3/10/17.            She  stopped drinking 5 days prior to last admission.  No head injury during any fall. (02 Apr 2017 18:48)    pt more comf  has some fatigue  neg CP or sob  kyle po  had 3 loose BM yesterday but only one today  neg blood  mild lower abd pain crampy prior to BM      PAST MEDICAL & SURGICAL HISTORY:  HTN (hypertension)  Rheumatoid arthritis  No significant past surgical history      MEDICATIONS  (STANDING):  multivitamin 1Tablet(s) Oral daily  aspirin 325milliGRAM(s) Oral daily  potassium chloride    Tablet ER 10milliEquivalent(s) Oral daily  pantoprazole    Tablet 40milliGRAM(s) Oral before breakfast  clopidogrel Tablet 75milliGRAM(s) Oral daily  atorvastatin 20milliGRAM(s) Oral at bedtime  magnesium oxide 800milliGRAM(s) Oral at bedtime  furosemide    Tablet 20milliGRAM(s) Oral daily    MEDICATIONS  (PRN):      Allergies    sulfa drugs (Unknown)    Intolerances        SOCIAL HISTORY:un changed    FAMILY HISTORY:  No pertinent family history in first degree relatives      REVIEW OF SYSTEMS:    CONSTITUTIONAL: No weakness, fevers or chills  EYES/ENT: No visual changes;  No vertigo or throat pain   NECK: No pain or stiffness  RESPIRATORY: No cough, wheezing, hemoptysis; No shortness of breath  CARDIOVASCULAR: No chest pain or palpitations  GENITOURINARY: No dysuria, frequency or hematuria  NEUROLOGICAL: No numbness or weakness  SKIN: No itching, burning, rashes, or lesions   All other review of systems is negative unless indicated above.    Vital Signs Last 24 Hrs  T(C): 36.3, Max: 36.6 (04-08 @ 16:17)  T(F): 97.4, Max: 97.9 (04-08 @ 21:10)  HR: 109 (87 - 109)  BP: 101/63 (92/49 - 101/63)  BP(mean): --  RR: 18 (18 - 18)  SpO2: 96% (96% - 96%)    PHYSICAL EXAM:    Constitutional: NAD, well-developed  HEENT: EOMI, throat clear  Neck: No LAD, supple  Respiratory: CTA and P  Cardiovascular: S1 and S2, RRR, no M  Gastrointestinal: BS+, soft, NT/ND, neg HSM,  Extremities: No peripheral edema, neg clubing, cyanosis  Vascular: 2+ peripheral pulses  Neurological: A/O x 3, no focal deficits  Psychiatric: Normal mood, normal affect  Skin: No rashes    LABS:  CBC Full  -  ( 09 Apr 2017 05:56 )  WBC Count : 10.5 K/uL  Hemoglobin : 10.0 g/dL  Hematocrit : 30.8 %  Platelet Count - Automated : 133 K/uL  Mean Cell Volume : 116.2 fl  Mean Cell Hemoglobin : 37.9 pg  Mean Cell Hemoglobin Concentration : 32.6 gm/dL  Auto Neutrophil # : x  Auto Lymphocyte # : x  Auto Monocyte # : x  Auto Eosinophil # : x  Auto Basophil # : x  Auto Neutrophil % : x  Auto Lymphocyte % : x  Auto Monocyte % : x  Auto Eosinophil % : x  Auto Basophil % : x    04-09    139  |  107  |  15  ----------------------------<  99  3.7   |  23  |  0.67    Ca    8.4<L>      09 Apr 2017 05:56              RADIOLOGY & ADDITIONAL STUDIES:EXAM:  US DPLX LWR EXT VEINS COMPL BI                            PROCEDURE DATE:  04/07/2017        INTERPRETATION:  Clinical information: Pain and swelling of feet    COMPARISON: Left lower extremity venous ultrasound April 03, 2007    TECHNIQUE: Duplex sonography of the bilateral lower extremities with   color and spectral Doppler, with and without compression.      FINDINGS:    There is normal compressibility of the bilateral common femoral, femoral   and popliteal veins. No calf vein thrombosis is detected.    Doppler examination shows normal spontaneous and phasic flow.    IMPRESSION:     No evidence of bilateral lower extremity deep venous thrombosis.              GERALD NÚÑEZ   This document has been electronically signed. Apr 7 20174:[id number]

## 2017-04-09 NOTE — PROGRESS NOTE ADULT - ASSESSMENT
Pt is a 65 yo woman with RA since 2009, with hx liver cirrhosis, variceal esophageal banding, admitted for b/L LE weakness.  Weakness unlikely due to steroid myopathy.   Currently her RA does not seem to be active.  She has an elevated ESR at 108, significance unclear, with a less impressive CRP at 2.12.  I would like to see the records from Dr. Montes to compare. Her office to be called on monday.  Pt's current pains are in the lower extremity b/L diffusely up to mid calf, and she has paresthesias in the hands in a glove distribution.  Given the advanced liver disease from alcohol, there may be a component of alcohol related polyneuropathy, in addition to discomfort in pain from the edema in legs.  Would start thiamine 100mg daily.      Plan:  f/u MRI lumbar spine   Continue prednisone, can do slow taper if necessary from adrenal standpoint.   DMARD to be initiated as outpatient.   Start thiamine 100mg daily  Gabapentin 100mg TID   EMG/NCS as outpatient  Patient needs PT for deconditioning

## 2017-04-09 NOTE — PROGRESS NOTE ADULT - SUBJECTIVE AND OBJECTIVE BOX
CHIEF COMPLAINT:    SUBJECTIVE: Numbness/tingling in hands, weakness in legs    Pt is a 65 yo woman with hx alcoholic liver cirrhosis, variceal esophageal banding, RA on prednisone, who was admitted recently 2-29 to 3-3 for cirrhosis and vomiting.   She reports weakness and inability to walk. . She has been incontinent of urine and stool for a few weeks as she does not make it to the bathroom in time.  Pt fell off the couch on the day of admission.  She also had two other falls, including 3/3/17 getting out of car to home in driveway, and the night prior to admission, when she was toileting herself.   Pt was unable to support her own weight and is having trouble with her balance.    Patient was diagnosed with RA in 2009, by Dr. Montes.  She has been on MTX and prednisone 5mg daily since 2009.  She denies using higher doses of prednisone.  Her MTX was discontinued on 3/10/17, and prednisone was increased to 15mg daily.     Patient continues to feel that her joints are quiet.  She has had RA flares in the past and knows she is not having a flare now.  She feels her biggest problem is her blood pressure ( systolic) and swelling in the legs.  She has not been out of bed today, and she has been trying to do exercises for her legs while laying in bed.  She feels pain diffusely in lower extremities, b/L.  No significant pain on moving ankles.  She also complains of burning/tingling in her hands from her wrists to her fingertips.      REVIEW OF SYSTEMS:     General: denies fevers, chills or night sweats  Skin: denies rashes or photosensitivity  Ophthalmologic: denies sicca symptoms  ENMT: denies frequent sinus infections, or ear infections, denies nasal perforation	  Respiratory and Thorax: denies asthma, chest pain or SOB  Cardiovascular: denies chest pain or SOB  ENMT: denies frequent sinus infections, or ear infections, denies nasal perforation	  Respiratory and Thorax: denies asthma, chest pain or SOB  Cardiovascular: denies chest pain or SOB	  Gastrointestinal: denies colitis	  Musculoskeletal: denies prolonged morning stiffness  Neurological: denies migraines or seizures.  +tingling/burning in hands, no motor deficiency in hands.      Vital Signs Last 24 Hrs  T(C): 36.3, Max: 36.6 (04-08 @ 16:17)  T(F): 97.4, Max: 97.9 (04-08 @ 21:10)  HR: 109 (87 - 109)  BP: 101/63 (92/49 - 101/63)  BP(mean): --  RR: 18 (18 - 18)  SpO2: 96% (96% - 96%)    I&O's Summary    I & Os for current day (as of 09 Apr 2017 15:23)  =============================================  IN: 0 ml / OUT: 1 ml / NET: -1 ml      CAPILLARY BLOOD GLUCOSE      PHYSICAL EXAM:    Constitutional: NAD, awake and alert, well-developed  HEENT- no oral lesions noted, no lymphadenoapthy noted  Heart- S1 S2 reg  Lungs- CTA b/l  Abd- Soft NT  Ext- 2+edema left LE, 1+right.  Skin- no rashes, multiple ecchymosis from IVs and phlebotomy.  Musculoskelatal- No active synovitis or TTP in shoulders, elbows, hands, wrists.  Minimal tenderness to palpation of knees.  No warmth or significant effusion in knees.  Ankles dificult to assess as patient is tender when gripping foot and lower leg to manipulate ankle L>R.  She   Neurological- intact strength upper extremities.  5/5 power upper extremities throughout.  4/5 hip flexors b/L, dorsiflexion and plantar flexion 4-5/5 on the right.  Left 4/5 on left       MEDICATIONS:  MEDICATIONS  (STANDING):  multivitamin 1Tablet(s) Oral daily  aspirin 325milliGRAM(s) Oral daily  potassium chloride    Tablet ER 10milliEquivalent(s) Oral daily  pantoprazole    Tablet 40milliGRAM(s) Oral before breakfast  clopidogrel Tablet 75milliGRAM(s) Oral daily  atorvastatin 20milliGRAM(s) Oral at bedtime  magnesium oxide 800milliGRAM(s) Oral at bedtime  furosemide    Tablet 20milliGRAM(s) Oral daily      LABS: All Labs Reviewed:                        10.0   10.5  )-----------( 133      ( 09 Apr 2017 05:56 )             30.8     04-09    139  |  107  |  15  ----------------------------<  99  3.7   |  23  |  0.67    Ca    8.4<L>      09 Apr 2017 05:56        CARDIAC MARKERS ( 09 Apr 2017 05:56 )  x     / x     / 22 U/L / x     / x      CARDIAC MARKERS ( 08 Apr 2017 20:16 )  x     / x     / 18 U/L / x     / x        , CRP 2.12.  CPK low    Blood Culture:     RADIOLOGY/EKG:    A/P:

## 2017-04-10 DIAGNOSIS — M51.36 OTHER INTERVERTEBRAL DISC DEGENERATION, LUMBAR REGION: ICD-10-CM

## 2017-04-10 LAB
ANION GAP SERPL CALC-SCNC: 9 MMOL/L — SIGNIFICANT CHANGE UP (ref 5–17)
BUN SERPL-MCNC: 13 MG/DL — SIGNIFICANT CHANGE UP (ref 7–23)
CALCIUM SERPL-MCNC: 8.5 MG/DL — SIGNIFICANT CHANGE UP (ref 8.5–10.1)
CHLORIDE SERPL-SCNC: 107 MMOL/L — SIGNIFICANT CHANGE UP (ref 96–108)
CO2 SERPL-SCNC: 24 MMOL/L — SIGNIFICANT CHANGE UP (ref 22–31)
CREAT SERPL-MCNC: 0.68 MG/DL — SIGNIFICANT CHANGE UP (ref 0.5–1.3)
CRP SERPL-MCNC: 2.2 MG/DL — HIGH (ref 0–0.4)
ERYTHROCYTE [SEDIMENTATION RATE] IN BLOOD: 101 MM/HR — HIGH (ref 0–20)
GLUCOSE SERPL-MCNC: 136 MG/DL — HIGH (ref 70–99)
HCT VFR BLD CALC: 31 % — LOW (ref 34.5–45)
HGB BLD-MCNC: 9.9 G/DL — LOW (ref 11.5–15.5)
MCHC RBC-ENTMCNC: 32 GM/DL — SIGNIFICANT CHANGE UP (ref 32–36)
MCHC RBC-ENTMCNC: 37.7 PG — HIGH (ref 27–34)
MCV RBC AUTO: 117.8 FL — HIGH (ref 80–100)
PLATELET # BLD AUTO: 130 K/UL — LOW (ref 150–400)
POTASSIUM SERPL-MCNC: 3.9 MMOL/L — SIGNIFICANT CHANGE UP (ref 3.5–5.3)
POTASSIUM SERPL-SCNC: 3.9 MMOL/L — SIGNIFICANT CHANGE UP (ref 3.5–5.3)
RBC # BLD: 2.63 M/UL — LOW (ref 3.8–5.2)
RBC # FLD: 15.9 % — HIGH (ref 10.3–14.5)
RHEUMATOID FACT SERPL-ACNC: <7 IU/ML — SIGNIFICANT CHANGE UP (ref 0–13.9)
SODIUM SERPL-SCNC: 140 MMOL/L — SIGNIFICANT CHANGE UP (ref 135–145)
WBC # BLD: 9.2 K/UL — SIGNIFICANT CHANGE UP (ref 3.8–10.5)
WBC # FLD AUTO: 9.2 K/UL — SIGNIFICANT CHANGE UP (ref 3.8–10.5)

## 2017-04-10 PROCEDURE — 99232 SBSQ HOSP IP/OBS MODERATE 35: CPT

## 2017-04-10 RX ADMIN — Medication 15 MILLIGRAM(S): at 05:47

## 2017-04-10 RX ADMIN — GABAPENTIN 100 MILLIGRAM(S): 400 CAPSULE ORAL at 21:28

## 2017-04-10 RX ADMIN — MAGNESIUM OXIDE 400 MG ORAL TABLET 800 MILLIGRAM(S): 241.3 TABLET ORAL at 21:29

## 2017-04-10 RX ADMIN — PANTOPRAZOLE SODIUM 40 MILLIGRAM(S): 20 TABLET, DELAYED RELEASE ORAL at 09:44

## 2017-04-10 RX ADMIN — Medication 10 MILLIGRAM(S): at 17:07

## 2017-04-10 RX ADMIN — Medication 1 TABLET(S): at 12:05

## 2017-04-10 RX ADMIN — ATORVASTATIN CALCIUM 20 MILLIGRAM(S): 80 TABLET, FILM COATED ORAL at 21:28

## 2017-04-10 RX ADMIN — Medication 10 MILLIEQUIVALENT(S): at 12:12

## 2017-04-10 RX ADMIN — Medication 100 MILLIGRAM(S): at 12:05

## 2017-04-10 RX ADMIN — GABAPENTIN 100 MILLIGRAM(S): 400 CAPSULE ORAL at 05:47

## 2017-04-10 RX ADMIN — Medication 1 MILLIGRAM(S): at 12:05

## 2017-04-10 RX ADMIN — GABAPENTIN 100 MILLIGRAM(S): 400 CAPSULE ORAL at 17:06

## 2017-04-10 RX ADMIN — CLOPIDOGREL BISULFATE 75 MILLIGRAM(S): 75 TABLET, FILM COATED ORAL at 12:05

## 2017-04-10 RX ADMIN — Medication 20 MILLIGRAM(S): at 12:05

## 2017-04-10 RX ADMIN — Medication 325 MILLIGRAM(S): at 12:05

## 2017-04-10 NOTE — PROGRESS NOTE ADULT - ASSESSMENT
Diarrhea and has improved  had empiric tx for c diff and she could not do stool tests  neg diarrhea todaya nd will follow      Hx of chronic diarrhea that has also improved and will follow      hypotension  RO adrenal insuff, on chronic steroids  endocrine note appreciated and pred increased, DCed propranolol and aldactone (she is been missing doses often)          pos troponins  [facility] cardioogy, pt had bare metal stent and they are considering duration of double anti plt with inc risk bleed  [facility] cardiology and they recommend plavix for at least one month and will possibly stop it after that  DW pt again       Cirrhosis due to ETOH and has stopped    ascites and LE edema      likely adrenal insuff with years of steroids and now on inc dose steroids,  BP is improving    consider slow taper of pred, managed by endocrine or Rheum    Rheum note appreciated  inc ESR noted      Rehab and FU with me 2-4 weeks after DC

## 2017-04-10 NOTE — PROGRESS NOTE ADULT - ASSESSMENT
Pt is a 67 yo woman with RA since 2009, with hx liver cirrhosis, variceal esophageal banding, admitted for b/L LE weakness.  Weakness unlikely due to steroid myopathy.   Currently her RA does not seem to be active.  She has an elevated ESR at 108, significance unclear, with a less impressive CRP at 2.12.    Pt's current pains are in the lower extremity b/L diffusely up to mid calf, and she has paresthesias in the hands in a glove distribution.  Given the advanced liver disease from alcohol, there may be a component of alcohol related polyneuropathy, in addition to discomfort in pain from the edema in legs.  MRI shows some multiple level disc herniation, with L2-L3 narrowing of LEFT neural foramen, and narrowing of b/L neural foramen L3-L4, L4-L5, and L5-S1.      Plan:  Consider Neuro/Sx input for findings on MRI if still unable to ambulate  Continue prednisone, can do slow taper if necessary from adrenal standpoint. RA is not very active.  Doubt elevated inflammatory markers are related to this.  Basal increase in CRP can be related to liver cirrhosis. Macrocytosis likely contributing to elevated ESR. Doesn't appear to have any signs of infection. Can check SPEP/UPEP/Immunofixation.  Will obtain prior labs.    DMARD to be initiated as outpatient.   Continue Thiamine, Gabapentin for possible alcohol related neuropathy.  EMG/NCS as outpatient  Patient needs PT for deconditioning

## 2017-04-10 NOTE — PROGRESS NOTE ADULT - SUBJECTIVE AND OBJECTIVE BOX
TD  04/10:  5S.  uses the bed pain to go to the bathroom.  mobility is limited.    Allergies    sulfa drugs (Unknown)    Intolerances      MEDICATIONS  (STANDING):  multivitamin 1Tablet(s) Oral daily  aspirin 325milliGRAM(s) Oral daily  potassium chloride    Tablet ER 10milliEquivalent(s) Oral daily  pantoprazole    Tablet 40milliGRAM(s) Oral before breakfast  clopidogrel Tablet 75milliGRAM(s) Oral daily  atorvastatin 20milliGRAM(s) Oral at bedtime  magnesium oxide 800milliGRAM(s) Oral at bedtime  furosemide    Tablet 20milliGRAM(s) Oral daily  folic acid 1milliGRAM(s) Oral daily  thiamine 100milliGRAM(s) Oral daily  predniSONE   Tablet 15milliGRAM(s) Oral daily  gabapentin 100milliGRAM(s) Oral three times a day    MEDICATIONS  (PRN):    Vital Signs Last 24 Hrs  T(C): 36.4, Max: 36.7 (04-10 @ 05:48)  T(F): 97.6, Max: 98.1 (04-10 @ 05:48)  HR: 104 (102 - 104)  BP: 115/74 (96/65 - 115/74)  BP(mean): --  RR: 18 (18 - 18)  SpO2: 98% (93% - 98%)    chronically ill appearing WF in no acute distress.  NCAT.  sclera icteric, mild.  neck supple.  JVP nml.  lungs CTA.  no WRR.  heart RRR.  NS1S2.  no MRG.  distended.  soft.  NT.  no mass.  no guarding.  no rebound.  LE good muscle tone.  limited range of motion.  b/l LE (+) 1-2 pitting edema, L>R.    A&Ox3.                        9.9    9.2   )-----------( 130      ( 10 Apr 2017 05:35 )             31.0       04-10    140  |  107  |  13  ----------------------------<  136<H>  3.9   |  24  |  0.68    Ca    8.5      10 Apr 2017 05:35      CARDIAC MARKERS ( 09 Apr 2017 05:56 )  x     / x     / 22 U/L / x     / x      CARDIAC MARKERS ( 08 Apr 2017 20:16 )  x     / x     / 18 U/L / x     / x

## 2017-04-10 NOTE — PROGRESS NOTE ADULT - SUBJECTIVE AND OBJECTIVE BOX
Patient is a 66y old  Female who presents with a chief complaint of weakness (02 Apr 2017 18:48)      HPI:  Pt is a 65 yo woman with hx liver cirrhosis, variceal esophageal bvanding, RA on prednisone, who was admitted recently 2-29 to 3-3 for cirrhosis and vomiting.   She reports weakness and inability to walk (legs feel like jelly). She has been incontinent of urine and stool for a few weeks as  she does not make it to the bathroom in time.  Pt fell off the couch his morning.  She also had two other falls, including 3/3/17 getting out of car to home in driveway, and  last evening when she slid off couch, struck a hard box with left chest and twisted her left ankle.  Pt's significant other reports she is  unable to support her own weight and is having trouble with her balance.  She denies cough, resp symptoms.  No fever/chills, n/v, cpain, SOB.  She has 1-3 loose stools/day.   Dr. Osorio discontinued her methotrexate, bystolic, and HCTZ on 3/10/17.            She  stopped drinking 5 days prior to last admission.  No head injury during any fall. (02 Apr 2017 18:48)    Diarrhea improved an has some weakness  mild lower abd pain, crampy, resolved with BM  neg CCP or sob  kyle diet        PAST MEDICAL & SURGICAL HISTORY:  HTN (hypertension)  Rheumatoid arthritis  No significant past surgical history      MEDICATIONS  (STANDING):  multivitamin 1Tablet(s) Oral daily  aspirin 325milliGRAM(s) Oral daily  potassium chloride    Tablet ER 10milliEquivalent(s) Oral daily  pantoprazole    Tablet 40milliGRAM(s) Oral before breakfast  clopidogrel Tablet 75milliGRAM(s) Oral daily  atorvastatin 20milliGRAM(s) Oral at bedtime  magnesium oxide 800milliGRAM(s) Oral at bedtime  furosemide    Tablet 20milliGRAM(s) Oral daily  folic acid 1milliGRAM(s) Oral daily  thiamine 100milliGRAM(s) Oral daily  predniSONE   Tablet 15milliGRAM(s) Oral daily  gabapentin 100milliGRAM(s) Oral three times a day    MEDICATIONS  (PRN):      Allergies    sulfa drugs (Unknown)    Intolerances        SOCIAL HISTORY:un changed    FAMILY HISTORY:  No pertinent family history in first degree relatives      REVIEW OF SYSTEMS:    CONSTITUTIONAL: No weakness, fevers or chills  EYES/ENT: No visual changes;  No vertigo or throat pain   NECK: No pain or stiffness  RESPIRATORY: No cough, wheezing, hemoptysis; No shortness of breath  CARDIOVASCULAR: No chest pain or palpitations  GENITOURINARY: No dysuria, frequency or hematuria  NEUROLOGICAL: No numbness or weakness  SKIN: No itching, burning, rashes, or lesions   All other review of systems is negative unless indicated above.    Vital Signs Last 24 Hrs  T(C): 36.7, Max: 36.7 (04-10 @ 05:48)  T(F): 98.1, Max: 98.1 (04-10 @ 05:48)  HR: 102 (102 - 109)  BP: 107/62 (96/65 - 107/62)  BP(mean): --  RR: 18 (18 - 18)  SpO2: 96% (93% - 96%)    PHYSICAL EXAM:    Constitutional: NAD, well-developed  HEENT: EOMI, throat clear  Neck: No LAD, supple  Respiratory: CTA and P  Cardiovascular: S1 and S2, RRR, no M  Gastrointestinal: BS+, soft, NT/ND, neg HSM,  Extremities: mild peripheral edema, neg clubing, cyanosis  Vascular: 2+ peripheral pulses  Neurological: A/O x 3, no focal deficits  Psychiatric: Normal mood, normal affect  Skin: No rashes    LABS:  CBC Full  -  ( 10 Apr 2017 05:35 )  WBC Count : 9.2 K/uL  Hemoglobin : 9.9 g/dL  Hematocrit : 31.0 %  Platelet Count - Automated : 130 K/uL  Mean Cell Volume : 117.8 fl  Mean Cell Hemoglobin : 37.7 pg  Mean Cell Hemoglobin Concentration : 32.0 gm/dL  Auto Neutrophil # : x  Auto Lymphocyte # : x  Auto Monocyte # : x  Auto Eosinophil # : x  Auto Basophil # : x  Auto Neutrophil % : x  Auto Lymphocyte % : x  Auto Monocyte % : x  Auto Eosinophil % : x  Auto Basophil % : x    04-10    140  |  107  |  13  ----------------------------<  136<H>  3.9   |  24  |  0.68    Ca    8.5      10 Apr 2017 05:35              RADIOLOGY & ADDITIONAL STUDIES:

## 2017-04-10 NOTE — PROGRESS NOTE ADULT - SUBJECTIVE AND OBJECTIVE BOX
CHIEF COMPLAINT: weakness    SUBJECTIVE: Pt is a 65 yo woman with hx alcoholic liver cirrhosis, variceal esophageal banding, RA on prednisone, who was admitted recently 2-29 to 3-3 for cirrhosis and vomiting.   She reports weakness and inability to walk. . She has been incontinent of urine and stool for a few weeks as she does not make it to the bathroom in time.  Pt fell off the couch on the day of admission.  She also had two other falls, including 3/3/17 getting out of car to home in driveway, and the night prior to admission, when she was toileting herself.   Pt was unable to support her own weight and is having trouble with her balance.    Patient was diagnosed with RA in 2009, by Dr. Montes.  She has been on MTX and prednisone 5mg daily since 2009.  She denies using higher doses of prednisone.  Her MTX was discontinued on 3/10/17, and prednisone was increased to 15mg daily.     Patient continues to feel that her joints are quiet.  She has had RA flares in the past and knows she is not having a flare now.    She feels pain diffusely in lower extremities, b/L.  No significant pain on moving ankles.  She also complains of burning/tingling in her hands from her wrists to her fingertips.    4/10/17.  No new complaints this morning except continued numbness/tingling/pain in hands and lower extremities, and weakness in extremities.        REVIEW OF SYSTEMS:    General: denies fevers, chills or night sweats  Skin: denies rashes or photosensitivity  Ophthalmologic: denies sicca symptoms  ENMT: denies frequent sinus infections, or ear infections, denies nasal perforation	  Respiratory and Thorax: denies asthma, chest pain or SOB  Cardiovascular: denies chest pain or SOB  ENMT: denies frequent sinus infections, or ear infections, denies nasal perforation	  Respiratory and Thorax: denies asthma, chest pain or SOB  Cardiovascular: denies chest pain or SOB	  Gastrointestinal: denies colitis	  Musculoskeletal: denies prolonged morning stiffness  Neurological: leg weakness    Vital Signs Last 24 Hrs  T(C): 36.4, Max: 36.7 (04-10 @ 05:48)  T(F): 97.6, Max: 98.1 (04-10 @ 05:48)  HR: 104 (102 - 104)  BP: 115/74 (96/65 - 115/74)  BP(mean): --  RR: 18 (18 - 18)  SpO2: 98% (93% - 98%)    I&O's Summary  I & Os for 24h ending 10 Apr 2017 07:00  =============================================  IN: 0 ml / OUT: 1 ml / NET: -1 ml    I & Os for current day (as of 10 Apr 2017 13:58)  =============================================  IN: 360 ml / OUT: 0 ml / NET: 360 ml      CAPILLARY BLOOD GLUCOSE      PHYSICAL EXAM:    Constitutional: NAD, awake and alert, well-developed  HEENT- no oral lesions noted, no lymphadenoapthy noted  Heart- S1 S2 reg  Lungs- CTA b/l  Abd- Soft NT  Ext- no edema  Skin- no rashes +Ecchymosis multiple on arms and legs at areas of IV and phlebotomy sites.    Musculoskelatal- FROM all joints, minimal tenderness in left knee.    Neurological- unchanged, with weakness in left LE >Right.        MEDICATIONS:  MEDICATIONS  (STANDING):  multivitamin 1Tablet(s) Oral daily  aspirin 325milliGRAM(s) Oral daily  potassium chloride    Tablet ER 10milliEquivalent(s) Oral daily  pantoprazole    Tablet 40milliGRAM(s) Oral before breakfast  clopidogrel Tablet 75milliGRAM(s) Oral daily  atorvastatin 20milliGRAM(s) Oral at bedtime  magnesium oxide 800milliGRAM(s) Oral at bedtime  furosemide    Tablet 20milliGRAM(s) Oral daily  folic acid 1milliGRAM(s) Oral daily  thiamine 100milliGRAM(s) Oral daily  predniSONE   Tablet 15milliGRAM(s) Oral daily  gabapentin 100milliGRAM(s) Oral three times a day      LABS: All Labs Reviewed:                        9.9    9.2   )-----------( 130      ( 10 Apr 2017 05:35 )             31.0     04-10    140  |  107  |  13  ----------------------------<  136<H>  3.9   |  24  |  0.68    Ca    8.5      10 Apr 2017 05:35        CARDIAC MARKERS ( 09 Apr 2017 05:56 )  x     / x     / 22 U/L / x     / x      CARDIAC MARKERS ( 08 Apr 2017 20:16 )  x     / x     / 18 U/L / x     / x          Blood Culture:     RADIOLOGY/EKG:    A/P:

## 2017-04-11 LAB
ANION GAP SERPL CALC-SCNC: 10 MMOL/L — SIGNIFICANT CHANGE UP (ref 5–17)
BUN SERPL-MCNC: 14 MG/DL — SIGNIFICANT CHANGE UP (ref 7–23)
CALCIUM SERPL-MCNC: 8.7 MG/DL — SIGNIFICANT CHANGE UP (ref 8.5–10.1)
CCP IGG SERPL-ACNC: 232 UNITS — HIGH (ref 0–19)
CHLORIDE SERPL-SCNC: 106 MMOL/L — SIGNIFICANT CHANGE UP (ref 96–108)
CO2 SERPL-SCNC: 23 MMOL/L — SIGNIFICANT CHANGE UP (ref 22–31)
CREAT SERPL-MCNC: 0.76 MG/DL — SIGNIFICANT CHANGE UP (ref 0.5–1.3)
FERRITIN SERPL-MCNC: 1228 NG/ML — HIGH (ref 15–150)
FIBRINOGEN PPP-MCNC: 259 MG/DL — LOW (ref 310–510)
GLUCOSE SERPL-MCNC: 112 MG/DL — HIGH (ref 70–99)
HCT VFR BLD CALC: 30.8 % — LOW (ref 34.5–45)
HGB BLD-MCNC: 10.5 G/DL — LOW (ref 11.5–15.5)
MCHC RBC-ENTMCNC: 34.2 GM/DL — SIGNIFICANT CHANGE UP (ref 32–36)
MCHC RBC-ENTMCNC: 39.9 PG — HIGH (ref 27–34)
MCV RBC AUTO: 116.5 FL — HIGH (ref 80–100)
PLATELET # BLD AUTO: 146 K/UL — LOW (ref 150–400)
POTASSIUM SERPL-MCNC: 4.3 MMOL/L — SIGNIFICANT CHANGE UP (ref 3.5–5.3)
POTASSIUM SERPL-SCNC: 4.3 MMOL/L — SIGNIFICANT CHANGE UP (ref 3.5–5.3)
RBC # BLD: 2.64 M/UL — LOW (ref 3.8–5.2)
RBC # FLD: 16.1 % — HIGH (ref 10.3–14.5)
RF+CCP IGG SER-IMP: (no result)
SODIUM SERPL-SCNC: 139 MMOL/L — SIGNIFICANT CHANGE UP (ref 135–145)
WBC # BLD: 11 K/UL — HIGH (ref 3.8–10.5)
WBC # FLD AUTO: 11 K/UL — HIGH (ref 3.8–10.5)

## 2017-04-11 PROCEDURE — 73721 MRI JNT OF LWR EXTRE W/O DYE: CPT | Mod: 26,LT

## 2017-04-11 RX ADMIN — Medication 1 TABLET(S): at 12:27

## 2017-04-11 RX ADMIN — Medication 100 MILLIGRAM(S): at 12:28

## 2017-04-11 RX ADMIN — ATORVASTATIN CALCIUM 20 MILLIGRAM(S): 80 TABLET, FILM COATED ORAL at 21:14

## 2017-04-11 RX ADMIN — Medication 125 MILLIGRAM(S): at 12:26

## 2017-04-11 RX ADMIN — MAGNESIUM OXIDE 400 MG ORAL TABLET 800 MILLIGRAM(S): 241.3 TABLET ORAL at 21:14

## 2017-04-11 RX ADMIN — GABAPENTIN 100 MILLIGRAM(S): 400 CAPSULE ORAL at 14:31

## 2017-04-11 RX ADMIN — Medication 1 MILLIGRAM(S): at 12:27

## 2017-04-11 RX ADMIN — PANTOPRAZOLE SODIUM 40 MILLIGRAM(S): 20 TABLET, DELAYED RELEASE ORAL at 06:04

## 2017-04-11 RX ADMIN — Medication 325 MILLIGRAM(S): at 12:27

## 2017-04-11 RX ADMIN — GABAPENTIN 100 MILLIGRAM(S): 400 CAPSULE ORAL at 21:14

## 2017-04-11 RX ADMIN — GABAPENTIN 100 MILLIGRAM(S): 400 CAPSULE ORAL at 06:05

## 2017-04-11 RX ADMIN — CLOPIDOGREL BISULFATE 75 MILLIGRAM(S): 75 TABLET, FILM COATED ORAL at 12:27

## 2017-04-11 RX ADMIN — Medication 10 MILLIEQUIVALENT(S): at 12:29

## 2017-04-11 RX ADMIN — Medication 10 MILLIGRAM(S): at 06:04

## 2017-04-11 NOTE — PROGRESS NOTE ADULT - SUBJECTIVE AND OBJECTIVE BOX
Patient is a 66y old  Female who presents with a chief complaint of weakness (02 Apr 2017 18:48)      HPI:  Pt is a 65 yo woman with hx liver cirrhosis, variceal esophageal bvanding, RA on prednisone, who was admitted recently 2-29 to 3-3 for cirrhosis and vomiting.   She reports weakness and inability to walk (legs feel like jelly). She has been incontinent of urine and stool for a few weeks as  she does not make it to the bathroom in time.  Pt fell off the couch his morning.  She also had two other falls, including 3/3/17 getting out of car to home in driveway, and  last evening when she slid off couch, struck a hard box with left chest and twisted her left ankle.  Pt's significant other reports she is  unable to support her own weight and is having trouble with her balance.  She denies cough, resp symptoms.  No fever/chills, n/v, cpain, SOB.  She has 1-3 loose stools/day.   Dr. Osorio discontinued her methotrexate, bystolic, and HCTZ on 3/10/17.            She  stopped drinking 5 days prior to last admission.  No head injury during any fall. (02 Apr 2017 18:48)    pt comf and has inc fatigue  mild lower abd discomfort prior to BM, resolved with small BM  neg blood  BP increasing    DW rheum      PAST MEDICAL & SURGICAL HISTORY:  HTN (hypertension)  Rheumatoid arthritis  No significant past surgical history      MEDICATIONS  (STANDING):  multivitamin 1Tablet(s) Oral daily  aspirin 325milliGRAM(s) Oral daily  potassium chloride    Tablet ER 10milliEquivalent(s) Oral daily  pantoprazole    Tablet 40milliGRAM(s) Oral before breakfast  clopidogrel Tablet 75milliGRAM(s) Oral daily  atorvastatin 20milliGRAM(s) Oral at bedtime  magnesium oxide 800milliGRAM(s) Oral at bedtime  furosemide    Tablet 20milliGRAM(s) Oral daily  folic acid 1milliGRAM(s) Oral daily  thiamine 100milliGRAM(s) Oral daily  gabapentin 100milliGRAM(s) Oral three times a day  predniSONE   Tablet 10milliGRAM(s) Oral daily    MEDICATIONS  (PRN):      Allergies    sulfa drugs (Unknown)    Intolerances        SOCIAL HISTORY:unchanged    FAMILY HISTORY:  No pertinent family history in first degree relatives      REVIEW OF SYSTEMS:    CONSTITUTIONAL: No weakness, fevers or chills  EYES/ENT: No visual changes;  No vertigo or throat pain   NECK: No pain or stiffness  RESPIRATORY: No cough, wheezing, hemoptysis; No shortness of breath  CARDIOVASCULAR: No chest pain or palpitations  GENITOURINARY: No dysuria, frequency or hematuria  NEUROLOGICAL: No numbness or weakness  SKIN: No itching, burning, rashes, or lesions   All other review of systems is negative unless indicated above.    Vital Signs Last 24 Hrs  T(C): 36.3, Max: 36.4 (04-10 @ 11:27)  T(F): 97.4, Max: 97.6 (04-10 @ 11:27)  HR: 107 (104 - 107)  BP: 115/75 (115/74 - 115/75)  BP(mean): --  RR: 20 (18 - 20)  SpO2: 96% (96% - 98%)    PHYSICAL EXAM:    Constitutional: NAD, well-developed  HEENT: EOMI, throat clear  Neck: No LAD, supple  Respiratory: CTA and P  Cardiovascular: S1 and S2, RRR, no M  Gastrointestinal: BS+, soft, NT/ND, neg HSM,  Extremities: No peripheral edema, neg clubing, cyanosis  Vascular: 2+ peripheral pulses  Neurological: A/O x 3, no focal deficits  Psychiatric: Normal mood, normal affect  Skin: No rashes    LABS:  CBC Full  -  ( 11 Apr 2017 07:24 )  WBC Count : 11.0 K/uL  Hemoglobin : 10.5 g/dL  Hematocrit : 30.8 %  Platelet Count - Automated : 146 K/uL  Mean Cell Volume : 116.5 fl  Mean Cell Hemoglobin : 39.9 pg  Mean Cell Hemoglobin Concentration : 34.2 gm/dL  Auto Neutrophil # : x  Auto Lymphocyte # : x  Auto Monocyte # : x  Auto Eosinophil # : x  Auto Basophil # : x  Auto Neutrophil % : x  Auto Lymphocyte % : x  Auto Monocyte % : x  Auto Eosinophil % : x  Auto Basophil % : x    04-11    139  |  106  |  14  ----------------------------<  112<H>  4.3   |  23  |  0.76    Ca    8.7      11 Apr 2017 07:24              RADIOLOGY & ADDITIONAL STUDIES:

## 2017-04-11 NOTE — PROGRESS NOTE ADULT - ASSESSMENT
Assessment and Plan:   · Assessment		  Pt is a 65 yo woman with RA since 2009, with hx liver cirrhosis, variceal esophageal banding, admitted for b/L LE weakness.  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  Unclear if RA fully in remission- obvious pain/ synovitis L ankle.  She has an elevated ESR at 108, significance unclear, with a less impressive CRP at 2.12.      Pt's current pains are in the lower extremity b/L diffusely up to mid calf, and she has paresthesias in the hands in a glove distribution w/ significant alcoholic peripheral neuropathy of note significant pathology throughout LS spine too but sx fairly recent onset and symmetrical.    - etiology of ESR probably r/t RA but if MRi neg, may need to consider SBA - may need cx peritoneal fluid- though NO pain on exam and no fever/ leukocytosis can be seen in this setting.      Plan:  - continue PO steroids but also give bolus 100mg solumedrol today for possible acute synovitis, without addressing this rehab will be difficult  - continue PT, rehab scheduled  - if responds to steroids need to think about RA tx, cannot resume MTX... will d/w GI  - Consider Neuro input for likely myopathy, should we do MRI thigh muscles to confirm myopathy. CK nl but this can be seen in alcoholic myopathy  - continue as other tx as noted  - f/u as outpatient will need rheum and neuro w/u w/ full EMG/ NCS

## 2017-04-11 NOTE — PROGRESS NOTE ADULT - SUBJECTIVE AND OBJECTIVE BOX
TD  04/11:  (+) left foot swelling.    Allergies    sulfa drugs (Unknown)    Intolerances      MEDICATIONS  (STANDING):  multivitamin 1Tablet(s) Oral daily  aspirin 325milliGRAM(s) Oral daily  potassium chloride    Tablet ER 10milliEquivalent(s) Oral daily  pantoprazole    Tablet 40milliGRAM(s) Oral before breakfast  clopidogrel Tablet 75milliGRAM(s) Oral daily  atorvastatin 20milliGRAM(s) Oral at bedtime  magnesium oxide 800milliGRAM(s) Oral at bedtime  furosemide    Tablet 20milliGRAM(s) Oral daily  folic acid 1milliGRAM(s) Oral daily  thiamine 100milliGRAM(s) Oral daily  gabapentin 100milliGRAM(s) Oral three times a day  predniSONE   Tablet 10milliGRAM(s) Oral daily    MEDICATIONS  (PRN):    Vital Signs Last 24 Hrs  T(C): 36.3, Max: 36.3 (04-10 @ 21:43)  T(F): 97.3, Max: 97.4 (04-10 @ 21:43)  HR: 111 (107 - 111)  BP: 107/61 (107/61 - 115/75)  BP(mean): --  RR: 18 (18 - 20)  SpO2: 98% (96% - 98%)    chronically ill appearing WF in no acute distress.  NCAT.  sclera icteric, mild.  neck supple.  JVP nml.  lungs CTA.  no WRR.  heart RRR.  NS1S2.  no MRG.  distended.  soft.  NT.  no mass.  no guarding.  no rebound.  LE good muscle tone.  limited range of motion.  b/l LE (+) 1-2 pitting edema, L>R.  (+) left food edema.  A&Ox3.                        10.5   11.0  )-----------( 146      ( 11 Apr 2017 07:24 )             30.8       04-11    139  |  106  |  14  ----------------------------<  112<H>  4.3   |  23  |  0.76    Ca    8.7      11 Apr 2017 07:24

## 2017-04-11 NOTE — PROGRESS NOTE ADULT - ASSESSMENT
Diarrhea and has improved  had empiric tx for c diff and she could not do stool tests  neg diarrhea todaya nd will follow      Hx of chronic diarrhea that has also improved and will follow, Mg may inc it and DW pt      hypotension  RO adrenal insuff, on chronic steroids  endocrine note appreciated and pred increased, DCed propranolol and aldactone (she is been missing doses often)          pos troponins  DW cardioogy, pt had bare metal stent and they are considering duration of double anti plt with inc risk bleed  DW cardiology and they recommend plavix for at least one month and will possibly stop it after that  DW pt again       Cirrhosis due to ETOH and has stopped    ascites and LE edema      likely adrenal insuff with years of steroids and now on inc dose steroids,  BP is improving    consider slow taper of pred, managed by endocrine or Rheum, tolerating 10 mg    Rheum note appreciated  inc ESR noted      Rehab and FU with me 2-4 weeks after DC

## 2017-04-11 NOTE — PROGRESS NOTE ADULT - SUBJECTIVE AND OBJECTIVE BOX
· Subjective and Objective: 	  CHIEF COMPLAINT: weakness    SUBJECTIVE: Pt is a 67 yo woman with hx alcoholic liver cirrhosis, variceal esophageal banding, RA on prednisone, who was admitted recently 2-29 to 3-3 for cirrhosis and vomiting and renal insufficiency- last drink 5 days PTA.   She reports weakness and inability to walk. . She has been incontinent of urine and stool for a few weeks as she does not make it to the bathroom in time.  Pt fell off the couch on the day of admission.  She also had two other falls, including 3/3/17 getting out of car to home in driveway, and the night prior to admission, when she was toileting herself.   Pt was unable to support her own weight and is having trouble with her balance.    Patient was diagnosed with RA in 2009, by Dr. Montes.  She has been on MTX and prednisone 5mg daily since 2009.  She denies using higher doses of prednisone.  Her MTX was discontinued on 3/10/17, and prednisone was increased to 15mg daily.     Patient continues to feel that her joints are quiet.  She has had RA flares in the past and knows she is not having a flare now.    She feels pain diffusely in lower extremities, b/L.  No significant pain on moving ankles.  She also complains of burning/tingling in her hands from her wrists to her fingertips.    4/10/17.  No new complaints this morning except continued numbness/tingling/pain in hands and lower extremities, and weakness in extremities.      4/11/17 Stable today, still unable to walk more than few steps.  Pain L ankle worse w/ movement.  VSS       REVIEW OF SYSTEMS:    General: denies fevers, chills or night sweats  Skin: denies rashes or photosensitivity  Ophthalmologic: denies sicca symptoms  ENMT: denies frequent sinus infections, or ear infections, denies nasal perforation	  Respiratory and Thorax: denies asthma, chest pain or SOB  Cardiovascular: denies chest pain or SOB  ENMT: denies frequent sinus infections, or ear infections, denies nasal perforation	  Respiratory and Thorax: denies asthma, chest pain or SOB  Cardiovascular: denies chest pain or SOB	  Gastrointestinal: denies colitis	  Musculoskeletal: denies prolonged morning stiffness  Neurological: leg weakness    Vital Signs Last 24 Hrs  T(C): 36.3, Max: 36.4 (04-10 @ 11:27)  T(F): 97.4, Max: 97.6 (04-10 @ 11:27)  HR: 107 (104 - 107)  BP: 115/75 (115/74 - 115/75)  BP(mean): --  RR: 20 (18 - 20)  SpO2: 96% (96% - 98%)                          10.5   11.0  )-----------( 146      ( 11 Apr 2017 07:24 )             30.8     04-11    139  |  106  |  14  ----------------------------<  112<H>  4.3   |  23  |  0.76    Ca    8.7      11 Apr 2017 07:24    Uric acid 7.8  - repeat 101 4/10/17 w/ CRP 2.2 (repeated)  AST 67, nl ALT, Bili 2.1  CK normal x 2  CCP > 250, FREEDOM 1:320S w/ neg AMA, SMA     EXAM:  CT ABDOMEN AND PELVIS IC                            PROCEDURE DATE:  04/02/2017        INTERPRETATION:      Three examinations were performed on this patient:   1. CT scan of the chest with intravenous contrast  2. CT scan of the abdomen with intravenous contrast  3. CT scan of the pelvis with intravenous contrast        CLINICAL INFORMATION (all 3 exams): Trauma, chest pain, abdominal pain    TECHNIQUE:  Contiguous axial 3 mm sections were obtained through the   chest, abdomen and pelvis using single helical acquisition.  Images were   acquired during the rapid bolus administration of 95 cc of Omnipaque 350/   5 cc discarded.  Imaging post processing software was employed to   generate reformatted images in 3 mm sagittal and coronal imaging planes.         Oral contrast was administered.   This scan was performed using   automatic exposure control (radiation dose reduction software) to obtain   a diagnostic image quality scan with patient dose as low as reasonably   achievable.         FINDINGS:   No previous examinations are available for review.    The thyroid gland appears intact.    The lungs are significant for a small LEFT pleural effusion with   underlying atelectasis.  Lung volumes are preserved. The trachea and   major bronchi are patent.    No enlarged axillary, hilar or mediastinal lymph nodes are found.   The   heart size is normal. The chest wall and thoracic spine are unremarkable.    There is moderate ascites.    The liver demonstrates homogeneous attenuation without focal lesion or   abnormal enhancement.  Hepatic size is normal but contours are nodular.   Hepatic and portal veins are patent and not displaced.  No intrahepatic   or common ductal dilatation is recognized.  The gallbladder is   significant for minimal cholelithiasis.  The pancreas is intact without   ductal dilatation or focal lesion.  The spleen is normal in size.    The adrenal glands are intact.  The kidneys demonstrate symmetric   nephrograms.  There is a 1 cm RIGHT renal cysts. No suspicious renal mass   is found.  No renal calculus, hydronephrosis or perinephric infiltration   is found.  The ureters are not dilated.   The bladder appears   unremarkable.    EXAM:  US DPLX LWR EXT VEINS COMPL BI                            PROCEDURE DATE:  04/07/2017        INTERPRETATION:  Clinical information: Pain and swelling of feet    COMPARISON: Left lower extremity venous ultrasound April 03, 2007    TECHNIQUE: Duplex sonography of the bilateral lower extremities with   color and spectral Doppler, with and without compression.      FINDINGS:    There is normal compressibility of the bilateral common femoral, femoral   and popliteal veins. No calf vein thrombosis is detected.    Doppler examination shows normal spontaneous and phasic flow.    IMPRESSION:     No evidence of bilateral lower extremity deep venous thrombosis.    EXAM:  LUMBAR SPINE(MRI)W O CON                            PROCEDURE DATE:  04/09/2017        INTERPRETATION:    MR lumbar without gadolinium       CLINICAL INFORMATION:   Bilateral leg weakness and numbness for 2 days   Lumbar spondylosis.    TECHNIQUE:   Sagittal and axial T1-weighted images, sagittal STIR images,   sagittal T2-weighted images and axial T1 and T2-weighted images of the   lumbar spine were obtained.         FINDINGS:   No prior similar studies are available for review.         Lumbar vertebral alignment is preserved.  Lumbar vertebral body heights   are maintained.  Marrow signal intensity within lumbar vertebral bodies   and posterior elements is unremarkable.  No osseous expansion, epidural   disease or paraspinal abnormality is found.          Lumbar intervertebral discs demonstrate disc degeneration and spondylosis   at T11-L3 L5-S1 with loss of disc height and associated degenerative   endplate changes. LEFT paracentral disc herniation is noted at L2-3   compressing the ventral thecal sac and narrowing the LEFT neural foramen.   Disc bulges at L3-4 and L4-5 and L5-S1 mild central stenosis at L2-3   through L4-5 on a degenerative basis due to disc bulge, facet osteophytic   hypertrophy and redundancy ligament of flavum. Flatten the ventral thecal   sac and narrow the BILATERAL neural foramina.    The distal cord maintains intact morphology.  Distal cord signal   intensity is preserved.  The conus is normally positioned at the T12   level.  Nerve roots of the cauda equina appear intact.  1 cm RIGHT renal   cysts.      IMPRESSION:  Disc degeneration and spondylosis at T11-12 through L5-S1   with loss of disc height and associated degenerative endplate changes.   LEFT paracentral disc herniation is noted at L2-3 compressing the ventral   thecal sac and narrowing the LEFT neural foramen. Disc bulges at L3-4 and   L4-5 and L5-S1 flatten the ventral thecal sac and narrow the BILATERAL   neural foramina.  Mild central stenosis at L2-3 through L4-5 on a degenerative basis.             EXAM:  LUMBAR SPINE(MRI)W O CON                            PROCEDURE DATE:  04/09/2017        INTERPRETATION:    MR lumbar without gadolinium       CLINICAL INFORMATION:   Bilateral leg weakness and numbness for 2 days   Lumbar spondylosis.    TECHNIQUE:   Sagittal and axial T1-weighted images, sagittal STIR images,   sagittal T2-weighted images and axial T1 and T2-weighted images of the   lumbar spine were obtained.         FINDINGS:   No prior similar studies are available for review.         Lumbar vertebral alignment is preserved.  Lumbar vertebral body heights   are maintained.  Marrow signal intensity within lumbar vertebral bodies   and posterior elements is unremarkable.  No osseous expansion, epidural   disease or paraspinal abnormality is found.          Lumbar intervertebral discs demonstrate disc degeneration and spondylosis   at T11-L3 L5-S1 with loss of disc height and associated degenerative   endplate changes. LEFT paracentral disc herniation is noted at L2-3   compressing the ventral thecal sac and narrowing the LEFT neural foramen.   Disc bulges at L3-4 and L4-5 and L5-S1 mild central stenosis at L2-3   through L4-5 on a degenerative basis due to disc bulge, facet osteophytic   hypertrophy and redundancy ligament of flavum. Flatten the ventral thecal   sac and narrow the BILATERAL neural foramina.    The distal cord maintains intact morphology.  Distal cord signal   intensity is preserved.  The conus is normally positioned at the T12   level.  Nerve roots of the cauda equina appear intact.  1 cm RIGHT renal   cysts.      IMPRESSION:  Disc degeneration and spondylosis at T11-12 through L5-S1   with loss of disc height and associated degenerative endplate changes.   LEFT paracentral disc herniation is noted at L2-3 compressing the ventral   thecal sac and narrowing the LEFT neural foramen. Disc bulges at L3-4 and   L4-5 and L5-S1 flatten the ventral thecal sac and narrow the BILATERAL   neural foramina.  Mild central stenosis at L2-3 through L4-5 on a degenerative basis.               MEDICATIONS  (STANDING):  multivitamin 1Tablet(s) Oral daily  aspirin 325milliGRAM(s) Oral daily  potassium chloride    Tablet ER 10milliEquivalent(s) Oral daily  pantoprazole    Tablet 40milliGRAM(s) Oral before breakfast  clopidogrel Tablet 75milliGRAM(s) Oral daily  atorvastatin 20milliGRAM(s) Oral at bedtime  magnesium oxide 800milliGRAM(s) Oral at bedtime  furosemide    Tablet 20milliGRAM(s) Oral daily  folic acid 1milliGRAM(s) Oral daily  thiamine 100milliGRAM(s) Oral daily  gabapentin 100milliGRAM(s) Oral three times a day  predniSONE   Tablet 10milliGRAM(s) Oral daily    MEDICATIONS  (PRN):      PHYSICAL EXAM:    Constitutional: NAD, awake and alert, well-developed  HEENT- no oral lesions noted, no lymphadenoapthy noted  Heart- S1 S2 reg  Lungs- CTA b/l  Abd- Soft NT- distended   Ext- +2-3 pitting b/l to mid calf, L slightly worse  Skin- no rashes +Ecchymosis multiple on arms and legs at areas of IV and phlebotomy sites.    Musculoskelatal- FROM all joints except b/l ankles- R + 3 pitting edema- nt; L +3 pitting ++ tenderness throughout- both limited dorsiflexion 2/2 swelling and L due to pain , minimal tenderness in left knee,   Neurological- proximal weakness b/l quads only- 4/5 b/l R slightly worse- quad muscles measure equal             No enlarged lymph nodes are found.  No ascites is present.   The osseous   structures show degenerative changes of the spine.          The bowel demonstrates scattered diverticulosis.  No obstruction,   perforation or abscess is recognized.           IMPRESSION:          1.   Chest:  small LEFT pleural effusion with underlying atelectasis          2.   Abdomen and pelvis: Moderate ascites.   Scattered   diverticulosis. Nodular liver suggesting cirrhosis, clinical correlation   needed. Cholelithiasis. 1 cm RIGHT renal cyst.

## 2017-04-12 DIAGNOSIS — S82.892A OTHER FRACTURE OF LEFT LOWER LEG, INITIAL ENCOUNTER FOR CLOSED FRACTURE: ICD-10-CM

## 2017-04-12 LAB
ANION GAP SERPL CALC-SCNC: 11 MMOL/L — SIGNIFICANT CHANGE UP (ref 5–17)
BUN SERPL-MCNC: 17 MG/DL — SIGNIFICANT CHANGE UP (ref 7–23)
CALCIUM SERPL-MCNC: 8.8 MG/DL — SIGNIFICANT CHANGE UP (ref 8.5–10.1)
CHLORIDE SERPL-SCNC: 104 MMOL/L — SIGNIFICANT CHANGE UP (ref 96–108)
CO2 SERPL-SCNC: 21 MMOL/L — LOW (ref 22–31)
CREAT SERPL-MCNC: 0.68 MG/DL — SIGNIFICANT CHANGE UP (ref 0.5–1.3)
GLUCOSE SERPL-MCNC: 150 MG/DL — HIGH (ref 70–99)
GLUCOSE SERPL-MCNC: 183 MG/DL — HIGH (ref 70–99)
HCT VFR BLD CALC: 31.2 % — LOW (ref 34.5–45)
HGB BLD-MCNC: 10.4 G/DL — LOW (ref 11.5–15.5)
IGG SERPL-MCNC: 1580 MG/DL — SIGNIFICANT CHANGE UP (ref 767–1590)
IGG1 SER-MCNC: 841 MG/DL — SIGNIFICANT CHANGE UP (ref 341–894)
IGG2 SER-MCNC: 670 MG/DL — HIGH (ref 171–632)
IGG3 SER-MCNC: 156 MG/DL — HIGH (ref 18.4–106)
IGG4 SER-MCNC: 53.8 MG/DL — SIGNIFICANT CHANGE UP (ref 2.4–121)
MCHC RBC-ENTMCNC: 33.1 GM/DL — SIGNIFICANT CHANGE UP (ref 32–36)
MCHC RBC-ENTMCNC: 39.2 PG — HIGH (ref 27–34)
MCV RBC AUTO: 118.4 FL — HIGH (ref 80–100)
PLATELET # BLD AUTO: 149 K/UL — LOW (ref 150–400)
POTASSIUM SERPL-MCNC: 4.3 MMOL/L — SIGNIFICANT CHANGE UP (ref 3.5–5.3)
POTASSIUM SERPL-SCNC: 4.3 MMOL/L — SIGNIFICANT CHANGE UP (ref 3.5–5.3)
RBC # BLD: 2.64 M/UL — LOW (ref 3.8–5.2)
RBC # FLD: 16.1 % — HIGH (ref 10.3–14.5)
SODIUM SERPL-SCNC: 136 MMOL/L — SIGNIFICANT CHANGE UP (ref 135–145)
WBC # BLD: 9.4 K/UL — SIGNIFICANT CHANGE UP (ref 3.8–10.5)
WBC # FLD AUTO: 9.4 K/UL — SIGNIFICANT CHANGE UP (ref 3.8–10.5)

## 2017-04-12 PROCEDURE — 73630 X-RAY EXAM OF FOOT: CPT | Mod: 26,LT

## 2017-04-12 PROCEDURE — 99232 SBSQ HOSP IP/OBS MODERATE 35: CPT

## 2017-04-12 RX ORDER — DEXTROSE 50 % IN WATER 50 %
25 SYRINGE (ML) INTRAVENOUS ONCE
Qty: 0 | Refills: 0 | Status: DISCONTINUED | OUTPATIENT
Start: 2017-04-12 | End: 2017-04-14

## 2017-04-12 RX ORDER — SODIUM CHLORIDE 9 MG/ML
1000 INJECTION, SOLUTION INTRAVENOUS
Qty: 0 | Refills: 0 | Status: DISCONTINUED | OUTPATIENT
Start: 2017-04-12 | End: 2017-04-14

## 2017-04-12 RX ORDER — GLUCAGON INJECTION, SOLUTION 0.5 MG/.1ML
1 INJECTION, SOLUTION SUBCUTANEOUS ONCE
Qty: 0 | Refills: 0 | Status: DISCONTINUED | OUTPATIENT
Start: 2017-04-12 | End: 2017-04-14

## 2017-04-12 RX ORDER — INSULIN LISPRO 100/ML
VIAL (ML) SUBCUTANEOUS AT BEDTIME
Qty: 0 | Refills: 0 | Status: DISCONTINUED | OUTPATIENT
Start: 2017-04-12 | End: 2017-04-14

## 2017-04-12 RX ORDER — INSULIN LISPRO 100/ML
VIAL (ML) SUBCUTANEOUS
Qty: 0 | Refills: 0 | Status: DISCONTINUED | OUTPATIENT
Start: 2017-04-12 | End: 2017-04-14

## 2017-04-12 RX ORDER — DEXTROSE 50 % IN WATER 50 %
1 SYRINGE (ML) INTRAVENOUS ONCE
Qty: 0 | Refills: 0 | Status: DISCONTINUED | OUTPATIENT
Start: 2017-04-12 | End: 2017-04-14

## 2017-04-12 RX ORDER — DEXTROSE 50 % IN WATER 50 %
12.5 SYRINGE (ML) INTRAVENOUS ONCE
Qty: 0 | Refills: 0 | Status: DISCONTINUED | OUTPATIENT
Start: 2017-04-12 | End: 2017-04-14

## 2017-04-12 RX ADMIN — Medication 1 TABLET(S): at 11:24

## 2017-04-12 RX ADMIN — GABAPENTIN 100 MILLIGRAM(S): 400 CAPSULE ORAL at 21:56

## 2017-04-12 RX ADMIN — GABAPENTIN 100 MILLIGRAM(S): 400 CAPSULE ORAL at 06:10

## 2017-04-12 RX ADMIN — Medication 1 MILLIGRAM(S): at 11:24

## 2017-04-12 RX ADMIN — ATORVASTATIN CALCIUM 20 MILLIGRAM(S): 80 TABLET, FILM COATED ORAL at 21:56

## 2017-04-12 RX ADMIN — Medication 100 MILLIGRAM(S): at 11:24

## 2017-04-12 RX ADMIN — GABAPENTIN 100 MILLIGRAM(S): 400 CAPSULE ORAL at 13:33

## 2017-04-12 RX ADMIN — PANTOPRAZOLE SODIUM 40 MILLIGRAM(S): 20 TABLET, DELAYED RELEASE ORAL at 07:35

## 2017-04-12 RX ADMIN — CLOPIDOGREL BISULFATE 75 MILLIGRAM(S): 75 TABLET, FILM COATED ORAL at 11:24

## 2017-04-12 RX ADMIN — Medication 20 MILLIGRAM(S): at 05:08

## 2017-04-12 RX ADMIN — MAGNESIUM OXIDE 400 MG ORAL TABLET 800 MILLIGRAM(S): 241.3 TABLET ORAL at 21:56

## 2017-04-12 RX ADMIN — Medication 10 MILLIGRAM(S): at 05:08

## 2017-04-12 RX ADMIN — Medication 325 MILLIGRAM(S): at 11:24

## 2017-04-12 RX ADMIN — Medication 10 MILLIEQUIVALENT(S): at 11:24

## 2017-04-12 NOTE — PROGRESS NOTE ADULT - SUBJECTIVE AND OBJECTIVE BOX
Patient is a 66y old  Female who presents with a chief complaint of weakness (02 Apr 2017 18:48)      HPI:  Pt is a 67 yo woman with hx liver cirrhosis, variceal esophageal bvanding, RA on prednisone, who was admitted recently 2-29 to 3-3 for cirrhosis and vomiting.   She reports weakness and inability to walk (legs feel like jelly). She has been incontinent of urine and stool for a few weeks as  she does not make it to the bathroom in time.  Pt fell off the couch his morning.  She also had two other falls, including 3/3/17 getting out of car to home in driveway, and  last evening when she slid off couch, struck a hard box with left chest and twisted her left ankle.  Pt's significant other reports she is  unable to support her own weight and is having trouble with her balance.  She denies cough, resp symptoms.  No fever/chills, n/v, cpain, SOB.  She has 1-3 loose stools/day.   Dr. Osorio discontinued her methotrexate, bystolic, and HCTZ on 3/10/17.            She  stopped drinking 5 days prior to last admission.  No head injury during any fall. (02 Apr 2017 18:48)    pt with cont weakness  neg N V and kyle po  LLE swelling and possible Fx  BP improved      PAST MEDICAL & SURGICAL HISTORY:  HTN (hypertension)  Rheumatoid arthritis  No significant past surgical history      MEDICATIONS  (STANDING):  multivitamin 1Tablet(s) Oral daily  aspirin 325milliGRAM(s) Oral daily  potassium chloride    Tablet ER 10milliEquivalent(s) Oral daily  pantoprazole    Tablet 40milliGRAM(s) Oral before breakfast  clopidogrel Tablet 75milliGRAM(s) Oral daily  atorvastatin 20milliGRAM(s) Oral at bedtime  magnesium oxide 800milliGRAM(s) Oral at bedtime  furosemide    Tablet 20milliGRAM(s) Oral daily  folic acid 1milliGRAM(s) Oral daily  thiamine 100milliGRAM(s) Oral daily  gabapentin 100milliGRAM(s) Oral three times a day  predniSONE   Tablet 10milliGRAM(s) Oral daily    MEDICATIONS  (PRN):      Allergies    sulfa drugs (Unknown)    Intolerances        SOCIAL HISTORY:unchanged    FAMILY HISTORY:  No pertinent family history in first degree relatives      REVIEW OF SYSTEMS:    CONSTITUTIONAL: No weakness, fevers or chills  EYES/ENT: No visual changes;  No vertigo or throat pain   NECK: No pain or stiffness  RESPIRATORY: No cough, wheezing, hemoptysis; No shortness of breath  CARDIOVASCULAR: No chest pain or palpitations  GENITOURINARY: No dysuria, frequency or hematuria  NEUROLOGICAL: No numbness or weakness  SKIN: No itching, burning, rashes, or lesions   All other review of systems is negative unless indicated above.    Vital Signs Last 24 Hrs  T(C): 36.3, Max: 36.3 (04-11 @ 11:16)  T(F): 97.4, Max: 97.4 (04-11 @ 21:14)  HR: 106 (106 - 112)  BP: 109/72 (102/61 - 109/72)  BP(mean): --  RR: 17 (17 - 18)  SpO2: 96% (96% - 98%)    PHYSICAL EXAM:    Constitutional: NAD, well-developed  HEENT: EOMI, throat clear  Neck: No LAD, supple  Respiratory: CTA and P  Cardiovascular: S1 and S2, RRR, no M  Gastrointestinal: BS+, soft, NT/ND, neg HSM,  Extremities: LLE>RLE peripheral edema, neg clubing, cyanosis  Vascular: 2+ peripheral pulses  Neurological: A/O x 3, no focal deficits  Psychiatric: Normal mood, normal affect  Skin: No rashes    LABS:  CBC Full  -  ( 12 Apr 2017 05:46 )  WBC Count : 9.4 K/uL  Hemoglobin : 10.4 g/dL  Hematocrit : 31.2 %  Platelet Count - Automated : 149 K/uL  Mean Cell Volume : 118.4 fl  Mean Cell Hemoglobin : 39.2 pg  Mean Cell Hemoglobin Concentration : 33.1 gm/dL  Auto Neutrophil # : x  Auto Lymphocyte # : x  Auto Monocyte # : x  Auto Eosinophil # : x  Auto Basophil # : x  Auto Neutrophil % : x  Auto Lymphocyte % : x  Auto Monocyte % : x  Auto Eosinophil % : x  Auto Basophil % : x    04-12    136  |  104  |  17  ----------------------------<  150<H>  4.3   |  21<L>  |  0.68    Ca    8.8      12 Apr 2017 05:46              RADIOLOGY & ADDITIONAL STUDIES:M:  MRI ANKLE WO C LEFT                            PROCEDURE DATE:  04/11/2017        INTERPRETATION:  MRI ANKLE WO C LEFT dated 4/11/2017 4:58 PM     INDICATION: Left ankle swelling and pain for three days. Alcohol   cirrhosis with severe peripheral edema. RA.    TECHNIQUE: Multi-sequential, multiplanar MRI imaging of the ankle and   hindfoot was performed per standard protocol.     COMPARISON: Ankle radiographs dated 4/2/2017    FINDINGS: There is diffuse soft tissue swelling. The swellingis most   prominent along the dorsum of the ankle and hindfoot. The swelling   extends into the midfoot and forefoot but is only partially included on   this study. There is no drainable fluid collection. No skin defect is   appreciated. There is moderate fluid surrounding the posterior tibialis   tendon (series 5, image 15) suggestive of tenosynovitis. There is no   full-thickness tear or tendon retraction. The Achilles tendon is   preserved. Plantar fascia is unremarkable.    Evaluation of the bone marrow signal demonstrates a nondisplaced fracture   of the medial malleolus. There are nondisplaced fractures involving the   distal anterior tibia and the posterior malleolus. There is a marrow   contusion in the distal fibular diaphysis. Thereare fractures involving   the base of the second and third metatarsals. There is a nondisplaced   fracture within the distal calcaneus. There is a nondisplaced fracture   within the anterolateral portion of the medial cuneiform at the base of   the origin of the interosseous Lisfranc ligament with edema in the   Lisfranc alignment suggesting Lisfranc ligamentous injury. There is no   joint effusion.    There is no soft tissue mass. Neurovascular structures are preserved.      IMPRESSION:   1.  Nondisplaced fractures of the medial malleolus, anterior distal   tibia, and posterior malleolus.  2.  Nondisplaced fractures at the base of the second and third   metatarsals.  3.  Edema within the Lisfranc ligament with nondisplaced fracture at the   lateral aspect of the medial cuneiform at the base of the Lisfranc   ligament suggesting Lisfranc injury.  4.  Contusion at the anterolateral medial cuneiform and distal fibula.  5.  Nondisplaced fracture of the distal calcaneus.  6.  Large diffuse soft tissue swelling is nonspecific.  7.  Tenosynovitis of the posterior tibialis tendon likely posttraumatic    Findings were discussed with LAMAR ROSS on 4/12/2017 9:05 AM    with read back.              ANDRE MENENDEZ   This document has been electronically signed. Apr 12 2017  9:15AM

## 2017-04-12 NOTE — PROGRESS NOTE ADULT - ASSESSMENT
LLE fx  await ortho input    Diarrhea and has improved  had empiric tx for c diff and she could not do stool tests  neg diarrhea todaya nd will follow      Hx of chronic diarrhea that has also improved and will follow, Mg may inc it and DW pt      hypotension  RO adrenal insuff, on chronic steroids  endocrine note appreciated and pred increased, DCed propranolol and aldactone (she is been missing doses often)          pos troponins  DW cardioogy, pt had bare metal stent and they are considering duration of double anti plt with inc risk bleed  DW cardiology and they recommend plavix for at least one month and will possibly stop it after that  DW pt again       Cirrhosis due to ETOH and has stopped    ascites and LE edema      likely adrenal insuff with years of steroids and now on inc dose steroids,  BP is improving    consider slow taper of pred, managed by endocrine or Rheum, tolerating taper    Rheum note appreciated  inc ESR noted

## 2017-04-12 NOTE — PROGRESS NOTE ADULT - SUBJECTIVE AND OBJECTIVE BOX
TD  04/12:  5S.  no new complaints.  placed on NWB LLE.    Allergies    sulfa drugs (Unknown)    Intolerances      MEDICATIONS  (STANDING):  multivitamin 1Tablet(s) Oral daily  aspirin 325milliGRAM(s) Oral daily  potassium chloride    Tablet ER 10milliEquivalent(s) Oral daily  pantoprazole    Tablet 40milliGRAM(s) Oral before breakfast  clopidogrel Tablet 75milliGRAM(s) Oral daily  atorvastatin 20milliGRAM(s) Oral at bedtime  magnesium oxide 800milliGRAM(s) Oral at bedtime  furosemide    Tablet 20milliGRAM(s) Oral daily  folic acid 1milliGRAM(s) Oral daily  thiamine 100milliGRAM(s) Oral daily  gabapentin 100milliGRAM(s) Oral three times a day  predniSONE   Tablet 10milliGRAM(s) Oral daily  insulin lispro (HumaLOG) corrective regimen sliding scale  SubCutaneous three times a day before meals  insulin lispro (HumaLOG) corrective regimen sliding scale  SubCutaneous at bedtime  dextrose 5%. 1000milliLiter(s) IV Continuous <Continuous>  dextrose 50% Injectable 12.5Gram(s) IV Push once  dextrose 50% Injectable 25Gram(s) IV Push once  dextrose 50% Injectable 25Gram(s) IV Push once    MEDICATIONS  (PRN):  dextrose Gel 1Dose(s) Oral once PRN Blood Glucose LESS THAN 70 milliGRAM(s)/deciliter  glucagon  Injectable 1milliGRAM(s) IntraMuscular once PRN Glucose LESS THAN 70 milligrams/deciliter    Vital Signs Last 24 Hrs  T(C): 36.4, Max: 36.4 (04-12 @ 11:27)  T(F): 97.5, Max: 97.5 (04-12 @ 11:27)  HR: 114 (106 - 114)  BP: 103/60 (102/61 - 109/72)  BP(mean): --  RR: 16 (16 - 18)  SpO2: 94% (94% - 96%)    chronically ill appearing WF in no acute distress.  NCAT.  sclera icteric, mild.  neck supple.  JVP nml.  lungs CTA.  no WRR.  heart RRR.  NS1S2.  no MRG.  distended.  soft.  NT.  no mass.  no guarding.  no rebound.  LE good muscle tone.  limited range of motion.  b/l LE (+) 1-2 pitting edema, L>R.  (+) left food edema.  (+) ecchimosis plantar aspect.  A&Ox3.                        10.4   9.4   )-----------( 149      ( 12 Apr 2017 05:46 )             31.2       04-12    136  |  104  |  17  ----------------------------<  150<H>  4.3   |  21<L>  |  0.68    Ca    8.8      12 Apr 2017 05:46

## 2017-04-12 NOTE — CONSULT NOTE ADULT - SUBJECTIVE AND OBJECTIVE BOX
66y Female PMH: RA, CAD, Stents, ASA/Plavix, Alcoholic cirrhosis, HTN community ambulatory c/o L ankle pain s/p fall off couch approx 3 weks ago. She stepped onto a water bottle and thinks she may have twisted ankle. Has had difficulty bearing weight since. XR on 4/2 negative for obvious fx. MRI 4/11 shows ankle nondisplaced distal tibia fractures and multiple foot fractures. Denies HS/LOC. Denies numbness/tingling. No other pain/injuries. Denies fevers/chills. Ortho is consulted for management of Left ankle.    HEALTH ISSUES - PROBLEM Dx:  Degenerative disc disease, lumbar: Degenerative disc disease, lumbar  Macrocytic anemia: Macrocytic anemia  Coronary artery disease: Coronary artery disease  Adrenal insufficiency: Adrenal insufficiency  Rheumatoid arthritis: Rheumatoid arthritis  Cirrhosis: Cirrhosis  Weakness: Weakness        MEDICATIONS  (STANDING):  multivitamin 1Tablet(s) Oral daily  aspirin 325milliGRAM(s) Oral daily  potassium chloride    Tablet ER 10milliEquivalent(s) Oral daily  pantoprazole    Tablet 40milliGRAM(s) Oral before breakfast  clopidogrel Tablet 75milliGRAM(s) Oral daily  atorvastatin 20milliGRAM(s) Oral at bedtime  magnesium oxide 800milliGRAM(s) Oral at bedtime  furosemide    Tablet 20milliGRAM(s) Oral daily  folic acid 1milliGRAM(s) Oral daily  thiamine 100milliGRAM(s) Oral daily  gabapentin 100milliGRAM(s) Oral three times a day  predniSONE   Tablet 10milliGRAM(s) Oral daily    Allergies    sulfa drugs (Unknown)    Intolerances      Imaging: MRI was reviewed and demonstrates multiple nondisplaced fractures of the Left distal tibia and multiple minimally displaced fractures foot.                         10.4   9.4   )-----------( 149      ( 12 Apr 2017 05:46 )             31.2     12 Apr 2017 05:46    136    |  104    |  17     ----------------------------<  150    4.3     |  21     |  0.68     Ca    8.8        12 Apr 2017 05:46        Vital Signs Last 24 Hrs  T(C): 36.3, Max: 36.3 (04-11 @ 11:16)  T(F): 97.4, Max: 97.4 (04-11 @ 21:14)  HR: 106 (106 - 112)  BP: 109/72 (102/61 - 109/72)  BP(mean): --  RR: 17 (17 - 18)  SpO2: 96% (96% - 98%)    Physical Exam  Gen: Nad  Left LE: Skin intact with ecchymosis lateral ankle and plantar aspects. Global Swelling noted to entired foot that is edematous and compressible. +mild TTP medial/lateral malleolus, no bony ttp elsewhere, +ehl/fhl/ta/gs function, dp/pt pulse intact, negative log roll, able to SLR, compartments soft/compressive, extremity warm/well perfused. No knee tenderness or swelling.  Secondary Survey: Benign, no bony ttp elsewhere, NV intact      RIGHT UE: No open skin. No obvious deformities or other signs of trauma at shoulder, upper arm, elbow, forearm, wrist or hand.  Full baseline painless ROM at shoulder, elbow, wrist, and . No bony TTP. SILT in axillary, radial, median, and ulnar distributions. 2+ radial pulse with brisk cap refill at distal finger tips. Compartments soft and compressible. Strength 5/5.      LEFT UE: No open skin. No obvious deformities or other signs of trauma at shoulder, upper arm, elbow, forearm, wrist or hand.  Full baseline painless ROM at shoulder, elbow, wrist, and . No bony TTP. SILT in axillary, radial, median, and ulnar distributions. 2+ radial pulse with brisk cap refill at distal finger tips. Compartments soft and compressible. Strength 5/5.    HIPS and PELVIS: Pelvis stable to AP and Lat compression. Able to actively SLR bilaterally. Negative Log Roll, Negative Heel strike bilaterally. No pain on internal/external rotation of the hips.    RIGHT LE: No open skin. No deformities or other signs of trauma at hip, thigh, knee, leg, ankle or foot. Full baseline painless ROM at hip, knee, ankle and toes with negative log-roll and heel strike. Able to actively SLR. SILT toes 1-5. No bony TTP to hip, knee or ankle. 2+ DP/PT pulses with brisk cap refill distally. Compartments soft and compressible. No pain on passive stretch. Strength 5/5.      A/P: 66y Female with nondisplaced subacute Left ankle and foot fractures seen on MRI  Analgesia  Elevation  NWB Left LE with CAM boot, OOB rolling walker with assist  CAM boot LLE has been ordered.  Ice  DVT PE ppx  Pt seen and examined with Dr. Fung, reviewed/discussed imaging studies  Follow up with Dr. Fung within 1-2 weeks, call office for appointment  Ortho stable for discharge after CAM boot arrives,

## 2017-04-12 NOTE — PROGRESS NOTE ADULT - ASSESSMENT
Assessment and Plan:   · Assessment		  Pt is a 67 yo woman with RA since 2009, with hx alcoholic liver cirrhosis, variceal esophageal banding, admitted for b/L LE weakness.  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 Long hx RA on steroids long term and MTX (stopped few months ago)- no evidence active RA confirmed by MRI ankle as noted above.     1) Ankle/ foot:  multiple non displaced fractures as noted.  Refer to ortho for further recommendations  2) RA:  would mnt current low dose prednisone for now and through rehab to mnt HD and continue to control condition.  Will need steroid sparing agent upon d/c from rehab, advised f/u with rheum  3) Elevated ESR w/ relatively nl CRP:  in face extensive injuries to L foot- may explain degree of elevation.  Will monitor over time. At this point, no other obvious explanation of such an elevation especially given essentially negative chest/ abd/ pelvic CTscan, nl renal function.  CBC stable w/ macrocytic anemia c/w ETOH use   -  may still need to consider SBA if inflammatory markers don't resolve w/ time - may need cx peritoneal fluid- though NO pain on exam and no fever/ leukocytosis can be seen in this setting.  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 proximal weakness  though only noted in lower extr- but fairly equal.  Pathology in spine could explain some but unlikely to be so symmetrical.  - may still benefit from neuro eval as outpatient w/ full emg/ ncs     Plan:  - continue PO steroids at lowest effective dose 5-15 mg as needed for HD stability  - ortho consult now  - Consider Neuro input for likely myopathy, should we do MRI thigh muscles to confirm myopathy- not necessary at this time. CK nl but this can be seen in alcoholic myopathy  - continue as other tx as noted

## 2017-04-12 NOTE — PROGRESS NOTE ADULT - SUBJECTIVE AND OBJECTIVE BOX
· Subjective and Objective: 	  CHIEF COMPLAINT: weakness    SUBJECTIVE: Pt is a 65 yo woman with hx alcoholic liver cirrhosis, variceal esophageal banding, RA on prednisone, who was admitted recently 2-29 to 3-3 for cirrhosis and vomiting and renal insufficiency- last drink 5 days PTA.   She reports weakness and inability to walk. . She has been incontinent of urine and stool for a few weeks as she does not make it to the bathroom in time.  Pt fell off the couch on the day of admission.  She also had two other falls, including 3/3/17 getting out of car to home in driveway, and the night prior to admission, when she was toileting herself.   Pt was unable to support her own weight and is having trouble with her balance.    Patient was diagnosed with RA in 2009, by Dr. Montes.  She has been on MTX and prednisone 5mg daily since 2009.  She denies using higher doses of prednisone.  Her MTX was discontinued on 3/10/17, and prednisone was increased to 15mg daily.     Patient continues to feel that her joints are quiet.  She has had RA flares in the past and knows she is not having a flare now.    She feels pain diffusely in lower extremities, b/L.  No significant pain on moving ankles.  She also complains of burning/tingling in her hands from her wrists to her fingertips.    4/10/17.  No new complaints this morning except continued numbness/tingling/pain in hands and lower extremities, and weakness in extremities.      4/11/17 Stable today, still unable to walk more than few steps.  Pain L ankle worse w/ movement.  VSS    4/12/17 No improvement w/ steroid dose (125 mg solumedrol)- though swelling in L foot slightly better, pain unchanged.   MRI L ankle + multiple fractures.  Had reported a fall PTA, now describes stepping on water bottle, rolling off with ankle and falling to ground- c/w injury noted on films (see below)  Still denies other joint pain/ swelling/ stiffness  Weakness in quads unchanged         REVIEW OF SYSTEMS:    General: denies fevers, chills or night sweats  Skin: denies rashes or photosensitivity  Ophthalmologic: denies sicca symptoms  ENMT: denies sicca, frequent sinus infections, or ear infections, denies nasal perforation	  Respiratory and Thorax: denies asthma, chest pain or SOB  Cardiovascular: denies chest pain or SOB  ENMT: denies frequent sinus infections, or ear infections, denies nasal perforation	  Respiratory and Thorax: denies asthma, chest pain or SOB  Cardiovascular: denies chest pain or SOB	  Gastrointestinal: denies colitis	  Musculoskeletal: denies prolonged morning stiffness  Neurological: leg weakness    Vital Signs Last 24 Hrs  T(C): 36.4, Max: 36.4 (04-12 @ 11:27)  T(F): 97.5, Max: 97.5 (04-12 @ 11:27)  HR: 114 (106 - 114)  BP: 103/60 (102/61 - 109/72)  BP(mean): --  RR: 16 (16 - 18)  SpO2: 94% (94% - 96%)                                     10.4   9.4   )-----------( 149      ( 12 Apr 2017 05:46 )             31.2     04-12    136  |  104  |  17  ----------------------------<  150<H>  4.3   |  21<L>  |  0.68    Ca    8.8      12 Apr 2017 05:46      Uric acid 7.8 (4/11/17)  - repeat 101 4/10/17 w/ CRP 2.2 (repeated, Fibrinogen low 250  AST 67, nl ALT, Bili 2.1  CK normal x 2  CCP > 250, FREEDOM 1:320S w/ neg AMA, SMA       EXAM:  CT ABDOMEN AND PELVIS IC                        PROCEDURE DATE:  04/02/2017    INTERPRETATION:    Three examinations were performed on this patient:   1. CT scan of the chest with intravenous contrast  2. CT scan of the abdomen with intravenous contrast  3. CT scan of the pelvis with intravenous contrast  CLINICAL INFORMATION (all 3 exams): Trauma, chest pain, abdominal pain    FINDINGS:   No previous examinations are available for review.    The thyroid gland appears intact.    The lungs are significant for a small LEFT pleural effusion with   underlying atelectasis.  Lung volumes are preserved. The trachea and   major bronchi are patent.    No enlarged axillary, hilar or mediastinal lymph nodes are found.   The   heart size is normal. The chest wall and thoracic spine are unremarkable.    There is moderate ascites.    The liver demonstrates homogeneous attenuation without focal lesion or   abnormal enhancement.  Hepatic size is normal but contours are nodular.   Hepatic and portal veins are patent and not displaced.  No intrahepatic   or common ductal dilatation is recognized.  The gallbladder is   significant for minimal cholelithiasis.  The pancreas is intact without   ductal dilatation or focal lesion.  The spleen is normal in size.    The adrenal glands are intact.  The kidneys demonstrate symmetric   nephrograms.  There is a 1 cm RIGHT renal cysts. No suspicious renal mass   is found.  No renal calculus, hydronephrosis or perinephric infiltration   is found.  The ureters are not dilated.   The bladder appears   unremarkable.    EXAM:  US DPLX LWR EXT VEINS COMPL BI                        PROCEDURE DATE:  04/07/2017    INTERPRETATION:  Clinical information: Pain and swelling of feet      IMPRESSION:     No evidence of bilateral lower extremity deep venous thrombosis.    EXAM:  LUMBAR SPINE(MRI)W O CON                        PROCEDURE DATE:  04/09/2017    INTERPRETATION:    MR lumbar without gadolinium       CLINICAL INFORMATION:   Bilateral leg weakness and numbness for 2 days   Lumbar spondylosis.    FINDINGS:   No prior similar studies are available for review.         Lumbar vertebral alignment is preserved.  Lumbar vertebral body heights   are maintained.  Marrow signal intensity within lumbar vertebral bodies   and posterior elements is unremarkable.  No osseous expansion, epidural   disease or paraspinal abnormality is found.          Lumbar intervertebral discs demonstrate disc degeneration and spondylosis   at T11-L3 L5-S1 with loss of disc height and associated degenerative   endplate changes. LEFT paracentral disc herniation is noted at L2-3   compressing the ventral thecal sac and narrowing the LEFT neural foramen.   Disc bulges at L3-4 and L4-5 and L5-S1 mild central stenosis at L2-3   through L4-5 on a degenerative basis due to disc bulge, facet osteophytic   hypertrophy and redundancy ligament of flavum. Flatten the ventral thecal   sac and narrow the BILATERAL neural foramina.    The distal cord maintains intact morphology.  Distal cord signal   intensity is preserved.  The conus is normally positioned at the T12   level.  Nerve roots of the cauda equina appear intact.  1 cm RIGHT renal   cysts.      IMPRESSION:  Disc degeneration and spondylosis at T11-12 through L5-S1   with loss of disc height and associated degenerative endplate changes.   LEFT paracentral disc herniation is noted at L2-3 compressing the ventral   thecal sac and narrowing the LEFT neural foramen. Disc bulges at L3-4 and   L4-5 and L5-S1 flatten the ventral thecal sac and narrow the BILATERAL   neural foramina.  Mild central stenosis at L2-3 through L4-5 on a degenerative basis.             EXAM:  MRI ANKLE WO C LEFT                        PROCEDURE DATE:  04/11/2017    INTERPRETATION:  MRI ANKLE WO C LEFT dated 4/11/2017 4:58 PM     INDICATION: Left ankle swelling and pain for three days. Alcohol   cirrhosis with severe peripheral edema. RA.    COMPARISON: Ankle radiographs dated 4/2/2017    FINDINGS: There is diffuse soft tissue swelling. The swelling is most   prominent along the dorsum of the ankle and hindfoot. The swelling   extends into the midfoot and forefoot but is only partially included on   this study. There is no drainable fluid collection. No skin defect is   appreciated. There is moderate fluid surrounding the posterior tibialis   tendon (series 5, image 15) suggestive of tenosynovitis. There is no   full-thickness tear or tendon retraction. The Achilles tendon is   preserved. Plantar fascia is unremarkable.    Evaluation of the bone marrow signal demonstrates a nondisplaced fracture   of the medial malleolus. There are nondisplaced fractures involving the   distal anterior tibia and the posterior malleolus. There is a marrow   contusion in the distal fibular diaphysis. There are fractures involving   the base of the second and third metatarsals. There is a nondisplaced   fracture within the distal calcaneus. There is a nondisplaced fracture   within the anterolateral portion of the medial cuneiform at the base of   the origin of the interosseous Lisfranc ligament with edema in the   Lisfranc alignment suggesting Lisfranc ligamentous injury. There is no   joint effusion.    There is no soft tissue mass. Neurovascular structures are preserved.      IMPRESSION:   1.  Nondisplaced fractures of the medial malleolus, anterior distal   tibia, and posterior malleolus.  2.  Nondisplaced fractures at the base of the second and third   metatarsals.  3.  Edema within the Lisfranc ligament with nondisplaced fracture at the   lateral aspect of the medial cuneiform at the base of the Lisfranc   ligament suggesting Lisfranc injury.  4.  Contusion at the anterolateral medial cuneiform and distal fibula.  5.  Nondisplaced fracture of the distal calcaneus.  6.  Large diffuse soft tissue swelling is nonspecific.  7.  Tenosynovitis of the posterior tibialis tendon likely posttraumatic    Findings were discussed with LAMAR ROSS on 4/12/2017 9:05 AM    with read back.    MEDICATIONS  (STANDING):  multivitamin 1Tablet(s) Oral daily  aspirin 325milliGRAM(s) Oral daily  potassium chloride    Tablet ER 10milliEquivalent(s) Oral daily  pantoprazole    Tablet 40milliGRAM(s) Oral before breakfast  clopidogrel Tablet 75milliGRAM(s) Oral daily  atorvastatin 20milliGRAM(s) Oral at bedtime  magnesium oxide 800milliGRAM(s) Oral at bedtime  furosemide    Tablet 20milliGRAM(s) Oral daily  folic acid 1milliGRAM(s) Oral daily  thiamine 100milliGRAM(s) Oral daily  gabapentin 100milliGRAM(s) Oral three times a day  predniSONE   Tablet 10milliGRAM(s) Oral daily  insulin lispro (HumaLOG) corrective regimen sliding scale  SubCutaneous three times a day before meals  insulin lispro (HumaLOG) corrective regimen sliding scale  SubCutaneous at bedtime  dextrose 5%. 1000milliLiter(s) IV Continuous <Continuous>  dextrose 50% Injectable 12.5Gram(s) IV Push once  dextrose 50% Injectable 25Gram(s) IV Push once  dextrose 50% Injectable 25Gram(s) IV Push once    MEDICATIONS  (PRN):  dextrose Gel 1Dose(s) Oral once PRN Blood Glucose LESS THAN 70 milliGRAM(s)/deciliter  glucagon  Injectable 1milliGRAM(s) IntraMuscular once PRN Glucose LESS THAN 70 milligrams/deciliter        PHYSICAL EXAM:    Constitutional: NAD, awake and alert, well-developed  HEENT- no oral lesions noted, no lymphadenoapthy noted  Heart- S1 S2 reg  Lungs- CTA b/l  Abd- Soft NT- distended   Ext- +2-3 pitting b/l to mid calf, L > R worse focused at foot   Skin- no rashes +Ecchymosis multiple on arms and legs at areas of IV and phlebotomy sites, plantar surface and lateral aspect of L foot- dorsum foot as well.    Musculoskelatal- FROM all joints except b/l ankles- R + 3 pitting edema- nt; L +3 pitting ++ tenderness throughout- both limited dorsiflexion 2/2 swelling and L due to pain , minimal tenderness in left knee,   Neurological- proximal weakness b/l quads only- 4/5 b/l R slightly worse- quad muscles measure equal

## 2017-04-13 ENCOUNTER — RESULT REVIEW (OUTPATIENT)
Age: 67
End: 2017-04-13

## 2017-04-13 LAB — HBA1C BLD-MCNC: 5 % — SIGNIFICANT CHANGE UP (ref 4–5.6)

## 2017-04-13 PROCEDURE — 99233 SBSQ HOSP IP/OBS HIGH 50: CPT

## 2017-04-13 RX ORDER — ONDANSETRON 8 MG/1
4 TABLET, FILM COATED ORAL EVERY 8 HOURS
Qty: 0 | Refills: 0 | Status: DISCONTINUED | OUTPATIENT
Start: 2017-04-13 | End: 2017-04-18

## 2017-04-13 RX ORDER — GABAPENTIN 400 MG/1
300 CAPSULE ORAL THREE TIMES A DAY
Qty: 0 | Refills: 0 | Status: DISCONTINUED | OUTPATIENT
Start: 2017-04-13 | End: 2017-04-18

## 2017-04-13 RX ADMIN — GABAPENTIN 300 MILLIGRAM(S): 400 CAPSULE ORAL at 13:06

## 2017-04-13 RX ADMIN — Medication 1 TABLET(S): at 13:05

## 2017-04-13 RX ADMIN — PANTOPRAZOLE SODIUM 40 MILLIGRAM(S): 20 TABLET, DELAYED RELEASE ORAL at 07:43

## 2017-04-13 RX ADMIN — ATORVASTATIN CALCIUM 20 MILLIGRAM(S): 80 TABLET, FILM COATED ORAL at 22:01

## 2017-04-13 RX ADMIN — CLOPIDOGREL BISULFATE 75 MILLIGRAM(S): 75 TABLET, FILM COATED ORAL at 13:05

## 2017-04-13 RX ADMIN — Medication 100 MILLIGRAM(S): at 13:05

## 2017-04-13 RX ADMIN — ONDANSETRON 4 MILLIGRAM(S): 8 TABLET, FILM COATED ORAL at 13:53

## 2017-04-13 RX ADMIN — Medication 20 MILLIGRAM(S): at 13:06

## 2017-04-13 RX ADMIN — MAGNESIUM OXIDE 400 MG ORAL TABLET 800 MILLIGRAM(S): 241.3 TABLET ORAL at 22:01

## 2017-04-13 RX ADMIN — Medication 325 MILLIGRAM(S): at 13:05

## 2017-04-13 RX ADMIN — GABAPENTIN 100 MILLIGRAM(S): 400 CAPSULE ORAL at 05:38

## 2017-04-13 RX ADMIN — Medication 10 MILLIEQUIVALENT(S): at 13:06

## 2017-04-13 RX ADMIN — Medication 10 MILLIGRAM(S): at 05:38

## 2017-04-13 RX ADMIN — Medication 1 MILLIGRAM(S): at 13:05

## 2017-04-13 RX ADMIN — GABAPENTIN 300 MILLIGRAM(S): 400 CAPSULE ORAL at 22:01

## 2017-04-13 NOTE — PROGRESS NOTE ADULT - PROBLEM SELECTOR PLAN 7
NSTEMI PCI LAD BMS.  DAPT + statin.
prednisone escalated to 15mg po qd.  monitor BP.  if further hypotension, will consider starting midodrine.
prednisone escalated to 15mg po qd.  monitor BP.  if further hypotension, will consider starting midodrine.
NSTEMI PCI LAD BMS.  DAPT + statin.

## 2017-04-13 NOTE — PROGRESS NOTE ADULT - PROBLEM SELECTOR PLAN 5
NSTEMI PCI LAD BMS.  DAPT + statin.
ascities.  elevated ESR.  afebrile.  nml WBCs.  IR ultrasound guided paracentesis for low volume cell cound and culture.  d/w GI.  hx EV-banding;  peripheral edema.  propranolol and spironolactone held owing to hypotension.  c/w Lasix.  low Na.  GI following.
elevated inflammatory markers (ESR/CRP).  c/w prednisone.  Rheumatology input appreciated.  DMARDs and EMG/NCS to be accomplished as outpatient.  start gabapentin 100mg TID .
elevated inflammatory markers (ESR/CRP).  c/w prednisone.  Rheumatology input appreciated.  DMARDs and EMG/NCS to be accomplished as outpatient.  start gabapentin 100mg TID .
hx EV-banding;  peripheral edema.  propranolol and spironolactone held owing to hypotension.  c/w Lasix.  low Na.  GI following.
prednisone escalated to 15mg po qd.  monitor BP.  if further hypotension, will consider starting midodrine.
NSTEMI PCI LAD BMS.  DAPT + statin.

## 2017-04-13 NOTE — PROGRESS NOTE ADULT - SUBJECTIVE AND OBJECTIVE BOX
· Subjective and Objective: 	  CHIEF COMPLAINT: weakness    SUBJECTIVE: Pt is a 65 yo woman with hx alcoholic liver cirrhosis, variceal esophageal banding, RA on prednisone, who was admitted recently 2-29 to 3-3 for cirrhosis and vomiting and renal insufficiency- last drink 5 days PTA.   She reports weakness and inability to walk. . She has been incontinent of urine and stool for a few weeks as she does not make it to the bathroom in time.  Pt fell off the couch on the day of admission.  She also had two other falls, including 3/3/17 getting out of car to home in driveway, and the night prior to admission, when she was toileting herself.   Pt was unable to support her own weight and is having trouble with her balance.    Patient was diagnosed with RA in 2009, by Dr. Montes.  She has been on MTX and prednisone 5mg daily since 2009.  She denies using higher doses of prednisone.  Her MTX was discontinued on 3/10/17, and prednisone was increased to 15mg daily.     Patient continues to feel that her joints are quiet.  She has had RA flares in the past and knows she is not having a flare now.    She feels pain diffusely in lower extremities, b/L.  No significant pain on moving ankles.  She also complains of burning/tingling in her hands from her wrists to her fingertips.    4/10/17.  No new complaints this morning except continued numbness/tingling/pain in hands and lower extremities, and weakness in extremities.      4/11/17 Stable today, still unable to walk more than few steps.  Pain L ankle worse w/ movement.  VSS    4/12/17 No improvement w/ steroid dose (125 mg solumedrol)- though swelling in L foot slightly better, pain unchanged.   MRI L ankle + multiple fractures.  Had reported a fall PTA, now describes stepping on water bottle, rolling off with ankle and falling to ground- c/w injury noted on films (see below)  Still denies other joint pain/ swelling/ stiffness  Weakness in quads unchanged    4/13/17  Swelling in BLE improved w/ elevation through day yestr, still no other synovitis.  Increased c/o neuropathic pain in hands/ feet "sensitive to touch"  Seen by ortho/ cam boot ordered nwb for next few wks.    BP still low 100-110/60-70         REVIEW OF SYSTEMS:    General: denies fevers, chills or night sweats  Skin: denies rashes or photosensitivity  Ophthalmologic: denies sicca symptoms  ENMT: denies sicca, frequent sinus infections, or ear infections, denies nasal perforation	  Respiratory and Thorax: denies asthma, chest pain or SOB  Cardiovascular: denies chest pain or SOB  Gastrointestinal: denies abd pain, nausea, reflux	  Musculoskeletal: denies prolonged morning stiffness- no active synovitis for past several months  Neurological: leg weakness persists    Vital Signs Last 24 Hrs  T(C): 36.4, Max: 36.4 (04-12 @ 11:27)  T(F): 97.6, Max: 97.6 (04-13 @ 05:35)  HR: 113 (113 - 114)  BP: 106/66 (103/60 - 114/79)  BP(mean): --  RR: 18 (16 - 18)  SpO2: 94% (94% - 95%)                          10.4   9.4   )-----------( 149      ( 12 Apr 2017 05:46 )             31.2     04-12    x   |  x   |  x   ----------------------------<  183<H>  x    |  x   |  x     Ca    8.8      12 Apr 2017 05:46          Uric acid 7.8 (4/11/17)  - repeat 101 4/10/17 w/ CRP 2.2 (repeated, Fibrinogen low 250  AST 67, nl ALT, Bili 2.1  CK normal x 2  CCP > 250, FREEDOM 1:320S w/ neg AMA, SMA       EXAM:  CT ABDOMEN AND PELVIS IC                        PROCEDURE DATE:  04/02/2017    INTERPRETATION:    Three examinations were performed on this patient:   1. CT scan of the chest with intravenous contrast  2. CT scan of the abdomen with intravenous contrast  3. CT scan of the pelvis with intravenous contrast  CLINICAL INFORMATION (all 3 exams): Trauma, chest pain, abdominal pain    FINDINGS:   No previous examinations are available for review.    The thyroid gland appears intact.    The lungs are significant for a small LEFT pleural effusion with   underlying atelectasis.  Lung volumes are preserved. The trachea and   major bronchi are patent.    No enlarged axillary, hilar or mediastinal lymph nodes are found.   The   heart size is normal. The chest wall and thoracic spine are unremarkable.    There is moderate ascites.    The liver demonstrates homogeneous attenuation without focal lesion or   abnormal enhancement.  Hepatic size is normal but contours are nodular.   Hepatic and portal veins are patent and not displaced.  No intrahepatic   or common ductal dilatation is recognized.  The gallbladder is   significant for minimal cholelithiasis.  The pancreas is intact without   ductal dilatation or focal lesion.  The spleen is normal in size.    The adrenal glands are intact.  The kidneys demonstrate symmetric   nephrograms.  There is a 1 cm RIGHT renal cysts. No suspicious renal mass   is found.  No renal calculus, hydronephrosis or perinephric infiltration   is found.  The ureters are not dilated.   The bladder appears   unremarkable.    EXAM:  US DPLX LWR EXT VEINS COMPL BI                        PROCEDURE DATE:  04/07/2017    INTERPRETATION:  Clinical information: Pain and swelling of feet      IMPRESSION:     No evidence of bilateral lower extremity deep venous thrombosis.    EXAM:  LUMBAR SPINE(MRI)W O CON                        PROCEDURE DATE:  04/09/2017    INTERPRETATION:    MR lumbar without gadolinium       CLINICAL INFORMATION:   Bilateral leg weakness and numbness for 2 days   Lumbar spondylosis.    FINDINGS:   No prior similar studies are available for review.         IMPRESSION:  Disc degeneration and spondylosis at T11-12 through L5-S1   with loss of disc height and associated degenerative endplate changes.   LEFT paracentral disc herniation is noted at L2-3 compressing the ventral   thecal sac and narrowing the LEFT neural foramen. Disc bulges at L3-4 and   L4-5 and L5-S1 flatten the ventral thecal sac and narrow the BILATERAL   neural foramina.  Mild central stenosis at L2-3 through L4-5 on a degenerative basis.             EXAM:  MRI ANKLE WO C LEFT                        PROCEDURE DATE:  04/11/2017    INTERPRETATION:  MRI ANKLE WO C LEFT dated 4/11/2017 4:58 PM     INDICATION: Left ankle swelling and pain for three days. Alcohol   cirrhosis with severe peripheral edema. RA.    COMPARISON: Ankle radiographs dated 4/2/2017    FINDINGS: There is diffuse soft tissue swelling. The swelling is most   prominent along the dorsum of the ankle and hindfoot. The swelling   extends into the midfoot and forefoot but is only partially included on   this study. There is no drainable fluid collection. No skin defect is   appreciated. There is moderate fluid surrounding the posterior tibialis   tendon (series 5, image 15) suggestive of tenosynovitis. There is no   full-thickness tear or tendon retraction. The Achilles tendon is   preserved. Plantar fascia is unremarkable.    Evaluation of the bone marrow signal demonstrates a nondisplaced fracture   of the medial malleolus. There are nondisplaced fractures involving the   distal anterior tibia and the posterior malleolus. There is a marrow   contusion in the distal fibular diaphysis. There are fractures involving   the base of the second and third metatarsals. There is a nondisplaced   fracture within the distal calcaneus. There is a nondisplaced fracture   within the anterolateral portion of the medial cuneiform at the base of   the origin of the interosseous Lisfranc ligament with edema in the   Lisfranc alignment suggesting Lisfranc ligamentous injury. There is no   joint effusion.    There is no soft tissue mass. Neurovascular structures are preserved.      IMPRESSION:   1.  Nondisplaced fractures of the medial malleolus, anterior distal   tibia, and posterior malleolus.  2.  Nondisplaced fractures at the base of the second and third   metatarsals.  3.  Edema within the Lisfranc ligament with nondisplaced fracture at the   lateral aspect of the medial cuneiform at the base of the Lisfranc   ligament suggesting Lisfranc injury.  4.  Contusion at the anterolateral medial cuneiform and distal fibula.  5.  Nondisplaced fracture of the distal calcaneus.  6.  Large diffuse soft tissue swelling is nonspecific.  7.  Tenosynovitis of the posterior tibialis tendon likely posttraumatic    Findings were discussed with LAMAR ROSS on 4/12/2017 9:05 AM    with read back.    MEDICATIONS  (STANDING):  multivitamin 1Tablet(s) Oral daily  aspirin 325milliGRAM(s) Oral daily  potassium chloride    Tablet ER 10milliEquivalent(s) Oral daily  pantoprazole    Tablet 40milliGRAM(s) Oral before breakfast  clopidogrel Tablet 75milliGRAM(s) Oral daily  atorvastatin 20milliGRAM(s) Oral at bedtime  magnesium oxide 800milliGRAM(s) Oral at bedtime  furosemide    Tablet 20milliGRAM(s) Oral daily  folic acid 1milliGRAM(s) Oral daily  thiamine 100milliGRAM(s) Oral daily  gabapentin 100milliGRAM(s) Oral three times a day  predniSONE   Tablet 10milliGRAM(s) Oral daily  insulin lispro (HumaLOG) corrective regimen sliding scale  SubCutaneous three times a day before meals  insulin lispro (HumaLOG) corrective regimen sliding scale  SubCutaneous at bedtime  dextrose 5%. 1000milliLiter(s) IV Continuous <Continuous>  dextrose 50% Injectable 12.5Gram(s) IV Push once  dextrose 50% Injectable 25Gram(s) IV Push once  dextrose 50% Injectable 25Gram(s) IV Push once    MEDICATIONS  (PRN):  dextrose Gel 1Dose(s) Oral once PRN Blood Glucose LESS THAN 70 milliGRAM(s)/deciliter  glucagon  Injectable 1milliGRAM(s) IntraMuscular once PRN Glucose LESS THAN 70 milligrams/deciliter          PHYSICAL EXAM:    Constitutional: NAD, awake and alert, well-developed  HEENT- no oral lesions noted, no lymphadenopathy noted  Heart- S1 S2 reg  Lungs- CTA b/l  Abd- Soft NT- distended   Ext- +1 RLE, +2 over L foot- pitting b/l to just above ankle, L > R worse focused at foot   Skin- no rashes +Ecchymosis multiple on arms and legs at areas of IV and phlebotomy sites, plantar surface and lateral aspect of L foot- dorsum foot as well.    Musculoskelatal- FROM all joints except b/l ankles- R + 1 as noted; L +2 pitting ++ tenderness throughout- both limited dorsiflexion 2/2 swelling and L due to pain , minimal tenderness in left knee,   Neurological- proximal weakness b/l quads only- 4+/5 b/l R slightly worse- quad muscles measure equal

## 2017-04-13 NOTE — PROGRESS NOTE ADULT - PROBLEM SELECTOR PROBLEM 3
Cirrhosis
Macrocytic anemia
Macrocytic anemia
Rheumatoid arthritis
Weakness
Weakness
Rheumatoid arthritis

## 2017-04-13 NOTE — PROGRESS NOTE ADULT - ASSESSMENT
LLE fx   ortho input appreciated    Diarrhea and has improved  had empiric tx for c diff and she could not do stool tests  neg diarrhea today and will follow for diarrhea and abd discomfort      Hx of chronic diarrhea that has also improved and will follow, Mg may inc it and DW pt      hypotension  RO adrenal insuff, on chronic steroids  endocrine note appreciated and pred increased, DCed propranolol and aldactone (she is been missing doses often)          pos troponins  DW cardioogy, pt had bare metal stent and they are considering duration of double anti plt with inc risk bleed  DW cardiology and they recommend plavix for at least one month and will possibly stop it after that  DW pt again       Cirrhosis due to ETOH and has stopped    ascites and LE edema      likely adrenal insuff with years of steroids and now on inc dose steroids,  BP is improving    consider slow taper of pred, managed by endocrine or Rheum, tolerating taper    Rheum note appreciated and DW rheum  inc ESR noted

## 2017-04-13 NOTE — PROGRESS NOTE ADULT - ASSESSMENT
Assessment and Plan:   · Assessment		  Pt is a 67 yo woman with RA since 2009, with hx alcoholic liver cirrhosis, variceal esophageal banding, admitted for b/L LE weakness.  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 Long hx RA on steroids long term and MTX (stopped few months ago)- no evidence active RA confirmed by MRI ankle as noted above.     1) Ankle/ foot:  multiple non displaced fractures as noted.  Refer to ortho for further recommendations- CAM boot, KAYLEY for now  2) RA:  would mnt current low dose prednisone for now and through rehab to mnt HD and continue to control condition.  Will need steroid sparing agent upon d/c from rehab, advised f/u with rheum  3) Elevated ESR w/ relatively nl CRP:  in face extensive injuries to L foot- may explain degree of elevation.  Will monitor over time. At this point, no other obvious explanation of such an elevation especially given essentially negative chest/ abd/ pelvic CTscan, nl renal function.  CBC stable w/ macrocytic anemia c/w ETOH use   -  may still need to consider SBA if inflammatory markers don't resolve w/ time - may need cx peritoneal fluid- though NO pain on exam and no fever/ leukocytosis can be seen in this setting.  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 proximal weakness  though only noted in lower extr- but fairly equal.  Pathology in spine could explain some but unlikely to be so symmetrical.  - may still benefit from neuro eval as outpatient w/ full emg/ ncs   5) Peripheral neuropathy:  likely etoh r/t, will need full EMG/ NCS as op for now will increase gabapentin, only on 300 mg total daily dose, increase as needed and tolerated- only limited by sedation which will improve over time with use...    Plan:  - continue PO steroids at lowest effective dose 5-15 mg as needed for HD stability, may need something else for BP management refer to endo  - ortho appreciated  - Consider Neuro input for likely myopathy, should we do MRI thigh muscles to confirm myopathy- not necessary at this time. CK nl but this can be seen in alcoholic myopathy  - peripheral neuropathy:  increase gabapentin to 300 mg TID- still lot higher doses as needed   - continue other tx as noted

## 2017-04-13 NOTE — PROGRESS NOTE ADULT - SUBJECTIVE AND OBJECTIVE BOX
Patient is a 66y old  Female who presents with a chief complaint of weakness (02 Apr 2017 18:48)      HPI:  Pt is a 67 yo woman with hx liver cirrhosis, variceal esophageal bvanding, RA on prednisone, who was admitted recently 2-29 to 3-3 for cirrhosis and vomiting.   She reports weakness and inability to walk (legs feel like jelly). She has been incontinent of urine and stool for a few weeks as  she does not make it to the bathroom in time.  Pt fell off the couch his morning.  She also had two other falls, including 3/3/17 getting out of car to home in driveway, and  last evening when she slid off couch, struck a hard box with left chest and twisted her left ankle.  Pt's significant other reports she is  unable to support her own weight and is having trouble with her balance.  She denies cough, resp symptoms.  No fever/chills, n/v, cpain, SOB.  She has 1-3 loose stools/day.   Dr. Osorio discontinued her methotrexate, bystolic, and HCTZ on 3/10/17.            She  stopped drinking 5 days prior to last admission.  No head injury during any fall. (02 Apr 2017 18:48)    pt had mild lower abd cramping and N last night  improved  BM times 3 yesterday  neg vomiting  ortho and podiatry notes appreciated and plan boot        PAST MEDICAL & SURGICAL HISTORY:  HTN (hypertension)  Rheumatoid arthritis  No significant past surgical history      MEDICATIONS  (STANDING):  multivitamin 1Tablet(s) Oral daily  aspirin 325milliGRAM(s) Oral daily  potassium chloride    Tablet ER 10milliEquivalent(s) Oral daily  pantoprazole    Tablet 40milliGRAM(s) Oral before breakfast  clopidogrel Tablet 75milliGRAM(s) Oral daily  atorvastatin 20milliGRAM(s) Oral at bedtime  magnesium oxide 800milliGRAM(s) Oral at bedtime  furosemide    Tablet 20milliGRAM(s) Oral daily  folic acid 1milliGRAM(s) Oral daily  thiamine 100milliGRAM(s) Oral daily  predniSONE   Tablet 10milliGRAM(s) Oral daily  insulin lispro (HumaLOG) corrective regimen sliding scale  SubCutaneous three times a day before meals  insulin lispro (HumaLOG) corrective regimen sliding scale  SubCutaneous at bedtime  dextrose 5%. 1000milliLiter(s) IV Continuous <Continuous>  dextrose 50% Injectable 12.5Gram(s) IV Push once  dextrose 50% Injectable 25Gram(s) IV Push once  dextrose 50% Injectable 25Gram(s) IV Push once  gabapentin 300milliGRAM(s) Oral three times a day    MEDICATIONS  (PRN):  dextrose Gel 1Dose(s) Oral once PRN Blood Glucose LESS THAN 70 milliGRAM(s)/deciliter  glucagon  Injectable 1milliGRAM(s) IntraMuscular once PRN Glucose LESS THAN 70 milligrams/deciliter      Allergies    sulfa drugs (Unknown)    Intolerances        SOCIAL HISTORY:unchanged    FAMILY HISTORY:  No pertinent family history in first degree relatives      REVIEW OF SYSTEMS:    CONSTITUTIONAL: No weakness, fevers or chills  EYES/ENT: No visual changes;  No vertigo or throat pain   NECK: No pain or stiffness  RESPIRATORY: No cough, wheezing, hemoptysis; No shortness of breath  CARDIOVASCULAR: No chest pain or palpitations  GENITOURINARY: No dysuria, frequency or hematuria  NEUROLOGICAL: No numbness or weakness  SKIN: No itching, burning, rashes, or lesions   All other review of systems is negative unless indicated above.    Vital Signs Last 24 Hrs  T(C): 36.4, Max: 36.4 (04-12 @ 11:27)  T(F): 97.6, Max: 97.6 (04-13 @ 05:35)  HR: 113 (113 - 114)  BP: 106/66 (103/60 - 114/79)  BP(mean): --  RR: 18 (16 - 18)  SpO2: 94% (94% - 95%)    PHYSICAL EXAM:    Constitutional: NAD, well-developed  HEENT: EOMI, throat clear  Neck: No LAD, supple  Respiratory: CTA and P  Cardiovascular: S1 and S2, RRR, no M  Gastrointestinal: BS+, soft, NT/ND, neg HSM,  Extremities: No peripheral edema, neg clubing, cyanosis  Vascular: 2+ peripheral pulses  Neurological: A/O x 3, no focal deficits  Psychiatric: Normal mood, normal affect  Skin: No rashes    LABS:  CBC Full  -  ( 12 Apr 2017 05:46 )  WBC Count : 9.4 K/uL  Hemoglobin : 10.4 g/dL  Hematocrit : 31.2 %  Platelet Count - Automated : 149 K/uL  Mean Cell Volume : 118.4 fl  Mean Cell Hemoglobin : 39.2 pg  Mean Cell Hemoglobin Concentration : 33.1 gm/dL  Auto Neutrophil # : x  Auto Lymphocyte # : x  Auto Monocyte # : x  Auto Eosinophil # : x  Auto Basophil # : x  Auto Neutrophil % : x  Auto Lymphocyte % : x  Auto Monocyte % : x  Auto Eosinophil % : x  Auto Basophil % : x    04-12    x   |  x   |  x   ----------------------------<  183<H>  x    |  x   |  x     Ca    8.8      12 Apr 2017 05:46              RADIOLOGY & ADDITIONAL STUDIES:

## 2017-04-13 NOTE — PHYSICAL THERAPY INITIAL EVALUATION ADULT - DIAGNOSIS, PT EVAL
Advanced liver disease, weakness, NSTEMI. Left ankle/foot fx's above.
Advanced Liver Disease; Weakness; NSTEMI

## 2017-04-13 NOTE — DIETITIAN INITIAL EVALUATION ADULT. - ORAL INTAKE PTA
Pt reports good intake, no issues with eating. Pt was not feeling well this morning ate poorly today/fair

## 2017-04-13 NOTE — PROGRESS NOTE ADULT - PROBLEM SELECTOR PLAN 4
elevated inflammatory markers (ESR/CRP).  c/w prednisone.  Rheumatology input appreciated.  DMARDs and EMG/NCS to be accomplished as outpatient.  start gabapentin 100mg TID .
hx EV-banding;  peripheral edema.  propranolol and spironolactone held owing to hypotension.  c/w Lasix.  low Na.  GI following.
hx EV-banding;  peripheral edema.  propranolol and spironolactone held owing to hypotension.  c/w Lasix.  low Na.  GI following.
prednisone escalated to 15mg po qd 3d3.  monitor BP.  if further hypotension, will consider starting midodrine.
suspect due to cirrhosis, EtOH.  check B12 and folic acid.
suspect due to cirrhosis, EtOH.  check B12 and folic acid.
prednisone escalated to 15mg po qd 2d3.  monitor BP.  if further hypotension, will consider starting midodrine.

## 2017-04-13 NOTE — PROGRESS NOTE ADULT - PROBLEM SELECTOR PROBLEM 5
Adrenal insufficiency
Cirrhosis
Cirrhosis
Coronary artery disease
Rheumatoid arthritis
Rheumatoid arthritis
Coronary artery disease

## 2017-04-13 NOTE — PROGRESS NOTE ADULT - PROBLEM SELECTOR PLAN 6
c/w prednisone 10mg po qd.  monitor BP.
elevated inflammatory markers (ESR/CRP).  c/w prednisone.  Rheumatology input appreciated.  DMARDs and EMG/NCS to be accomplished as outpatient.  start gabapentin 100mg TID .
elevated inflammatory markers (ESR/CRP).  c/w prednisone.  Rheumatology input appreciated.  DMARDs and EMG/NCS to be accomplished as outpatient.  start gabapentin 100mg TID .
prednisone escalated to 15mg po qd.  monitor BP.  if further hypotension, will consider starting midodrine.
NSTEMI PCI LAD BMS.  DAPT + statin.

## 2017-04-13 NOTE — PROGRESS NOTE ADULT - SUBJECTIVE AND OBJECTIVE BOX
Patient is a 66y old  Female who presents with a chief complaint of weakness (02 Apr 2017 18:48)      HPI:  Pt is a 67 yo woman with hx liver cirrhosis, variceal esophageal bvanding, RA on prednisone, who was admitted recently 2-29 to 3-3 for cirrhosis and vomiting.   She reports weakness and inability to walk (legs feel like jelly). She has been incontinent of urine and stool for a few weeks as  she does not make it to the bathroom in time.  Pt fell off the couch his morning.  She also had two other falls, including 3/3/17 getting out of car to home in driveway, and  last evening when she slid off couch, struck a hard box with left chest and twisted her left ankle.  Pt's significant other reports she is  unable to support her own weight and is having trouble with her balance.  She denies cough, resp symptoms.  No fever/chills, n/v, cpain, SOB.  She has 1-3 loose stools/day.   Dr. Osorio discontinued her methotrexate, bystolic, and HCTZ on 3/10/17.            She  stopped drinking 5 days prior to last admission.  No head injury during any fall. (02 Apr 2017 18:48)      Allergies    sulfa drugs (Unknown)    Intolerances        MEDICATIONS  (STANDING):  multivitamin 1Tablet(s) Oral daily  aspirin 325milliGRAM(s) Oral daily  potassium chloride    Tablet ER 10milliEquivalent(s) Oral daily  pantoprazole    Tablet 40milliGRAM(s) Oral before breakfast  clopidogrel Tablet 75milliGRAM(s) Oral daily  atorvastatin 20milliGRAM(s) Oral at bedtime  magnesium oxide 800milliGRAM(s) Oral at bedtime  furosemide    Tablet 20milliGRAM(s) Oral daily  folic acid 1milliGRAM(s) Oral daily  thiamine 100milliGRAM(s) Oral daily  gabapentin 100milliGRAM(s) Oral three times a day  predniSONE   Tablet 10milliGRAM(s) Oral daily  insulin lispro (HumaLOG) corrective regimen sliding scale  SubCutaneous three times a day before meals  insulin lispro (HumaLOG) corrective regimen sliding scale  SubCutaneous at bedtime  dextrose 5%. 1000milliLiter(s) IV Continuous <Continuous>  dextrose 50% Injectable 12.5Gram(s) IV Push once  dextrose 50% Injectable 25Gram(s) IV Push once  dextrose 50% Injectable 25Gram(s) IV Push once    MEDICATIONS  (PRN):  dextrose Gel 1Dose(s) Oral once PRN Blood Glucose LESS THAN 70 milliGRAM(s)/deciliter  glucagon  Injectable 1milliGRAM(s) IntraMuscular once PRN Glucose LESS THAN 70 milligrams/deciliter      PHYSICAL EXAM:      Constitutional:    Eyes:    ENMT:    Neck:    Breasts:    Back:    Respiratory:    Cardiovascular:    Gastrointestinal:    Genitourinary:    Rectal:    Extremities:    Vascular:    Neurological:    Skin:    Lymph Nodes:    Musculoskeletal:    Psychiatric:        RADIOLOGY    CBC Full  -  ( 12 Apr 2017 05:46 )  WBC Count : 9.4 K/uL  Hemoglobin : 10.4 g/dL  Hematocrit : 31.2 %  Platelet Count - Automated : 149 K/uL  Mean Cell Volume : 118.4 fl  Mean Cell Hemoglobin : 39.2 pg  Mean Cell Hemoglobin Concentration : 33.1 gm/dL  Auto Neutrophil # : x  Auto Lymphocyte # : x  Auto Monocyte # : x  Auto Eosinophil # : x  Auto Basophil # : x  Auto Neutrophil % : x  Auto Lymphocyte % : x  Auto Monocyte % : x  Auto Eosinophil % : x  Auto Basophil % : x      04-12    x   |  x   |  x   ----------------------------<  183<H>  x    |  x   |  x     Ca    8.8      12 Apr 2017 05:46

## 2017-04-13 NOTE — PROGRESS NOTE ADULT - PROBLEM SELECTOR PROBLEM 4
Adrenal insufficiency
Cirrhosis
Cirrhosis
Macrocytic anemia
Macrocytic anemia
Rheumatoid arthritis
Adrenal insufficiency

## 2017-04-13 NOTE — PHYSICAL THERAPY INITIAL EVALUATION ADULT - PERTINENT HX OF CURRENT PROBLEM, REHAB EVAL
See initial eval. Pt has been seen by PT since 4/2. MRI left ankle: non-displaced fx's of medial malleolus, ant tibia,and post malleolus. Non-displaced fx's base of 2nd and 3rd metatarsal, Lisfranc injury, contusion at the anterolateral cuneiform and distal fibula. Non-displaced fx of distal calcaneus.

## 2017-04-13 NOTE — PROGRESS NOTE ADULT - ASSESSMENT
+ DPpulses Edema L>>R foot / tender, ecchymoisis to L plantar aaspect. Nails thick, yellow, crumbly with partial lysis to L Hallux plate. Pt / ETOH abuse/RA/ Fx multiple L foot/ankle/ probably osteoporotic as well. R/O onychomycosis v eczematous changes to nails from autoimmune issues. Debride site. ALL TRAUMA/FRACTURE CARE TO FEET TO ORTHO. Will sign off case. THANKS

## 2017-04-13 NOTE — PROGRESS NOTE ADULT - PROBLEM SELECTOR PROBLEM 6
Adrenal insufficiency
Adrenal insufficiency
Rheumatoid arthritis
Rheumatoid arthritis
Coronary artery disease

## 2017-04-13 NOTE — PROGRESS NOTE ADULT - SUBJECTIVE AND OBJECTIVE BOX
TD  04/13:  5S.  c/o diarrhea.  minimal lower abdominal pain a/w BM.  relieved afterward.    Allergies    sulfa drugs (Unknown)    Intolerances      MEDICATIONS  (STANDING):  multivitamin 1Tablet(s) Oral daily  aspirin 325milliGRAM(s) Oral daily  potassium chloride    Tablet ER 10milliEquivalent(s) Oral daily  pantoprazole    Tablet 40milliGRAM(s) Oral before breakfast  clopidogrel Tablet 75milliGRAM(s) Oral daily  atorvastatin 20milliGRAM(s) Oral at bedtime  magnesium oxide 800milliGRAM(s) Oral at bedtime  furosemide    Tablet 20milliGRAM(s) Oral daily  folic acid 1milliGRAM(s) Oral daily  thiamine 100milliGRAM(s) Oral daily  predniSONE   Tablet 10milliGRAM(s) Oral daily  insulin lispro (HumaLOG) corrective regimen sliding scale  SubCutaneous three times a day before meals  insulin lispro (HumaLOG) corrective regimen sliding scale  SubCutaneous at bedtime  dextrose 5%. 1000milliLiter(s) IV Continuous <Continuous>  dextrose 50% Injectable 12.5Gram(s) IV Push once  dextrose 50% Injectable 25Gram(s) IV Push once  dextrose 50% Injectable 25Gram(s) IV Push once  gabapentin 300milliGRAM(s) Oral three times a day    MEDICATIONS  (PRN):  dextrose Gel 1Dose(s) Oral once PRN Blood Glucose LESS THAN 70 milliGRAM(s)/deciliter  glucagon  Injectable 1milliGRAM(s) IntraMuscular once PRN Glucose LESS THAN 70 milligrams/deciliter  ondansetron Injectable 4milliGRAM(s) IV Push every 8 hours PRN Nausea and/or Vomiting    Vital Signs Last 24 Hrs  T(C): 36.3, Max: 36.4 (04-13 @ 05:35)  T(F): 97.4, Max: 97.6 (04-13 @ 05:35)  HR: 114 (113 - 114)  BP: 114/69 (106/66 - 114/79)  BP(mean): --  RR: 14 (14 - 18)  SpO2: 93% (93% - 95%)    chronically ill appearing WF in no acute distress.  NCAT.  neck supple.  JVP nml.  lungs CTA.  no WRR.  heart RRR.  NS1S2.  no MRG.  distended.  soft.  NT.  no mass.  no guarding.  no rebound.  LE good muscle tone.  limited range of motion.  b/l LE (+) 1-2 pitting edema, L>R.  (+) left food edema.  (+) ecchimosis plantar aspect.  A&Ox3.                                 10.4   9.4   )-----------( 149      ( 12 Apr 2017 05:46 )             31.2       04-12    x   |  x   |  x   ----------------------------<  183<H>  x    |  x   |  x     Ca    8.8      12 Apr 2017 05:46            total encounter time >30 minutes.

## 2017-04-13 NOTE — PROGRESS NOTE ADULT - PROBLEM SELECTOR PLAN 3
c/w prednisone.  Rheumatology consult.
debilitated w/ poor functionality.  case management suggested Rheumatology input to help optimize mobility?  however, she denies arthralgia, joint swelling or s/sx to suggest RA flair.  she's been taken off methotrexate recently and is on prednisone chronically.  Rheumatology consult appreciated.  c/w prednisone 15mg po qd w/ slow taper base to "home dose" of 5mg po qd.  f/u MRI of LS.
debilitated w/ poor functionality.  case management suggested Rheumatology input to help optimize mobility?  however, she denies arthralgia, joint swelling or s/sx to suggest RA flair.  she's been taken off methotrexate recently and is on prednisone chronically.  Rheumatology consult appreciated.  c/w prednisone 15mg po qd w/ slow taper base to "home dose" of 5mg po qd.  f/u MRI of LS.
hx EV-banding;  peripheral edema.  propranolol and spironolactone held owing to hypotension.  c/w Lasix.  low Na.  GI following.
suspect due to cirrhosis, EtOH.  check B12 and folic acid.
suspect due to cirrhosis, EtOH.  check B12 and folic acid.
c/w prednisone.  Rheumatology consult.

## 2017-04-14 LAB
ANION GAP SERPL CALC-SCNC: 8 MMOL/L — SIGNIFICANT CHANGE UP (ref 5–17)
APTT BLD: 32 SEC — SIGNIFICANT CHANGE UP (ref 27.5–37.4)
BUN SERPL-MCNC: 22 MG/DL — SIGNIFICANT CHANGE UP (ref 7–23)
C DIFF BY PCR RESULT: SIGNIFICANT CHANGE UP
C DIFF TOX GENS STL QL NAA+PROBE: SIGNIFICANT CHANGE UP
CALCIUM SERPL-MCNC: 8.6 MG/DL — SIGNIFICANT CHANGE UP (ref 8.5–10.1)
CHLORIDE SERPL-SCNC: 106 MMOL/L — SIGNIFICANT CHANGE UP (ref 96–108)
CO2 SERPL-SCNC: 24 MMOL/L — SIGNIFICANT CHANGE UP (ref 22–31)
CREAT SERPL-MCNC: 0.98 MG/DL — SIGNIFICANT CHANGE UP (ref 0.5–1.3)
GLUCOSE SERPL-MCNC: 114 MG/DL — HIGH (ref 70–99)
GRAM STN FLD: SIGNIFICANT CHANGE UP
HCT VFR BLD CALC: 34.3 % — LOW (ref 34.5–45)
HGB BLD-MCNC: 11.5 G/DL — SIGNIFICANT CHANGE UP (ref 11.5–15.5)
INR BLD: 1.48 RATIO — HIGH (ref 0.88–1.16)
MCHC RBC-ENTMCNC: 33.5 GM/DL — SIGNIFICANT CHANGE UP (ref 32–36)
MCHC RBC-ENTMCNC: 39.9 PG — HIGH (ref 27–34)
MCV RBC AUTO: 119.1 FL — HIGH (ref 80–100)
PLATELET # BLD AUTO: 183 K/UL — SIGNIFICANT CHANGE UP (ref 150–400)
POTASSIUM SERPL-MCNC: 4.2 MMOL/L — SIGNIFICANT CHANGE UP (ref 3.5–5.3)
POTASSIUM SERPL-SCNC: 4.2 MMOL/L — SIGNIFICANT CHANGE UP (ref 3.5–5.3)
PROTHROM AB SERPL-ACNC: 16.1 SEC — HIGH (ref 9.8–12.7)
RBC # BLD: 2.88 M/UL — LOW (ref 3.8–5.2)
RBC # FLD: 16.3 % — HIGH (ref 10.3–14.5)
SODIUM SERPL-SCNC: 138 MMOL/L — SIGNIFICANT CHANGE UP (ref 135–145)
SPECIMEN SOURCE: SIGNIFICANT CHANGE UP
WBC # BLD: 11.1 K/UL — HIGH (ref 3.8–10.5)
WBC # FLD AUTO: 11.1 K/UL — HIGH (ref 3.8–10.5)

## 2017-04-14 PROCEDURE — 72141 MRI NECK SPINE W/O DYE: CPT | Mod: 26

## 2017-04-14 PROCEDURE — 49083 ABD PARACENTESIS W/IMAGING: CPT

## 2017-04-14 PROCEDURE — 88305 TISSUE EXAM BY PATHOLOGIST: CPT | Mod: 26

## 2017-04-14 PROCEDURE — 88108 CYTOPATH CONCENTRATE TECH: CPT | Mod: 26

## 2017-04-14 PROCEDURE — 72146 MRI CHEST SPINE W/O DYE: CPT | Mod: 26

## 2017-04-14 RX ORDER — SODIUM CHLORIDE 9 MG/ML
250 INJECTION INTRAMUSCULAR; INTRAVENOUS; SUBCUTANEOUS ONCE
Qty: 0 | Refills: 0 | Status: COMPLETED | OUTPATIENT
Start: 2017-04-14 | End: 2017-04-14

## 2017-04-14 RX ORDER — SODIUM CHLORIDE 9 MG/ML
1000 INJECTION INTRAMUSCULAR; INTRAVENOUS; SUBCUTANEOUS
Qty: 0 | Refills: 0 | Status: DISCONTINUED | OUTPATIENT
Start: 2017-04-14 | End: 2017-04-15

## 2017-04-14 RX ORDER — ENOXAPARIN SODIUM 100 MG/ML
40 INJECTION SUBCUTANEOUS EVERY 24 HOURS
Qty: 0 | Refills: 0 | Status: DISCONTINUED | OUTPATIENT
Start: 2017-04-14 | End: 2017-04-18

## 2017-04-14 RX ADMIN — SODIUM CHLORIDE 83 MILLILITER(S): 9 INJECTION INTRAMUSCULAR; INTRAVENOUS; SUBCUTANEOUS at 12:16

## 2017-04-14 RX ADMIN — ENOXAPARIN SODIUM 40 MILLIGRAM(S): 100 INJECTION SUBCUTANEOUS at 18:34

## 2017-04-14 RX ADMIN — Medication 15 MILLIGRAM(S): at 14:34

## 2017-04-14 RX ADMIN — SODIUM CHLORIDE 83 MILLILITER(S): 9 INJECTION INTRAMUSCULAR; INTRAVENOUS; SUBCUTANEOUS at 19:46

## 2017-04-14 RX ADMIN — MAGNESIUM OXIDE 400 MG ORAL TABLET 800 MILLIGRAM(S): 241.3 TABLET ORAL at 21:17

## 2017-04-14 RX ADMIN — SODIUM CHLORIDE 500 MILLILITER(S): 9 INJECTION INTRAMUSCULAR; INTRAVENOUS; SUBCUTANEOUS at 12:18

## 2017-04-14 RX ADMIN — GABAPENTIN 300 MILLIGRAM(S): 400 CAPSULE ORAL at 21:16

## 2017-04-14 RX ADMIN — ATORVASTATIN CALCIUM 20 MILLIGRAM(S): 80 TABLET, FILM COATED ORAL at 21:16

## 2017-04-14 NOTE — PROGRESS NOTE ADULT - SUBJECTIVE AND OBJECTIVE BOX
TD  04/14:  5S.  more somnolent today.  SBP 82mmHg.  will give 0.9%NS 250mL bolus then maintenance.      Allergies    sulfa drugs (Unknown)    Intolerances      MEDICATIONS  (STANDING):  multivitamin 1Tablet(s) Oral daily  aspirin 325milliGRAM(s) Oral daily  potassium chloride    Tablet ER 10milliEquivalent(s) Oral daily  pantoprazole    Tablet 40milliGRAM(s) Oral before breakfast  clopidogrel Tablet 75milliGRAM(s) Oral daily  atorvastatin 20milliGRAM(s) Oral at bedtime  magnesium oxide 800milliGRAM(s) Oral at bedtime  furosemide    Tablet 20milliGRAM(s) Oral daily  folic acid 1milliGRAM(s) Oral daily  thiamine 100milliGRAM(s) Oral daily  predniSONE   Tablet 10milliGRAM(s) Oral daily  gabapentin 300milliGRAM(s) Oral three times a day  rifaximin 550milliGRAM(s) Oral two times a day  sodium chloride 0.9%. 1000milliLiter(s) IV Continuous <Continuous>    MEDICATIONS  (PRN):  ondansetron Injectable 4milliGRAM(s) IV Push every 8 hours PRN Nausea and/or Vomiting    Vital Signs Last 24 Hrs  T(C): 36.3, Max: 36.6 (04-14 @ 05:52)  T(F): 97.4, Max: 97.8 (04-14 @ 05:52)  HR: 116 (115 - 121)  BP: 82/52 (82/52 - 121/84)  BP(mean): --  RR: 16 (16 - 16)  SpO2: 96% (96% - 96%)    chronically ill appearing WF in no acute distress.  NCAT.  neck supple.  JVP nml.  lungs CTA.  no WRR.  heart RRR.  NS1S2.  no MRG.  distended.  soft.    LE good muscle tone.  limited range of motion.  b/l LE (+) 1-2 pitting edema, L>R.  (+) left food edema.  (+) ecchimosis plantar aspect.  A&Ox3.                        11.5   11.1  )-----------( 183      ( 14 Apr 2017 06:30 )             34.3       04-14    138  |  106  |  22  ----------------------------<  114<H>  4.2   |  24  |  0.98    Ca    8.6      14 Apr 2017 06:30 TD  04/14:  5S.  more somnolent today.  SBP 82mmHg.  give prednisone 15mg po stat. + 0.9%NS 250mL bolus then maintenance.      Allergies    sulfa drugs (Unknown)    Intolerances      MEDICATIONS  (STANDING):  multivitamin 1Tablet(s) Oral daily  aspirin 325milliGRAM(s) Oral daily  potassium chloride    Tablet ER 10milliEquivalent(s) Oral daily  pantoprazole    Tablet 40milliGRAM(s) Oral before breakfast  clopidogrel Tablet 75milliGRAM(s) Oral daily  atorvastatin 20milliGRAM(s) Oral at bedtime  magnesium oxide 800milliGRAM(s) Oral at bedtime  furosemide    Tablet 20milliGRAM(s) Oral daily  folic acid 1milliGRAM(s) Oral daily  thiamine 100milliGRAM(s) Oral daily  predniSONE   Tablet 10milliGRAM(s) Oral daily  gabapentin 300milliGRAM(s) Oral three times a day  rifaximin 550milliGRAM(s) Oral two times a day  sodium chloride 0.9%. 1000milliLiter(s) IV Continuous <Continuous>    MEDICATIONS  (PRN):  ondansetron Injectable 4milliGRAM(s) IV Push every 8 hours PRN Nausea and/or Vomiting    Vital Signs Last 24 Hrs  T(C): 36.3, Max: 36.6 (04-14 @ 05:52)  T(F): 97.4, Max: 97.8 (04-14 @ 05:52)  HR: 116 (115 - 121)  BP: 82/52 (82/52 - 121/84)  BP(mean): --  RR: 16 (16 - 16)  SpO2: 96% (96% - 96%)    chronically ill appearing WF in no acute distress.  NCAT.  neck supple.  JVP nml.  lungs CTA.  no WRR.  heart RRR.  NS1S2.  no MRG.  distended.  soft.    LE good muscle tone.  limited range of motion.  b/l LE (+) 1-2 pitting edema, L>R.  (+) left food edema.  (+) ecchimosis plantar aspect.  A&Ox3.                        11.5   11.1  )-----------( 183      ( 14 Apr 2017 06:30 )             34.3       04-14    138  |  106  |  22  ----------------------------<  114<H>  4.2   |  24  |  0.98    Ca    8.6      14 Apr 2017 06:30

## 2017-04-14 NOTE — PROVIDER CONTACT NOTE (OTHER) - NAME OF MD/NP/PA/DO NOTIFIED:
Elenazjohnny
Ortho resident notified of consult.
Dr. Chapa
Dr. Damico
Dr. Ventura"s group
dr campbell
Dr. Manisha BOYCE

## 2017-04-14 NOTE — CONSULT NOTE ADULT - CONSULT REQUESTED DATE/TIME
03-Apr-2017 10:26
12-Apr-2017 11:01
03-Apr-2017 09:27
08-Apr-2017
14-Apr-2017 13:42
06-Apr-2017 07:43

## 2017-04-14 NOTE — CONSULT NOTE ADULT - ASSESSMENT
67 yo female s/p multiple falls with subjective LE weakness, numbness/tingling in the LLE and b/l hands and incontinence of urine and stool at home prior to admission:

## 2017-04-14 NOTE — CONSULT NOTE ADULT - SUBJECTIVE AND OBJECTIVE BOX
Called by the Hospitalist to see this 65 yo female with PMHX of RA, CAD with stents on ASA/Plavix, alcoholic cirrhosis with esophageal varices s/p banding, HTN, who presented to the ED 12 days ago s/p fall from the couch with c/o trouble ambulating and leg weakness. This was not the first time she fell from the couch, as it had happened the night before, and she had also had a previous fall on 3/3. She also stated when she was at home she resorted to wearing diapers because she would not get the urge to urinate or defecate and then it would suddenly happen. Since her admission, patient was found to have a complex L ankle fracture that she now has a CAM walker for, but is NWB at this time. She also complains of numbness/tingling in the L LE and feeling like "sand paper" in both her hands for the last several weeks. She denies any back, mid-back or neck pain. MRI of the lumbar spine shows multi-level DDD with foraminal stenosis mostly on the left, slight scoliosis, but no compression of the cauda equina or conus.   PMHX- as above  Meds- see MAR, ASA/Plavix/Lovenox, prednisone 15mg of interest  PsxHx- denies  Psocial: quit smoking 2005, last drink of alcohol 5 days prior to admission  VSS, afebrile  Neuro- Awake, alert, orientedx3  NAD, speech clear and appropriate  follows commands  HURLEY well in bed  no hoffmans  flexes b/l LEs in bed, flexes and extends R foot, cannot test L foot which is swollen  b/l UE 5/5  sensate intact to LT  Abdomen- visibly distended  MRI- Disc degeneration and spondylosis at T11-12 through L5-S1   with loss of disc height and associated degenerative endplate changes.   LEFT paracentral disc herniation is noted at L2-3 compressing the ventral   thecal sac and narrowing the LEFT neural foramen. Disc bulges at L3-4 and   L4-5 and L5-S1 flatten the ventral thecal sac and narrow the BILATERAL   neural foramina.  Mild central stenosis at L2-3 through L4-5 on a degenerative basis.

## 2017-04-14 NOTE — CONSULT NOTE ADULT - PROBLEM SELECTOR RECOMMENDATION 9
Recommend MRI C-T spine given her bowel and bladder symptoms and UE complaints. Her Lumbar stenosis is not enough to warrant all her symptoms. May be the source of her subjective LE weakness or numbness/tingling, but unlikely to be causing her B/B symptoms and definitely not her hand numbness or tingling. Either way, her symptoms have been ongoing for several weeks now and this is not emergent. Her lumbar stenosis is chronic. No emergent intervention needed. Will follow up her scans.

## 2017-04-14 NOTE — PROGRESS NOTE ADULT - ASSESSMENT
A O 2 and pos asterixis CW mild H enceph and start rifaximin    LLE fx   ortho input appreciated    Diarrhea and had improved  had empiric tx for c diff and she could not do stool tests      pos diarrhea and RO c diff      Hx of chronic diarrhea that has also improved and will follow, Mg may inc it and DW pt      hypotension  RO adrenal insuff, on chronic steroids  endocrine note appreciated and pred increased, DCed propranolol and aldactone (she is been missing doses often)          pos troponins  DW cardioogy, pt had bare metal stent and they are considering duration of double anti plt with inc risk bleed  DW cardiology and they recommend plavix for at least one month and will possibly stop it after that  DW pt again       Cirrhosis due to ETOH and has stopped    ascites and LE edema      likely adrenal insuff with years of steroids and now on inc dose steroids,  BP is improving    consider slow taper of pred, managed by endocrine or Rheum, tolerating taper    Rheum note appreciated and DW rheum and hospitalist  inc ESR noted    plan paracentesis to RO SBP, but low likelihood

## 2017-04-14 NOTE — PROVIDER CONTACT NOTE (OTHER) - REASON
Consult
Consult- Cardiology - weakness, pos troponin
low BP and elevated HR
md kellogg
routine consult
cirrhosis and fall

## 2017-04-14 NOTE — CONSULT NOTE ADULT - CONSULT REASON
Adrenal Insufficiency
Left ankle injury, multiple fractures
Lumbar stenosis
RA management
cirrhosis
preop EGD and banding

## 2017-04-14 NOTE — PROGRESS NOTE ADULT - ASSESSMENT
Pt is a 65 yo woman with hx liver cirrhosis, variceal esophageal bvanding, RA on prednisone, who was admitted recently 2-29 to 3-3 for cirrhosis and vomiting.   She reports weakness and inability to walk (legs feel like jelly). She has been incontinent of urine and stool for a few weeks as  she does not make it to the bathroom in time.  Pt fell off the couch his morning.  She also had two other falls, including 3/3/17 getting out of car to home in driveway, and  last evening when she slid off couch, struck a hard box with left chest and twisted her left ankle.  Pt's significant other reports she is  unable to support her own weight and is having trouble with her balance.  She denies cough, resp symptoms.  No fever/chills, n/v, cpain, SOB.  She has 1-3 loose stools/day.   Dr. Osorio discontinued her methotrexate, bystolic, and HCTZ on 3/10/17.            She  stopped drinking 5 days prior to last admission.  No head injury during any fall. (02 Apr 2017 18:48)      acute upon chronic hypotension.  hx AI.  continue to monitor.  will give NS 250mL bolus and maintenance IVFs.  escalate prednisone to 15mg po qd.    weakness.  difficulty walking.  poor functionality.  multifactorial.  ankle fracture.  LS DDD.  neuropathy.  RA.      left ankle fracture.  hx of fall.  elevation.  NWB LLE w/ CAM boot.  rolling walker w/ assist.  RICE.  Orthopedic f/u outpatient.  Podiatry consult, re:  toe nail clipping of left foot.     disc herniations and spinal stenosis.  Neurosurgery consult.  d/w NSxPA who will asses patient.    elevated ESR.  afebrile.  WBCs nml.  will obtain US guided paracentesis for Cx and cell count.    NSTEMI PCI LAD BMS.  DAPT + statin.    hx neuropathy.  B12.  folic acid.  started gabapentin 100mg TID.    hx EtOH cirrhosis-EV (hx banding);  peripheral edema.  Lasix falling outside parameters.  propranolol and spironolactone not tolerated due to hypotension.    hx RA.  doubt acute exacerbation.  c/w prednisone.  Rheumatology f/u outpatient, re:  DMARDs and EMG/NCS.    disposition.  mobilze/PT.  anticipate ARSH when stable. Pt is a 65 yo woman with hx liver cirrhosis, variceal esophageal bvanding, RA on prednisone, who was admitted recently 2-29 to 3-3 for cirrhosis and vomiting.   She reports weakness and inability to walk (legs feel like jelly). She has been incontinent of urine and stool for a few weeks as  she does not make it to the bathroom in time.  Pt fell off the couch his morning.  She also had two other falls, including 3/3/17 getting out of car to home in driveway, and  last evening when she slid off couch, struck a hard box with left chest and twisted her left ankle.  Pt's significant other reports she is  unable to support her own weight and is having trouble with her balance.  She denies cough, resp symptoms.  No fever/chills, n/v, cpain, SOB.  She has 1-3 loose stools/day.   Dr. Osorio discontinued her methotrexate, bystolic, and HCTZ on 3/10/17.            She  stopped drinking 5 days prior to last admission.  No head injury during any fall. (02 Apr 2017 18:48)      acute upon chronic hypotension.  hx AI.  continue to monitor.  will give NS 250mL bolus and maintenance IVFs.  escalate prednisone to 15mg po qd.    weakness.  difficulty walking.  poor functionality.  multifactorial.  ankle fracture.  LS DDD.  neuropathy.  RA.  AI.    left ankle fracture.  hx of fall.  elevation.  NWB LLE w/ CAM boot.  rolling walker w/ assist.  RICE.  Orthopedic f/u outpatient.  Podiatry consult, re:  toe nail clipping of left foot.     disc herniations and spinal stenosis.  Neurosurgery consult.  d/w NSxPA who will asses patient.    elevated ESR.  afebrile.  WBCs nml.  will obtain US guided paracentesis for Cx and cell count.    NSTEMI PCI LAD BMS.  DAPT + statin.    hx neuropathy.  B12.  folic acid.  c/w gabapentin.  consider Neurology consult.      hx EtOH cirrhosis-EV (hx banding);  peripheral edema.  Lasix falling outside BP parameters.  propranolol and spironolactone not tolerated due to hypotension.    hx RA.  doubt acute exacerbation.  c/w prednisone.  Rheumatology f/u outpatient, re:  DMARDs and EMG/NCS.    disposition.  mobilze/PT.  anticipate ARSH when stable.

## 2017-04-14 NOTE — PROVIDER CONTACT NOTE (OTHER) - ACTION/TREATMENT ORDERED:
s/w mode at office consult called in
250 ml Normal Saline bolus STAT and start NS at 83 ml/hr while NPO.

## 2017-04-14 NOTE — PROVIDER CONTACT NOTE (OTHER) - DATE AND TIME:
02-Apr-2017 22:34
12-Apr-2017 09:54
03-Apr-2017 08:31
07-Apr-2017 17:31
12-Apr-2017 20:49
14-Apr-2017 11:55

## 2017-04-14 NOTE — PROVIDER CONTACT NOTE (OTHER) - SITUATION
message left with answering service, aware of consult
BP 82/52 manual,  radially, Temp 97.4, RR 16, 96% O2 on room air. Patient states feels fine. Denies any dizziness or SOB.
Dr. Chapa consulted - spoke with Beth from service
Spoke with Dr. Cooper, spoke with her directly.
called dr campbell office an cell phone an left message on both

## 2017-04-14 NOTE — PROGRESS NOTE ADULT - SUBJECTIVE AND OBJECTIVE BOX
Patient is a 66y old  Female who presents with a chief complaint of weakness (02 Apr 2017 18:48)      HPI:  Pt is a 65 yo woman with hx liver cirrhosis, variceal esophageal bvanding, RA on prednisone, who was admitted recently 2-29 to 3-3 for cirrhosis and vomiting.   She reports weakness and inability to walk (legs feel like jelly). She has been incontinent of urine and stool for a few weeks as  she does not make it to the bathroom in time.  Pt fell off the couch his morning.  She also had two other falls, including 3/3/17 getting out of car to home in driveway, and  last evening when she slid off couch, struck a hard box with left chest and twisted her left ankle.  Pt's significant other reports she is  unable to support her own weight and is having trouble with her balance.  She denies cough, resp symptoms.  No fever/chills, n/v, cpain, SOB.  She has 1-3 loose stools/day.   Dr. Osorio discontinued her methotrexate, bystolic, and HCTZ on 3/10/17.            She  stopped drinking 5 days prior to last admission.  No head injury during any fall. (02 Apr 2017 18:48)    pt with inc diarrhea and  had 3 loose BM yesterday and also this am  neg blood  pleasant and A O times 2  slurring words a little      PAST MEDICAL & SURGICAL HISTORY:  HTN (hypertension)  Rheumatoid arthritis  No significant past surgical history      MEDICATIONS  (STANDING):  multivitamin 1Tablet(s) Oral daily  aspirin 325milliGRAM(s) Oral daily  potassium chloride    Tablet ER 10milliEquivalent(s) Oral daily  pantoprazole    Tablet 40milliGRAM(s) Oral before breakfast  clopidogrel Tablet 75milliGRAM(s) Oral daily  atorvastatin 20milliGRAM(s) Oral at bedtime  magnesium oxide 800milliGRAM(s) Oral at bedtime  furosemide    Tablet 20milliGRAM(s) Oral daily  folic acid 1milliGRAM(s) Oral daily  thiamine 100milliGRAM(s) Oral daily  predniSONE   Tablet 10milliGRAM(s) Oral daily  insulin lispro (HumaLOG) corrective regimen sliding scale  SubCutaneous three times a day before meals  insulin lispro (HumaLOG) corrective regimen sliding scale  SubCutaneous at bedtime  dextrose 5%. 1000milliLiter(s) IV Continuous <Continuous>  dextrose 50% Injectable 12.5Gram(s) IV Push once  dextrose 50% Injectable 25Gram(s) IV Push once  dextrose 50% Injectable 25Gram(s) IV Push once  gabapentin 300milliGRAM(s) Oral three times a day  rifaximin 550milliGRAM(s) Oral two times a day    MEDICATIONS  (PRN):  dextrose Gel 1Dose(s) Oral once PRN Blood Glucose LESS THAN 70 milliGRAM(s)/deciliter  glucagon  Injectable 1milliGRAM(s) IntraMuscular once PRN Glucose LESS THAN 70 milligrams/deciliter  ondansetron Injectable 4milliGRAM(s) IV Push every 8 hours PRN Nausea and/or Vomiting      Allergies    sulfa drugs (Unknown)    Intolerances        SOCIAL HISTORY:unchanged    FAMILY HISTORY:  No pertinent family history in first degree relatives      REVIEW OF SYSTEMS:    CONSTITUTIONAL: No weakness, fevers or chills  EYES/ENT: No visual changes;  No vertigo or throat pain   NECK: No pain or stiffness  RESPIRATORY: No cough, wheezing, hemoptysis; No shortness of breath  CARDIOVASCULAR: No chest pain or palpitations  GENITOURINARY: No dysuria, frequency or hematuria  NEUROLOGICAL: No numbness or weakness, pos asterixis  SKIN: No itching, burning, rashes, or lesions   All other review of systems is negative unless indicated above.    Vital Signs Last 24 Hrs  T(C): 36.6, Max: 36.6 (04-14 @ 05:52)  T(F): 97.8, Max: 97.8 (04-14 @ 05:52)  HR: 121 (114 - 121)  BP: 121/84 (114/66 - 121/84)  BP(mean): --  RR: 16 (14 - 16)  SpO2: 96% (93% - 96%)    PHYSICAL EXAM:    Constitutional: NAD, well-developed  HEENT: EOMI, throat clear  Neck: No LAD, supple  Respiratory: CTA and P  Cardiovascular: S1 and S2, RRR, no M  Gastrointestinal: BS+, soft, NT/ND, neg HSM,  Extremities: pos peripheral edema, neg clubing, cyanosis  Vascular: 2+ peripheral pulses  Neurological: A/O x 3, no focal deficits  Psychiatric: Normal mood, normal affect  Skin: No rashes    LABS:  CBC Full  -  ( 14 Apr 2017 06:30 )  WBC Count : 11.1 K/uL  Hemoglobin : 11.5 g/dL  Hematocrit : 34.3 %  Platelet Count - Automated : 183 K/uL  Mean Cell Volume : 119.1 fl  Mean Cell Hemoglobin : 39.9 pg  Mean Cell Hemoglobin Concentration : 33.5 gm/dL  Auto Neutrophil # : x  Auto Lymphocyte # : x  Auto Monocyte # : x  Auto Eosinophil # : x  Auto Basophil # : x  Auto Neutrophil % : x  Auto Lymphocyte % : x  Auto Monocyte % : x  Auto Eosinophil % : x  Auto Basophil % : x    04-14    138  |  106  |  22  ----------------------------<  114<H>  4.2   |  24  |  0.98    Ca    8.6      14 Apr 2017 06:30              RADIOLOGY & ADDITIONAL STUDIES:

## 2017-04-15 DIAGNOSIS — R60.0 LOCALIZED EDEMA: ICD-10-CM

## 2017-04-15 LAB
ANION GAP SERPL CALC-SCNC: 10 MMOL/L — SIGNIFICANT CHANGE UP (ref 5–17)
B PERT IGG+IGM PNL SER: (no result)
BUN SERPL-MCNC: 24 MG/DL — HIGH (ref 7–23)
CALCIUM SERPL-MCNC: 8 MG/DL — LOW (ref 8.5–10.1)
CHLORIDE SERPL-SCNC: 105 MMOL/L — SIGNIFICANT CHANGE UP (ref 96–108)
CO2 SERPL-SCNC: 24 MMOL/L — SIGNIFICANT CHANGE UP (ref 22–31)
COLOR FLD: SIGNIFICANT CHANGE UP
CREAT SERPL-MCNC: 0.84 MG/DL — SIGNIFICANT CHANGE UP (ref 0.5–1.3)
FLUID INTAKE SUBSTANCE CLASS: SIGNIFICANT CHANGE UP
FLUID SEGMENTED GRANULOCYTES: 10 % — SIGNIFICANT CHANGE UP
GLUCOSE SERPL-MCNC: 112 MG/DL — HIGH (ref 70–99)
HCT VFR BLD CALC: 31.2 % — LOW (ref 34.5–45)
HGB BLD-MCNC: 10.2 G/DL — LOW (ref 11.5–15.5)
LYMPHOCYTES # FLD: 12 % — SIGNIFICANT CHANGE UP
MCHC RBC-ENTMCNC: 32.9 GM/DL — SIGNIFICANT CHANGE UP (ref 32–36)
MCHC RBC-ENTMCNC: 39.1 PG — HIGH (ref 27–34)
MCV RBC AUTO: 118.9 FL — HIGH (ref 80–100)
MESOTHL CELL # FLD: 29 % — SIGNIFICANT CHANGE UP
MONOS+MACROS # FLD: 48 % — SIGNIFICANT CHANGE UP
OTHER CELLS FLD MANUAL: 1 % — SIGNIFICANT CHANGE UP
PLATELET # BLD AUTO: 161 K/UL — SIGNIFICANT CHANGE UP (ref 150–400)
POTASSIUM SERPL-MCNC: 4.2 MMOL/L — SIGNIFICANT CHANGE UP (ref 3.5–5.3)
POTASSIUM SERPL-SCNC: 4.2 MMOL/L — SIGNIFICANT CHANGE UP (ref 3.5–5.3)
RBC # BLD: 2.62 M/UL — LOW (ref 3.8–5.2)
RBC # FLD: 16.2 % — HIGH (ref 10.3–14.5)
RCV VOL RI: 1000 /UL — HIGH (ref 0–5)
SODIUM SERPL-SCNC: 139 MMOL/L — SIGNIFICANT CHANGE UP (ref 135–145)
TOTAL NUCLEATED CELL COUNT, BODY FLUID: 61 /UL — HIGH (ref 0–5)
TUBE TYPE: SIGNIFICANT CHANGE UP
WBC # BLD: 10.5 K/UL — SIGNIFICANT CHANGE UP (ref 3.8–10.5)
WBC # FLD AUTO: 10.5 K/UL — SIGNIFICANT CHANGE UP (ref 3.8–10.5)

## 2017-04-15 PROCEDURE — 99232 SBSQ HOSP IP/OBS MODERATE 35: CPT

## 2017-04-15 RX ADMIN — MAGNESIUM OXIDE 400 MG ORAL TABLET 800 MILLIGRAM(S): 241.3 TABLET ORAL at 22:27

## 2017-04-15 RX ADMIN — Medication 15 MILLIGRAM(S): at 06:18

## 2017-04-15 RX ADMIN — ATORVASTATIN CALCIUM 20 MILLIGRAM(S): 80 TABLET, FILM COATED ORAL at 22:26

## 2017-04-15 RX ADMIN — CLOPIDOGREL BISULFATE 75 MILLIGRAM(S): 75 TABLET, FILM COATED ORAL at 14:05

## 2017-04-15 RX ADMIN — PANTOPRAZOLE SODIUM 40 MILLIGRAM(S): 20 TABLET, DELAYED RELEASE ORAL at 06:12

## 2017-04-15 RX ADMIN — Medication 1 TABLET(S): at 14:05

## 2017-04-15 RX ADMIN — GABAPENTIN 300 MILLIGRAM(S): 400 CAPSULE ORAL at 14:06

## 2017-04-15 RX ADMIN — Medication 100 MILLIGRAM(S): at 14:05

## 2017-04-15 RX ADMIN — Medication 1 MILLIGRAM(S): at 14:05

## 2017-04-15 RX ADMIN — ENOXAPARIN SODIUM 40 MILLIGRAM(S): 100 INJECTION SUBCUTANEOUS at 18:58

## 2017-04-15 RX ADMIN — GABAPENTIN 300 MILLIGRAM(S): 400 CAPSULE ORAL at 22:26

## 2017-04-15 RX ADMIN — Medication 10 MILLIEQUIVALENT(S): at 14:06

## 2017-04-15 RX ADMIN — Medication 325 MILLIGRAM(S): at 14:04

## 2017-04-15 RX ADMIN — GABAPENTIN 300 MILLIGRAM(S): 400 CAPSULE ORAL at 06:12

## 2017-04-15 NOTE — PROGRESS NOTE ADULT - ASSESSMENT
Assessment and Plan:   · Assessment		  Pt is a 65 yo woman with RA since 2009, with hx alcoholic liver cirrhosis, variceal esophageal banding, admitted for b/L LE weakness.  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   Long hx RA on steroids long term (10-20 mg daily x many ys) and MTX (stopped few months ago)- no evidence active RA confirmed by MRI ankle as noted above.     1) Ankle/ foot:  multiple non displaced fractures as noted.  Refer to ortho for further recommendations- CAM boot, NWB for now  2) RA:  would mnt current low dose prednisone for now and through rehab to mnt HD and continue to control condition.  Will need steroid sparing agent upon d/c from rehab, advised f/u with rheum  3) Elevated ESR w/ relatively nl CRP:  in face extensive injuries to L foot- may explain degree of elevation.  Will monitor over time. At this point, no other obvious explanation of such an elevation especially given essentially negative chest/ abd/ pelvic CTscan, nl renal function.  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 proximal weakness  though only noted in lower extr- but fairly equal.  Pathology in spine (cervical/ thoracic/ lumbar) could explain some but unlikely to be so symmetrical.  - may still benefit from neuro eval as outpatient w/ full emg/ ncs and consider MRI thighs to assess muscles further- often very helpful  5) Peripheral neuropathy:  likely etoh r/t, will need full EMG/ NCS as op for now will increase gabapentin, only on 300 mg total daily dose, increase as needed and tolerated- only limited by sedation which will improve over time with use...- 2 days ago increased from 300 mg daily dose to 900 mg total divided dose- mild increase in sedation but overall unchanged- and still reporting little difference in sx.  Could increase further for pain, but will hold for now till adjusts to this dose    Plan:  - continue PO steroids at lowest effective dose 5-15 mg as needed for HD stability, may need something else for BP management refer to endo  - ortho appreciated  - Consider Neuro input for likely myopathy, should we do MRI thigh muscles to confirm myopathy- not necessary at this time.. but has been unable to bear any wt on legs (complicated by multiple fractures). CK nl but this can be seen in alcoholic myopathy  - peripheral neuropathy:  increase gabapentin to 300 mg TID- still lot higher doses as needed, hold for now  - continue other tx as noted

## 2017-04-15 NOTE — PROGRESS NOTE ADULT - PROBLEM SELECTOR PROBLEM 1
Ankle fracture, left
Ankle fracture, left
Degenerative disc disease, lumbar
Degenerative disc disease, lumbar
Weakness
Weakness
Bilateral edema of lower extremity
Weakness

## 2017-04-15 NOTE — PROGRESS NOTE ADULT - SUBJECTIVE AND OBJECTIVE BOX
Patient is a 66y old  Female who presents with a chief complaint of weakness (02 Apr 2017 18:48)      HPI:  pt overall feeling ok  tolerated paracentesis well  main complaint today is neuropathy - new med started  persistent swelling of legs    PAST MEDICAL & SURGICAL HISTORY:  HTN (hypertension)  Rheumatoid arthritis  No significant past surgical history      MEDICATIONS  (STANDING):  multivitamin 1Tablet(s) Oral daily  aspirin 325milliGRAM(s) Oral daily  potassium chloride    Tablet ER 10milliEquivalent(s) Oral daily  pantoprazole    Tablet 40milliGRAM(s) Oral before breakfast  clopidogrel Tablet 75milliGRAM(s) Oral daily  atorvastatin 20milliGRAM(s) Oral at bedtime  magnesium oxide 800milliGRAM(s) Oral at bedtime  furosemide    Tablet 20milliGRAM(s) Oral daily  folic acid 1milliGRAM(s) Oral daily  thiamine 100milliGRAM(s) Oral daily  gabapentin 300milliGRAM(s) Oral three times a day  rifaximin 550milliGRAM(s) Oral two times a day  sodium chloride 0.9%. 1000milliLiter(s) IV Continuous <Continuous>  predniSONE   Tablet 15milliGRAM(s) Oral daily  enoxaparin Injectable 40milliGRAM(s) SubCutaneous every 24 hours    MEDICATIONS  (PRN):  ondansetron Injectable 4milliGRAM(s) IV Push every 8 hours PRN Nausea and/or Vomiting    Allergies  sulfa drugs (Unknown)  REVIEW OF SYSTEMS:  CONSTITUTIONAL: weakness  RESPIRATORY: No cough, wheezing, hemoptysis; No shortness of breath  CARDIOVASCULAR: No chest pain or palpitations  GASTROINTESTINAL:minimal abdominal pain intermittently  Neuro - tingling and pins/needles in hands  All other review of systems is negative unless indicated above.    Vital Signs Last 24 Hrs  T(C): 36.9, Max: 37.1 (04-14 @ 18:31)  T(F): 98.4, Max: 98.7 (04-14 @ 18:31)  HR: 107 (107 - 116)  BP: 98/48 (82/52 - 108/60)  BP(mean): --  RR: 17 (16 - 18)  SpO2: 94% (94% - 96%)    PHYSICAL EXAM:    Constitutional: NAD, well-developed, appears chronically ill  Respiratory: CTAB  Cardiovascular: S1 and S2, RRR,   Gastrointestinal: BS+, softly distended, no focal tenderness  Extremities: 3+ bilateral edema  Psychiatric: Normal mood, normal affect  Skin: No rashes    LABS:                        10.2   10.5  )-----------( 161      ( 15 Apr 2017 05:42 )             31.2     04-15    139  |  105  |  24<H>  ----------------------------<  112<H>  4.2   |  24  |  0.84    Ca    8.0<L>      15 Apr 2017 05:42  wbc 110 in paracentesis fluid    PT/INR - ( 14 Apr 2017 12:34 )   PT: 16.1 sec;   INR: 1.48 ratio         PTT - ( 14 Apr 2017 12:34 )  PTT:32.0 sec      RADIOLOGY & ADDITIONAL STUDIES:

## 2017-04-15 NOTE — PROGRESS NOTE ADULT - SUBJECTIVE AND OBJECTIVE BOX
Subjective:  04/14:  5S.  more somnolent today.  SBP 82mmHg.  give prednisone 15mg po stat. + 0.9%NS 250mL bolus then maintenance.  4/15: Lying in bed. Sleeping but arousable and speaking in full sentences.  Complaints of b/l arm tingling and b/l leg edema.       REVIEW OF SYSTEMS:  All other review of systems is negative unless indicated above.Subjective:      Objective:     Vital Signs Last 24 Hrs  T(C): 36.4, Max: 37.1 (04-14 @ 18:31)  T(F): 97.5, Max: 98.7 (04-14 @ 18:31)  HR: 102 (102 - 116)  BP: 105/53 (91/49 - 108/60)  BP(mean): --  RR: 17 (17 - 18)  SpO2: 97% (94% - 97%)    chronically ill appearing WF in no acute distress.  NCAT.  neck supple.  JVP nml.  lungs CTA.  no WRR.  heart RRR.  NS1S2.  no MRG.  distended.  soft.    LE good muscle tone.  limited range of motion.  b/l LE (+) 1-2 pitting edema, L>R.  (+) left food edema.  (+) ecchimosis plantar aspect.  A&Ox3.                                         10.2   10.5  )-----------( 161      ( 15 Apr 2017 05:42 )             31.2     04-15    139  |  105  |  24<H>  ----------------------------<  112<H>  4.2   |  24  |  0.84    Ca    8.0<L>      15 Apr 2017 05:42      CAPILLARY BLOOD GLUCOSE      PT/INR - ( 14 Apr 2017 12:34 )   PT: 16.1 sec;   INR: 1.48 ratio         PTT - ( 14 Apr 2017 12:34 )  PTT:32.0 sec    MRI Cervical and thoracic spine 4/14:  IMPRESSION:  Multilevel degenerative disc disease and spondylosis of the   cervical and thoracic spine.   Narrowing of the RIGHT C3-4, BILATERAL   C4-5, BILATERAL C5-6 and BILATERAL C6-7 and LEFT C7-T1 neural foramina   due to uncovertebral spurring and facet osteophytic hypertrophy. Mild   degenerative cord flattening is seen at C5-6 and mild degenerative cord   impingement is seen at C6-7 due to posterior osteophytic ridge/disc   complexes.   Tiny RIGHT paracentral disc herniation is noted at T8-9   small RIGHT and LEFT paracentral disc herniations are noted at T8-9-10   with minimal cord impingement on the LEFT. Posterior osteophytic   ridge/disc complex at T11-12 indents the ventral subarachnoid space and   narrows the RIGHT neural foramen.    MEDICATIONS  (STANDING):  multivitamin 1Tablet(s) Oral daily  aspirin 325milliGRAM(s) Oral daily  potassium chloride    Tablet ER 10milliEquivalent(s) Oral daily  pantoprazole    Tablet 40milliGRAM(s) Oral before breakfast  clopidogrel Tablet 75milliGRAM(s) Oral daily  atorvastatin 20milliGRAM(s) Oral at bedtime  magnesium oxide 800milliGRAM(s) Oral at bedtime  furosemide    Tablet 20milliGRAM(s) Oral daily  folic acid 1milliGRAM(s) Oral daily  thiamine 100milliGRAM(s) Oral daily  gabapentin 300milliGRAM(s) Oral three times a day  rifaximin 550milliGRAM(s) Oral two times a day  sodium chloride 0.9%. 1000milliLiter(s) IV Continuous <Continuous>  predniSONE   Tablet 15milliGRAM(s) Oral daily  enoxaparin Injectable 40milliGRAM(s) SubCutaneous every 24 hours    MEDICATIONS  (PRN):  ondansetron Injectable 4milliGRAM(s) IV Push every 8 hours PRN Nausea and/or Vomiting      TD  04/14:  5S.  more somnolent today.  SBP 82mmHg.  will give 0.9%NS 250mL bolus then maintenance.      Allergies    sulfa drugs (Unknown)    Intolerances      MEDICATIONS  (STANDING):  multivitamin 1Tablet(s) Oral daily  aspirin 325milliGRAM(s) Oral daily  potassium chloride    Tablet ER 10milliEquivalent(s) Oral daily  pantoprazole    Tablet 40milliGRAM(s) Oral before breakfast  clopidogrel Tablet 75milliGRAM(s) Oral daily  atorvastatin 20milliGRAM(s) Oral at bedtime  magnesium oxide 800milliGRAM(s) Oral at bedtime  furosemide    Tablet 20milliGRAM(s) Oral daily  folic acid 1milliGRAM(s) Oral daily  thiamine 100milliGRAM(s) Oral daily  predniSONE   Tablet 10milliGRAM(s) Oral daily  gabapentin 300milliGRAM(s) Oral three times a day  rifaximin 550milliGRAM(s) Oral two times a day  sodium chloride 0.9%. 1000milliLiter(s) IV Continuous <Continuous>    MEDICATIONS  (PRN):  ondansetron Injectable 4milliGRAM(s) IV Push every 8 hours PRN Nausea and/or Vomiting    Vital Signs Last 24 Hrs  T(C): 36.3, Max: 36.6 (04-14 @ 05:52)  T(F): 97.4, Max: 97.8 (04-14 @ 05:52)  HR: 116 (115 - 121)  BP: 82/52 (82/52 - 121/84)  BP(mean): --  RR: 16 (16 - 16)  SpO2: 96% (96% - 96%)    chronically ill appearing WF in no acute distress.  NCAT.  neck supple.  JVP nml.  lungs CTA.  no WRR.  heart RRR.  NS1S2.  no MRG.  distended.  soft.    LE good muscle tone.  limited range of motion.  b/l LE (+) 1-2 pitting edema, L>R.  (+) left food edema.  (+) ecchimosis plantar aspect.  A&Ox3.                        11.5   11.1  )-----------( 183      ( 14 Apr 2017 06:30 )             34.3       04-14    138  |  106  |  22  ----------------------------<  114<H>  4.2   |  24  |  0.98    Ca    8.6      14 Apr 2017 06:30    Assessment and Plan:   · Assessment		  Pt is a 67 yo woman with hx liver cirrhosis, variceal esophageal banding, RA on prednisone who presents with weakness,  inability to walk (legs feel like jelly), and falls.                  She  stopped drinking 5 days prior to last admission.  No head injury during any fall. (02 Apr 2017 18:48)      Acute upon chronic hypotension:    -Dr. Osorio discontinued her methotrexate, bystolic, and HCTZ on 3/10/17.    -d/c IVFs and monitor  -Escalate prednisone to 15mg po qd.    weakness with difficulty walking and poor functionality:  Multifactorial, related to underlying co-morbidities including  ankle fracture, LS DDD,  disc herniations, spinal stenosis, neuropathy,  RA,  AI  -MRI of cervical and thoracic spine shows DDD, multi-level neural foraminal narrowing, cord impingement likely contributing to her symptoms.  -neurosurgery consult appreciated --> does not want to consider any possible spinal surgery, until at least her left leg has healed and managed.   F/U as outpatient.  -on gabapentin - could be contributing to weakness.  If continues will need to taper off to improve mentation.  -f/u B12, folic acid  -unclear significance of elevated ESR.  Repeat in AM.  - Rheumatology consult appreciated.     left ankle fracture in setting of hx of falls.  -Elevation.    -NWB LLE w/ CAM boot.    -Rolling walker w/ assist.    -RICE.    -Orthopedic f/u outpatient.    -Podiatry consult, re:  toe nail clipping of left foot.     Cirrhosis secondary to chronic alcohol abuse with peripheral edema, anemia, and coagulopathy:  -Hx of banding  -s/p US guided paracentesis for Cx and cell count.  -Lasix falling outside BP parameters.  propranolol and spironolactone not tolerated due to hypotension.  -c/w rifaximin   -GI following    CAD:  -NSTEMI s/p LAD with BMS.    -DAPT + statin.      disposition.   -mobilze/PT.   -Anticipate [facility] when stable.    Attending Statement:  >35 minutes spent on total encounter; more than 50% of the visit was spent counseling and/or coordinating care by the attending physician.

## 2017-04-15 NOTE — PROGRESS NOTE ADULT - SUBJECTIVE AND OBJECTIVE BOX
· Subjective and Objective: 	  CHIEF COMPLAINT: weakness    SUBJECTIVE: Pt is a 67 yo woman with hx alcoholic liver cirrhosis, variceal esophageal banding, RA on prednisone, who was admitted recently 2-29 to 3-3 for cirrhosis and vomiting and renal insufficiency- last drink 5 days PTA.   She reports weakness and inability to walk. . She has been incontinent of urine and stool for a few weeks as she does not make it to the bathroom in time.  Pt fell off the couch on the day of admission.  She also had two other falls, including 3/3/17 getting out of car to home in driveway, and the night prior to admission, when she was toileting herself.   Pt was unable to support her own weight and is having trouble with her balance.    Patient was diagnosed with RA in 2009, by Dr. Montes.  She has been on MTX and prednisone 5mg daily since 2009.  She denies using higher doses of prednisone.  Her MTX was discontinued on 3/10/17, and prednisone was increased to 15mg daily.     Patient continues to feel that her joints are quiet.  She has had RA flares in the past and knows she is not having a flare now.    She feels pain diffusely in lower extremities, b/L.  No significant pain on moving ankles.  She also complains of burning/tingling in her hands from her wrists to her fingertips.    4/10/17.  No new complaints this morning except continued numbness/tingling/pain in hands and lower extremities, and weakness in extremities.      4/11/17 Stable today, still unable to walk more than few steps.  Pain L ankle worse w/ movement.  VSS    4/12/17 No improvement w/ steroid dose (125 mg solumedrol)- though swelling in L foot slightly better, pain unchanged.   MRI L ankle + multiple fractures.  Had reported a fall PTA, now describes stepping on water bottle, rolling off with ankle and falling to ground- c/w injury noted on films (see below)  Still denies other joint pain/ swelling/ stiffness  Weakness in quads unchanged    4/13/17  Swelling in BLE improved w/ elevation through day yestr, still no other synovitis.  Increased c/o neuropathic pain in hands/ feet "sensitive to touch"  Seen by ortho/ cam boot ordered nwb for next few wks.    BP still low 100-110/60-70    4/15/17  Stable overall, little change in quad strength- nwb on L ankle. S/p paracentesis w/ no evidence of SBP, VSS, still low bp  Increased gabapentin by 300 mg past 24 hs to help with neuropathic pain/ discomfort- little improvement and noted slightly more sedated per chart... mentation unchanged from previous exams          REVIEW OF SYSTEMS:    General: denies fevers, chills or night sweats  Skin: denies rashes or photosensitivity  Ophthalmologic: denies sicca symptoms  ENMT: denies sicca, frequent sinus infections, or ear infections, denies nasal perforation	  Respiratory and Thorax: denies asthma, chest pain or SOB  Cardiovascular: denies chest pain or SOB  Gastrointestinal: denies abd pain, nausea, reflux, diarrhea/ constipation- is not incontinent at this point	  Musculoskeletal: denies prolonged morning stiffness- no active synovitis for past several months.  L ankle pain / bruising and swelling persists   Neurological: leg weakness persists    Vital Signs Last 24 Hrs  T(C): 36.4, Max: 36.9 (04-15 @ 05:34)  T(F): 97.5, Max: 98.4 (04-15 @ 05:34)  HR: 102 (102 - 111)  BP: 105/53 (98/48 - 105/53)  BP(mean): --  RR: 17 (17 - 18)  SpO2: 97% (94% - 97%)                        10.4                           10.2   10.5  )-----------( 161      ( 15 Apr 2017 05:42 )             31.2     04-15    139  |  105  |  24<H>  ----------------------------<  112<H>  4.2   |  24  |  0.84    Ca    8.0<L>      15 Apr 2017 05:42        Uric acid 7.8 (4/11/17)  - repeat 101 4/10/17 w/ CRP 2.2 (repeated, Fibrinogen low 250)  AST 67, nl ALT, Bili 2.1  CK normal x 2  CCP > 250, FREEDOM 1:320S w/ neg AMA, SMA   Peritoneal fluid:  TNC 61 RBC 1000- turbid, no growth (4/14/17)      EXAM:  CT ABDOMEN AND PELVIS IC                        PROCEDURE DATE:  04/02/2017    INTERPRETATION:    Three examinations were performed on this patient:   1. CT scan of the chest with intravenous contrast  2. CT scan of the abdomen with intravenous contrast  3. CT scan of the pelvis with intravenous contrast  CLINICAL INFORMATION (all 3 exams): Trauma, chest pain, abdominal pain    FINDINGS:   No previous examinations are available for review.    The thyroid gland appears intact.    The lungs are significant for a small LEFT pleural effusion with   underlying atelectasis.  Lung volumes are preserved. The trachea and   major bronchi are patent.    No enlarged axillary, hilar or mediastinal lymph nodes are found.   The   heart size is normal. The chest wall and thoracic spine are unremarkable.    There is moderate ascites.    The liver demonstrates homogeneous attenuation without focal lesion or   abnormal enhancement.  Hepatic size is normal but contours are nodular.   Hepatic and portal veins are patent and not displaced.  No intrahepatic   or common ductal dilatation is recognized.  The gallbladder is   significant for minimal cholelithiasis.  The pancreas is intact without   ductal dilatation or focal lesion.  The spleen is normal in size.    The adrenal glands are intact.  The kidneys demonstrate symmetric   nephrograms.  There is a 1 cm RIGHT renal cysts. No suspicious renal mass   is found.  No renal calculus, hydronephrosis or perinephric infiltration   is found.  The ureters are not dilated.   The bladder appears   unremarkable.    EXAM:  US DPLX LWR EXT VEINS COMPL BI                        PROCEDURE DATE:  04/07/2017    INTERPRETATION:  Clinical information: Pain and swelling of feet      IMPRESSION:     No evidence of bilateral lower extremity deep venous thrombosis.    EXAM:  LUMBAR SPINE(MRI)W O CON                        PROCEDURE DATE:  04/09/2017    INTERPRETATION:    MR lumbar without gadolinium       CLINICAL INFORMATION:   Bilateral leg weakness and numbness for 2 days   Lumbar spondylosis.    FINDINGS:   No prior similar studies are available for review.         IMPRESSION:  Disc degeneration and spondylosis at T11-12 through L5-S1   with loss of disc height and associated degenerative endplate changes.   LEFT paracentral disc herniation is noted at L2-3 compressing the ventral   thecal sac and narrowing the LEFT neural foramen. Disc bulges at L3-4 and   L4-5 and L5-S1 flatten the ventral thecal sac and narrow the BILATERAL   neural foramina.  Mild central stenosis at L2-3 through L4-5 on a degenerative basis.           EXAM:  CERVICAL/ THORACIC SPINE (MRI)W O CON                            PROCEDURE DATE:  04/14/2017    MR cervical  and thoracic without gadolinium   CLINICAL INFORMATION:  Bilateral arm numbness, cervical and thoracic back   pain                IMPRESSION:  Multilevel degenerative disc disease and spondylosis of the   cervical and thoracic spine.   Narrowing of the RIGHT C3-4, BILATERAL   C4-5, BILATERAL C5-6 and BILATERAL C6-7 and LEFT C7-T1 neural foramina   due to uncovertebral spurring and facet osteophytic hypertrophy. Mild   degenerative cord flattening is seen at C5-6 and mild degenerative cord   impingement is seen at C6-7 due to posterior osteophytic ridge/disc   complexes.   Tiny RIGHT paracentral disc herniation is noted at T8-9   small RIGHT and LEFT paracentral disc herniations are noted at T8-9-10   with minimal cord impingement on the LEFT. Posterior osteophytic   ridge/disc complex at T11-12 indents the ventral subarachnoid space and   narrows the RIGHT neural foramen.    EXAM:  MRI ANKLE WO C LEFT                        PROCEDURE DATE:  04/11/2017    INTERPRETATION:  MRI ANKLE WO C LEFT dated 4/11/2017 4:58 PM     INDICATION: Left ankle swelling and pain for three days. Alcohol   cirrhosis with severe peripheral edema. RA.    COMPARISON: Ankle radiographs dated 4/2/2017    IMPRESSION:   1.  Nondisplaced fractures of the medial malleolus, anterior distal   tibia, and posterior malleolus.  2.  Nondisplaced fractures at the base of the second and third   metatarsals.  3.  Edema within the Lisfranc ligament with nondisplaced fracture at the   lateral aspect of the medial cuneiform at the base of the Lisfranc   ligament suggesting Lisfranc injury.  4.  Contusion at the anterolateral medial cuneiform and distal fibula.  5.  Nondisplaced fracture of the distal calcaneus.  6.  Large diffuse soft tissue swelling is nonspecific.  7.  Tenosynovitis of the posterior tibialis tendon likely posttraumatic      MEDICATIONS  (STANDING):  multivitamin 1Tablet(s) Oral daily  aspirin 325milliGRAM(s) Oral daily  potassium chloride    Tablet ER 10milliEquivalent(s) Oral daily  pantoprazole    Tablet 40milliGRAM(s) Oral before breakfast  clopidogrel Tablet 75milliGRAM(s) Oral daily  atorvastatin 20milliGRAM(s) Oral at bedtime  magnesium oxide 800milliGRAM(s) Oral at bedtime  furosemide    Tablet 20milliGRAM(s) Oral daily  folic acid 1milliGRAM(s) Oral daily  thiamine 100milliGRAM(s) Oral daily  gabapentin 300milliGRAM(s) Oral three times a day  rifaximin 550milliGRAM(s) Oral two times a day  predniSONE   Tablet 15milliGRAM(s) Oral daily  enoxaparin Injectable 40milliGRAM(s) SubCutaneous every 24 hours    MEDICATIONS  (PRN):  ondansetron Injectable 4milliGRAM(s) IV Push every 8 hours PRN Nausea and/or Vomiting    PHYSICAL EXAM:    Constitutional: NAD, awake and alert  HEENT- no oral lesions noted, no lymphadenopathy noted  Heart- S1 S2 reg  Lungs- CTA b/l  Abd- Soft NT- distended   Ext- +1 RLE, +2 over L foot- pitting b/l to just above ankle, L > R worse focused at foot   Skin- no rashes +Ecchymosis multiple on arms and legs at areas of IV and phlebotomy sites, plantar surface and lateral aspect of L foot- dorsum foot as well.  L wrist/ forearm dependent edema/ infiltration L wrist IV access    Musculoskelatal- FROM all joints except b/l ankles- R + 1 as noted; L +2 pitting ++ tenderness throughout- both limited dorsiflexion 2/2 swelling and L due to pain , minimal tenderness in left knee,   Neurological- proximal weakness b/l quads only- 4+/5 b/l R slightly worse- quad muscles measure equal

## 2017-04-15 NOTE — PROGRESS NOTE ADULT - PROBLEM SELECTOR PLAN 2
? contributing to LE weakness.  04/09 MRI LS, LEFT paracentral disc herniation is noted at L2-3 compressing the ventral thecal sac and narrowing the LEFT neural foramen. Disc bulges at L3-4 and L4-5 and L5-S1 flatten the ventral thecal sac and narrow the BILATERAL neural foramina.  Mild central stenosis at L2-3 through L4-5 on a degenerative basis.  NSx to comment prior to d/c, Dr. Gonzales on-call 04/10.
? contributing to LE weakness.  04/09 MRI LS, LEFT paracentral disc herniation is noted at L2-3 compressing the ventral thecal sac and narrowing the LEFT neural foramen. Disc bulges at L3-4 and L4-5 and L5-S1 flatten the ventral thecal sac and narrow the BILATERAL neural foramina.  Mild central stenosis at L2-3 through L4-5 on a degenerative basis.  NSx to comment prior to d/c, Dr. Gonzales on-call 04/10.
debilitated w/ poor functionality.  case management suggested Rheumatology input to help optimize mobility?  however, she denies arthralgia, joint swelling or s/sx to suggest RA flair.  she's been taken off methotrexate recently and is on prednisone chronically.  Rheumatology consult appreciated.  c/w prednisone 15mg po qd w/ slow taper base to "home dose" of 5mg po qd.  f/u MRI of LS.
debilitated w/ poor functionality.  case management suggested Rheumatology input to help optimize mobility?  however, she denies arthralgia, joint swelling or s/sx to suggest RA flair.  she's been taken off methotrexate recently and is on prednisone chronically.  Rheumatology consult appreciated.  c/w prednisone 15mg po qd w/ slow taper base to "home dose" of 5mg po qd.  f/u MRI of LS.
hx EV-banding;  peripheral edema.  propranolol and spironolactone held owing to hypotension.  c/w Lasix.  low Na.  GI following.
suspect due to cirrhosis, EtOH.  check B12 and folic acid.
continue rifaximin  s/p banding of varices - stable h/h
hx EV-banding;  peripheral edema.  propranolol and spironolactone held owing to hypotension.  c/w Lasix.  low Na.  GI following.

## 2017-04-15 NOTE — PROGRESS NOTE ADULT - PROBLEM SELECTOR PLAN 1
? contributing to LE weakness.  04/09 MRI LS, LEFT paracentral disc herniation is noted at L2-3 compressing the ventral thecal sac and narrowing the LEFT neural foramen. Disc bulges at L3-4 and L4-5 and L5-S1 flatten the ventral thecal sac and narrow the BILATERAL neural foramina.  Mild central stenosis at L2-3 through L4-5 on a degenerative basis.  NSx to comment prior to d/c, Dr. Gonzales on-call 04/10.
? contributing to LE weakness.  04/09 MRI LS, LEFT paracentral disc herniation is noted at L2-3 compressing the ventral thecal sac and narrowing the LEFT neural foramen. Disc bulges at L3-4 and L4-5 and L5-S1 flatten the ventral thecal sac and narrow the BILATERAL neural foramina.  Mild central stenosis at L2-3 through L4-5 on a degenerative basis.  NSx to comment prior to d/c, Dr. Gonzales on-call 04/10.
elevation.  NWB LLE w/ CAM boot.  rolling walker w/ assist.  RICE.  Orthopedic f/u outpatient.  Podiatry consult, re:  toe nail clipping of left foot.
elevation.  NWB LLE w/ CAM boot.  rolling walker w/ assist.  RICE.  Orthopedic f/u outpatient.  Podiatry consult, re:  toe nail clipping of left foot.
weakness of both lower extremities.  debilitated w/ poor functionality.  case management suggested Rheumatology input to help optimize mobility?  however, she denies arthralgia, joint swelling or s/sx to suggest RA flair.  she's been taken off methotrexate recently and is on prednisone chronically.  Rheumatology consult appreciated.  c/w prednisone 15mg po qd w/ slow taper base to "home dose" of 5mg po qd.  f/u MRI of LS.
weakness of both lower extremities.  debilitated w/ poor functionality.  case management suggested Rheumatology input to help optimize mobility?  however, she denies arthralgia, joint swelling or s/sx to suggest RA flair.  she's been taken off methotrexate recently and is on prednisone chronically.  Rheumatology consult appreciated.  will deescalate prednisone after tomorrows dose.  MRI of LS.
continue diuretics
weakness of both lower extremities.  debilitated w/ poor functionality.  case management suggested Rheumatology input to help optimize mobility?  however, she denies arthralgia, joint swelling or s/sx to suggest RA flair.  she's been taken off methotrexate recently and is on prednisone chronically.  Rheumatology consult to comment.  will consider MRI of LS.

## 2017-04-15 NOTE — PROGRESS NOTE ADULT - SUBJECTIVE AND OBJECTIVE BOX
Patient seen and examined and case discussed with the neurosurgery PA, agree with the notes.  L-spine stenosis, as well as Left ankle/Tibia fracture, managed by Ortho.  No significant back/neck pain.   MRI C-spine shows moderate DDD/Stenosis C4-7.   Neurologically moves all extremities mild left ankle/leg weakness mainly secondary to ankle/tibia pain.   I had an extensive discussion with her regarding management of her L/C spinal stenosis. At the present time she does not want to considering any possible spinal surgery, until at least her left leg has healed and managed.   We will follow her as out-patient and as needed and accordingly.

## 2017-04-15 NOTE — PROGRESS NOTE ADULT - ASSESSMENT
65yo female with cirrhosis and ascites  s/p paracentesis - no evidence of sbp  gabapentin for neuropathy

## 2017-04-15 NOTE — PROGRESS NOTE ADULT - PROBLEM SELECTOR PROBLEM 2
Cirrhosis
Degenerative disc disease, lumbar
Degenerative disc disease, lumbar
Macrocytic anemia
Weakness
Weakness
Cirrhosis
Cirrhosis

## 2017-04-16 LAB
ANION GAP SERPL CALC-SCNC: 9 MMOL/L — SIGNIFICANT CHANGE UP (ref 5–17)
BUN SERPL-MCNC: 20 MG/DL — SIGNIFICANT CHANGE UP (ref 7–23)
CALCIUM SERPL-MCNC: 8.1 MG/DL — LOW (ref 8.5–10.1)
CHLORIDE SERPL-SCNC: 104 MMOL/L — SIGNIFICANT CHANGE UP (ref 96–108)
CO2 SERPL-SCNC: 24 MMOL/L — SIGNIFICANT CHANGE UP (ref 22–31)
CREAT SERPL-MCNC: 0.7 MG/DL — SIGNIFICANT CHANGE UP (ref 0.5–1.3)
CRP SERPL-MCNC: 3.22 MG/DL — HIGH (ref 0–0.4)
ERYTHROCYTE [SEDIMENTATION RATE] IN BLOOD: 67 MM/HR — HIGH (ref 0–20)
GLUCOSE SERPL-MCNC: 97 MG/DL — SIGNIFICANT CHANGE UP (ref 70–99)
HCT VFR BLD CALC: 30.3 % — LOW (ref 34.5–45)
HGB BLD-MCNC: 9.9 G/DL — LOW (ref 11.5–15.5)
MCHC RBC-ENTMCNC: 32.5 GM/DL — SIGNIFICANT CHANGE UP (ref 32–36)
MCHC RBC-ENTMCNC: 37.9 PG — HIGH (ref 27–34)
MCV RBC AUTO: 116.6 FL — HIGH (ref 80–100)
PLATELET # BLD AUTO: 150 K/UL — SIGNIFICANT CHANGE UP (ref 150–400)
POTASSIUM SERPL-MCNC: 4.1 MMOL/L — SIGNIFICANT CHANGE UP (ref 3.5–5.3)
POTASSIUM SERPL-SCNC: 4.1 MMOL/L — SIGNIFICANT CHANGE UP (ref 3.5–5.3)
RBC # BLD: 2.6 M/UL — LOW (ref 3.8–5.2)
RBC # FLD: 15.8 % — HIGH (ref 10.3–14.5)
SODIUM SERPL-SCNC: 137 MMOL/L — SIGNIFICANT CHANGE UP (ref 135–145)
WBC # BLD: 11.5 K/UL — HIGH (ref 3.8–10.5)
WBC # FLD AUTO: 11.5 K/UL — HIGH (ref 3.8–10.5)

## 2017-04-16 RX ORDER — GLYCERIN 1 %
1 DROPS OPHTHALMIC (EYE)
Qty: 0 | Refills: 0 | Status: DISCONTINUED | OUTPATIENT
Start: 2017-04-16 | End: 2017-04-18

## 2017-04-16 RX ADMIN — Medication 325 MILLIGRAM(S): at 11:32

## 2017-04-16 RX ADMIN — GABAPENTIN 300 MILLIGRAM(S): 400 CAPSULE ORAL at 07:02

## 2017-04-16 RX ADMIN — GABAPENTIN 300 MILLIGRAM(S): 400 CAPSULE ORAL at 14:34

## 2017-04-16 RX ADMIN — PANTOPRAZOLE SODIUM 40 MILLIGRAM(S): 20 TABLET, DELAYED RELEASE ORAL at 07:02

## 2017-04-16 RX ADMIN — Medication 20 MILLIGRAM(S): at 07:02

## 2017-04-16 RX ADMIN — ATORVASTATIN CALCIUM 20 MILLIGRAM(S): 80 TABLET, FILM COATED ORAL at 22:36

## 2017-04-16 RX ADMIN — CLOPIDOGREL BISULFATE 75 MILLIGRAM(S): 75 TABLET, FILM COATED ORAL at 11:31

## 2017-04-16 RX ADMIN — Medication 1 MILLIGRAM(S): at 11:32

## 2017-04-16 RX ADMIN — MAGNESIUM OXIDE 400 MG ORAL TABLET 800 MILLIGRAM(S): 241.3 TABLET ORAL at 22:36

## 2017-04-16 RX ADMIN — Medication 10 MILLIEQUIVALENT(S): at 11:32

## 2017-04-16 RX ADMIN — GABAPENTIN 300 MILLIGRAM(S): 400 CAPSULE ORAL at 22:36

## 2017-04-16 RX ADMIN — ENOXAPARIN SODIUM 40 MILLIGRAM(S): 100 INJECTION SUBCUTANEOUS at 17:38

## 2017-04-16 RX ADMIN — Medication 15 MILLIGRAM(S): at 07:02

## 2017-04-16 RX ADMIN — Medication 100 MILLIGRAM(S): at 11:32

## 2017-04-16 RX ADMIN — Medication 1 TABLET(S): at 11:32

## 2017-04-16 NOTE — PROGRESS NOTE ADULT - SUBJECTIVE AND OBJECTIVE BOX
Subjective:  04/14:  5S.  more somnolent today.  SBP 82mmHg.  give prednisone 15mg po stat. + 0.9%NS 250mL bolus then maintenance.  4/15: Lying in bed. Sleeping but arousable and speaking in full sentences.  Complaints of b/l arm tingling and b/l leg edema.   4/16: More alert today.  Essentially status quo.  Denies fever, chills, N, V, abd pain, CP, SOB. + tingling in arms and leg edema.      REVIEW OF SYSTEMS:  All other review of systems is negative unless indicated above.Subjective:    Vital Signs Last 24 Hrs  T(C): 36.3, Max: 36.8 (04-15 @ 20:57)  T(F): 97.4, Max: 98.3 (04-15 @ 20:57)  HR: 109 (102 - 109)  BP: 109/57 (102/54 - 109/57)  BP(mean): --  RR: 18 (17 - 18)  SpO2: 95% (95% - 97%)    chronically ill appearing WF in no acute distress.  NCAT.  neck supple.  JVP nml.  lungs CTA.  no WRR.  heart RRR.  NS1S2.  no MRG.  distended.  soft.    LE good muscle tone.  limited range of motion.  b/l LE (+) 1-2 pitting edema, L>R.  (+) left food edema.  (+) ecchimosis plantar aspect.  A&Ox3.                                       9.9    11.5  )-----------( 150      ( 16 Apr 2017 07:13 )             30.3     04-16    137  |  104  |  20  ----------------------------<  97  4.1   |  24  |  0.70    Ca    8.1<L>      16 Apr 2017 07:13      CAPILLARY BLOOD GLUCOSE      PT/INR - ( 14 Apr 2017 12:34 )   PT: 16.1 sec;   INR: 1.48 ratio         PTT - ( 14 Apr 2017 12:34 )  PTT:32.0 sec              MEDICATIONS  (STANDING):  multivitamin 1Tablet(s) Oral daily  aspirin 325milliGRAM(s) Oral daily  potassium chloride    Tablet ER 10milliEquivalent(s) Oral daily  pantoprazole    Tablet 40milliGRAM(s) Oral before breakfast  clopidogrel Tablet 75milliGRAM(s) Oral daily  atorvastatin 20milliGRAM(s) Oral at bedtime  magnesium oxide 800milliGRAM(s) Oral at bedtime  furosemide    Tablet 20milliGRAM(s) Oral daily  folic acid 1milliGRAM(s) Oral daily  thiamine 100milliGRAM(s) Oral daily  gabapentin 300milliGRAM(s) Oral three times a day  rifaximin 550milliGRAM(s) Oral two times a day  predniSONE   Tablet 15milliGRAM(s) Oral daily  enoxaparin Injectable 40milliGRAM(s) SubCutaneous every 24 hours    MEDICATIONS  (PRN):  ondansetron Injectable 4milliGRAM(s) IV Push every 8 hours PRN Nausea and/or Vomiting        TD  04/14:  5S.  more somnolent today.  SBP 82mmHg.  will give 0.9%NS 250mL bolus then maintenance.      Allergies    sulfa drugs (Unknown)    Intolerances      MEDICATIONS  (STANDING):  multivitamin 1Tablet(s) Oral daily  aspirin 325milliGRAM(s) Oral daily  potassium chloride    Tablet ER 10milliEquivalent(s) Oral daily  pantoprazole    Tablet 40milliGRAM(s) Oral before breakfast  clopidogrel Tablet 75milliGRAM(s) Oral daily  atorvastatin 20milliGRAM(s) Oral at bedtime  magnesium oxide 800milliGRAM(s) Oral at bedtime  furosemide    Tablet 20milliGRAM(s) Oral daily  folic acid 1milliGRAM(s) Oral daily  thiamine 100milliGRAM(s) Oral daily  predniSONE   Tablet 10milliGRAM(s) Oral daily  gabapentin 300milliGRAM(s) Oral three times a day  rifaximin 550milliGRAM(s) Oral two times a day  sodium chloride 0.9%. 1000milliLiter(s) IV Continuous <Continuous>    MEDICATIONS  (PRN):  ondansetron Injectable 4milliGRAM(s) IV Push every 8 hours PRN Nausea and/or Vomiting    Vital Signs Last 24 Hrs  T(C): 36.3, Max: 36.6 (04-14 @ 05:52)  T(F): 97.4, Max: 97.8 (04-14 @ 05:52)  HR: 116 (115 - 121)  BP: 82/52 (82/52 - 121/84)  BP(mean): --  RR: 16 (16 - 16)  SpO2: 96% (96% - 96%)    chronically ill appearing WF in no acute distress.  NCAT.  neck supple.  JVP nml.  lungs CTA.  no WRR.  heart RRR.  NS1S2.  no MRG.  distended.  soft.    LE good muscle tone.  limited range of motion.  b/l LE (+) 1-2 pitting edema, L>R.  (+) left food edema.  (+) ecchimosis plantar aspect.  A&Ox3.                        11.5   11.1  )-----------( 183      ( 14 Apr 2017 06:30 )             34.3       04-14    138  |  106  |  22  ----------------------------<  114<H>  4.2   |  24  |  0.98    Ca    8.6      14 Apr 2017 06:30    Assessment and Plan:   · Assessment		  Pt is a 65 yo woman with hx liver cirrhosis, variceal esophageal banding, RA on prednisone who presents with weakness,  inability to walk (legs feel like jelly), and falls.       Acute upon chronic hypotension: STABLE  -Dr. Osorio discontinued her methotrexate, bystolic, and HCTZ on 3/10/17.    -d/c IVFs and monitor  -c/w prednisone to 15mg po qd per rheum    weakness with difficulty walking and poor functionality:  Multifactorial, related to underlying co-morbidities including  ankle fracture, LS DDD,  disc herniations, spinal stenosis, neuropathy,  RA,  cirrhosis, edema, AI  -MRI of cervical and thoracic spine shows DDD, multi-level neural foraminal narrowing, cord impingement likely contributing to her symptoms.  -neurosurgery consult appreciated --> does not want to consider any possible spinal surgery, until at least her left leg has healed and managed.   F/U as outpatient.  -on gabapentin - could be contributing to weakness.  If continues will need to taper off to improve mentation.  -Folate: 7.8 ng/mL, Vitamin B12: 1087 pg/mL  -ESR trending down.  - Rheumatology consult appreciated.     left ankle fracture in setting of hx of falls.  -Elevation.    -NWB LLE w/ CAM boot.    -Rolling walker w/ assist.    -RICE.    -Orthopedic f/u outpatient.    -Podiatry consult, re:  toe nail clipping of left foot.   -PT re-eval.    Cirrhosis secondary to chronic alcohol abuse with peripheral edema, anemia, and coagulopathy:  -Hx of banding  -s/p US guided paracentesis for Cx and cell count.  -Lasix falling outside BP parameters.  propranolol and spironolactone not tolerated due to hypotension.  -c/w rifaximin   -GI following    CAD:  -NSTEMI s/p LAD with BMS.    -DAPT + statin.    disposition.   -mobilze/PT.   -Anticipate ARSH when stable.    Attending Statement:  >35 minutes spent on total encounter; more than 50% of the visit was spent counseling and/or coordinating care by the attending physician.

## 2017-04-17 ENCOUNTER — TRANSCRIPTION ENCOUNTER (OUTPATIENT)
Age: 67
End: 2017-04-17

## 2017-04-17 PROCEDURE — 99233 SBSQ HOSP IP/OBS HIGH 50: CPT

## 2017-04-17 RX ORDER — FUROSEMIDE 40 MG
1 TABLET ORAL
Qty: 0 | Refills: 0 | COMMUNITY

## 2017-04-17 RX ORDER — POTASSIUM CHLORIDE 20 MEQ
1 PACKET (EA) ORAL
Qty: 0 | Refills: 0 | COMMUNITY
Start: 2017-04-17

## 2017-04-17 RX ORDER — ATORVASTATIN CALCIUM 80 MG/1
1 TABLET, FILM COATED ORAL
Qty: 0 | Refills: 0 | COMMUNITY
Start: 2017-04-17

## 2017-04-17 RX ORDER — SPIRONOLACTONE 25 MG/1
1 TABLET, FILM COATED ORAL
Qty: 0 | Refills: 0 | COMMUNITY

## 2017-04-17 RX ORDER — CLOPIDOGREL BISULFATE 75 MG/1
1 TABLET, FILM COATED ORAL
Qty: 0 | Refills: 0 | COMMUNITY
Start: 2017-04-17

## 2017-04-17 RX ORDER — ASPIRIN/CALCIUM CARB/MAGNESIUM 324 MG
1 TABLET ORAL
Qty: 0 | Refills: 0 | COMMUNITY
Start: 2017-04-17

## 2017-04-17 RX ORDER — THIAMINE MONONITRATE (VIT B1) 100 MG
1 TABLET ORAL
Qty: 0 | Refills: 0 | COMMUNITY
Start: 2017-04-17

## 2017-04-17 RX ORDER — FUROSEMIDE 40 MG
1 TABLET ORAL
Qty: 0 | Refills: 0 | COMMUNITY
Start: 2017-04-17

## 2017-04-17 RX ORDER — GLYCERIN 1 %
1 DROPS OPHTHALMIC (EYE)
Qty: 0 | Refills: 0 | COMMUNITY
Start: 2017-04-17

## 2017-04-17 RX ORDER — MAGNESIUM OXIDE 400 MG ORAL TABLET 241.3 MG
2 TABLET ORAL
Qty: 0 | Refills: 0 | COMMUNITY
Start: 2017-04-17

## 2017-04-17 RX ORDER — GABAPENTIN 400 MG/1
1 CAPSULE ORAL
Qty: 0 | Refills: 0 | COMMUNITY
Start: 2017-04-17

## 2017-04-17 RX ORDER — FOLIC ACID 0.8 MG
1 TABLET ORAL
Qty: 0 | Refills: 0 | COMMUNITY
Start: 2017-04-17

## 2017-04-17 RX ADMIN — PANTOPRAZOLE SODIUM 40 MILLIGRAM(S): 20 TABLET, DELAYED RELEASE ORAL at 06:04

## 2017-04-17 RX ADMIN — CLOPIDOGREL BISULFATE 75 MILLIGRAM(S): 75 TABLET, FILM COATED ORAL at 11:16

## 2017-04-17 RX ADMIN — GABAPENTIN 300 MILLIGRAM(S): 400 CAPSULE ORAL at 21:45

## 2017-04-17 RX ADMIN — ATORVASTATIN CALCIUM 20 MILLIGRAM(S): 80 TABLET, FILM COATED ORAL at 21:45

## 2017-04-17 RX ADMIN — ENOXAPARIN SODIUM 40 MILLIGRAM(S): 100 INJECTION SUBCUTANEOUS at 17:21

## 2017-04-17 RX ADMIN — Medication 1 MILLIGRAM(S): at 11:16

## 2017-04-17 RX ADMIN — Medication 1 TABLET(S): at 11:16

## 2017-04-17 RX ADMIN — GABAPENTIN 300 MILLIGRAM(S): 400 CAPSULE ORAL at 13:26

## 2017-04-17 RX ADMIN — GABAPENTIN 300 MILLIGRAM(S): 400 CAPSULE ORAL at 05:58

## 2017-04-17 RX ADMIN — Medication 100 MILLIGRAM(S): at 11:16

## 2017-04-17 RX ADMIN — Medication 10 MILLIEQUIVALENT(S): at 11:16

## 2017-04-17 RX ADMIN — MAGNESIUM OXIDE 400 MG ORAL TABLET 800 MILLIGRAM(S): 241.3 TABLET ORAL at 21:45

## 2017-04-17 RX ADMIN — Medication 325 MILLIGRAM(S): at 11:16

## 2017-04-17 RX ADMIN — Medication 15 MILLIGRAM(S): at 05:59

## 2017-04-17 NOTE — DISCHARGE NOTE ADULT - MEDICATION SUMMARY - MEDICATIONS TO TAKE
I will START or STAY ON the medications listed below when I get home from the hospital:    predniSONE 5 mg oral tablet  -- 3 tab(s) by mouth once a day  -- Indication: For Rheumatoid arthritis    aspirin 325 mg oral tablet  -- 1 tab(s) by mouth once a day, continue until 5/3/17 then decrease to Aspirin 81mg daily  -- Indication: For prevention    gabapentin 300 mg oral capsule  -- 1 cap(s) by mouth 3 times a day  -- Indication: For neuropathy    atorvastatin 20 mg oral tablet  -- 1 tab(s) by mouth once a day (at bedtime)  -- Indication: For hld    clopidogrel 75 mg oral tablet  -- 1 tab(s) by mouth once a day  -- Indication: For Resume previous med    furosemide 20 mg oral tablet  -- 1 tab(s) by mouth once a day, hold for SBP<100  -- Indication: For Cirrhosis    magnesium oxide 400 mg (241.3 mg elemental magnesium) oral tablet  -- 2 tab(s) by mouth once a day (at bedtime)  -- Indication: For Cirrhosis    potassium chloride 10 mEq oral tablet, extended release  -- 1 tab(s) by mouth once a day  -- Indication: For supplementation    rifAXIMin 550 mg oral tablet  -- 1 tab(s) by mouth 2 times a day  -- Indication: For Cirrhosis    naphazoline-pheniramine 0.025%-0.3% ophthalmic solution  -- 1 drop(s) to each affected eye 2 times a day, As needed, watery eyes.  -- Indication: For eye drop    Protonix 40 mg oral delayed release tablet  -- 1 tab(s) by mouth once a day  -- It is very important that you take or use this exactly as directed.  Do not skip doses or discontinue unless directed by your doctor.  Obtain medical advice before taking any non-prescription drugs as some may affect the action of this medication.  Swallow whole.  Do not crush.    -- Indication: For Cirrhosis    Multiple Vitamins oral tablet  -- 1 tab(s) by mouth once a day  -- Indication: For prevention    folic acid 1 mg oral tablet  -- 1 tab(s) by mouth once a day  -- Indication: For prevention    thiamine 100 mg oral tablet  -- 1 tab(s) by mouth once a day  -- Indication: For prevention

## 2017-04-17 NOTE — DISCHARGE NOTE ADULT - CARE PLAN
Principal Discharge DX:	Fracture of left ankle, closed, initial encounter  Secondary Diagnosis:	Other cirrhosis of liver  Secondary Diagnosis:	Rheumatoid arthritis, involving unspecified site, unspecified rheumatoid factor presence  Secondary Diagnosis:	Degenerative disc disease, lumbar  Secondary Diagnosis:	Coronary artery disease involving native coronary artery of native heart without angina pectoris  Secondary Diagnosis:	Weakness  Secondary Diagnosis:	Adrenal insufficiency Principal Discharge DX:	Fracture of left ankle, closed, initial encounter  Goal:	nwb left ankle.  needs physical therapy.  Instructions for follow-up, activity and diet:	outpatient follow up.  Secondary Diagnosis:	Other cirrhosis of liver  Goal:	follow up with your GI doctor.  Instructions for follow-up, activity and diet:	continue lasix.  Secondary Diagnosis:	Rheumatoid arthritis, involving unspecified site, unspecified rheumatoid factor presence  Goal:	continue steroids.  Instructions for follow-up, activity and diet:	follow up with your rheumatologist.  Secondary Diagnosis:	Degenerative disc disease, lumbar  Goal:	gabapentin for neuropathy.  Instructions for follow-up, activity and diet:	follow up with Dr. Gonzales as outpatient for further management of your spinal stenosis.  Secondary Diagnosis:	Coronary artery disease involving native coronary artery of native heart without angina pectoris  Goal:	med management.  Instructions for follow-up, activity and diet:	follow up with your pcp and cardiologist.  Secondary Diagnosis:	Weakness  Goal:	rehab.  Secondary Diagnosis:	Adrenal insufficiency

## 2017-04-17 NOTE — DISCHARGE NOTE ADULT - SECONDARY DIAGNOSIS.
Other cirrhosis of liver Rheumatoid arthritis, involving unspecified site, unspecified rheumatoid factor presence Degenerative disc disease, lumbar Coronary artery disease involving native coronary artery of native heart without angina pectoris Weakness Adrenal insufficiency

## 2017-04-17 NOTE — DISCHARGE NOTE ADULT - MEDICATION SUMMARY - MEDICATIONS TO STOP TAKING
I will STOP taking the medications listed below when I get home from the hospital:    propranolol 10 mg oral tablet  -- 1 tab(s) by mouth 3 times a day    spironolactone 50 mg oral tablet  -- 1 dose(s) by mouth once a day

## 2017-04-17 NOTE — DISCHARGE NOTE ADULT - HOSPITAL COURSE
66 year old woman presents with weakness.  During patients hospital course she was seen by multiple specialities including GI, neurosurgery, and rheumatology.  She was placed on increased steroids for chronic hypotension.  Her meds with optimized.  She was seen by neurosurgery for b/l arm tingling for which MRI revealed spinal stenosis.  Pt will follow up as outpatient.  Pt also with left leg ankle fracture for which orthopedics has been following.  She is to be NWB.  Pt also had paracentesis which was negative for SBP.  She is very debilitated and will need ARSH followed up close outpatient evaluation with her specialists. REVIEW OF SYSTEMS:  All other review of systems is negative unless indicated above.    Vitals: Stable    chronically ill appearing WF in no acute distress.  neck supple.  JVP nml.  lungs CTA.  no WRR.  heart RRR.  NS1S2.  no MRG.  distended.  soft.    LE good muscle tone.  limited range of motion.  b/l LE (+) 1-2 pitting edema, L>R.  (+) left food edema.  (+) ecchimosis plantar aspect             meds and labs reviewed.  TD  04/14:  5S.  more somnolent today.  SBP 82mmHg.  will give 0.9%NS 250mL bolus then maintenance.      Allergies    sulfa drugs (Unknown)    Intolerances      MEDICATIONS  (STANDING):  multivitamin 1Tablet(s) Oral daily  aspirin 325milliGRAM(s) Oral daily  potassium chloride    Tablet ER 10milliEquivalent(s) Oral daily  pantoprazole    Tablet 40milliGRAM(s) Oral before breakfast  clopidogrel Tablet 75milliGRAM(s) Oral daily  atorvastatin 20milliGRAM(s) Oral at bedtime  magnesium oxide 800milliGRAM(s) Oral at bedtime  furosemide    Tablet 20milliGRAM(s) Oral daily  folic acid 1milliGRAM(s) Oral daily  thiamine 100milliGRAM(s) Oral daily  predniSONE   Tablet 10milliGRAM(s) Oral daily  gabapentin 300milliGRAM(s) Oral three times a day  rifaximin 550milliGRAM(s) Oral two times a day  sodium chloride 0.9%. 1000milliLiter(s) IV Continuous <Continuous>    MEDICATIONS  (PRN):  ondansetron Injectable 4milliGRAM(s) IV Push every 8 hours PRN Nausea and/or Vomiting    Vital Signs Last 24 Hrs  T(C): 36.3, Max: 36.6 (04-14 @ 05:52)  T(F): 97.4, Max: 97.8 (04-14 @ 05:52)  HR: 116 (115 - 121)  BP: 82/52 (82/52 - 121/84)  BP(mean): --  RR: 16 (16 - 16)  SpO2: 96% (96% - 96%)    chronically ill appearing WF in no acute distress.  NCAT.  neck supple.  JVP nml.  lungs CTA.  no WRR.  heart RRR.  NS1S2.  no MRG.  distended.  soft.    LE good muscle tone.  limited range of motion.  b/l LE (+) 1-2 pitting edema, L>R.  (+) left food edema.  (+) ecchimosis plantar aspect.  A&Ox3.                        11.5   11.1  )-----------( 183      ( 14 Apr 2017 06:30 )             34.3       04-14    138  |  106  |  22  ----------------------------<  114<H>  4.2   |  24  |  0.98    Ca    8.6      14 Apr 2017 06:30    Assessment and Plan:   		  Pt is a 65 yo woman with hx liver cirrhosis, variceal esophageal banding, RA on prednisone who presents with weakness,  inability to walk (legs feel like jelly), and falls.     Acute upon chronic hypotension, likely adrenal insufficiency: STABLE  -Dr. Osorio discontinued her methotrexate, bystolic, and HCTZ on 3/10/17. c/w prednisone to 15mg po qd per rheum    weakness with difficulty walking and poor functionality:  Multifactorial, related to underlying co-morbidities including  ankle fracture, LS DDD,  disc herniations, spinal stenosis, neuropathy,  RA,  cirrhosis, edema, AI  -MRI of cervical and thoracic spine shows DDD, multi-level neural foraminal narrowing, cord impingement likely ontributing to her symptoms. neurosurgery consult appreciated --> does not want to consider any possible spinal surgery, until at least her left leg has healed and managed.   F/U as outpatient. on gabapentin - could be contributing to weakness.  If continues will need to taper off to improve mentation. Folate: 7.8 ng/mL, Vitamin B12: 1087 pg/mL. ESR trending down. Rheumatology consult appreciated. Outpt follow up.  Continue with steroids for now.  left ankle fracture in setting of hx of falls.  -RICE. NWB LLE w/ CAM boot.  Rolling walker w/ assist.  Orthopedic f/u outpatient.   Cirrhosis secondary to chronic alcohol abuse with peripheral edema, anemia, and coagulopathy:  -s/p US guided paracentesis for Cx and cell count - ruled out for SBP. Lasix falling outside BP parameters.  propranolol and spironolactone not tolerated due to hypotension.  c/w rifaximin. outpatient GI follow up.  CAD: -NSTEMI s/p LAD with BMS.  DAPT + statin.  Attending Statement: >35 minutes spent on total encounter and discharge; more than 50% of the visit was spent counseling and/or coordinating care by the attending physician.

## 2017-04-17 NOTE — PROGRESS NOTE ADULT - ASSESSMENT
LLE fx   ortho input appreciated    Diarrhea and has improved  had empiric tx for c diff and she could not do stool tests          Hx of chronic diarrhea that has also improved and will follow, Mg may inc it and DW pt      hypotension  adrenal insuff, on chronic steroids  endocrine note appreciated and pred increased, DCed propranolol and aldactone (she is been missing doses often)    consider tapering prednisone to lowest dose      pos troponins  DW cardioogy, pt had bare metal stent and they are considering duration of double anti plt with inc risk bleed  DW cardiology and they recommend plavix for at least one month and will possibly stop it after that  DW pt again       Cirrhosis due to ETOH and has stopped    ascites and LE edema      likely adrenal insuff with years of steroids and now on inc dose steroids,  BP is improving, but pred increased    consider slow taper of pred, managed by endocrine or Rheum,     Rheum note appreciated and DW rheum and hospitalist  inc ESR noted    neg for SBP    plan paracentesis to RO SBP, but low likelihood

## 2017-04-17 NOTE — DISCHARGE NOTE ADULT - PATIENT PORTAL LINK FT
“You can access the [facility] Patient Portal, offered by [facility], by registering with the following website: [email or web address]”

## 2017-04-17 NOTE — DISCHARGE NOTE ADULT - PLAN OF CARE
nwb left ankle.  needs physical therapy. outpatient follow up. follow up with your GI doctor. continue lasix. continue steroids. follow up with your rheumatologist. gabapentin for neuropathy. follow up with Dr. Gonzales as outpatient for further management of your spinal stenosis. med management. follow up with your pcp and cardiologist. rehab.

## 2017-04-17 NOTE — PROGRESS NOTE ADULT - SUBJECTIVE AND OBJECTIVE BOX
· Subjective and Objective: 	  CHIEF COMPLAINT: weakness    SUBJECTIVE: Pt is a 67 yo woman with hx alcoholic liver cirrhosis, variceal esophageal banding, RA on prednisone, who was admitted recently 2-29 to 3-3 for cirrhosis and vomiting and renal insufficiency- last drink 5 days PTA.   She reports weakness and inability to walk. Pt fell off the couch on the day of admission.  She also had two other falls, including 3/3/17 getting out of car to home in driveway, and the night prior to admission, when she was toileting herself.   Pt was unable to support her own weight and is having trouble with her balance.    Patient was diagnosed with RA in 2009, by Dr. Montes.  She has been on MTX and prednisone 5mg daily since 2009.  She denies using higher doses of prednisone.  Her MTX was discontinued on 3/10/17, and prednisone was increased to 15mg daily.     Patient continues to feel that her joints are quiet.  She has had RA flares in the past and knows she is not having a flare now.    She feels pain diffusely in lower extremities, b/L.  No significant pain on moving ankles.  She also complains of burning/tingling in her hands from her wrists to her fingertips.    4/10/17.  No new complaints this morning except continued numbness/tingling/pain in hands and lower extremities, and weakness in extremities.      4/11/17 Stable today, still unable to walk more than few steps.  Pain L ankle worse w/ movement.  VSS    4/12/17 No improvement w/ steroid dose (125 mg solumedrol)- though swelling in L foot slightly better, pain unchanged.   MRI L ankle + multiple fractures.  Had reported a fall PTA, now describes stepping on water bottle, rolling off with ankle and falling to ground- c/w injury noted on films (see below)  Still denies other joint pain/ swelling/ stiffness  Weakness in quads unchanged    4/13/17  Swelling in BLE improved w/ elevation through day yestr, still no other synovitis.  Increased c/o neuropathic pain in hands/ feet "sensitive to touch"  Seen by ortho/ cam boot ordered nwb for next few wks.    BP still low 100-110/60-70    4/15/17  Stable overall, little change in quad strength- nwb on L ankle. S/p paracentesis w/ no evidence of SBP, VSS, still low bp  Increased gabapentin by 300 mg past 24 hs to help with neuropathic pain/ discomfort- little improvement and noted slightly more sedated per chart... mentation unchanged from previous exams     4/17 pt is without any new complaints today. She is anxious to get her leg better. She c/o neuropathy sx in her hands and feet with sx of numbness and tingling. She also c/o weakness in her legs but denies pain.          REVIEW OF SYSTEMS:    General: denies fevers, chills or night sweats  Skin: denies rashes or photosensitivity  Ophthalmologic: denies sicca symptoms  ENMT: denies sicca, frequent sinus infections, or ear infections, denies nasal perforation	  Respiratory and Thorax: denies asthma, chest pain or SOB  Cardiovascular: denies chest pain or SOB  Gastrointestinal: denies abd pain, nausea, reflux, diarrhea/ constipation- is not incontinent at this point	  Musculoskeletal: denies prolonged morning stiffness- no active synovitis for past several months.  L ankle pain / bruising and swelling persists   Neurological: leg weakness persists    Vital Signs Last 24 Hrs  T(C): 36.4, Max: 36.9 (04-15 @ 05:34)  T(F): 97.5, Max: 98.4 (04-15 @ 05:34)  HR: 102 (102 - 111)  BP: 105/53 (98/48 - 105/53)  BP(mean): --  RR: 17 (17 - 18)  SpO2: 97% (94% - 97%)                 Heart- S1 S2 reg,   Abd Soft Nt  Ext no edema'  Lungs CTA b/l  Musculoskelatal- FROM all joints,left foot in brace  Muscle strength intact.                                      10.2   10.5  )-----------( 161      ( 15 Apr 2017 05:42 )             31.2     04-15    139  |  105  |  24<H>  ----------------------------<  112<H>  4.2   |  24  |  0.84    Ca    8.0<L>      15 Apr 2017 05:42        Uric acid 7.8 (4/11/17)  - repeat 101 4/10/17 w/ CRP 2.2 (repeated, Fibrinogen low 250)  AST 67, nl ALT, Bili 2.1  CK normal x 2  CCP > 250, FREEDOM 1:320S w/ neg AMA, SMA   Peritoneal fluid:  TNC 61 RBC 1000- turbid, no growth (4/14/17)      EXAM:  CT ABDOMEN AND PELVIS IC                        PROCEDURE DATE:  04/02/2017    INTERPRETATION:    Three examinations were performed on this patient:   1. CT scan of the chest with intravenous contrast  2. CT scan of the abdomen with intravenous contrast  3. CT scan of the pelvis with intravenous contrast  CLINICAL INFORMATION (all 3 exams): Trauma, chest pain, abdominal pain    FINDINGS:   No previous examinations are available for review.    The thyroid gland appears intact.    The lungs are significant for a small LEFT pleural effusion with   underlying atelectasis.  Lung volumes are preserved. The trachea and   major bronchi are patent.    No enlarged axillary, hilar or mediastinal lymph nodes are found.   The   heart size is normal. The chest wall and thoracic spine are unremarkable.    There is moderate ascites.    The liver demonstrates homogeneous attenuation without focal lesion or   abnormal enhancement.  Hepatic size is normal but contours are nodular.   Hepatic and portal veins are patent and not displaced.  No intrahepatic   or common ductal dilatation is recognized.  The gallbladder is   significant for minimal cholelithiasis.  The pancreas is intact without   ductal dilatation or focal lesion.  The spleen is normal in size.    The adrenal glands are intact.  The kidneys demonstrate symmetric   nephrograms.  There is a 1 cm RIGHT renal cysts. No suspicious renal mass   is found.  No renal calculus, hydronephrosis or perinephric infiltration   is found.  The ureters are not dilated.   The bladder appears   unremarkable.    EXAM:  US DPLX LWR EXT VEINS COMPL BI                        PROCEDURE DATE:  04/07/2017    INTERPRETATION:  Clinical information: Pain and swelling of feet      IMPRESSION:     No evidence of bilateral lower extremity deep venous thrombosis.    EXAM:  LUMBAR SPINE(MRI)W O CON                        PROCEDURE DATE:  04/09/2017    INTERPRETATION:    MR lumbar without gadolinium       CLINICAL INFORMATION:   Bilateral leg weakness and numbness for 2 days   Lumbar spondylosis.    FINDINGS:   No prior similar studies are available for review.         IMPRESSION:  Disc degeneration and spondylosis at T11-12 through L5-S1   with loss of disc height and associated degenerative endplate changes.   LEFT paracentral disc herniation is noted at L2-3 compressing the ventral   thecal sac and narrowing the LEFT neural foramen. Disc bulges at L3-4 and   L4-5 and L5-S1 flatten the ventral thecal sac and narrow the BILATERAL   neural foramina.  Mild central stenosis at L2-3 through L4-5 on a degenerative basis.           EXAM:  CERVICAL/ THORACIC SPINE (MRI)W O CON                            PROCEDURE DATE:  04/14/2017    MR cervical  and thoracic without gadolinium   CLINICAL INFORMATION:  Bilateral arm numbness, cervical and thoracic back   pain                IMPRESSION:  Multilevel degenerative disc disease and spondylosis of the   cervical and thoracic spine.   Narrowing of the RIGHT C3-4, BILATERAL   C4-5, BILATERAL C5-6 and BILATERAL C6-7 and LEFT C7-T1 neural foramina   due to uncovertebral spurring and facet osteophytic hypertrophy. Mild   degenerative cord flattening is seen at C5-6 and mild degenerative cord   impingement is seen at C6-7 due to posterior osteophytic ridge/disc   complexes.   Tiny RIGHT paracentral disc herniation is noted at T8-9   small RIGHT and LEFT paracentral disc herniations are noted at T8-9-10   with minimal cord impingement on the LEFT. Posterior osteophytic   ridge/disc complex at T11-12 indents the ventral subarachnoid space and   narrows the RIGHT neural foramen.    EXAM:  MRI ANKLE WO C LEFT                        PROCEDURE DATE:  04/11/2017    INTERPRETATION:  MRI ANKLE WO C LEFT dated 4/11/2017 4:58 PM     INDICATION: Left ankle swelling and pain for three days. Alcohol   cirrhosis with severe peripheral edema. RA.    COMPARISON: Ankle radiographs dated 4/2/2017    IMPRESSION:   1.  Nondisplaced fractures of the medial malleolus, anterior distal   tibia, and posterior malleolus.  2.  Nondisplaced fractures at the base of the second and third   metatarsals.  3.  Edema within the Lisfranc ligament with nondisplaced fracture at the   lateral aspect of the medial cuneiform at the base of the Lisfranc   ligament suggesting Lisfranc injury.  4.  Contusion at the anterolateral medial cuneiform and distal fibula.  5.  Nondisplaced fracture of the distal calcaneus.  6.  Large diffuse soft tissue swelling is nonspecific.  7.  Tenosynovitis of the posterior tibialis tendon likely posttraumatic      MEDICATIONS  (STANDING):  multivitamin 1Tablet(s) Oral daily  aspirin 325milliGRAM(s) Oral daily  potassium chloride    Tablet ER 10milliEquivalent(s) Oral daily  pantoprazole    Tablet 40milliGRAM(s) Oral before breakfast  clopidogrel Tablet 75milliGRAM(s) Oral daily  atorvastatin 20milliGRAM(s) Oral at bedtime  magnesium oxide 800milliGRAM(s) Oral at bedtime  furosemide    Tablet 20milliGRAM(s) Oral daily  folic acid 1milliGRAM(s) Oral daily  thiamine 100milliGRAM(s) Oral daily  gabapentin 300milliGRAM(s) Oral three times a day  rifaximin 550milliGRAM(s) Oral two times a day  predniSONE   Tablet 15milliGRAM(s) Oral daily  enoxaparin Injectable 40milliGRAM(s) SubCutaneous every 24 hours    MEDICATIONS  (PRN):  ondansetron Injectable 4milliGRAM(s) IV Push every 8 hours PRN Nausea and/or Vomiting    PHYSICAL EXAM:    Constitutional: NAD, awake and alert  HEENT- no oral lesions noted, no lymphadenopathy noted  Heart- S1 S2 reg  Lungs- CTA b/l  Abd- Soft NT- distended   Ext- +1 RLE, +2 over L foot- pitting b/l to just above ankle, L > R worse focused at foot   Skin- no rashes +Ecchymosis multiple on arms and legs at areas of IV and phlebotomy sites, plantar surface and lateral aspect of L foot- dorsum foot as well.  L wrist/ forearm dependent edema/ infiltration L wrist IV access    Musculoskelatal- FROM all joints except b/l ankles- R + 1 as noted; L +2 pitting ++ tenderness throughout- both limited dorsiflexion 2/2 swelling and L due to pain , minimal tenderness in left knee,   Neurological- proximal weakness b/l quads only- 4+/5 b/l R slightly worse- quad muscles measure equal

## 2017-04-17 NOTE — PROGRESS NOTE ADULT - ASSESSMENT
Assessment and Plan:   · Assessment		  Pt is a 67 yo woman with RA since 2009, with hx alcoholic liver cirrhosis, variceal esophageal banding, admitted for b/L LE weakness.  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   Long hx RA on steroids long term (10-20 mg daily x many ys) and MTX (stopped few months ago)- no evidence active RA confirmed by MRI ankle as noted above.     1) Ankle/ foot:  multiple non displaced fractures as noted.  Refer to ortho for further recommendations- CAM boot, NWB for now  2) RA:  would mnt current low dose prednisone for now and through rehab to mnt HD and continue to control condition.  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 proximal weakness  though only noted in lower extr- but fairly equal.    - may still benefit from neuro eval as outpatient w/ full emg/ ncs and consider MRI thighs to assess muscles further- often very helpful  5) Peripheral neuropathy:  likely etoh r/t, will need full EMG/ NCS as outpt, on neurontin.     Plan:  - continue PO steroids at lowest effective dose 5-15 mg as needed for HD stability, may need something else for BP management refer to endo  - ortho appreciated  - Consider Neuro input for likely myopathy, MRI thighs cam be done as outpt once pt gains more muscle strength.   - peripheral neuropathy:  increase gabapentin to 300 mg TID- still lot higher doses as needed, hold for now  - continue other tx as noted

## 2017-04-17 NOTE — PROGRESS NOTE ADULT - SUBJECTIVE AND OBJECTIVE BOX
Patient is a 66y old  Female who presents with a chief complaint of weakness (02 Apr 2017 18:48)      HPI:  Pt is a 65 yo woman with hx liver cirrhosis, variceal esophageal bvanding, RA on prednisone, who was admitted recently 2-29 to 3-3 for cirrhosis and vomiting.   She reports weakness and inability to walk (legs feel like jelly). She has been incontinent of urine and stool for a few weeks as  she does not make it to the bathroom in time.  Pt fell off the couch his morning.  She also had two other falls, including 3/3/17 getting out of car to home in driveway, and  last evening when she slid off couch, struck a hard box with left chest and twisted her left ankle.  Pt's significant other reports she is  unable to support her own weight and is having trouble with her balance.  She denies cough, resp symptoms.  No fever/chills, n/v, cpain, SOB.  She has 1-3 loose stools/day.   Dr. Osorio discontinued her methotrexate, bystolic, and HCTZ on 3/10/17.            She  stopped drinking 5 days prior to last admission.  No head injury during any fall. (02 Apr 2017 18:48)      pt comf  diarrhea resolved  mild lower abd discomfort prior to BM  neg blood      A and O times 3    pos asterixis      PAST MEDICAL & SURGICAL HISTORY:  HTN (hypertension)  Rheumatoid arthritis  No significant past surgical history      MEDICATIONS  (STANDING):  multivitamin 1Tablet(s) Oral daily  aspirin 325milliGRAM(s) Oral daily  potassium chloride    Tablet ER 10milliEquivalent(s) Oral daily  pantoprazole    Tablet 40milliGRAM(s) Oral before breakfast  clopidogrel Tablet 75milliGRAM(s) Oral daily  atorvastatin 20milliGRAM(s) Oral at bedtime  magnesium oxide 800milliGRAM(s) Oral at bedtime  furosemide    Tablet 20milliGRAM(s) Oral daily  folic acid 1milliGRAM(s) Oral daily  thiamine 100milliGRAM(s) Oral daily  gabapentin 300milliGRAM(s) Oral three times a day  rifaximin 550milliGRAM(s) Oral two times a day  predniSONE   Tablet 15milliGRAM(s) Oral daily  enoxaparin Injectable 40milliGRAM(s) SubCutaneous every 24 hours    MEDICATIONS  (PRN):  ondansetron Injectable 4milliGRAM(s) IV Push every 8 hours PRN Nausea and/or Vomiting  naphazoline/pheniramine Solution 1Drop(s) Both EYES two times a day PRN watery eyes.      Allergies    sulfa drugs (Unknown)    Intolerances        SOCIAL HISTORY:unchanged    FAMILY HISTORY:  No pertinent family history in first degree relatives      REVIEW OF SYSTEMS:    CONSTITUTIONAL: No weakness, fevers or chills  EYES/ENT: No visual changes;  No vertigo or throat pain   NECK: No pain or stiffness  RESPIRATORY: No cough, wheezing, hemoptysis; No shortness of breath  CARDIOVASCULAR: No chest pain or palpitations  GENITOURINARY: No dysuria, frequency or hematuria  NEUROLOGICAL: No numbness or weakness  SKIN: No itching, burning, rashes, or lesions   All other review of systems is negative unless indicated above.    Vital Signs Last 24 Hrs  T(C): 36.5, Max: 36.9 (04-16 @ 11:48)  T(F): 97.7, Max: 98.4 (04-16 @ 11:48)  HR: 106 (102 - 112)  BP: 107/54 (107/54 - 116/65)  BP(mean): --  RR: 18 (18 - 19)  SpO2: 94% (94% - 98%)    PHYSICAL EXAM:    Constitutional: NAD, well-developed  HEENT: EOMI, throat clear  Neck: No LAD, supple  Respiratory: CTA and P  Cardiovascular: S1 and S2, RRR, no M  Gastrointestinal: BS+, soft, NT/ND, neg HSM,  Extremities: No peripheral edema, neg clubing, cyanosis  Vascular: 2+ peripheral pulses  Neurological: A/O x 3, no focal deficits  Psychiatric: Normal mood, normal affect  Skin: No rashes    LABS:  CBC Full  -  ( 16 Apr 2017 07:13 )  WBC Count : 11.5 K/uL  Hemoglobin : 9.9 g/dL  Hematocrit : 30.3 %  Platelet Count - Automated : 150 K/uL  Mean Cell Volume : 116.6 fl  Mean Cell Hemoglobin : 37.9 pg  Mean Cell Hemoglobin Concentration : 32.5 gm/dL  Auto Neutrophil # : x  Auto Lymphocyte # : x  Auto Monocyte # : x  Auto Eosinophil # : x  Auto Basophil # : x  Auto Neutrophil % : x  Auto Lymphocyte % : x  Auto Monocyte % : x  Auto Eosinophil % : x  Auto Basophil % : x    04-16    137  |  104  |  20  ----------------------------<  97  4.1   |  24  |  0.70    Ca    8.1<L>      16 Apr 2017 07:13              RADIOLOGY & ADDITIONAL STUDIES:EXAM:  THORACIC SPINE(MRI)W O CON                            PROCEDURE DATE:  04/14/2017        INTERPRETATION:      MR cervical  and thoracic without gadolinium     CLINICAL INFORMATION:  Bilateral arm numbness, cervical and thoracic back   pain      TECHNIQUE:   Sagittal T1-weighted images, sagittal STIR images, sagittal   T2-weighted images and axial T1 andT2-weighted gradient-echo images of   the cervical spine and thoracic were obtained.         FINDINGS:   No prior similar studies are available for review.         Cervical vertebral alignment is significant for straightening on a   degenerative basis.  Cervical vertebral body heights are maintained.    Marrow signal intensity within cervical vertebral bodies and posterior   elements is unremarkable.  No osseous expansion, epidural disease or   paraspinal abnormality is found.           Cervical intervertebral discs show disc degeneration and spondylosis at   C4-5 through C6-7 with loss of disc height and associated degenerative  endplate changes. There is narrowing of the RIGHT C3-4, BILATERAL C4-5,   BILATERAL C5-6 and BILATERAL C6-7 and LEFT C7-T1 neural foramina due to   uncovertebral spurring and facet osteophytic hypertrophy. Mild   degenerative cord flattening is seen at C5-6 and mild degenerative cord   impingement is seen at C6-7 due to posterior osteophytic ridge/disc   complexes.    The cervical cord maintains intact morphology  No cord signal intensity   abnormality or focal cord lesion is appreciated.  The cervical medullary   junction remains intact.      Thoracic alignment is intact. Disc degeneration and mild spondylosis is   noted at T7-8 through T11-12 with loss of disc height and associated   degenerative endplate changes. Tiny RIGHT paracentral disc herniation is   noted at T8-9 small RIGHT and LEFT paracentral disc herniations are noted   at T8-9-10 with minimal cord impingement on the LEFT. Posterior   osteophytic ridge/disc complex at T11-12 indents the ventral subarachnoid   space and narrows the RIGHT neural foramen.      IMPRESSION:  Multilevel degenerative disc disease and spondylosis of the   cervical and thoracic spine.   Narrowing of the RIGHT C3-4, BILATERAL   C4-5, BILATERAL C5-6 and BILATERAL C6-7 and LEFT C7-T1 neural foramina   due to uncovertebral spurring and facet osteophytic hypertrophy. Mild   degenerative cord flattening is seen at C5-6 and mild degenerative cord   impingement is seen at C6-7 due to posterior osteophytic ridge/disc   complexes.   Tiny RIGHT paracentral disc herniation is noted at T8-9   small RIGHT and LEFT paracentral disc herniations are noted at T8-9-10   with minimal cord impingement on the LEFT. Posterior osteophytic   ridge/disc complex at T11-12 indents the ventral subarachnoid space and   narrows the RIGHT neural foramen.              SAVITA HARRELL M.D., ATTENDING RADIOLOGIST  This document has been electronically signed. Apr 15 2017 11:14AM

## 2017-04-17 NOTE — DISCHARGE NOTE ADULT - CARE PROVIDER_API CALL
Gatito Lees), Family Medicine  [address] [place] [address]  Phone: [phone]  Fax: [phone]    Annabel Cunningham (DNP; NP; RN), NP in Adult Health  [address] [place] [address]  Phone: [phone]  Fax: [phone]    Jas Gonzales), Neurological Surgery  [address] [facility] [address] [place] [address]  Phone: [phone]  Fax: [phone]    Tommie Underwood), Gastroenterology  [address] [place] [address]  Phone: [phone]  Fax: [phone]

## 2017-04-18 VITALS
SYSTOLIC BLOOD PRESSURE: 107 MMHG | OXYGEN SATURATION: 97 % | HEART RATE: 106 BPM | TEMPERATURE: 98 F | RESPIRATION RATE: 12 BRPM | DIASTOLIC BLOOD PRESSURE: 56 MMHG

## 2017-04-18 LAB — NON-GYN CYTOLOGY SPEC: SIGNIFICANT CHANGE UP

## 2017-04-18 RX ADMIN — CLOPIDOGREL BISULFATE 75 MILLIGRAM(S): 75 TABLET, FILM COATED ORAL at 12:38

## 2017-04-18 RX ADMIN — PANTOPRAZOLE SODIUM 40 MILLIGRAM(S): 20 TABLET, DELAYED RELEASE ORAL at 08:51

## 2017-04-18 RX ADMIN — Medication 1 TABLET(S): at 12:38

## 2017-04-18 RX ADMIN — Medication 325 MILLIGRAM(S): at 12:38

## 2017-04-18 RX ADMIN — Medication 20 MILLIGRAM(S): at 12:38

## 2017-04-18 RX ADMIN — GABAPENTIN 300 MILLIGRAM(S): 400 CAPSULE ORAL at 06:45

## 2017-04-18 RX ADMIN — GABAPENTIN 300 MILLIGRAM(S): 400 CAPSULE ORAL at 13:56

## 2017-04-18 RX ADMIN — Medication 10 MILLIEQUIVALENT(S): at 12:38

## 2017-04-18 RX ADMIN — Medication 15 MILLIGRAM(S): at 06:45

## 2017-04-18 RX ADMIN — Medication 100 MILLIGRAM(S): at 12:39

## 2017-04-18 RX ADMIN — Medication 1 MILLIGRAM(S): at 12:38

## 2017-04-18 NOTE — PROGRESS NOTE ADULT - SUBJECTIVE AND OBJECTIVE BOX
4/18- patient seen and examined. patient was medically optimized for discharge 4/18       REVIEW OF SYSTEMS:  All other review of systems is negative unless indicated above.     Vital Signs Last 24 Hrs  T(C): 36.7, Max: 36.7 (04-17 @ 21:21)  T(F): 98, Max: 98 (04-17 @ 21:21)  HR: 105 (80 - 105)  BP: 104/56 (104/56 - 138/67)  BP(mean): --  RR: 18 (14 - 18)  SpO2: 96% (95% - 96%)    PE:  Constitutional: NAD, laying in bed- weak appearing  HEENT: NC/AT, EOMI, PERRLA  Neck: supple  Back: no tenderness  Respiratory: decreased breath sounds  Cardiovascular: S1S2 regular, no murmurs  Abdomen: soft, not tender, not distended, positive BS  Genitourinary:  Rectal: deferred  Musculoskeletal: CAM boot to left leg  Extremities: no pedal edema   Neurological: no focal deficits  Skin: no rashes                                       9.9    11.5  )-----------( 150      ( 16 Apr 2017 07:13 )             30.3     04-16    137  |  104  |  20  ----------------------------<  97  4.1   |  24  |  0.70    Ca    8.1<L>      16 Apr 2017 07:13      CAPILLARY BLOOD GLUCOSE      PT/INR - ( 14 Apr 2017 12:34 )   PT: 16.1 sec;   INR: 1.48 ratio         PTT - ( 14 Apr 2017 12:34 )  PTT:32.0 sec              MEDICATIONS  (STANDING):  multivitamin 1Tablet(s) Oral daily  aspirin 325milliGRAM(s) Oral daily  potassium chloride    Tablet ER 10milliEquivalent(s) Oral daily  pantoprazole    Tablet 40milliGRAM(s) Oral before breakfast  clopidogrel Tablet 75milliGRAM(s) Oral daily  atorvastatin 20milliGRAM(s) Oral at bedtime  magnesium oxide 800milliGRAM(s) Oral at bedtime  furosemide    Tablet 20milliGRAM(s) Oral daily  folic acid 1milliGRAM(s) Oral daily  thiamine 100milliGRAM(s) Oral daily  gabapentin 300milliGRAM(s) Oral three times a day  rifaximin 550milliGRAM(s) Oral two times a day  predniSONE   Tablet 15milliGRAM(s) Oral daily  enoxaparin Injectable 40milliGRAM(s) SubCutaneous every 24 hours    MEDICATIONS  (PRN):  ondansetron Injectable 4milliGRAM(s) IV Push every 8 hours PRN Nausea and/or Vomiting        TD  04/14:  5S.  more somnolent today.  SBP 82mmHg.  will give 0.9%NS 250mL bolus then maintenance.      Allergies    sulfa drugs (Unknown)    Intolerances      MEDICATIONS  (STANDING):  multivitamin 1Tablet(s) Oral daily  aspirin 325milliGRAM(s) Oral daily  potassium chloride    Tablet ER 10milliEquivalent(s) Oral daily  pantoprazole    Tablet 40milliGRAM(s) Oral before breakfast  clopidogrel Tablet 75milliGRAM(s) Oral daily  atorvastatin 20milliGRAM(s) Oral at bedtime  magnesium oxide 800milliGRAM(s) Oral at bedtime  furosemide    Tablet 20milliGRAM(s) Oral daily  folic acid 1milliGRAM(s) Oral daily  thiamine 100milliGRAM(s) Oral daily  predniSONE   Tablet 10milliGRAM(s) Oral daily  gabapentin 300milliGRAM(s) Oral three times a day  rifaximin 550milliGRAM(s) Oral two times a day  sodium chloride 0.9%. 1000milliLiter(s) IV Continuous <Continuous>    MEDICATIONS  (PRN):  ondansetron Injectable 4milliGRAM(s) IV Push every 8 hours PRN Nausea and/or Vomiting    Vital Signs Last 24 Hrs  T(C): 36.3, Max: 36.6 (04-14 @ 05:52)  T(F): 97.4, Max: 97.8 (04-14 @ 05:52)  HR: 116 (115 - 121)  BP: 82/52 (82/52 - 121/84)  BP(mean): --  RR: 16 (16 - 16)  SpO2: 96% (96% - 96%)    chronically ill appearing WF in no acute distress.  NCAT.  neck supple.  JVP nml.  lungs CTA.  no WRR.  heart RRR.  NS1S2.  no MRG.  distended.  soft.    LE good muscle tone.  limited range of motion.  b/l LE (+) 1-2 pitting edema, L>R.  (+) left food edema.  (+) ecchimosis plantar aspect.  A&Ox3.                        11.5   11.1  )-----------( 183      ( 14 Apr 2017 06:30 )             34.3       04-14    138  |  106  |  22  ----------------------------<  114<H>  4.2   |  24  |  0.98    Ca    8.6      14 Apr 2017 06:30    Assessment and Plan:   · Assessment		  Pt is a 65 yo woman with hx liver cirrhosis, variceal esophageal banding, RA on prednisone who presents with weakness,  inability to walk (legs feel like jelly), and falls.       Acute upon chronic hypotension: STABLE  -Dr. Osorio discontinued her methotrexate, bystolic, and HCTZ on 3/10/17.    -d/c IVFs and monitor  -c/w prednisone to 15mg po qd per rheum    weakness with difficulty walking and poor functionality:  Multifactorial, related to underlying co-morbidities including  ankle fracture, LS DDD,  disc herniations, spinal stenosis, neuropathy,  RA,  cirrhosis, edema, AI  -MRI of cervical and thoracic spine shows DDD, multi-level neural foraminal narrowing, cord impingement likely contributing to her symptoms.  -neurosurgery consult appreciated --> does not want to consider any possible spinal surgery, until at least her left leg has healed and managed.   F/U as outpatient.  - on gabapentin - could be contributing to weakness.  If continues will need to taper off to improve mentation.  - Folate: 7.8 ng/mL, Vitamin B12: 1087 pg/mL  - ESR trending down.  - Rheumatology consult appreciated.     left ankle fracture in setting of hx of falls.  -Elevation.    -NWB LLE w/ CAM boot.    -Rolling walker w/ assist.     -Orthopedic f/u outpatient.    -Podiatry consult, re:  toe nail clipping of left foot.   -PT re-eval with recommendations for [facility]    Cirrhosis secondary to chronic alcohol abuse with peripheral edema, anemia, and coagulopathy:  -Hx of banding  -s/p US guided paracentesis for Cx and cell count.  -Lasix falling outside BP parameters.  propranolol and spironolactone not tolerated due to hypotension.  -c/w rifaximin   -GI following    CAD:  -NSTEMI s/p LAD with BMS.    -DAPT + statin.    disposition.   -Discharge to rehab today. Case discussed with Dr ross.     Attending Statement:  >35 minutes spent on total encounter; more than 50% of the visit was spent counseling and/or coordinating care by the attending physician.

## 2017-04-18 NOTE — PROGRESS NOTE ADULT - ATTENDING COMMENTS
Patient seen and examined with NP Rajendra.  Agree with physical exam and assessment and plan.   - pt agreeable to discharge to rehab today  time spent on d/c 40 mins

## 2017-04-18 NOTE — PROGRESS NOTE ADULT - ASSESSMENT
LLE fx   ortho input appreciated    Diarrhea and has improved  had empiric tx for c diff and she could not do stool tests as out pt          Hx of chronic diarrhea that has also improved and will follow, Mg may inc it and DW pt      hypotension  adrenal insuff, on chronic steroids  endocrine note appreciated and pred increased, DCed propranolol and aldactone (she is been missing doses often)    consider tapering prednisone to lowest dose      pos troponins  DW cardioogy, pt had bare metal stent and they are considering duration of double anti plt with inc risk bleed  DW cardiology and they recommend plavix for at least one month and will possibly stop it after that  DW pt again       Cirrhosis due to ETOH and has stopped    ascites and LE edema      likely adrenal insuff with years of steroids and now on inc dose steroids,  BP is improving, but pred increased    consider slow taper of pred, managed by endocrine or Rheum,     Rheum note appreciated and DW rheum and hospitalist  inc ESR noted    neg for SBP

## 2017-04-18 NOTE — PROGRESS NOTE ADULT - SUBJECTIVE AND OBJECTIVE BOX
Patient is a 66y old  Female who presents with a chief complaint of Weakness (17 Apr 2017 13:00)      HPI:  Pt is a 67 yo woman with hx liver cirrhosis, variceal esophageal bvanding, RA on prednisone, who was admitted recently 2-29 to 3-3 for cirrhosis and vomiting.   She reports weakness and inability to walk (legs feel like jelly). She has been incontinent of urine and stool for a few weeks as  she does not make it to the bathroom in time.  Pt fell off the couch his morning.  She also had two other falls, including 3/3/17 getting out of car to home in driveway, and  last evening when she slid off couch, struck a hard box with left chest and twisted her left ankle.  Pt's significant other reports she is  unable to support her own weight and is having trouble with her balance.  She denies cough, resp symptoms.  No fever/chills, n/v, cpain, SOB.  She has 1-3 loose stools/day.   Dr. Osorio discontinued her methotrexate, bystolic, and HCTZ on 3/10/17.            She  stopped drinking 5 days prior to last admission.  No head injury during any fall. (02 Apr 2017 18:48)  pt comf  CO LE weakness and hands tingling  neg N V  had some abd wall bleeding after heparin shot    neg abd pain x by heparin shot, mild, 1-2/10  neg diarrhea      PAST MEDICAL & SURGICAL HISTORY:  HTN (hypertension)  Rheumatoid arthritis  No significant past surgical history      MEDICATIONS  (STANDING):  multivitamin 1Tablet(s) Oral daily  aspirin 325milliGRAM(s) Oral daily  potassium chloride    Tablet ER 10milliEquivalent(s) Oral daily  pantoprazole    Tablet 40milliGRAM(s) Oral before breakfast  clopidogrel Tablet 75milliGRAM(s) Oral daily  atorvastatin 20milliGRAM(s) Oral at bedtime  magnesium oxide 800milliGRAM(s) Oral at bedtime  furosemide    Tablet 20milliGRAM(s) Oral daily  folic acid 1milliGRAM(s) Oral daily  thiamine 100milliGRAM(s) Oral daily  gabapentin 300milliGRAM(s) Oral three times a day  rifaximin 550milliGRAM(s) Oral two times a day  predniSONE   Tablet 15milliGRAM(s) Oral daily  enoxaparin Injectable 40milliGRAM(s) SubCutaneous every 24 hours    MEDICATIONS  (PRN):  ondansetron Injectable 4milliGRAM(s) IV Push every 8 hours PRN Nausea and/or Vomiting  naphazoline/pheniramine Solution 1Drop(s) Both EYES two times a day PRN watery eyes.      Allergies    sulfa drugs (Unknown)    Intolerances        SOCIAL HISTORY:unchagned    FAMILY HISTORY:  No pertinent family history in first degree relatives      REVIEW OF SYSTEMS:    CONSTITUTIONAL: No weakness, fevers or chills  EYES/ENT: No visual changes;  No vertigo or throat pain   NECK: No pain or stiffness  RESPIRATORY: No cough, wheezing, hemoptysis; No shortness of breath  CARDIOVASCULAR: No chest pain or palpitations  GENITOURINARY: No dysuria, frequency or hematuria  NEUROLOGICAL: No numbness or weakness  SKIN: No itching, burning, rashes, or lesions   All other review of systems is negative unless indicated above.    Vital Signs Last 24 Hrs  T(C): 36.7, Max: 36.7 (04-17 @ 21:21)  T(F): 98, Max: 98 (04-17 @ 21:21)  HR: 105 (80 - 105)  BP: 104/56 (104/56 - 138/67)  BP(mean): --  RR: 18 (14 - 18)  SpO2: 96% (95% - 96%)    PHYSICAL EXAM:    Constitutional: NAD, well-developed  HEENT: EOMI, throat clear  Neck: No LAD, supple  Respiratory: CTA and P  Cardiovascular: S1 and S2, RRR, no M  Gastrointestinal: BS+, soft, NT/ND, neg HSM,  Extremities: No peripheral edema, neg clubing, cyanosis  Vascular: 2+ peripheral pulses  Neurological: A/O x 3, no focal deficits  Psychiatric: Normal mood, normal affect  Skin: No rashes    LABS:                  RADIOLOGY & ADDITIONAL STUDIES:

## 2017-04-19 LAB
CULTURE RESULTS: SIGNIFICANT CHANGE UP
SPECIMEN SOURCE: SIGNIFICANT CHANGE UP
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
## 2017-04-21 DIAGNOSIS — F10.20 ALCOHOL DEPENDENCE, UNCOMPLICATED: ICD-10-CM

## 2017-04-21 DIAGNOSIS — Z79.52 LONG TERM (CURRENT) USE OF SYSTEMIC STEROIDS: ICD-10-CM

## 2017-04-21 DIAGNOSIS — I85.10 SECONDARY ESOPHAGEAL VARICES WITHOUT BLEEDING: ICD-10-CM

## 2017-04-21 DIAGNOSIS — M06.9 RHEUMATOID ARTHRITIS, UNSPECIFIED: ICD-10-CM

## 2017-04-21 DIAGNOSIS — E83.42 HYPOMAGNESEMIA: ICD-10-CM

## 2017-04-21 DIAGNOSIS — I42.6 ALCOHOLIC CARDIOMYOPATHY: ICD-10-CM

## 2017-04-21 DIAGNOSIS — K70.31 ALCOHOLIC CIRRHOSIS OF LIVER WITH ASCITES: ICD-10-CM

## 2017-04-21 DIAGNOSIS — S82.892A OTHER FRACTURE OF LEFT LOWER LEG, INITIAL ENCOUNTER FOR CLOSED FRACTURE: ICD-10-CM

## 2017-04-21 DIAGNOSIS — I95.89 OTHER HYPOTENSION: ICD-10-CM

## 2017-04-21 DIAGNOSIS — Z87.891 PERSONAL HISTORY OF NICOTINE DEPENDENCE: ICD-10-CM

## 2017-04-21 DIAGNOSIS — M51.36 OTHER INTERVERTEBRAL DISC DEGENERATION, LUMBAR REGION: ICD-10-CM

## 2017-04-21 DIAGNOSIS — M65.872 OTHER SYNOVITIS AND TENOSYNOVITIS, LEFT ANKLE AND FOOT: ICD-10-CM
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
## 2017-04-21 DIAGNOSIS — E87.6 HYPOKALEMIA: ICD-10-CM

## 2017-04-21 DIAGNOSIS — K52.9 NONINFECTIVE GASTROENTERITIS AND COLITIS, UNSPECIFIED: ICD-10-CM

## 2017-04-21 DIAGNOSIS — G62.1 ALCOHOLIC POLYNEUROPATHY: ICD-10-CM

## 2017-04-21 DIAGNOSIS — M47.815 SPONDYLOSIS WITHOUT MYELOPATHY OR RADICULOPATHY, THORACOLUMBAR REGION: ICD-10-CM

## 2017-04-21 DIAGNOSIS — I21.4 NON-ST ELEVATION (NSTEMI) MYOCARDIAL INFARCTION: ICD-10-CM

## 2017-04-21 DIAGNOSIS — D53.9 NUTRITIONAL ANEMIA, UNSPECIFIED: ICD-10-CM

## 2017-04-21 DIAGNOSIS — E27.49 OTHER ADRENOCORTICAL INSUFFICIENCY: ICD-10-CM

## 2017-04-21 DIAGNOSIS — Z91.81 HISTORY OF FALLING: ICD-10-CM

## 2017-04-21 DIAGNOSIS — K70.9 ALCOHOLIC LIVER DISEASE, UNSPECIFIED: ICD-10-CM

## 2017-04-21 DIAGNOSIS — I25.10 ATHEROSCLEROTIC HEART DISEASE OF NATIVE CORONARY ARTERY WITHOUT ANGINA PECTORIS: ICD-10-CM

## 2017-04-21 DIAGNOSIS — M48.00 SPINAL STENOSIS, SITE UNSPECIFIED: ICD-10-CM

## 2017-04-24 ENCOUNTER — INPATIENT (INPATIENT)
Facility: HOSPITAL | Age: 67
LOS: 24 days | Discharge: ANOTHER HOSPITAL | End: 2017-05-19
Attending: HOSPITALIST | Admitting: INTERNAL MEDICINE
Payer: MEDICARE

## 2017-04-24 VITALS
RESPIRATION RATE: 18 BRPM | WEIGHT: 149.91 LBS | SYSTOLIC BLOOD PRESSURE: 98 MMHG | OXYGEN SATURATION: 97 % | HEART RATE: 100 BPM | TEMPERATURE: 98 F | DIASTOLIC BLOOD PRESSURE: 54 MMHG | HEIGHT: 64 IN

## 2017-04-24 LAB
ALBUMIN SERPL ELPH-MCNC: 2 G/DL — LOW (ref 3.3–5)
ALP SERPL-CCNC: 215 U/L — HIGH (ref 40–120)
ALT FLD-CCNC: 33 U/L — SIGNIFICANT CHANGE UP (ref 12–78)
ANION GAP SERPL CALC-SCNC: 13 MMOL/L — SIGNIFICANT CHANGE UP (ref 5–17)
ANISOCYTOSIS BLD QL: SLIGHT — SIGNIFICANT CHANGE UP
APTT BLD: 32.5 SEC — SIGNIFICANT CHANGE UP (ref 27.5–37.4)
AST SERPL-CCNC: 52 U/L — HIGH (ref 15–37)
BASOPHILS # BLD AUTO: 0.1 K/UL — SIGNIFICANT CHANGE UP (ref 0–0.2)
BASOPHILS NFR BLD AUTO: 0.7 % — SIGNIFICANT CHANGE UP (ref 0–2)
BILIRUB SERPL-MCNC: 1.6 MG/DL — HIGH (ref 0.2–1.2)
BUN SERPL-MCNC: 20 MG/DL — SIGNIFICANT CHANGE UP (ref 7–23)
CALCIUM SERPL-MCNC: 8.4 MG/DL — LOW (ref 8.5–10.1)
CHLORIDE SERPL-SCNC: 101 MMOL/L — SIGNIFICANT CHANGE UP (ref 96–108)
CK SERPL-CCNC: 1062 U/L — HIGH (ref 26–192)
CO2 SERPL-SCNC: 20 MMOL/L — LOW (ref 22–31)
CREAT SERPL-MCNC: 0.72 MG/DL — SIGNIFICANT CHANGE UP (ref 0.5–1.3)
EOSINOPHIL # BLD AUTO: 0.2 K/UL — SIGNIFICANT CHANGE UP (ref 0–0.5)
EOSINOPHIL NFR BLD AUTO: 1.9 % — SIGNIFICANT CHANGE UP (ref 0–6)
ETHANOL SERPL-MCNC: <10 MG/DL — SIGNIFICANT CHANGE UP (ref 0–10)
GLUCOSE SERPL-MCNC: 126 MG/DL — HIGH (ref 70–99)
HCT VFR BLD CALC: 33.6 % — LOW (ref 34.5–45)
HGB BLD-MCNC: 10.8 G/DL — LOW (ref 11.5–15.5)
INR BLD: 1.27 RATIO — HIGH (ref 0.88–1.16)
LIDOCAIN IGE QN: 519 U/L — HIGH (ref 73–393)
LYMPHOCYTES # BLD AUTO: 19.2 % — SIGNIFICANT CHANGE UP (ref 13–44)
LYMPHOCYTES # BLD AUTO: 2 K/UL — SIGNIFICANT CHANGE UP (ref 1–3.3)
MACROCYTES BLD QL: SIGNIFICANT CHANGE UP
MAGNESIUM SERPL-MCNC: 2 MG/DL — SIGNIFICANT CHANGE UP (ref 1.8–2.4)
MANUAL DIF COMMENT BLD-IMP: SIGNIFICANT CHANGE UP
MCHC RBC-ENTMCNC: 32.1 GM/DL — SIGNIFICANT CHANGE UP (ref 32–36)
MCHC RBC-ENTMCNC: 36.9 PG — HIGH (ref 27–34)
MCV RBC AUTO: 114.7 FL — HIGH (ref 80–100)
MONOCYTES # BLD AUTO: 0.8 K/UL — SIGNIFICANT CHANGE UP (ref 0–0.9)
MONOCYTES NFR BLD AUTO: 7.8 % — SIGNIFICANT CHANGE UP (ref 2–14)
NEUTROPHILS # BLD AUTO: 7.2 K/UL — SIGNIFICANT CHANGE UP (ref 1.8–7.4)
NEUTROPHILS NFR BLD AUTO: 70.4 % — SIGNIFICANT CHANGE UP (ref 43–77)
PLAT MORPH BLD: NORMAL — SIGNIFICANT CHANGE UP
PLATELET # BLD AUTO: 133 K/UL — LOW (ref 150–400)
POIKILOCYTOSIS BLD QL AUTO: SLIGHT — SIGNIFICANT CHANGE UP
POLYCHROMASIA BLD QL SMEAR: SLIGHT — SIGNIFICANT CHANGE UP
POTASSIUM SERPL-MCNC: 4 MMOL/L — SIGNIFICANT CHANGE UP (ref 3.5–5.3)
POTASSIUM SERPL-SCNC: 4 MMOL/L — SIGNIFICANT CHANGE UP (ref 3.5–5.3)
PROT SERPL-MCNC: 6.2 GM/DL — SIGNIFICANT CHANGE UP (ref 6–8.3)
PROTHROM AB SERPL-ACNC: 13.8 SEC — HIGH (ref 9.8–12.7)
RBC # BLD: 2.93 M/UL — LOW (ref 3.8–5.2)
RBC # FLD: 14.8 % — HIGH (ref 10.3–14.5)
RBC BLD AUTO: (no result)
SCHISTOCYTES BLD QL AUTO: SLIGHT — SIGNIFICANT CHANGE UP
SODIUM SERPL-SCNC: 134 MMOL/L — LOW (ref 135–145)
TARGETS BLD QL SMEAR: SLIGHT — SIGNIFICANT CHANGE UP
TROPONIN I SERPL-MCNC: 0.71 NG/ML — HIGH (ref 0.01–0.04)
TROPONIN I SERPL-MCNC: 79.3 NG/ML — HIGH (ref 0.01–0.04)
TROPONIN I SERPL-MCNC: 96 NG/ML — HIGH (ref 0.01–0.04)
WBC # BLD: 10.2 K/UL — SIGNIFICANT CHANGE UP (ref 3.8–10.5)
WBC # FLD AUTO: 10.2 K/UL — SIGNIFICANT CHANGE UP (ref 3.8–10.5)

## 2017-04-24 PROCEDURE — 71010: CPT | Mod: 26

## 2017-04-24 PROCEDURE — 93010 ELECTROCARDIOGRAM REPORT: CPT

## 2017-04-24 PROCEDURE — 99285 EMERGENCY DEPT VISIT HI MDM: CPT

## 2017-04-24 PROCEDURE — 71275 CT ANGIOGRAPHY CHEST: CPT | Mod: 26

## 2017-04-24 RX ORDER — HEPARIN SODIUM 5000 [USP'U]/ML
4100 INJECTION INTRAVENOUS; SUBCUTANEOUS EVERY 6 HOURS
Qty: 0 | Refills: 0 | Status: DISCONTINUED | OUTPATIENT
Start: 2017-04-24 | End: 2017-04-24

## 2017-04-24 RX ORDER — METOPROLOL TARTRATE 50 MG
12.5 TABLET ORAL DAILY
Qty: 0 | Refills: 0 | Status: DISCONTINUED | OUTPATIENT
Start: 2017-04-24 | End: 2017-04-27

## 2017-04-24 RX ORDER — CLOPIDOGREL BISULFATE 75 MG/1
75 TABLET, FILM COATED ORAL ONCE
Qty: 0 | Refills: 0 | Status: COMPLETED | OUTPATIENT
Start: 2017-04-24 | End: 2017-04-24

## 2017-04-24 RX ORDER — LISINOPRIL 2.5 MG/1
2.5 TABLET ORAL DAILY
Qty: 0 | Refills: 0 | Status: DISCONTINUED | OUTPATIENT
Start: 2017-04-24 | End: 2017-04-27

## 2017-04-24 RX ORDER — ASPIRIN/CALCIUM CARB/MAGNESIUM 324 MG
162 TABLET ORAL ONCE
Qty: 0 | Refills: 0 | Status: COMPLETED | OUTPATIENT
Start: 2017-04-24 | End: 2017-04-24

## 2017-04-24 RX ORDER — GABAPENTIN 400 MG/1
300 CAPSULE ORAL THREE TIMES A DAY
Qty: 0 | Refills: 0 | Status: DISCONTINUED | OUTPATIENT
Start: 2017-04-24 | End: 2017-05-19

## 2017-04-24 RX ORDER — CLOPIDOGREL BISULFATE 75 MG/1
75 TABLET, FILM COATED ORAL DAILY
Qty: 0 | Refills: 0 | Status: DISCONTINUED | OUTPATIENT
Start: 2017-04-24 | End: 2017-05-16

## 2017-04-24 RX ORDER — ATORVASTATIN CALCIUM 80 MG/1
40 TABLET, FILM COATED ORAL AT BEDTIME
Qty: 0 | Refills: 0 | Status: DISCONTINUED | OUTPATIENT
Start: 2017-04-24 | End: 2017-05-19

## 2017-04-24 RX ORDER — THIAMINE MONONITRATE (VIT B1) 100 MG
100 TABLET ORAL DAILY
Qty: 0 | Refills: 0 | Status: DISCONTINUED | OUTPATIENT
Start: 2017-04-24 | End: 2017-05-19

## 2017-04-24 RX ORDER — METOPROLOL TARTRATE 50 MG
12.5 TABLET ORAL ONCE
Qty: 0 | Refills: 0 | Status: COMPLETED | OUTPATIENT
Start: 2017-04-24 | End: 2017-04-24

## 2017-04-24 RX ORDER — PANTOPRAZOLE SODIUM 20 MG/1
40 TABLET, DELAYED RELEASE ORAL
Qty: 0 | Refills: 0 | Status: DISCONTINUED | OUTPATIENT
Start: 2017-04-24 | End: 2017-05-16

## 2017-04-24 RX ORDER — FOLIC ACID 0.8 MG
1 TABLET ORAL DAILY
Qty: 0 | Refills: 0 | Status: DISCONTINUED | OUTPATIENT
Start: 2017-04-24 | End: 2017-05-19

## 2017-04-24 RX ORDER — HEPARIN SODIUM 5000 [USP'U]/ML
INJECTION INTRAVENOUS; SUBCUTANEOUS
Qty: 25000 | Refills: 0 | Status: DISCONTINUED | OUTPATIENT
Start: 2017-04-24 | End: 2017-04-24

## 2017-04-24 RX ORDER — HEPARIN SODIUM 5000 [USP'U]/ML
4700 INJECTION INTRAVENOUS; SUBCUTANEOUS EVERY 6 HOURS
Qty: 0 | Refills: 0 | Status: DISCONTINUED | OUTPATIENT
Start: 2017-04-24 | End: 2017-04-26

## 2017-04-24 RX ORDER — GLYCERIN 1 %
1 DROPS OPHTHALMIC (EYE)
Qty: 0 | Refills: 0 | Status: DISCONTINUED | OUTPATIENT
Start: 2017-04-24 | End: 2017-05-19

## 2017-04-24 RX ORDER — ASPIRIN/CALCIUM CARB/MAGNESIUM 324 MG
325 TABLET ORAL DAILY
Qty: 0 | Refills: 0 | Status: DISCONTINUED | OUTPATIENT
Start: 2017-04-24 | End: 2017-05-16

## 2017-04-24 RX ORDER — HEPARIN SODIUM 5000 [USP'U]/ML
INJECTION INTRAVENOUS; SUBCUTANEOUS
Qty: 25000 | Refills: 0 | Status: DISCONTINUED | OUTPATIENT
Start: 2017-04-24 | End: 2017-04-26

## 2017-04-24 RX ADMIN — Medication 162 MILLIGRAM(S): at 14:00

## 2017-04-24 RX ADMIN — HEPARIN SODIUM 950 UNIT(S)/HR: 5000 INJECTION INTRAVENOUS; SUBCUTANEOUS at 20:44

## 2017-04-24 NOTE — ED PROVIDER NOTE - MEDICAL DECISION MAKING DETAILS
66yr old with MANY chronic and serious medical comorbidities, just d/c from Hospital, with vague symptoms, "hollowness" in abdomen and some SOB.  Basic labs, UA.  EKG shows small changes in precordial leads, No CP.  Will get Trop. Keep on monitor. Given LLE fracture, LLE boot, immobilization, and subjective SOB, will get CTA to eval for PE.

## 2017-04-24 NOTE — ED PROVIDER NOTE - NS ED MD SCRIBE ATTENDING SCRIBE SECTIONS
HISTORY OF PRESENT ILLNESS/PHYSICAL EXAM/PROGRESS NOTE/RESULTS/PAST MEDICAL/SURGICAL/SOCIAL HISTORY/DISPOSITION/REVIEW OF SYSTEMS

## 2017-04-24 NOTE — ED ADULT NURSE NOTE - OBJECTIVE STATEMENT
Pt reports feeling "not right in the middle of my body."  Pt denies pain, but reports feeling "hollow" with numbness that has been evaluated by MRI.

## 2017-04-24 NOTE — H&P ADULT - HISTORY OF PRESENT ILLNESS
65 yo F with PMH of liver cirrhosis 2/2 chronic alcohol abuse, macrocytic anemia, HTN, RA, esophageal varices s/p banding, CAD s/p stent, h/o prolonged Qtc, p/w chest pain/abdominal pain. States pain is more like pressure in L chest and abdomen. States pain has diminished a lot now, but is still there mildly. Denies radiation to arm / jaw / neck. Denies nausea / vomiting / diophoresis. In ED, troponins trended from 0.707 -> 79.3. Called on call cardio Dr. Flannery and discussed case, he recommended calling Dr. Anderson who is the on call interventionlist. Discussed case in detail with Dr. Anderson, including troponins trending up, new ST depressions on EKG and on going residual chest discomfort. Dr. Anderson recommends heparin drip without initial bolus due to esophageal varices / liver cirrhosis and due to patient being at increased risk for bleeding. Also recommends to c/w ASA / plavix / beta blocker / statin. Dr. Anderson believes patient may have had cardiac event earlier, and troponins are just lagging behind. Cardiac cath to be performed in AM.    PSH: PCI, esophageal varices banding  Social Hx: Former smoker - quit in 2005, h/o heavy etoh use - quit 2/2016, Drugs - h/o marijuana use previously  Family Hx: Father - stomach problems / heart disease, mother - pancreatic cancer

## 2017-04-24 NOTE — ED PROVIDER NOTE - DETAILS:
I, Elias Mc, performed the initial face to face bedside interview with this patient regarding history of present illness, review of symptoms and relevant past medical, social and family history.  I completed an independent physical examination.  I was the initial provider who evaluated this patient.  The history, relevant review of systems, past medical and surgical history, medical decision making, and physical examination was documented by the scribe in my presence and I attest to the accuracy of the documentation.

## 2017-04-24 NOTE — ED PROVIDER NOTE - OTHER FINDINGS
T wave inversions with ST depressions in V1, V2, which are slightly worse when compared to EKG from April 6, 2017. Diffuse T wave inversions seen on previous EKG from April 6, 2017.

## 2017-04-24 NOTE — ED PROVIDER NOTE - OBJECTIVE STATEMENT
67 y/o female with a h/o liver cirrhosis secondary to chronic alcohol abuse, macrocytic anemia, peripheral edema, RADHA, CAD s/p 1 stent, variceal esophageal banding, neuropathy, RA on prednisone, chronic hypotension, adrenal insufficiency, c/o sudden onset of fatigue since 0930 today. She describes a hollow pressure in her epigastric abdominal area, radiating to RUE, associated with SOB. No N/V, CP, lightheadedness, dizziness, or back pain. Pt was admitted to  last week, underwent paracentesis at that time which was negative for SBP. During her hospital stay, she was c/o weakness, inability to walk, frequent falls, and b/l arm tingling, had MRI which showed spinal stenosis. She was also recently dx with left ankle fx. Pt quit smoking in 2005. Last alcoholic beverage was in Feb. 2016. No illicit drug use. Dr. Lees (PMD), Dr. Gonzales (neurosurgery), Dr. Underwood (GI). 65 y/o female with a h/o liver cirrhosis secondary to chronic alcohol abuse, macrocytic anemia, peripheral edema, RADHA, CAD s/p recent placement of 1 stent a few weeks ago by Dr. Anderson (cardio), variceal esophageal banding, neuropathy, RA on prednisone, chronic hypotension, adrenal insufficiency, c/o sudden onset of fatigue since 0930 today. Also, pt was laying in bed when she developed a "hollow" sensation in mostly her left abdomen, associated with SOB. No diaphoresis, fevers, palpitations, N/V, CP, lightheadedness, dizziness, or back pain. Pt was admitted to  last week, underwent paracentesis at that time which was negative for SBP. During her hospital stay, she was c/o weakness, inability to walk, frequent falls, and b/l arm tingling, had MRI which showed spinal stenosis. She was also recently dx with left ankle fx. Pt quit smoking in 2005. Last alcoholic beverage was in Feb. 2016. No illicit drug use. No h/o blood clots. Dr. Lees (PMD), Dr. Gonzales (neurosurgery), Dr. Underwood (GI). 65 y/o female with a h/o liver cirrhosis secondary to chronic alcohol abuse, macrocytic anemia, peripheral edema, RADHA, CAD s/p recent placement of 1 stent a few weeks ago by Dr. Anderson (cardio), variceal esophageal banding, neuropathy, RA on prednisone, chronic hypotension, adrenal insufficiency, c/o sudden onset of fatigue since 0930 today. Also, pt was laying in bed when she developed a "hollow" sensation in mostly her left abdomen, associated with SOB. No diaphoresis, fevers, palpitations, N/V, CP, lightheadedness, dizziness, or back pain. Pt was admitted to  last week, underwent paracentesis at that time which was negative for SBP. During her hospital stay, she was c/o weakness, inability to walk, frequent falls, and b/l arm tingling, had MRI which showed spinal stenosis. She has not been ambulatory recently due to sustaining left ankle fx last week. Pt quit smoking in 2005. Last alcoholic beverage was in Feb. 2016. No illicit drug use. No h/o blood clots. Dr. Lees (PMD), Dr. Gonzales (neurosurgery), Dr. Underwood (GI). 67 y/o female with a h/o liver cirrhosis secondary to chronic alcohol abuse, macrocytic anemia, peripheral edema, RADHA, CAD s/p recent placement of 1 stent a few weeks ago by Dr. Anderson (cardio), variceal esophageal banding, neuropathy, RA on prednisone, chronic hypotension, adrenal insufficiency, c/o sudden onset of fatigue since 0930 today. Also, pt was laying in bed when she developed a "hollow" sensation in mostly her left abdomen, associated with SOB. No diaphoresis, fevers, palpitations, N/V, CP, lightheadedness, dizziness, or back pain. Pt was admitted to  last week, underwent paracentesis at that time which was negative for SBP. During her hospital stay, she was c/o weakness, inability to walk, frequent falls, and b/l arm tingling, had MRI which showed spinal stenosis. She has not been ambulatory recently due to sustaining left ankle fx last week. Pt quit smoking in 2005. Last alcoholic beverage was in Feb. 2016. No illicit drug use. No h/o blood clots. Pt is unsure if she took her aspirin today. Dr. Lees (PMD), Dr. Gonzales (neurosurgery), Dr. Underwood (GI). 65 y/o female with a h/o liver cirrhosis secondary to chronic alcohol abuse, macrocytic anemia, peripheral edema, RADHA, CAD s/p recent placement of 1 stent a few weeks ago by Dr. Anderson (cardio) taking ASA and Plavix, variceal esophageal banding, neuropathy, RA on prednisone, chronic hypotension, adrenal insufficiency, c/o sudden onset of fatigue since 0930 today. Also, pt was laying in bed when she developed a "hollow" sensation in mostly her abdomen, associated with SOB. No diaphoresis, fevers, palpitations, N/V, CP, lightheadedness, dizziness, or back pain. Pt was admitted to  last week, underwent paracentesis at that time which was negative for SBP. During her hospital stay, she was c/o weakness, inability to walk, frequent falls, and b/l arm tingling, had MRI which showed spinal stenosis. She has not been ambulatory recently due to sustaining left ankle fx last week. Pt quit smoking in 2005. Last alcoholic beverage was in Feb. 2016. No illicit drug use. No h/o blood clots. Pt is unsure if she took her aspirin today. Dr. Lees (PMD), Dr. Gonzales (neurosurgery), Dr. Underwood (GI).

## 2017-04-24 NOTE — ED PROVIDER NOTE - CARE PLAN
Principal Discharge DX:	Elevated troponin Principal Discharge DX:	Elevated troponin  Secondary Diagnosis:	NSTEMI (non-ST elevated myocardial infarction)  Secondary Diagnosis:	EKG abnormalities

## 2017-04-24 NOTE — H&P ADULT - ASSESSMENT
67 yo F with PMH of liver cirrhosis 2/2 chronic alcohol abuse, macrocytic anemia, HTN, RA, esophageal varices s/p banding, CAD s/p stent, h/o prolonged Qtc, p/w chest pain/abdominal pain.    *NSTEMI   -Heparin drip  -ASA / plavix / beta blocker / statin (patient received plavix and ASA at the rehab center today)  -Explained to patient with significant other present at bedside risks vs benefits of starting heparin drip in the setting of liver cirrhosis / esophageal varices / platelet dysfunction. Including but not limited to risk of catastrophic bleed / death / debility. Patient understands and agrees to treatment with anticoagulation / anti-platelets  -NPO for cardiac cath in AM, hold diuretics while NPO  -Cardio consult  -CCU admission  -SUN + Serial EKGs    *Liver cirrhosis w/ esophageal varices   -Continue to monitor H/H on blood thinners  -Outpatient GI referral    *HTN  -Low salt diet  -C/w home meds    *Rheumatoid Arthritis  -C/w prednisone    *Anemia/thrombocytopenia  -Monitor closely    *Abnormal UA  -Denies symptoms of UTI    *DVT ppx  -Heparin drip

## 2017-04-24 NOTE — ED PROVIDER NOTE - PROGRESS NOTE DETAILS
S/w Dr. Flannery (serial Trop, CPK, EKG).  Optimize vitals. S/w Dr. Flannery (cardio) about pts EKG and elevated trop of 0.7. (Recommends serial Trop, CPK, EKG and optimize VS).     Pt took ASA and Plavix today.  NO CP now. Never had CP.  Mild SOB earlier.     ->pt to be admitted. CTA shows no obvious PE but cannot r/o smaller PE due to poor timing of CTA.  Given pts stable VS and no large PE, w/ Hx of Esophageal bleeding due to varices, risk of anticoagulation until PE further ruled out to risky.  Pt admitted.  Repeat Trops pending.

## 2017-04-25 LAB
ALBUMIN SERPL ELPH-MCNC: 1.8 G/DL — LOW (ref 3.3–5)
ALP SERPL-CCNC: 177 U/L — HIGH (ref 40–120)
ALT FLD-CCNC: 38 U/L — SIGNIFICANT CHANGE UP (ref 12–78)
ANION GAP SERPL CALC-SCNC: 12 MMOL/L — SIGNIFICANT CHANGE UP (ref 5–17)
APTT BLD: 180.1 SEC — CRITICAL HIGH (ref 27.5–37.4)
APTT BLD: 83.1 SEC — HIGH (ref 27.5–37.4)
APTT BLD: > 200 SEC (ref 27.5–37.4)
AST SERPL-CCNC: 200 U/L — HIGH (ref 15–37)
BASOPHILS # BLD AUTO: 0.1 K/UL — SIGNIFICANT CHANGE UP (ref 0–0.2)
BASOPHILS NFR BLD AUTO: 1.1 % — SIGNIFICANT CHANGE UP (ref 0–2)
BILIRUB SERPL-MCNC: 1.7 MG/DL — HIGH (ref 0.2–1.2)
BUN SERPL-MCNC: 18 MG/DL — SIGNIFICANT CHANGE UP (ref 7–23)
CALCIUM SERPL-MCNC: 8.2 MG/DL — LOW (ref 8.5–10.1)
CHLORIDE SERPL-SCNC: 103 MMOL/L — SIGNIFICANT CHANGE UP (ref 96–108)
CHOLEST SERPL-MCNC: 200 MG/DL — HIGH (ref 10–199)
CK SERPL-CCNC: 677 U/L — HIGH (ref 26–192)
CO2 SERPL-SCNC: 21 MMOL/L — LOW (ref 22–31)
CREAT SERPL-MCNC: 0.57 MG/DL — SIGNIFICANT CHANGE UP (ref 0.5–1.3)
EOSINOPHIL # BLD AUTO: 0.3 K/UL — SIGNIFICANT CHANGE UP (ref 0–0.5)
EOSINOPHIL NFR BLD AUTO: 2.7 % — SIGNIFICANT CHANGE UP (ref 0–6)
GLUCOSE SERPL-MCNC: 88 MG/DL — SIGNIFICANT CHANGE UP (ref 70–99)
HCT VFR BLD CALC: 30.8 % — LOW (ref 34.5–45)
HCT VFR BLD CALC: 33.7 % — LOW (ref 34.5–45)
HDLC SERPL-MCNC: 27 MG/DL — LOW (ref 40–125)
HGB BLD-MCNC: 10 G/DL — LOW (ref 11.5–15.5)
HGB BLD-MCNC: 10.8 G/DL — LOW (ref 11.5–15.5)
INR BLD: 1.45 RATIO — HIGH (ref 0.88–1.16)
LIPID PNL WITH DIRECT LDL SERPL: 156 MG/DL — HIGH
LYMPHOCYTES # BLD AUTO: 1.9 K/UL — SIGNIFICANT CHANGE UP (ref 1–3.3)
LYMPHOCYTES # BLD AUTO: 18.1 % — SIGNIFICANT CHANGE UP (ref 13–44)
MAGNESIUM SERPL-MCNC: 1.7 MG/DL — LOW (ref 1.8–2.4)
MCHC RBC-ENTMCNC: 32.2 GM/DL — SIGNIFICANT CHANGE UP (ref 32–36)
MCHC RBC-ENTMCNC: 32.4 GM/DL — SIGNIFICANT CHANGE UP (ref 32–36)
MCHC RBC-ENTMCNC: 36.6 PG — HIGH (ref 27–34)
MCHC RBC-ENTMCNC: 36.9 PG — HIGH (ref 27–34)
MCV RBC AUTO: 113.6 FL — HIGH (ref 80–100)
MCV RBC AUTO: 113.8 FL — HIGH (ref 80–100)
MONOCYTES # BLD AUTO: 0.7 K/UL — SIGNIFICANT CHANGE UP (ref 0–0.9)
MONOCYTES NFR BLD AUTO: 7 % — SIGNIFICANT CHANGE UP (ref 2–14)
NEUTROPHILS # BLD AUTO: 7.6 K/UL — HIGH (ref 1.8–7.4)
NEUTROPHILS NFR BLD AUTO: 71.1 % — SIGNIFICANT CHANGE UP (ref 43–77)
PHOSPHATE SERPL-MCNC: 3.5 MG/DL — SIGNIFICANT CHANGE UP (ref 2.5–4.5)
PLATELET # BLD AUTO: 122 K/UL — LOW (ref 150–400)
PLATELET # BLD AUTO: 135 K/UL — LOW (ref 150–400)
POTASSIUM SERPL-MCNC: 4 MMOL/L — SIGNIFICANT CHANGE UP (ref 3.5–5.3)
POTASSIUM SERPL-SCNC: 4 MMOL/L — SIGNIFICANT CHANGE UP (ref 3.5–5.3)
PROT SERPL-MCNC: 5.9 GM/DL — LOW (ref 6–8.3)
PROTHROM AB SERPL-ACNC: 15.8 SEC — HIGH (ref 9.8–12.7)
RBC # BLD: 2.71 M/UL — LOW (ref 3.8–5.2)
RBC # BLD: 2.96 M/UL — LOW (ref 3.8–5.2)
RBC # FLD: 14.3 % — SIGNIFICANT CHANGE UP (ref 10.3–14.5)
RBC # FLD: 14.5 % — SIGNIFICANT CHANGE UP (ref 10.3–14.5)
SODIUM SERPL-SCNC: 136 MMOL/L — SIGNIFICANT CHANGE UP (ref 135–145)
TOTAL CHOLESTEROL/HDL RATIO MEASUREMENT: 7.4 RATIO — HIGH (ref 3.3–7.1)
TRIGL SERPL-MCNC: 87 MG/DL — SIGNIFICANT CHANGE UP (ref 10–149)
TROPONIN I SERPL-MCNC: 63.9 NG/ML — HIGH (ref 0.01–0.04)
WBC # BLD: 10.7 K/UL — HIGH (ref 3.8–10.5)
WBC # BLD: 11 K/UL — HIGH (ref 3.8–10.5)
WBC # FLD AUTO: 10.7 K/UL — HIGH (ref 3.8–10.5)
WBC # FLD AUTO: 11 K/UL — HIGH (ref 3.8–10.5)

## 2017-04-25 PROCEDURE — 93308 TTE F-UP OR LMTD: CPT | Mod: 26

## 2017-04-25 PROCEDURE — 93010 ELECTROCARDIOGRAM REPORT: CPT

## 2017-04-25 RX ORDER — MAGNESIUM SULFATE 500 MG/ML
2 VIAL (ML) INJECTION ONCE
Qty: 0 | Refills: 0 | Status: COMPLETED | OUTPATIENT
Start: 2017-04-25 | End: 2017-04-25

## 2017-04-25 RX ORDER — SODIUM CHLORIDE 9 MG/ML
1000 INJECTION, SOLUTION INTRAVENOUS
Qty: 0 | Refills: 0 | Status: DISCONTINUED | OUTPATIENT
Start: 2017-04-25 | End: 2017-04-27

## 2017-04-25 RX ADMIN — HEPARIN SODIUM 0 UNIT(S)/HR: 5000 INJECTION INTRAVENOUS; SUBCUTANEOUS at 04:07

## 2017-04-25 RX ADMIN — Medication 100 MILLIGRAM(S): at 12:48

## 2017-04-25 RX ADMIN — PANTOPRAZOLE SODIUM 40 MILLIGRAM(S): 20 TABLET, DELAYED RELEASE ORAL at 09:08

## 2017-04-25 RX ADMIN — GABAPENTIN 300 MILLIGRAM(S): 400 CAPSULE ORAL at 21:20

## 2017-04-25 RX ADMIN — Medication 325 MILLIGRAM(S): at 12:48

## 2017-04-25 RX ADMIN — GABAPENTIN 300 MILLIGRAM(S): 400 CAPSULE ORAL at 15:25

## 2017-04-25 RX ADMIN — Medication 1 MILLIGRAM(S): at 12:48

## 2017-04-25 RX ADMIN — ATORVASTATIN CALCIUM 40 MILLIGRAM(S): 80 TABLET, FILM COATED ORAL at 21:20

## 2017-04-25 RX ADMIN — CLOPIDOGREL BISULFATE 75 MILLIGRAM(S): 75 TABLET, FILM COATED ORAL at 12:48

## 2017-04-25 RX ADMIN — Medication 50 GRAM(S): at 10:23

## 2017-04-25 RX ADMIN — Medication 15 MILLIGRAM(S): at 05:22

## 2017-04-25 RX ADMIN — Medication 1 TABLET(S): at 12:48

## 2017-04-25 RX ADMIN — HEPARIN SODIUM 0 UNIT(S)/HR: 5000 INJECTION INTRAVENOUS; SUBCUTANEOUS at 11:24

## 2017-04-25 RX ADMIN — HEPARIN SODIUM 700 UNIT(S)/HR: 5000 INJECTION INTRAVENOUS; SUBCUTANEOUS at 04:59

## 2017-04-25 RX ADMIN — Medication 12.5 MILLIGRAM(S): at 05:21

## 2017-04-25 RX ADMIN — GABAPENTIN 300 MILLIGRAM(S): 400 CAPSULE ORAL at 05:21

## 2017-04-25 RX ADMIN — HEPARIN SODIUM 450 UNIT(S)/HR: 5000 INJECTION INTRAVENOUS; SUBCUTANEOUS at 12:47

## 2017-04-25 RX ADMIN — SODIUM CHLORIDE 75 MILLILITER(S): 9 INJECTION, SOLUTION INTRAVENOUS at 15:32

## 2017-04-25 NOTE — CONSULT NOTE ADULT - ASSESSMENT
65 yo F with PMH of liver cirrhosis 2/2 chronic alcohol abuse, macrocytic anemia, HTN, RA, esophageal varices s/p banding, CAD s/p stent, h/o prolonged Qtc, p/w chest pain/abdominal pain.    1. CP- NSTEMI- cont asa/plavix/heparin/statin/Bbl. 2Decho done at bedside shows nl LV fxn. Will need LHC to evaluate coronary anatomy. Pt currently CP free, no signs of decompensation.  CP free- can start nitrate/tridil if CP begins.    2. CTA- negative for PE. Will start 1/2NS @ 75cc/hr prior to LHC.     3. HTN- controlled- continue current meds.     4. CCU monitoring, DVT proph, replete lytes as needed.

## 2017-04-25 NOTE — PHYSICAL THERAPY INITIAL EVALUATION ADULT - MODALITIES TREATMENT COMMENTS
pt left in bed supine post Eval; bed alarm on; all above lines/monitors in place; callbell in reach; kyle well; pt denied pain

## 2017-04-25 NOTE — PHYSICAL THERAPY INITIAL EVALUATION ADULT - GENERAL OBSERVATIONS, REHAB EVAL
O2 2L/min nc; HM; BP cuff; pulse oxym; L ortopedic boot donned; pt denied pain; HR 88; O2 Sat 97%; BP 97/51; RR 10

## 2017-04-25 NOTE — PHYSICAL THERAPY INITIAL EVALUATION ADULT - MANUAL MUSCLE TESTING RESULTS, REHAB EVAL
no strength deficits were identified/except L LE: hip/knee flex 3+/5; knee ext 3+/5; ankle PF 4-/5; DF 3+/5; R LE: hip/knee flex 4-/5; knee ext 4-/5; ankle PF/DF 4-/5

## 2017-04-25 NOTE — CONSULT NOTE ADULT - SUBJECTIVE AND OBJECTIVE BOX
Cardiology Consultation    HPI: 67 yo F with PMH of liver cirrhosis 2/2 chronic alcohol abuse, macrocytic anemia, HTN, RA, esophageal varices s/p banding, CAD s/p stent, h/o prolonged Qtc, p/w chest pain/abdominal pain. States pain is more like pressure in L chest and abdomen. States pain has diminished a lot now, but is still there mildly. Denies radiation to arm / jaw / neck. Denies nausea / vomiting / diophoresis. In ED, troponins trended from 0.707 -> 79.3. Called on call cardio Dr. Flannery and discussed case, he recommended calling Dr. Anderson who is the on call interventionlist. Discussed case in detail with Dr. Anderson, including troponins trending up, new ST depressions on EKG and on going residual chest discomfort. Dr. Anderson recommends heparin drip without initial bolus due to esophageal varices / liver cirrhosis and due to patient being at increased risk for bleeding. Also recommends to c/w ASA / plavix / beta blocker / statin. Dr. Anderson believes patient may have had cardiac event earlier, and troponins are just lagging behind.     4/25- No CP/SOB at present. SR on tele. 2Decho at bedside shows Nl LV fxn.    PSH: CAD s/p PCI, esophageal varices banding    Social Hx: Former smoker - quit in 2005, h/o heavy etoh use - quit 2/2016, Drugs - h/o marijuana use previously    Family Hx: Father - stomach problems / heart disease, mother - pancreatic cancer (24 Apr 2017 21:23)    PAST MEDICAL & SURGICAL HISTORY:  HTN (hypertension)  Rheumatoid arthritis  No significant past surgical history    Allergies  sulfa drugs (Unknown)    SOCIAL HISTORY: Denies tobacco, etoh abuse or illicit drug use    FAMILY HISTORY: No pertinent family history in first degree relatives    MEDICATIONS  (STANDING):  atorvastatin 40milliGRAM(s) Oral at bedtime  lisinopril 2.5milliGRAM(s) Oral daily  heparin  Infusion. Unit(s)/Hr IV Continuous <Continuous>  predniSONE   Tablet 15milliGRAM(s) Oral daily  aspirin 325milliGRAM(s) Oral daily  gabapentin 300milliGRAM(s) Oral three times a day  clopidogrel Tablet 75milliGRAM(s) Oral daily  rifaximin 550milliGRAM(s) Oral two times a day  pantoprazole    Tablet 40milliGRAM(s) Oral before breakfast  multivitamin 1Tablet(s) Oral daily  folic acid 1milliGRAM(s) Oral daily  thiamine 100milliGRAM(s) Oral daily  metoprolol succinate ER 12.5milliGRAM(s) Oral daily  magnesium sulfate  IVPB 2Gram(s) IV Intermittent once    MEDICATIONS  (PRN):  heparin  Injectable 4700Unit(s) IV Push every 6 hours PRN For aPTT less than 40  naphazoline 0.025%/pheniramine 0.3% Ophthalmic Solution - Peds 1Drop(s) Both EYES two times a day PRN watery eyes.    Vital Signs Last 24 Hrs  T(C): 36.8, Max: 36.8 (04-25 @ 08:00)  T(F): 98.2, Max: 98.2 (04-25 @ 08:00)  HR: 91 (91 - 116)  BP: 97/51 (89/65 - 118/73)  BP(mean): 62 (60 - 82)  RR: 17 (10 - 23)  SpO2: 96% (96% - 100%)    REVIEW OF SYSTEMS:    CONSTITUTIONAL:  As per HPI.  HEENT:  Eyes:  No diplopia or blurred vision. ENT:  No earache, sore throat or runny nose.  CARDIOVASCULAR:  No pressure, squeezing, strangling, tightness, heaviness or aching about the chest, neck, axilla or epigastrium.  RESPIRATORY:  No cough, shortness of breath, PND or orthopnea.  GASTROINTESTINAL:  No nausea, vomiting or diarrhea.  GENITOURINARY:  No dysuria, frequency or urgency.  MUSCULOSKELETAL:  As per HPI.  SKIN:  No change in skin, hair or nails.  NEUROLOGIC:  No paresthesias, fasciculations, seizures or weakness.  PSYCHIATRIC:  No disorder of thought or mood.  ENDOCRINE:  No heat or cold intolerance, polyuria or polydipsia.  HEMATOLOGICAL:  No easy bruising or bleedings.     PHYSICAL EXAMINATION:    GENERAL APPEARANCE:  Pt. is not currently dyspneic, in no distress. Pt. is alert, oriented, and pleasant.  HEENT:  Pupils are normal and react normally. No icterus. Mucous membranes well colored.  NECK:  Supple. No lymphadenopathy. Jugular venous pressure not elevated. Carotids equal.   HEART:   The cardiac impulse has a normal quality. There are no murmurs, rubs or gallops noted  CHEST:  Chest is clear to auscultation. Normal respiratory effort.  ABDOMEN:  Soft and nontender.   EXTREMITIES:  There is no edema.   SKIN:  No rash or significant lesions are noted.    I&O's Summary  I & Os for 24h ending 25 Apr 2017 07:00  =============================================  IN: 207 ml / OUT: 400 ml / NET: -193 ml    I & Os for current day (as of 25 Apr 2017 10:00)  =============================================  IN: 0 ml / OUT: 100 ml / NET: -100 ml    LABS:                        10.0   10.7  )-----------( 122      ( 25 Apr 2017 05:28 )             30.8     04-25    136  |  103  |  18  ----------------------------<  88  4.0   |  21<L>  |  0.57    Ca    8.2<L>      25 Apr 2017 05:28  Phos  3.5     04-25  Mg     1.7     04-25    TPro  5.9<L>  /  Alb  1.8<L>  /  TBili  1.7<H>  /  DBili  x   /  AST  200<H>  /  ALT  38  /  AlkPhos  177<H>  04-25    LIVER FUNCTIONS - ( 25 Apr 2017 05:28 )  Alb: 1.8 g/dL / Pro: 5.9 gm/dL / ALK PHOS: 177 U/L / ALT: 38 U/L / AST: 200 U/L / GGT: x           PT/INR - ( 25 Apr 2017 05:28 )   PT: 15.8 sec;   INR: 1.45 ratio       PTT - ( 25 Apr 2017 03:14 )  PTT:> 200 sec  CARDIAC MARKERS ( 25 Apr 2017 05:28 )  63.900 ng/mL / x     / 677 U/L / x     / x      CARDIAC MARKERS ( 24 Apr 2017 21:42 )  96.000 ng/mL / x     / x     / x     / x      CARDIAC MARKERS ( 24 Apr 2017 17:54 )  79.300 ng/mL / x     / 1062 U/L / x     / x      CARDIAC MARKERS ( 24 Apr 2017 11:48 )  0.707 ng/mL / x     / x     / x     / x        EKG: Normal sinus rhythm  T wave abnormality, consider inferior ischemia  T wave abnormality, consider anterolateral ischemia  Prolonged QT    TELEMETRY: SR    CARDIAC TESTS: 2Decho- prelim- nl LV fxn, 4/17- The left ventricle is normal in size.   Mild concentric left ventricular hypertrophy is present. The mid and   apical septal and apical lateral segments are akinetic. Other LV segments   contract normally.  Estimated left ventricular ejection fraction is 35-40 %.   EA reversal of the mitral inflow consistent with reduced compliance of   the left ventricle.  Mild (1+) mitral regurgitation is present.    RADIOLOGY & ADDITIONAL STUDIES: CTA- Limited evaluation of the pulmonary artery secondary to suboptimal bolus   timing. No pulmonary embolus the trunk, main right or left pulmonary   artery. Small right and moderate left pleural effusion with bibasilar compressive   atelectasis, incomplete on the right and complete of the left. Cirrhosis and moderate ascites in upper abdomen.    ASSESSMENT & PLAN:

## 2017-04-25 NOTE — PHYSICAL THERAPY INITIAL EVALUATION ADULT - CRITERIA FOR SKILLED THERAPEUTIC INTERVENTIONS
will hold PT intervention @ this time pending further medical management; pt currently with elevated CE's; will await future orders when PT appropriate; RN Verito made aware

## 2017-04-25 NOTE — PROGRESS NOTE ADULT - SUBJECTIVE AND OBJECTIVE BOX
PCP: DR Lees    CC-Patient is a 66y old  Female who presents with a chief complaint of chest pain (2017 23:38)    HPI:  65 yo F with PMH of liver cirrhosis 2/2 chronic alcohol abuse, macrocytic anemia, HTN, RA, esophageal varices s/p banding, CAD s/p stent, h/o prolonged Qtc, p/w chest pain/abdominal pain. States pain is more like pressure in L chest and abdomen. States pain has diminished a lot now, but is still there mildly. Denies radiation to arm / jaw / neck. Denies nausea / vomiting / diophoresis. In ED, troponins trended from 0.707 -> 79.3. Called on call cardio Dr. Flannery and discussed case, he recommended calling Dr. Anderson who is the on call interventionlist. Discussed case in detail with Dr. Anderson, including troponins trending up, new ST depressions on EKG and on going residual chest discomfort. Dr. Anderson recommends heparin drip without initial bolus due to esophageal varices / liver cirrhosis and due to patient being at increased risk for bleeding. Also recommends to c/w ASA / plavix / beta blocker / statin. Dr. Anderson believes patient may have had cardiac event earlier, and troponins are just lagging behind. Cardiac cath to be performed in AM.      T(C): 36.8, Max: 36.8 ( @ 08:00)  HR: 89 (89 - 116)  BP: 98/57 (89/65 - 118/73)  RR: 18 (10 - 23)  SpO2: 97% (96% - 100%)  Wt(kg): --    LABS:                        10.0   10.7  )-----------( 122      ( 2017 05:28 )             30.8         136  |  103  |  18  ----------------------------<  88  4.0   |  21<L>  |  0.57    Ca    8.2<L>      2017 05:28  Phos  3.5       Mg     1.7         TPro  5.9<L>  /  Alb  1.8<L>  /  TBili  1.7<H>  /  DBili  x   /  AST  200<H>  /  ALT  38  /  AlkPhos  177<H>      PT/INR - ( 2017 05:28 )   PT: 15.8 sec;   INR: 1.45 ratio      PTT - ( 2017 10:46 )  PTT:180.1 sec  Urinalysis Basic - ( 2017 11:48 )  Color: Yellow / Appearance: Slightly Turbid / S.015 / pH: x  Gluc: x / Ketone: Negative  / Bili: Negative / Urobili: Negative mg/dL   Blood: x / Protein: Negative mg/dL / Nitrite: Negative   Leuk Esterase: Small / RBC: 0-2 /HPF / WBC 3-5   Sq Epi: x / Non Sq Epi: Few / Bacteria: Many    CARDIAC MARKERS ( 2017 05:28 )  63.900 ng/mL / x     / 677 U/L / x     / x      CARDIAC MARKERS ( 2017 21:42 )  96.000 ng/mL / x     / x     / x     / x      CARDIAC MARKERS ( 2017 17:54 )  79.300 ng/mL / x     / 1062 U/L / x     / x      CARDIAC MARKERS ( 2017 11:48 )  0.707 ng/mL / x     / x     / x     / x        RADIOLOGY & ADDITIONAL TESTS:  EXAM:  CHEST SINGLE VIEW FRONTAL                          PROCEDURE DATE:  2017    INTERPRETATION:  History: Dyspnea    Chest:  one view.      Comparison: 2017    AP radiograph of the chest demonstrates worsening LEFT lower lobe   infiltrate with associated small LEFT pleural effusion. The cardiac   silhouette is normal in size. Osseous structures are intact.    Impression:worsening LEFT lower lobe infiltrate with associated small   LEFT pleural effusion.       EXAM:  CT ANGIO CHEST PE PROTOCOL IC                        PROCEDURE DATE:  2017    INTERPRETATION:  CT ANGIO CHEST PE PROTOCOL IC    HISTORY:  Shortness of breath and chest pain    TECHNIQUE: Contrast enhanced CT pulmonary angiogramwas performed.   Multiplanar CT and HRCT images were reviewed. Maximum intensity   projection (MIP) images are reconstructed as per CT angiography protocol.   Images were acquired during the administration of 90 cc Omnipaque 350 IV   contrast. This study was performed using automatic exposure control   (radiation dose reduction software) to obtain a diagnostic image quality   scan with patient dose as low as reasonably achievable.    COMPARISON: Chest x-ray from the same day, chest CT 2017    PULMONARY ARTERIES: Suboptimal bolus timing limiting evaluation of the   pulmonary arteries. No pulmonary embolus in the main pulmonary trunk or   right and left main pulmonary artery.    HEART AND VESSELS: Normal heart size. No pericardial effusion.Normal   caliber thoracic aorta. Extensive coronary artery calcification.    MEDIASTINUM, FILEMON, AXILLAE: No adenopathy.    LUNGS, AIRWAYS: The central airways are patent. There is partial   compressive atelectasis of the right lower lobe and completeand present   atelectasis of the left lower lobe. The visualized aerated lungs are   clear.    PLEURA: Small right and moderate left pleural effusion.    UPPER ABDOMEN: Cirrhotic morphology of the liver. Moderate ascites.    BONES AND CHEST WALL: No aggressive lesion.    IMPRESSION:     Limited evaluation of the pulmonary artery secondary to suboptimal bolus   timing. No pulmonary embolus the trunk, main right or left pulmonary   artery.    Small right and moderate left pleural effusion with bibasilar compressive   atelectasis, incomplete on the right and complete of the left.    Cirrhosis and moderate ascites in upper abdomen.    PHYSICAL EXAM:  GENERAL: NAD, well-groomed, well-developed  HEAD:  Atraumatic, Normocephalic  EYES: EOMI, PERRLA, conjunctiva and sclera clear  HEENT: Moist mucous membranes  NECK: Supple, No JVD  NERVOUS SYSTEM:  Alert & Oriented X3, Motor Strength 5/5 B/L upper and lower extremities; DTRs 2+ intact and symmetric  CHEST/LUNG: Clear to auscultation bilaterally; No rales, rhonchi, wheezing, or rubs  HEART: Regular rate and rhythm; No murmurs, rubs, or gallops  ABDOMEN: Soft, Nontender, Nondistended; Bowel sounds present  GENITOURINARY- Voiding, no palpable bladder  EXTREMITIES: LLE boot on  SKIN-no rash, no lesion  CNS- alert, oriented X3, non focal        Daily Weight in k.8 (2017 00:00)      atorvastatin 40milliGRAM(s) Oral at bedtime  lisinopril 2.5milliGRAM(s) Oral daily  heparin  Infusion. Unit(s)/Hr IV Continuous <Continuous>  heparin  Injectable 4700Unit(s) IV Push every 6 hours PRN  predniSONE   Tablet 15milliGRAM(s) Oral daily  aspirin 325milliGRAM(s) Oral daily  gabapentin 300milliGRAM(s) Oral three times a day  clopidogrel Tablet 75milliGRAM(s) Oral daily  rifaximin 550milliGRAM(s) Oral two times a day  naphazoline 0.025%/pheniramine 0.3% Ophthalmic Solution - Peds 1Drop(s) Both EYES two times a day PRN  pantoprazole    Tablet 40milliGRAM(s) Oral before breakfast  multivitamin 1Tablet(s) Oral daily  folic acid 1milliGRAM(s) Oral daily  thiamine 100milliGRAM(s) Oral daily  metoprolol succinate ER 12.5milliGRAM(s) Oral daily  sodium chloride 0.45%. 1000milliLiter(s) IV Continuous <Continuous>    Assessment/Plan  #NSTEMI  Card eval DR Ramesh appreciated  Cont current cardiac meds, IV Heparin drip  For card cath in am as per cardio    #Liver cirrhosis w/ esophageal varices   -Continue to monitor H/H on blood thinners  -Outpatient GI referral    #HTN  Stable    #Rheumatoid Arthritis  -C/w prednisone    #Anemia/thrombocytopenia likely 2 to liver cirrhosis  Monitor     #Dispo- cont CCU care, card cath in am as per cardio

## 2017-04-26 LAB
APTT BLD: 47.5 SEC — HIGH (ref 27.5–37.4)
HCT VFR BLD CALC: 32.2 % — LOW (ref 34.5–45)
HGB BLD-MCNC: 10.3 G/DL — LOW (ref 11.5–15.5)
MCHC RBC-ENTMCNC: 31.9 GM/DL — LOW (ref 32–36)
MCHC RBC-ENTMCNC: 36.4 PG — HIGH (ref 27–34)
MCV RBC AUTO: 114.2 FL — HIGH (ref 80–100)
PLATELET # BLD AUTO: 116 K/UL — LOW (ref 150–400)
RBC # BLD: 2.82 M/UL — LOW (ref 3.8–5.2)
RBC # FLD: 14.5 % — SIGNIFICANT CHANGE UP (ref 10.3–14.5)
WBC # BLD: 9.5 K/UL — SIGNIFICANT CHANGE UP (ref 3.8–10.5)
WBC # FLD AUTO: 9.5 K/UL — SIGNIFICANT CHANGE UP (ref 3.8–10.5)

## 2017-04-26 PROCEDURE — 73610 X-RAY EXAM OF ANKLE: CPT | Mod: 26,RT

## 2017-04-26 PROCEDURE — 93010 ELECTROCARDIOGRAM REPORT: CPT

## 2017-04-26 PROCEDURE — 73630 X-RAY EXAM OF FOOT: CPT | Mod: 26,LT

## 2017-04-26 RX ORDER — SODIUM CHLORIDE 9 MG/ML
1000 INJECTION INTRAMUSCULAR; INTRAVENOUS; SUBCUTANEOUS ONCE
Qty: 0 | Refills: 0 | Status: COMPLETED | OUTPATIENT
Start: 2017-04-26 | End: 2017-04-26

## 2017-04-26 RX ORDER — HYDROCORTISONE 20 MG
50 TABLET ORAL EVERY 8 HOURS
Qty: 0 | Refills: 0 | Status: COMPLETED | OUTPATIENT
Start: 2017-04-26 | End: 2017-04-27

## 2017-04-26 RX ADMIN — Medication 1 MILLIGRAM(S): at 14:36

## 2017-04-26 RX ADMIN — Medication 50 MILLIGRAM(S): at 22:11

## 2017-04-26 RX ADMIN — CLOPIDOGREL BISULFATE 75 MILLIGRAM(S): 75 TABLET, FILM COATED ORAL at 14:38

## 2017-04-26 RX ADMIN — Medication 1 TABLET(S): at 14:38

## 2017-04-26 RX ADMIN — Medication 325 MILLIGRAM(S): at 14:37

## 2017-04-26 RX ADMIN — LISINOPRIL 2.5 MILLIGRAM(S): 2.5 TABLET ORAL at 06:28

## 2017-04-26 RX ADMIN — Medication 12.5 MILLIGRAM(S): at 06:29

## 2017-04-26 RX ADMIN — ATORVASTATIN CALCIUM 40 MILLIGRAM(S): 80 TABLET, FILM COATED ORAL at 22:11

## 2017-04-26 RX ADMIN — GABAPENTIN 300 MILLIGRAM(S): 400 CAPSULE ORAL at 14:38

## 2017-04-26 RX ADMIN — GABAPENTIN 300 MILLIGRAM(S): 400 CAPSULE ORAL at 22:11

## 2017-04-26 RX ADMIN — SODIUM CHLORIDE 1000 MILLILITER(S): 9 INJECTION INTRAMUSCULAR; INTRAVENOUS; SUBCUTANEOUS at 20:12

## 2017-04-26 RX ADMIN — PANTOPRAZOLE SODIUM 40 MILLIGRAM(S): 20 TABLET, DELAYED RELEASE ORAL at 08:07

## 2017-04-26 RX ADMIN — Medication 15 MILLIGRAM(S): at 06:28

## 2017-04-26 RX ADMIN — Medication 100 MILLIGRAM(S): at 14:37

## 2017-04-26 RX ADMIN — GABAPENTIN 300 MILLIGRAM(S): 400 CAPSULE ORAL at 06:28

## 2017-04-26 RX ADMIN — HEPARIN SODIUM 350 UNIT(S)/HR: 5000 INJECTION INTRAVENOUS; SUBCUTANEOUS at 00:31

## 2017-04-26 RX ADMIN — ATORVASTATIN CALCIUM 40 MILLIGRAM(S): 80 TABLET, FILM COATED ORAL at 06:19

## 2017-04-26 RX ADMIN — GABAPENTIN 300 MILLIGRAM(S): 400 CAPSULE ORAL at 06:19

## 2017-04-26 NOTE — PROGRESS NOTE ADULT - ASSESSMENT
66y old F:  NSTEMI - s/p LHC with clean coronaries  s/p LAD stent  Cirrhosis  RA - On steroids  GIB    Plan:  Cont CCU Care  Serial neuro Exams  Low BP - Hold AntiHTN meds  Cont IVF  Stress steroids  PO diet  cont ASA/Plavix  Monitor renal function  Strict I/O's  DVT prophylaxis - Hep SQ

## 2017-04-26 NOTE — PROGRESS NOTE ADULT - SUBJECTIVE AND OBJECTIVE BOX
CC: Low BP    HPI:  65 yo F with PMH of liver cirrhosis 2/2 chronic alcohol abuse, macrocytic anemia, HTN, RA, esophageal varices s/p banding, CAD s/p stent, h/o prolonged Qtc, p/w chest pain/abdominal pain. States pain is more like pressure in L chest and abdomen. States pain has diminished a lot now, but is still there mildly. Denies radiation to arm / jaw / neck. Denies nausea / vomiting / diophoresis. In ED, troponins trended from 0.707 -> 79.3. Called on call cardio Dr. Flannery and discussed case, he recommended calling Dr. Anderson who is the on call interventionlist. Discussed case in detail with Dr. Anderson, including troponins trending up, new ST depressions on EKG and on going residual chest discomfort. Dr. Anderson recommends heparin drip without initial bolus due to esophageal varices / liver cirrhosis and due to patient being at increased risk for bleeding. Also recommends to c/w ASA / plavix / beta blocker / statin. Dr. Anderson believes patient may have had cardiac event earlier, and troponins are just lagging behind. Cardiac cath to be performed in AM.    4/26 - Patient seen and examined. Events noted. Called to see patient for low BP. She is s/p LHC with no coronary obstruction.    PSH: PCI, esophageal varices banding  Social Hx: Former smoker - quit in 2005, h/o heavy etoh use - quit 2/2016, Drugs - h/o marijuana use previously  Family Hx: Father - stomach problems / heart disease, mother - pancreatic cancer (24 Apr 2017 21:23)      PAST MEDICAL & SURGICAL HISTORY:  HTN (hypertension)  Rheumatoid arthritis  No significant past surgical history      FAMILY HISTORY:  No pertinent family history in first degree relatives      Social Hx:    Allergies    sulfa drugs (Unknown)    Intolerances        66y        ICU Vital Signs Last 24 Hrs  T(C): 36.6, Max: 37 (04-26 @ 05:00)  T(F): 97.8, Max: 98.6 (04-26 @ 05:00)  HR: 84 (80 - 100)  BP: 90/58 (76/43 - 103/43)  BP(mean): 51 (51 - 73)  ABP: --  ABP(mean): --  RR: 16 (9 - 20)  SpO2: 93% (92% - 97%)          I&O's Summary  I & Os for 24h ending 26 Apr 2017 07:00  =============================================  IN: 1741.3 ml / OUT: 300 ml / NET: 1441.3 ml    I & Os for current day (as of 26 Apr 2017 13:52)  =============================================  IN: 150 ml / OUT: 0 ml / NET: 150 ml                            10.3   9.5   )-----------( 116      ( 26 Apr 2017 05:47 )             32.2       04-25    136  |  103  |  18  ----------------------------<  88  4.0   |  21<L>  |  0.57    Ca    8.2<L>      25 Apr 2017 05:28  Phos  3.5     04-25  Mg     1.7     04-25    TPro  5.9<L>  /  Alb  1.8<L>  /  TBili  1.7<H>  /  DBili  x   /  AST  200<H>  /  ALT  38  /  AlkPhos  177<H>  04-25      CAPILLARY BLOOD GLUCOSE      LIVER FUNCTIONS - ( 25 Apr 2017 05:28 )  Alb: 1.8 g/dL / Pro: 5.9 gm/dL / ALK PHOS: 177 U/L / ALT: 38 U/L / AST: 200 U/L / GGT: x             CARDIAC MARKERS ( 25 Apr 2017 05:28 )  63.900 ng/mL / x     / 677 U/L / x     / x      CARDIAC MARKERS ( 24 Apr 2017 21:42 )  96.000 ng/mL / x     / x     / x     / x      CARDIAC MARKERS ( 24 Apr 2017 17:54 )  79.300 ng/mL / x     / 1062 U/L / x     / x            PT/INR - ( 25 Apr 2017 05:28 )   PT: 15.8 sec;   INR: 1.45 ratio         PTT - ( 26 Apr 2017 07:44 )  PTT:47.5 sec            MEDICATIONS  (STANDING):  atorvastatin 40milliGRAM(s) Oral at bedtime  lisinopril 2.5milliGRAM(s) Oral daily  predniSONE   Tablet 15milliGRAM(s) Oral daily  aspirin 325milliGRAM(s) Oral daily  gabapentin 300milliGRAM(s) Oral three times a day  clopidogrel Tablet 75milliGRAM(s) Oral daily  rifaximin 550milliGRAM(s) Oral two times a day  pantoprazole    Tablet 40milliGRAM(s) Oral before breakfast  multivitamin 1Tablet(s) Oral daily  folic acid 1milliGRAM(s) Oral daily  thiamine 100milliGRAM(s) Oral daily  metoprolol succinate ER 12.5milliGRAM(s) Oral daily  sodium chloride 0.45%. 1000milliLiter(s) IV Continuous <Continuous>    MEDICATIONS  (PRN):  naphazoline 0.025%/pheniramine 0.3% Ophthalmic Solution - Peds 1Drop(s) Both EYES two times a day PRN watery eyes.          DVT Prophylaxis:    Advanced Directives:  Discussed with:    Visit Information:    ** Time is exclusive of billed procedures and/or teaching and/or routine family updates.

## 2017-04-26 NOTE — PROGRESS NOTE ADULT - SUBJECTIVE AND OBJECTIVE BOX
Cardiology Consultation    HPI: 65 yo F with PMH of liver cirrhosis 2/2 chronic alcohol abuse, macrocytic anemia, HTN, RA, esophageal varices s/p banding, CAD s/p stent, h/o prolonged Qtc, p/w chest pain/abdominal pain. States pain is more like pressure in L chest and abdomen. States pain has diminished a lot now, but is still there mildly. Denies radiation to arm / jaw / neck. Denies nausea / vomiting / diophoresis. In ED, troponins trended from 0.707 -> 79.3. Called on call cardio Dr. Flannery and discussed case, he recommended calling Dr. Anderson who is the on call interventionlist. Discussed case in detail with Dr. Anderson, including troponins trending up, new ST depressions on EKG and on going residual chest discomfort. Dr. Anderson recommends heparin drip without initial bolus due to esophageal varices / liver cirrhosis and due to patient being at increased risk for bleeding. Also recommends to c/w ASA / plavix / beta blocker / statin. Dr. Anderson believes patient may have had cardiac event earlier, and troponins are just lagging behind.     4/25- No CP/SOB at present. SR on tele. 2Decho at bedside shows Nl LV fxn.    4/26- No CP, no events last pm. Lying flat in bed. SBP 80s-90s. No dizziness/syncope. No fevers.   Awaiting [facility] today.     PSH: CAD s/p PCI, esophageal varices banding    Social Hx: Former smoker - quit in 2005, h/o heavy etoh use - quit 2/2016, Drugs - h/o marijuana use previously    Family Hx: Father - stomach problems / heart disease, mother - pancreatic cancer (24 Apr 2017 21:23)    PAST MEDICAL & SURGICAL HISTORY:  HTN (hypertension)  Rheumatoid arthritis  No significant past surgical history    Allergies  sulfa drugs (Unknown)    SOCIAL HISTORY: Denies tobacco, etoh abuse or illicit drug use    FAMILY HISTORY: No pertinent family history in first degree relatives    MEDICATIONS  (STANDING):  atorvastatin 40milliGRAM(s) Oral at bedtime  lisinopril 2.5milliGRAM(s) Oral daily  heparin  Infusion. Unit(s)/Hr IV Continuous <Continuous>  predniSONE   Tablet 15milliGRAM(s) Oral daily  aspirin 325milliGRAM(s) Oral daily  gabapentin 300milliGRAM(s) Oral three times a day  clopidogrel Tablet 75milliGRAM(s) Oral daily  rifaximin 550milliGRAM(s) Oral two times a day  pantoprazole    Tablet 40milliGRAM(s) Oral before breakfast  multivitamin 1Tablet(s) Oral daily  folic acid 1milliGRAM(s) Oral daily  thiamine 100milliGRAM(s) Oral daily  metoprolol succinate ER 12.5milliGRAM(s) Oral daily  magnesium sulfate  IVPB 2Gram(s) IV Intermittent once    MEDICATIONS  (PRN):  heparin  Injectable 4700Unit(s) IV Push every 6 hours PRN For aPTT less than 40  naphazoline 0.025%/pheniramine 0.3% Ophthalmic Solution - Peds 1Drop(s) Both EYES two times a day PRN watery eyes.    Vital Signs Last 24 Hrs  T(C): 36.8, Max: 36.8 (04-25 @ 08:00)  T(F): 98.2, Max: 98.2 (04-25 @ 08:00)  HR: 91 (91 - 116)  BP: 97/51 (89/65 - 118/73)  BP(mean): 62 (60 - 82)  RR: 17 (10 - 23)  SpO2: 96% (96% - 100%)    REVIEW OF SYSTEMS:    CONSTITUTIONAL:  As per HPI.  HEENT:  Eyes:  No diplopia or blurred vision. ENT:  No earache, sore throat or runny nose.  CARDIOVASCULAR:  No pressure, squeezing, strangling, tightness, heaviness or aching about the chest, neck, axilla or epigastrium.  RESPIRATORY:  No cough, shortness of breath, PND or orthopnea.  GASTROINTESTINAL:  No nausea, vomiting or diarrhea.  GENITOURINARY:  No dysuria, frequency or urgency.  MUSCULOSKELETAL:  As per HPI.  SKIN:  No change in skin, hair or nails.  NEUROLOGIC:  No paresthesias, fasciculations, seizures or weakness.  PSYCHIATRIC:  No disorder of thought or mood.  ENDOCRINE:  No heat or cold intolerance, polyuria or polydipsia.  HEMATOLOGICAL:  No easy bruising or bleedings.     PHYSICAL EXAMINATION:    GENERAL APPEARANCE:  Pt. is not currently dyspneic, in no distress. Pt. is alert, oriented, and pleasant.  HEENT:  Pupils are normal and react normally. No icterus. Mucous membranes well colored.  NECK:  Supple. No lymphadenopathy. Jugular venous pressure not elevated. Carotids equal.   HEART:   The cardiac impulse has a normal quality. There are no murmurs, rubs or gallops noted  CHEST:  Chest is clear to auscultation. Normal respiratory effort.  ABDOMEN:  Soft and nontender.   EXTREMITIES:  There is no edema.   SKIN:  No rash or significant lesions are noted.    I&O's Summary  I & Os for 24h ending 25 Apr 2017 07:00  =============================================  IN: 207 ml / OUT: 400 ml / NET: -193 ml    I & Os for current day (as of 25 Apr 2017 10:00)  =============================================  IN: 0 ml / OUT: 100 ml / NET: -100 ml    LABS:                        10.0   10.7  )-----------( 122      ( 25 Apr 2017 05:28 )             30.8     04-25    136  |  103  |  18  ----------------------------<  88  4.0   |  21<L>  |  0.57    Ca    8.2<L>      25 Apr 2017 05:28  Phos  3.5     04-25  Mg     1.7     04-25    TPro  5.9<L>  /  Alb  1.8<L>  /  TBili  1.7<H>  /  DBili  x   /  AST  200<H>  /  ALT  38  /  AlkPhos  177<H>  04-25    LIVER FUNCTIONS - ( 25 Apr 2017 05:28 )  Alb: 1.8 g/dL / Pro: 5.9 gm/dL / ALK PHOS: 177 U/L / ALT: 38 U/L / AST: 200 U/L / GGT: x           PT/INR - ( 25 Apr 2017 05:28 )   PT: 15.8 sec;   INR: 1.45 ratio       PTT - ( 25 Apr 2017 03:14 )  PTT:> 200 sec  CARDIAC MARKERS ( 25 Apr 2017 05:28 )  63.900 ng/mL / x     / 677 U/L / x     / x      CARDIAC MARKERS ( 24 Apr 2017 21:42 )  96.000 ng/mL / x     / x     / x     / x      CARDIAC MARKERS ( 24 Apr 2017 17:54 )  79.300 ng/mL / x     / 1062 U/L / x     / x      CARDIAC MARKERS ( 24 Apr 2017 11:48 )  0.707 ng/mL / x     / x     / x     / x        EKG: Normal sinus rhythm  T wave abnormality, consider inferior ischemia  T wave abnormality, consider anterolateral ischemia  Prolonged QT    TELEMETRY: SR    CARDIAC TESTS: 2Decho- prelim- nl LV fxn, 4/17- The left ventricle is normal in size.   Mild concentric left ventricular hypertrophy is present. The mid and   apical septal and apical lateral segments are akinetic. Other LV segments   contract normally.  Estimated left ventricular ejection fraction is 35-40 %.   EA reversal of the mitral inflow consistent with reduced compliance of   the left ventricle.  Mild (1+) mitral regurgitation is present.    RADIOLOGY & ADDITIONAL STUDIES: CTA- Limited evaluation of the pulmonary artery secondary to suboptimal bolus   timing. No pulmonary embolus the trunk, main right or left pulmonary   artery. Small right and moderate left pleural effusion with bibasilar compressive   atelectasis, incomplete on the right and complete of the left. Cirrhosis and moderate ascites in upper abdomen.    ASSESSMENT & PLAN:

## 2017-04-26 NOTE — CONSULT NOTE ADULT - ASSESSMENT
A/P: 66F with Multiple Left foot and ankle fractures  pain control  Continue NWB in CAM walker  rest, ice, elevate  DW pt no need for acute ortho surgical intervention at this time  FU XR Foot and Ankle to monitor healing  No further orthopedic intervention at this time  Will DW Dr. Fung, advise if plan changes.

## 2017-04-26 NOTE — PROGRESS NOTE ADULT - SUBJECTIVE AND OBJECTIVE BOX
PCP: DR Lees    CC-Patient is a 66y old  Female who presents with a chief complaint of chest pain (2017 23:38)    HPI:  65 yo F with PMH of liver cirrhosis 2/2 chronic alcohol abuse, macrocytic anemia, HTN, RA, esophageal varices s/p banding, CAD s/p stent, h/o prolonged Qtc, p/w chest pain/abdominal pain. States pain is more like pressure in L chest and abdomen. States pain has diminished a lot now, but is still there mildly. Denies radiation to arm / jaw / neck. Denies nausea / vomiting / diophoresis. In ED, troponins trended from 0.707 -> 79.3. Called on call cardio Dr. Flannery and discussed case, he recommended calling Dr. Anderson who is the on call interventionlist. Discussed case in detail with Dr. Anderson, including troponins trending up, new ST depressions on EKG and on going residual chest discomfort. Dr. Anderson recommends heparin drip without initial bolus due to esophageal varices / liver cirrhosis and due to patient being at increased risk for bleeding. Also recommends to c/w ASA / plavix / beta blocker / statin. Dr. Anderson believes patient may have had cardiac event earlier, and troponins are just lagging behind. Cardiac cath to be performed in AM.    17 s/p card cath- stent open, no intervention done    Vital Signs Last 24 Hrs  T(C): 36.6, Max: 37 ( @ 05:00)  T(F): 97.8, Max: 98.6 ( @ 05:00)  HR: 80 (80 - 100)  BP: 91/48 (76/43 - 100/65)  BP(mean): 58 (51 - 73)  RR: 16 (9 - 20)  SpO2: 93% (92% - 97%)    LABS:                        10.3   9.5   )-----------( 116      ( 2017 05:47 )             32.2     2017 05:28    136    |  103    |  18     ----------------------------<  88     4.0     |  21     |  0.57     Ca    8.2        2017 05:28  Phos  3.5       2017 05:28  Mg     1.7       2017 05:28    TPro  5.9    /  Alb  1.8    /  TBili  1.7    /  DBili  x      /  AST  200    /  ALT  38     /  AlkPhos  177    2017 05:28    LIVER FUNCTIONS - ( 2017 05:28 )  Alb: 1.8 g/dL / Pro: 5.9 gm/dL / ALK PHOS: 177 U/L / ALT: 38 U/L / AST: 200 U/L / GGT: x           PT/INR - ( 2017 05:28 )   PT: 15.8 sec;   INR: 1.45 ratio    PTT - ( 2017 07:44 )  PTT:47.5 sec  CAPILLARY BLOOD GLUCOSE    CARDIAC MARKERS ( 2017 05:28 )  63.900 ng/mL / x     / 677 U/L / x     / x      CARDIAC MARKERS ( 2017 21:42 )  96.000 ng/mL / x     / x     / x     / x      CARDIAC MARKERS ( 2017 17:54 )  79.300 ng/mL / x     / 1062 U/L / x     / x         RADIOLOGY & ADDITIONAL TESTS:  EXAM:  CHEST SINGLE VIEW FRONTAL                          PROCEDURE DATE:  2017    INTERPRETATION:  History: Dyspnea    Chest:  one view.      Comparison: 2017    AP radiograph of the chest demonstrates worsening LEFT lower lobe   infiltrate with associated small LEFT pleural effusion. The cardiac   silhouette is normal in size. Osseous structures are intact.    Impression:worsening LEFT lower lobe infiltrate with associated small   LEFT pleural effusion.       EXAM:  CT ANGIO CHEST PE PROTOCOL IC                        PROCEDURE DATE:  2017    INTERPRETATION:  CT ANGIO CHEST PE PROTOCOL IC    HISTORY:  Shortness of breath and chest pain    TECHNIQUE: Contrast enhanced CT pulmonary angiogramwas performed.   Multiplanar CT and HRCT images were reviewed. Maximum intensity   projection (MIP) images are reconstructed as per CT angiography protocol.   Images were acquired during the administration of 90 cc Omnipaque 350 IV   contrast. This study was performed using automatic exposure control   (radiation dose reduction software) to obtain a diagnostic image quality   scan with patient dose as low as reasonably achievable.    COMPARISON: Chest x-ray from the same day, chest CT 2017    PULMONARY ARTERIES: Suboptimal bolus timing limiting evaluation of the   pulmonary arteries. No pulmonary embolus in the main pulmonary trunk or   right and left main pulmonary artery.    HEART AND VESSELS: Normal heart size. No pericardial effusion.Normal   caliber thoracic aorta. Extensive coronary artery calcification.    MEDIASTINUM, FILEMON, AXILLAE: No adenopathy.    LUNGS, AIRWAYS: The central airways are patent. There is partial   compressive atelectasis of the right lower lobe and completeand present   atelectasis of the left lower lobe. The visualized aerated lungs are   clear.    PLEURA: Small right and moderate left pleural effusion.    UPPER ABDOMEN: Cirrhotic morphology of the liver. Moderate ascites.    BONES AND CHEST WALL: No aggressive lesion.    IMPRESSION:     Limited evaluation of the pulmonary artery secondary to suboptimal bolus   timing. No pulmonary embolus the trunk, main right or left pulmonary   artery.    Small right and moderate left pleural effusion with bibasilar compressive   atelectasis, incomplete on the right and complete of the left.    Cirrhosis and moderate ascites in upper abdomen.    PHYSICAL EXAM:  GENERAL: NAD, well-groomed, well-developed  HEAD:  Atraumatic, Normocephalic  EYES: EOMI, PERRLA, conjunctiva and sclera clear  HEENT: Moist mucous membranes  NECK: Supple, No JVD  NERVOUS SYSTEM:  Alert & Oriented X3, Motor Strength 5/5 B/L upper and lower extremities; DTRs 2+ intact and symmetric  CHEST/LUNG: Clear to auscultation bilaterally; No rales, rhonchi, wheezing, or rubs  HEART: Regular rate and rhythm; No murmurs, rubs, or gallops  ABDOMEN: Soft, Nontender, Nondistended; Bowel sounds present  GENITOURINARY- Voiding, no palpable bladder  EXTREMITIES: LLE boot on  SKIN-no rash, no lesion  CNS- alert, oriented X3, non focal        Daily Weight in k.8 (2017 00:00)      atorvastatin 40milliGRAM(s) Oral at bedtime  lisinopril 2.5milliGRAM(s) Oral daily  heparin  Infusion. Unit(s)/Hr IV Continuous <Continuous>  heparin  Injectable 4700Unit(s) IV Push every 6 hours PRN  predniSONE   Tablet 15milliGRAM(s) Oral daily  aspirin 325milliGRAM(s) Oral daily  gabapentin 300milliGRAM(s) Oral three times a day  clopidogrel Tablet 75milliGRAM(s) Oral daily  rifaximin 550milliGRAM(s) Oral two times a day  naphazoline 0.025%/pheniramine 0.3% Ophthalmic Solution - Peds 1Drop(s) Both EYES two times a day PRN  pantoprazole    Tablet 40milliGRAM(s) Oral before breakfast  multivitamin 1Tablet(s) Oral daily  folic acid 1milliGRAM(s) Oral daily  thiamine 100milliGRAM(s) Oral daily  metoprolol succinate ER 12.5milliGRAM(s) Oral daily  sodium chloride 0.45%. 1000milliLiter(s) IV Continuous <Continuous>    Assessment/Plan  #NSTEMI/ CAD s/p recent stent  Card eval DR Ramesh appreciated  S/p card cath- stent patent, no other blockages  Cont aspirin, Plavix, statin    #Hypotension likely 2 to adrenal insufficiency from chronic steroid use for RA  Persistent. Same issue last hospitalization- Prednisone was increased from 5mg to 15mg. Currently,SBP in 80'-90's  Will give stress dose of Decadron for 24hours  Endo f/u DR Schmitz    #Liver cirrhosis w/ esophageal varices   -Continue to monitor H/H on blood thinners  -Outpatient GI referral    #Rheumatoid Arthritis  -C/w prednisone, rheum f/u DR Chapa    #LT ankle fracture  Ortho f/u DR Fung    #Anemia/thrombocytopenia likely 2 to liver cirrhosis  Monitor HH    #Dispo- cont CCU care, monitor BP

## 2017-04-26 NOTE — PROGRESS NOTE ADULT - ASSESSMENT
65 yo F with PMH of liver cirrhosis 2/2 chronic alcohol abuse, macrocytic anemia, HTN, RA, esophageal varices s/p banding, CAD s/p stent, h/o prolonged Qtc, p/w chest pain/abdominal pain.    1. CP- NSTEMI- cont asa/plavix/heparin/statin/Bbl. 2Decho done at bedside shows nl LV fxn. LHC today to evaluate coronary anatomy recent PCI. Pt currently CP free, no signs of decompensation.  CP free- can start nitrate/tridil if CP begins.    2. CTA- negative for PE. Will start 1/2NS @ 75cc/hr prior to LHC.     3. HTN- controlled- continue current meds.     4. CCU monitoring, DVT proph, replete lytes as needed.    5. Abnormal LFTs, BR- suggest further evaluation, GI eval.

## 2017-04-27 LAB
ADD ON TEST-SPECIMEN IN LAB: SIGNIFICANT CHANGE UP
ANION GAP SERPL CALC-SCNC: 12 MMOL/L — SIGNIFICANT CHANGE UP (ref 5–17)
BUN SERPL-MCNC: 23 MG/DL — SIGNIFICANT CHANGE UP (ref 7–23)
CALCIUM SERPL-MCNC: 7.5 MG/DL — LOW (ref 8.5–10.1)
CHLORIDE SERPL-SCNC: 103 MMOL/L — SIGNIFICANT CHANGE UP (ref 96–108)
CO2 SERPL-SCNC: 18 MMOL/L — LOW (ref 22–31)
CREAT SERPL-MCNC: 0.81 MG/DL — SIGNIFICANT CHANGE UP (ref 0.5–1.3)
GLUCOSE SERPL-MCNC: 117 MG/DL — HIGH (ref 70–99)
HCT VFR BLD CALC: 29.2 % — LOW (ref 34.5–45)
HGB BLD-MCNC: 9.5 G/DL — LOW (ref 11.5–15.5)
MCHC RBC-ENTMCNC: 32.3 GM/DL — SIGNIFICANT CHANGE UP (ref 32–36)
MCHC RBC-ENTMCNC: 37 PG — HIGH (ref 27–34)
MCV RBC AUTO: 114.3 FL — HIGH (ref 80–100)
PLATELET # BLD AUTO: 115 K/UL — LOW (ref 150–400)
POTASSIUM SERPL-MCNC: 4.3 MMOL/L — SIGNIFICANT CHANGE UP (ref 3.5–5.3)
POTASSIUM SERPL-SCNC: 4.3 MMOL/L — SIGNIFICANT CHANGE UP (ref 3.5–5.3)
PROCALCITONIN SERPL-MCNC: 0.11 NG/ML — HIGH (ref 0–0.05)
RBC # BLD: 2.56 M/UL — LOW (ref 3.8–5.2)
RBC # FLD: 14.6 % — HIGH (ref 10.3–14.5)
SODIUM SERPL-SCNC: 133 MMOL/L — LOW (ref 135–145)
WBC # BLD: 8.3 K/UL — SIGNIFICANT CHANGE UP (ref 3.8–10.5)
WBC # FLD AUTO: 8.3 K/UL — SIGNIFICANT CHANGE UP (ref 3.8–10.5)

## 2017-04-27 PROCEDURE — 99222 1ST HOSP IP/OBS MODERATE 55: CPT

## 2017-04-27 RX ORDER — SODIUM CHLORIDE 9 MG/ML
1000 INJECTION INTRAMUSCULAR; INTRAVENOUS; SUBCUTANEOUS ONCE
Qty: 0 | Refills: 0 | Status: COMPLETED | OUTPATIENT
Start: 2017-04-27 | End: 2017-04-27

## 2017-04-27 RX ORDER — HYDROCORTISONE 20 MG
20 TABLET ORAL DAILY
Qty: 0 | Refills: 0 | Status: DISCONTINUED | OUTPATIENT
Start: 2017-04-27 | End: 2017-05-19

## 2017-04-27 RX ORDER — HYDROCORTISONE 20 MG
10 TABLET ORAL AT BEDTIME
Qty: 0 | Refills: 0 | Status: DISCONTINUED | OUTPATIENT
Start: 2017-04-27 | End: 2017-05-19

## 2017-04-27 RX ORDER — HEPARIN SODIUM 5000 [USP'U]/ML
5000 INJECTION INTRAVENOUS; SUBCUTANEOUS EVERY 12 HOURS
Qty: 0 | Refills: 0 | Status: DISCONTINUED | OUTPATIENT
Start: 2017-04-27 | End: 2017-05-16

## 2017-04-27 RX ORDER — SODIUM CHLORIDE 9 MG/ML
1000 INJECTION INTRAMUSCULAR; INTRAVENOUS; SUBCUTANEOUS
Qty: 0 | Refills: 0 | Status: DISCONTINUED | OUTPATIENT
Start: 2017-04-27 | End: 2017-04-28

## 2017-04-27 RX ADMIN — Medication 325 MILLIGRAM(S): at 11:46

## 2017-04-27 RX ADMIN — GABAPENTIN 300 MILLIGRAM(S): 400 CAPSULE ORAL at 21:43

## 2017-04-27 RX ADMIN — HEPARIN SODIUM 5000 UNIT(S): 5000 INJECTION INTRAVENOUS; SUBCUTANEOUS at 18:05

## 2017-04-27 RX ADMIN — PANTOPRAZOLE SODIUM 40 MILLIGRAM(S): 20 TABLET, DELAYED RELEASE ORAL at 08:38

## 2017-04-27 RX ADMIN — Medication 1 TABLET(S): at 11:47

## 2017-04-27 RX ADMIN — Medication 50 MILLIGRAM(S): at 11:53

## 2017-04-27 RX ADMIN — ATORVASTATIN CALCIUM 40 MILLIGRAM(S): 80 TABLET, FILM COATED ORAL at 21:43

## 2017-04-27 RX ADMIN — SODIUM CHLORIDE 1000 MILLILITER(S): 9 INJECTION INTRAMUSCULAR; INTRAVENOUS; SUBCUTANEOUS at 09:00

## 2017-04-27 RX ADMIN — CLOPIDOGREL BISULFATE 75 MILLIGRAM(S): 75 TABLET, FILM COATED ORAL at 11:52

## 2017-04-27 RX ADMIN — GABAPENTIN 300 MILLIGRAM(S): 400 CAPSULE ORAL at 11:47

## 2017-04-27 RX ADMIN — SODIUM CHLORIDE 125 MILLILITER(S): 9 INJECTION INTRAMUSCULAR; INTRAVENOUS; SUBCUTANEOUS at 10:08

## 2017-04-27 RX ADMIN — Medication 10 MILLIGRAM(S): at 21:43

## 2017-04-27 RX ADMIN — Medication 100 MILLIGRAM(S): at 11:47

## 2017-04-27 RX ADMIN — Medication 50 MILLIGRAM(S): at 06:33

## 2017-04-27 RX ADMIN — GABAPENTIN 300 MILLIGRAM(S): 400 CAPSULE ORAL at 06:33

## 2017-04-27 RX ADMIN — Medication 1 MILLIGRAM(S): at 11:47

## 2017-04-27 NOTE — CONSULT NOTE ADULT - SUBJECTIVE AND OBJECTIVE BOX
PCP:    REQUESTING PHYSICIAN:     REASON FOR CONSULT: RA    CHIEF COMPLAINT:    HPI:  67 yo F with PMH of liver cirrhosis 2/2 chronic alcohol abuse, macrocytic anemia, HTN, RA, esophageal varices s/p banding, CAD s/p stent, h/o prolonged Qtc, p/w chest pain/abdominal pain.  Patient states that she felt a "hollow pressure feeling" on her left side (points to left lower chest/abdomen/thigh).   Denied radiation to arm / jaw / neck. Denies nausea / vomiting / diaphoresis. In ED, troponins trended from 0.707 -> 79.3. Called on call cardio Dr. Flannery and discussed case, he recommended calling Dr. Anderson who is the on call interventionlist. Discussed case in detail with Dr. Anderson, including troponins trending up, new ST depressions on EKG and on going residual chest discomfort.  She was started on heparin drip.  Also  to c/w ASA / plavix / beta blocker / statin. Dr. Anderson believed patient may have had cardiac event earlier, and troponins were lagging behind. Cardiac cath performed and stent was patent.    Patient is known to our service from last admission.  She previously followed with Rheumatologist Dr. Montes, diagnosed in 2009, on MTX and prednisone 5mg daily since then.  Her MTX was discontinued 3/10/17 due to the alcohol liver cirrhosis, and prednisone was increased to 15mg daily to attempt to improve her BP as there ?adrenal insufficiency.  Patient continues to feel that her RA is quiet.  She has had RA flares in the pas including the elbows,shoulders, hands, and denies any stiffness or pain in joints.  She has multiple fractures in left ankle, now with CAM boot.  She feels pain diffusely in lower extremities b/L, and has swelling in both legs. She continues to complain of numbness/tingling in hands/feet.  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  She was started on thiamine and gabapentin.  Patient does not feel her symptoms have improved.        PSH: PCI, esophageal varices banding  Social Hx: Former smoker - quit in 2005, h/o heavy etoh use - quit 2/2016, Drugs - h/o marijuana use previously  Family Hx: Father - stomach problems / heart disease, mother - pancreatic cancer (24 Apr 2017 21:23)      REVIEW OF SYSTEMS:    CONSTITUTIONAL: No weakness, fevers or chills  EYES/ENT: No visual changes;  No vertigo or throat pain   NECK: No pain or stiffness  RESPIRATORY: No cough, wheezing, hemoptysis; No shortness of breath  CARDIOVASCULAR: No chest pain or palpitations  GASTROINTESTINAL: As above.  No nausea, vomiting, or hematemesis; No diarrhea or constipation. No melena or hematochezia.  GENITOURINARY: No dysuria, frequency or hematuria  NEUROLOGICAL: No numbness or weakness  SKIN: No itching, burning, rashes, or lesions   All other review of systems is negative unless indicated above    Vital Signs Last 24 Hrs  T(C): 36.4, Max: 36.8 (04-27 @ 05:00)  T(F): 97.6, Max: 98.2 (04-27 @ 05:00)  HR: 86 (74 - 86)  BP: 94/52 (73/39 - 113/92)  BP(mean): 65 (47 - 97)  RR: 13 (9 - 19)  SpO2: 93% (92% - 94%)    I&O's Summary  I & Os for 24h ending 27 Apr 2017 07:00  =============================================  IN: 2140 ml / OUT: 750 ml / NET: 1390 ml    I & Os for current day (as of 27 Apr 2017 08:51)  =============================================  IN: 211.5 ml / OUT: 0 ml / NET: 211.5 ml      CAPILLARY BLOOD GLUCOSE      PHYSICAL EXAM:    HEENT- no oral lesions noted, no lymphadenoapthy noted  Heart- S1 S2 reg  Lungs- CTA b/l  Abd- Soft NT  Ext- no edema  Skin- no rashes  Musculoskelatal- FROM all joints, no active synovitis noted.  No tenderness in joints.  Some discomfort on manipulation of right ankle, but patient has pain throughout the RLE including non joint areas when gripping the RLE.  Unable to examine left foot/ankle.    Neurological- intact strength upper extremities.  Decreased strength lower extremities.      MEDICATIONS:  MEDICATIONS  (STANDING):  atorvastatin 40milliGRAM(s) Oral at bedtime  lisinopril 2.5milliGRAM(s) Oral daily  aspirin 325milliGRAM(s) Oral daily  gabapentin 300milliGRAM(s) Oral three times a day  clopidogrel Tablet 75milliGRAM(s) Oral daily  rifaximin 550milliGRAM(s) Oral two times a day  pantoprazole    Tablet 40milliGRAM(s) Oral before breakfast  multivitamin 1Tablet(s) Oral daily  folic acid 1milliGRAM(s) Oral daily  thiamine 100milliGRAM(s) Oral daily  sodium chloride 0.45%. 1000milliLiter(s) IV Continuous <Continuous>  hydrocortisone  Injectable 50milliGRAM(s) IV Push every 8 hours      LABS: All Labs Reviewed:                        9.5    8.3   )-----------( 115      ( 27 Apr 2017 04:47 )             29.2     04-27    133<L>  |  103  |  23  ----------------------------<  117<H>  4.3   |  18<L>  |  0.81    Ca    7.5<L>      27 Apr 2017 04:47      PTT - ( 26 Apr 2017 07:44 )  PTT:47.5 sec      Blood Culture:     RADIOLOGY/EKG:  CT chest, CXR reviewed    A/P PCP:    REQUESTING PHYSICIAN:     REASON FOR CONSULT: RA    CHIEF COMPLAINT:    HPI:  65 yo F with PMH of liver cirrhosis 2/2 chronic alcohol abuse, macrocytic anemia, HTN, RA, esophageal varices s/p banding, CAD s/p stent, h/o prolonged Qtc, p/w chest pain/abdominal pain.  Patient states that she felt a "hollow pressure feeling" on her left side (points to left lower chest/abdomen/thigh).   Denied radiation to arm / jaw / neck. Denies nausea / vomiting / diaphoresis. In ED, troponins trended from 0.707 -> 79.3. Called on call cardio Dr. Flannery and discussed case, he recommended calling Dr. Anderson who is the on call interventionlist. Discussed case in detail with Dr. Anderson, including troponins trending up, new ST depressions on EKG and on going residual chest discomfort.  She was started on heparin drip.  Also  to c/w ASA / plavix / beta blocker / statin. Dr. Anderson believed patient may have had cardiac event earlier, and troponins were lagging behind. Cardiac cath performed and stent was patent.    Patient is known to our service from last admission.  She previously followed with Rheumatologist Dr. Montes, diagnosed in 2009, on MTX and prednisone 5mg daily since then.  Her MTX was discontinued 3/10/17 due to the alcohol liver cirrhosis, and prednisone was increased to 15mg daily to attempt to improve her BP as there ?adrenal insufficiency.  Patient continues to feel that her RA is quiet.  She has had RA flares in the past including the elbows,shoulders, hands, and denies any stiffness or pain in joints.  She has multiple fractures in left ankle, now with CAM boot.  She feels pain diffusely in lower extremities b/L, and has swelling in both legs. She continues to complain of numbness/tingling in hands/feet.  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  She was started on thiamine and gabapentin.  Patient does not feel her symptoms have improved.        PSH: PCI, esophageal varices banding  Social Hx: Former smoker - quit in 2005, h/o heavy etoh use - quit 2/2016, Drugs - h/o marijuana use previously  Family Hx: Father - stomach problems / heart disease, mother - pancreatic cancer (24 Apr 2017 21:23)      REVIEW OF SYSTEMS:    CONSTITUTIONAL: No weakness, fevers or chills  EYES/ENT: No visual changes;  No vertigo or throat pain   NECK: No pain or stiffness  RESPIRATORY: No cough, wheezing, hemoptysis; No shortness of breath  CARDIOVASCULAR: No chest pain or palpitations  GASTROINTESTINAL: As above.  No nausea, vomiting, or hematemesis; No diarrhea or constipation. No melena or hematochezia.  GENITOURINARY: No dysuria, frequency or hematuria  NEUROLOGICAL: No numbness or weakness  SKIN: No itching, burning, rashes, or lesions   All other review of systems is negative unless indicated above    Vital Signs Last 24 Hrs  T(C): 36.4, Max: 36.8 (04-27 @ 05:00)  T(F): 97.6, Max: 98.2 (04-27 @ 05:00)  HR: 86 (74 - 86)  BP: 94/52 (73/39 - 113/92)  BP(mean): 65 (47 - 97)  RR: 13 (9 - 19)  SpO2: 93% (92% - 94%)    I&O's Summary  I & Os for 24h ending 27 Apr 2017 07:00  =============================================  IN: 2140 ml / OUT: 750 ml / NET: 1390 ml    I & Os for current day (as of 27 Apr 2017 08:51)  =============================================  IN: 211.5 ml / OUT: 0 ml / NET: 211.5 ml      CAPILLARY BLOOD GLUCOSE      PHYSICAL EXAM:    HEENT- no oral lesions noted, no lymphadenoapthy noted  Heart- S1 S2 reg  Lungs- CTA b/l  Abd- Soft NT  Ext- no edema  Skin- no rashes  Musculoskelatal- FROM all joints, no active synovitis noted.  No tenderness in joints.  Some discomfort on manipulation of right ankle, but patient has pain throughout the RLE including non joint areas when gripping the RLE.  Unable to examine left foot/ankle.    Neurological- intact strength upper extremities.  Decreased strength lower extremities.      MEDICATIONS:  MEDICATIONS  (STANDING):  atorvastatin 40milliGRAM(s) Oral at bedtime  lisinopril 2.5milliGRAM(s) Oral daily  aspirin 325milliGRAM(s) Oral daily  gabapentin 300milliGRAM(s) Oral three times a day  clopidogrel Tablet 75milliGRAM(s) Oral daily  rifaximin 550milliGRAM(s) Oral two times a day  pantoprazole    Tablet 40milliGRAM(s) Oral before breakfast  multivitamin 1Tablet(s) Oral daily  folic acid 1milliGRAM(s) Oral daily  thiamine 100milliGRAM(s) Oral daily  sodium chloride 0.45%. 1000milliLiter(s) IV Continuous <Continuous>  hydrocortisone  Injectable 50milliGRAM(s) IV Push every 8 hours      LABS: All Labs Reviewed:                        9.5    8.3   )-----------( 115      ( 27 Apr 2017 04:47 )             29.2     04-27    133<L>  |  103  |  23  ----------------------------<  117<H>  4.3   |  18<L>  |  0.81    Ca    7.5<L>      27 Apr 2017 04:47      PTT - ( 26 Apr 2017 07:44 )  PTT:47.5 sec      Blood Culture:     RADIOLOGY/EKG:  CT chest, CXR reviewed:      A/P

## 2017-04-27 NOTE — PROGRESS NOTE ADULT - ASSESSMENT
66y old F:  NSTEMI - s/p LHC with clean coronaries  s/p LAD stent  Cirrhosis  RA - On steroids  GIB    Plan:  Cont CCU Care  Serial neuro Exams  Low BP - Hold AntiHTN meds  Cont IVF Bolus + gtt  Stress steroids  PO diet  cont ASA/Plavix  Monitor renal function  Strict I/O's  DVT prophylaxis - Hep SQ

## 2017-04-27 NOTE — CONSULT NOTE ADULT - ASSESSMENT
67 yo F with PMH of liver cirrhosis 2/2 chronic alcohol abuse, HTN, RA, esophageal varices s/p banding, CAD s/p stent, p/w chest pain/abdominal pain. Found to have elevated troponins and underwent cardiac cath.     Pt has had lower extremity weakness of subacute onset, also has paresthesias in upper and lower extremities on examination no sensory level is identified, significant weakness is noted in both proximal and lower extremity muscles with depressed. Pt was admitted with similar complaints to  in early April, extensive w/u including MRI scans of the spine were preformed, she was seen by Dr Gonzales for possible surgical intervention which she refused    1) Cervical / thoracic severe DJD with cord impingement possible myelopathy    2) possibility of severe peripheral motor/ sensory neuropathy    - Recommend PT  - Will need EMG and NCV studies as outpt  - increase Gabapentin to 400mg 3 x day  - F/u with Ortho spine / neurosurgery (Dr Gonzales)    Mgmt discussed with patient    -

## 2017-04-27 NOTE — PROGRESS NOTE ADULT - SUBJECTIVE AND OBJECTIVE BOX
PCP: DR Lees    CC-Patient is a 66y old  Female who presents with a chief complaint of chest pain (2017 23:38)    HPI:  67 yo F with PMH of liver cirrhosis 2/2 chronic alcohol abuse, macrocytic anemia, HTN, RA, esophageal varices s/p banding, CAD s/p stent, h/o prolonged Qtc, p/w chest pain/abdominal pain. States pain is more like pressure in L chest and abdomen. States pain has diminished a lot now, but is still there mildly. Denies radiation to arm / jaw / neck. Denies nausea / vomiting / diophoresis. In ED, troponins trended from 0.707 -> 79.3. Called on call cardio Dr. Flannery and discussed case, he recommended calling Dr. Anderson who is the on call interventionlist. Discussed case in detail with Dr. Anderson, including troponins trending up, new ST depressions on EKG and on going residual chest discomfort. Dr. Anderson recommends heparin drip without initial bolus due to esophageal varices / liver cirrhosis and due to patient being at increased risk for bleeding. Also recommends to c/w ASA / plavix / beta blocker / statin. Dr. Anderson believes patient may have had cardiac event earlier, and troponins are just lagging behind. Cardiac cath to be performed in AM.    17 s/p card cath- stent open, no intervention done  17 c/o tingling and numbness of both arms    Vital Signs Last 24 Hrs  T(C): 36.4, Max: 36.8 ( @ 05:00)  T(F): 97.6, Max: 98.2 ( @ 05:00)  HR: 87 (74 - 90)  BP: 92/57 (73/39 - 113/92)  BP(mean): 65 (47 - 97)  RR: 23 (9 - 23)  SpO2: 97% (92% - 97%)    LABS:                                   9.5    8.3   )-----------( 115      ( 2017 04:47 )             29.2     2017 04:47    133    |  103    |  23     ----------------------------<  117    4.3     |  18     |  0.81     Ca    7.5        2017 04:47    PTT - ( 2017 07:44 )  PTT:47.5 sec    RADIOLOGY & ADDITIONAL TESTS:  EXAM:  FOOT-LEFT                        PROCEDURE DATE:  2017    INTERPRETATION:  Exam Date: 2017 11:51 PM  Clinical Information: Left ankle foot pain with known fractures  Technique: 3 views of the left ankle and 2 views the left foot. Study   severely limited by patient positioning.     Findings:    Overlying casting is visualized which limits evaluation. Ankle mortise   appears intact. Known nondisplaced medial malleolus, anterior distal   tibia and posterior malleolus fractures seen on MRI not well delineated   on x-rays. Diffuse degenerative changes and osteopenia. Old fracture of   the left fifth metatarsal. Known nondisplaced left second and third   metatarsal fractures not well identified. No new definite fractures. Mild   soft tissue swelling.    Impression:  Known fractures seen on recent MRI not well delineated. No new fractures.    EXAM:  CERVICAL SPINE (MRI)W O CON                          PROCEDURE DATE:  2017    INTERPRETATION:      MR cervical  and thoracic without gadolinium     CLINICAL INFORMATION:  Bilateral arm numbness, cervical and thoracic back   pain       TECHNIQUE:   Sagittal T1-weighted images, sagittal STIR images, sagittal   T2-weighted images and axial T1 andT2-weighted gradient-echo images of   the cervical spine and thoracic were obtained.         FINDINGS:   No prior similar studies areavailable for review.         Cervical vertebral alignment is significant for straightening on a   degenerative basis.  Cervical vertebral body heights are maintained.    Marrow signal intensity within cervical vertebral bodies and posterior   elements is unremarkable.  No osseous expansion, epidural disease or   paraspinal abnormality is found.           Cervical intervertebral discs show disc degeneration and spondylosis at   C4-5 through C6-7 with loss of disc height and associated degenerative   endplate changes. There is narrowing of the RIGHT C3-4, BILATERAL C4-5,   BILATERAL C5-6 and BILATERAL C6-7 and LEFT C7-T1 neural foramina due to   uncovertebral spurring and facet osteophytic hypertrophy. Mild   degenerative cord flattening is seen at C5-6 and mild degenerative cord   impingement is seen at C6-7 due to posterior osteophytic ridge/disc   complexes.    The cervical cord maintains intact morphology  No cord signal intensity   abnormality or focal cord lesion is appreciated.  Thecervical medullary   junction remains intact.      Thoracic alignment is intact. Disc degeneration and mild spondylosis is   noted at T7-8 through T11-12 with loss of disc height and associated   degenerative endplate changes. Tiny RIGHT paracentral disc herniation is   noted at T8-9 small RIGHT and LEFT paracentral disc herniations are noted   at T8-9-10 with minimal cord impingement on the LEFT. Posterior   osteophytic ridge/disc complex at T11-12 indents the ventral subarachnoid   space and narrows the RIGHT neural foramen.      IMPRESSION:  Multilevel degenerative disc disease and spondylosis of the   cervical and thoracic spine.   Narrowing of the RIGHT C3-4, BILATERAL   C4-5, BILATERAL C5-6 and BILATERAL C6-7 and LEFT C7-T1 neural foramina   due to uncovertebral spurring and facet osteophytic hypertrophy. Mild   degenerative cord flattening is seen at C5-6 and mild degenerative cord   impingement is seen at C6-7 due to posterior osteophytic ridge/disc   complexes.   Tiny RIGHT paracentral disc herniation is noted at T8-9   small RIGHT and LEFT paracentral disc herniations are noted at T8-9-10   with minimal cord impingement on the LEFT. Posterior osteophytic   ridge/disc complex at T11-12 indents the ventral subarachnoid space and   narrows the RIGHT neural foramen.    EXAM:  LUMBAR SPINE(MRI)W O CON                          PROCEDURE DATE:  2017    INTERPRETATION:    MR lumbar without gadolinium       CLINICAL INFORMATION:   Bilateral leg weakness and numbness for 2 days   Lumbar spondylosis.    TECHNIQUE:   Sagittal and axial T1-weighted images, sagittal STIR images,   sagittal T2-weighted images and axial T1 and T2-weighted images of the   lumbar spine were obtained.         FINDINGS:   No prior similar studies are available for review.         Lumbar vertebral alignment is preserved.  Lumbar vertebral body heights   are maintained.  Marrow signal intensity within lumbar vertebral bodies   and posterior elements is unremarkable.  No osseous expansion, epidural   disease or paraspinal abnormality is found.          Lumbar intervertebral discs demonstrate disc degeneration and spondylosis   at T11-L3 L5-S1 with loss of disc height and associated degenerative   endplate changes. LEFT paracentral disc herniation is noted at L2-3   compressing the ventral thecal sac and narrowing the LEFT neural foramen.   Disc bulges at L3-4 and L4-5 and L5-S1 mild central stenosis at L2-3   through L4-5 on a degenerative basis due to disc bulge, facet osteophytic   hypertrophy and redundancy ligament of flavum. Flatten the ventral thecal   sac and narrow the BILATERAL neural foramina.    The distal cord maintains intact morphology.  Distal cord signal   intensity is preserved.  The conus is normally positioned at the T12   level.  Nerve roots of the cauda equina appear intact.  1 cm RIGHT renal   cysts.      IMPRESSION:  Disc degeneration and spondylosis at T11-12 through L5-S1   with loss of disc height and associated degenerative endplate changes.   LEFT paracentral disc herniation is noted at L2-3 compressing the ventral   thecal sac and narrowing the LEFT neural foramen. Disc bulges at L3-4 and   L4-5 and L5-S1 flatten the ventral thecal sac and narrow the BILATERAL   neural foramina.  Mild central stenosis at L2-3 through L4-5 on a degenerative basis.      PHYSICAL EXAM:  GENERAL: NAD, well-groomed, well-developed  HEAD:  Atraumatic, Normocephalic  EYES: EOMI, PERRLA, conjunctiva and sclera clear  HEENT: Moist mucous membranes  NECK: Supple, No JVD  NERVOUS SYSTEM:  Alert & Oriented X3, Motor Strength 5/5 B/L upper and lower extremities; DTRs 2+ intact and symmetric  CHEST/LUNG: Clear to auscultation bilaterally; No rales, rhonchi, wheezing, or rubs  HEART: Regular rate and rhythm; No murmurs, rubs, or gallops  ABDOMEN: Soft, Nontender, Nondistended; Bowel sounds present  GENITOURINARY- Voiding, no palpable bladder  EXTREMITIES: LLE boot on  SKIN-no rash, no lesion  CNS- alert, oriented X3, non focal        Daily Weight in k.8 (2017 00:00)      atorvastatin 40milliGRAM(s) Oral at bedtime  lisinopril 2.5milliGRAM(s) Oral daily  heparin  Infusion. Unit(s)/Hr IV Continuous <Continuous>  heparin  Injectable 4700Unit(s) IV Push every 6 hours PRN  predniSONE   Tablet 15milliGRAM(s) Oral daily  aspirin 325milliGRAM(s) Oral daily  gabapentin 300milliGRAM(s) Oral three times a day  clopidogrel Tablet 75milliGRAM(s) Oral daily  rifaximin 550milliGRAM(s) Oral two times a day  naphazoline 0.025%/pheniramine 0.3% Ophthalmic Solution - Peds 1Drop(s) Both EYES two times a day PRN  pantoprazole    Tablet 40milliGRAM(s) Oral before breakfast  multivitamin 1Tablet(s) Oral daily  folic acid 1milliGRAM(s) Oral daily  thiamine 100milliGRAM(s) Oral daily  metoprolol succinate ER 12.5milliGRAM(s) Oral daily  sodium chloride 0.45%. 1000milliLiter(s) IV Continuous <Continuous>    Assessment/Plan  #NSTEMI/ CAD s/p recent stent  Card eval DR Ramesh appreciated  S/p card cath- stent patent, no other blockages  Cont aspirin, Plavix, statin. Not on BB 2 to hypotension    #Hypotension likely 2 to adrenal insufficiency from chronic steroid use for RA  Persistent. Same issue last hospitalization  D/w DR Marcelo over the phone. S/p stress dose of Hydrocortisone. Switch to PO hydrocortisone 20mg am+10mg PM  Monitor BP    #Neuropathy/cervical stenosis  Seen by DR Gonzales previous admission. Refused surgical intervention  MRI's reviewed. Still does not want to proceed with surgery  Neuro eval DR Valdes    #Liver cirrhosis w/ esophageal varices   -Continue to monitor H/H on blood thinners  -Outpatient GI referral    #Rheumatoid Arthritis  Rheum f/u appreciated    #LT ankle fracture  Ortho f/u DR Fung appreciated. Xrays reviewed  Remains NWB to LLE in cam boot. WIll get PT eval    #Anemia/thrombocytopenia likely 2 to liver cirrhosis  Monitor HH    #Dispo- PT eval. May need [facility] on discharge

## 2017-04-27 NOTE — CONSULT NOTE ADULT - SUBJECTIVE AND OBJECTIVE BOX
Patient is a 66y old  Female who presents with a chief complaint of chest pain (24 Apr 2017 23:38)      HPI:  65 yo F with PMH of liver cirrhosis 2/2 chronic alcohol abuse, macrocytic anemia, HTN, RA, esophageal varices s/p banding, CAD s/p stent, h/o prolonged Qtc, p/w chest pain/abdominal pain. Found to have elevated troponins and admitted to cardiology for cardiac cath.     Neuro consulted as pt is c/o weakness and pain in lower extremities and intermittently in the hands. Pt states her weakness started in lower extremities about 2 months ago, one fine day she was unable to ambulate she also had what she describes as pain around her abdomin for a short time, she had no control over bowels and bladder, was using diapers, needed assistance for ambulating and transfer. Pt was admitted with similar complaints to  in early April, extensive w/u including MRI scans of the spine were preformed, she was seen by Dr Gonzales for possible surgical intervention which she refused, in the interim, pt states she has been dx'd with Left foot fx. She is currently wearing a CAM walker boot.     At present, pt states she is able to lift her legs off the bed, she has no weakness n the hands, c/o numbness and pain in the lower > upper extremities.      PAST MEDICAL & SURGICAL HISTORY:  HTN (hypertension)  Rheumatoid arthritis  PCI, esophageal varices banding      Social Hx: Former smoker - quit in 2005, h/o heavy etoh use - quit 2/2016, Drugs - h/o marijuana use previously  Family Hx: Father - stomach problems / heart disease, mother - pancreatic cancer (24 Apr 2017 21:23)      MEDICATIONS  (STANDING):  atorvastatin 40milliGRAM(s) Oral at bedtime  aspirin 325milliGRAM(s) Oral daily  gabapentin 300milliGRAM(s) Oral three times a day  clopidogrel Tablet 75milliGRAM(s) Oral daily  rifaximin 550milliGRAM(s) Oral two times a day  pantoprazole    Tablet 40milliGRAM(s) Oral before breakfast  multivitamin 1Tablet(s) Oral daily  folic acid 1milliGRAM(s) Oral daily  thiamine 100milliGRAM(s) Oral daily  sodium chloride 0.9%. 1000milliLiter(s) IV Continuous <Continuous>  hydrocortisone 10milliGRAM(s) Oral at bedtime  hydrocortisone 20milliGRAM(s) Oral daily  heparin  Injectable 5000Unit(s) SubCutaneous every 12 hours       Allergies  sulfa drugs (Unknown)      ROS: Pertinent positives in HPI, all other ROS were reviewed and are negative.        Vital Signs Last 24 Hrs  T(C): 36.5, Max: 36.8 (04-27 @ 05:00)  T(F): 97.7, Max: 98.2 (04-27 @ 05:00)  HR: 93 (74 - 102)  BP: 100/61 (73/47 - 113/62)  BP(mean): 49 (49 - 71)  RR: 10 (8 - 23)  SpO2: 95% (91% - 97%)      Labs:   04-27    133<L>  |  103  |  23  ----------------------------<  117<H>  4.3   |  18<L>  |  0.81    Ca    7.5<L>      27 Apr 2017 04:47      04-25 Chol 200<H> <H> HDL 27<L> Trig 87  04-13 [id number] 5.0                          9.5    8.3   )-----------( 115      ( 27 Apr 2017 04:47 )             29.2       Constitutional: awake and alert, obese  HEENT: PERRLA, EOMI  Neck: Supple.  Respiratory: Breath sounds are clear bilaterally  Cardiovascular: S1 and S2, regular rhythm  Extremities:  no edema  Vascular: Carotid Bruit - no  Musculoskeletal: no joint swelling/tenderness, no abnormal movements  Skin: No rashes    Neurological exam:  HF: A x O x 3. Speech fluent, No Aphasia or paraphasic errors. Naming /repetition intact   CN: TIMA, EOMI, VFF, facial sensation normal, no NLFD, tongue midline, Palate moves equally, SCM equal bilaterally  Motor: No pronator drift, Strength in b/l UEs 5/5, R LE 4-/5 grossly, L LE limited 2nd to CAm boot, no tremor, rigidity    Sens: No sensory level, decreased PP R LE, VS/ JS intact    Reflexes: UE 1+, LE absent, downgoing toe on R, L limited   Coord:  No FNFA, dysmetria, FRANCISCO JAVIER intact   Gait/Balance: Cannot test      Radiology:  - CT Head:Patient is a 66y old  Female who presents with a chief complaint of chest pain (24 Apr 2017 23:38)      HPI:  65 yo F with PMH of liver cirrhosis 2/2 chronic alcohol abuse, macrocytic anemia, HTN, RA, esophageal varices s/p banding, CAD s/p stent, h/o prolonged Qtc, p/w chest pain/abdominal pain. States pain is more like pressure in L chest and abdomen. States pain has diminished a lot now, but is still there mildly. Denies radiation to arm / jaw / neck. Denies nausea / vomiting / diophoresis. In ED, troponins trended from 0.707 -> 79.3. Called on call cardio Dr. Flannery and discussed case, he recommended calling Dr. Anderson who is the on call interventionlist. Discussed case in detail with Dr. Anderson, including troponins trending up, new ST depressions on EKG and on going residual chest discomfort. Dr. Anderson recommends heparin drip without initial bolus due to esophageal varices / liver cirrhosis and due to patient being at increased risk for bleeding. Also recommends to c/w ASA / plavix / beta blocker / statin. Dr. Anderson believes patient may have had cardiac event earlier, and troponins are just lagging behind. Cardiac cath to be performed in AM.    PSH: PCI, esophageal varices banding  Social Hx: Former smoker - quit in 2005, h/o heavy etoh use - quit 2/2016, Drugs - h/o marijuana use previously  Family Hx: Father - stomach problems / heart disease, mother - pancreatic cancer (24 Apr 2017 21:23)      PAST MEDICAL & SURGICAL HISTORY:  HTN (hypertension)  Rheumatoid arthritis  No significant past surgical history      FAMILY HISTORY:  No pertinent family history in first degree relatives      Social Hx:  Nonsmoker, no drug or alcohol use    MEDICATIONS  (STANDING):  atorvastatin 40milliGRAM(s) Oral at bedtime  aspirin 325milliGRAM(s) Oral daily  gabapentin 300milliGRAM(s) Oral three times a day  clopidogrel Tablet 75milliGRAM(s) Oral daily  rifaximin 550milliGRAM(s) Oral two times a day  pantoprazole    Tablet 40milliGRAM(s) Oral before breakfast  multivitamin 1Tablet(s) Oral daily  folic acid 1milliGRAM(s) Oral daily  thiamine 100milliGRAM(s) Oral daily  sodium chloride 0.9%. 1000milliLiter(s) IV Continuous <Continuous>  hydrocortisone 10milliGRAM(s) Oral at bedtime  hydrocortisone 20milliGRAM(s) Oral daily  heparin  Injectable 5000Unit(s) SubCutaneous every 12 hours       Allergies    sulfa drugs (Unknown)    Intolerances        ROS: Pertinent positives in HPI, all other ROS were reviewed and are negative.      Vital Signs Last 24 Hrs  T(C): 36.5, Max: 36.8 (04-27 @ 05:00)  T(F): 97.7, Max: 98.2 (04-27 @ 05:00)  HR: 93 (74 - 102)  BP: 100/61 (73/47 - 113/62)  BP(mean): 49 (49 - 71)  RR: 10 (8 - 23)  SpO2: 95% (91% - 97%)        Constitutional: awake and alert.  HEENT: PERRLA, EOMI,   Neck: Supple.  Respiratory: Breath sounds are clear bilaterally  Cardiovascular: S1 and S2, regular / irregular rhythm  Gastrointestinal: soft, nontender  Extremities:  no edema  Vascular: Caritid Bruit - no  Musculoskeletal: no joint swelling/tenderness, no abnormal movements  Skin: No rashes    Neurological exam:  HF: A x O x 3. Appropriately interactive, normal affect. Speech fluent, No Aphasia or paraphasic errors. Naming /repetition intact   CN: TIMA, EOMI, VFF, facial sensation normal, no NLFD, tongue midline, Palate moves equally, SCM equal bilaterally  Motor: No pronator drift, Strength 5/5 in all 4 ext, normal bulk and tone, no tremor, rigidity or bradykinesia.    Sens: Intact to light touch / PP/ VS/ JS    Reflexes: Symmetric and normal . BJ 2+, BR 2+, KJ 2+, AJ 2+, downgoing toes b/l  Coord:  No FNFA, dysmetria, FRANCISCO JAVIER intact   Gait/Balance: Normal/Cannot test    NIHSS:          Labs:   04-27    133<L>  |  103  |  23  ----------------------------<  117<H>  4.3   |  18<L>  |  0.81    Ca    7.5<L>      27 Apr 2017 04:47      04-25 Chol 200<H> <H> HDL 27<L> Trig 87  04-13 [id number] 5.0                          9.5    8.3   )-----------( 115      ( 27 Apr 2017 04:47 )             29.2       Radiology:  - MRIs of spine reviewed      -ASA/PLAVIX  -Atorvastatin  -DVT prophylaxis  -Dysphagia screen  -Speech and swallow eval  -PT eval/ rehab eval    Mangement d/w Pt / family /     Total Critical Care Time spent:

## 2017-04-27 NOTE — PROGRESS NOTE ADULT - SUBJECTIVE AND OBJECTIVE BOX
CC: Low BP    HPI:  67 yo F with PMH of liver cirrhosis 2/2 chronic alcohol abuse, macrocytic anemia, HTN, RA, esophageal varices s/p banding, CAD s/p stent, h/o prolonged Qtc, p/w chest pain/abdominal pain. States pain is more like pressure in L chest and abdomen. States pain has diminished a lot now, but is still there mildly. Denies radiation to arm / jaw / neck. Denies nausea / vomiting / diophoresis. In ED, troponins trended from 0.707 -> 79.3. Called on call cardio Dr. Flannery and discussed case, he recommended calling Dr. Anderson who is the on call interventionlist. Discussed case in detail with Dr. Anderson, including troponins trending up, new ST depressions on EKG and on going residual chest discomfort. Dr. Anderson recommends heparin drip without initial bolus due to esophageal varices / liver cirrhosis and due to patient being at increased risk for bleeding. Also recommends to c/w ASA / plavix / beta blocker / statin. Dr. Anderson believes patient may have had cardiac event earlier, and troponins are just lagging behind. Cardiac cath to be performed in AM.    4/27 - Events noted. Had a low BP. No CP or SOB.    PSH: PCI, esophageal varices banding  Social Hx: Former smoker - quit in 2005, h/o heavy etoh use - quit 2/2016, Drugs - h/o marijuana use previously  Family Hx: Father - stomach problems / heart disease, mother - pancreatic cancer (24 Apr 2017 21:23)      PAST MEDICAL & SURGICAL HISTORY:  HTN (hypertension)  Rheumatoid arthritis  No significant past surgical history      FAMILY HISTORY:  No pertinent family history in first degree relatives      Social Hx:    Allergies    sulfa drugs (Unknown)    Intolerances        66y        ICU Vital Signs Last 24 Hrs  T(C): 36.4, Max: 36.8 (04-27 @ 05:00)  T(F): 97.6, Max: 98.2 (04-27 @ 05:00)  HR: 86 (74 - 86)  BP: 94/52 (73/39 - 113/92)  BP(mean): 65 (47 - 97)  ABP: --  ABP(mean): --  RR: 13 (9 - 19)  SpO2: 93% (92% - 94%)          I&O's Summary  I & Os for 24h ending 27 Apr 2017 07:00  =============================================  IN: 2140 ml / OUT: 750 ml / NET: 1390 ml    I & Os for current day (as of 27 Apr 2017 08:57)  =============================================  IN: 211.5 ml / OUT: 0 ml / NET: 211.5 ml                            9.5    8.3   )-----------( 115      ( 27 Apr 2017 04:47 )             29.2       04-27    133<L>  |  103  |  23  ----------------------------<  117<H>  4.3   |  18<L>  |  0.81    Ca    7.5<L>      27 Apr 2017 04:47        CAPILLARY BLOOD GLUCOSE                PTT - ( 26 Apr 2017 07:44 )  PTT:47.5 sec            MEDICATIONS  (STANDING):  atorvastatin 40milliGRAM(s) Oral at bedtime  aspirin 325milliGRAM(s) Oral daily  gabapentin 300milliGRAM(s) Oral three times a day  clopidogrel Tablet 75milliGRAM(s) Oral daily  rifaximin 550milliGRAM(s) Oral two times a day  pantoprazole    Tablet 40milliGRAM(s) Oral before breakfast  multivitamin 1Tablet(s) Oral daily  folic acid 1milliGRAM(s) Oral daily  thiamine 100milliGRAM(s) Oral daily  sodium chloride 0.45%. 1000milliLiter(s) IV Continuous <Continuous>  hydrocortisone  Injectable 50milliGRAM(s) IV Push every 8 hours  sodium chloride 0.9%. 1000milliLiter(s) IV Continuous <Continuous>  sodium chloride 0.9% Bolus 1000milliLiter(s) IV Bolus once    MEDICATIONS  (PRN):  naphazoline 0.025%/pheniramine 0.3% Ophthalmic Solution - Peds 1Drop(s) Both EYES two times a day PRN watery eyes.          DVT Prophylaxis: Hep SQ    Advanced Directives:  Discussed with:    Visit Information:    30-min CC  Time is exclusive of billed procedures and/or teaching and/or routine family updates.

## 2017-04-27 NOTE — CONSULT NOTE ADULT - ASSESSMENT
66 year old woman with hx of RA since 2009, with alcoholic liver cirrhosis, esophageal varices s/p banding, macrocytosis, multi level disc disease with spinal stenosis, alcohol neuropathy,  CAD s/p stent, h/o prolonged Qtc, presenting with chest/abdominal pressure while at [facility], [place] for LE weakness.  Our service was called for management of RA.  Patient's arthritis is currently stable/quiescent. Would continue prednisone at current dose.   Prednisone was increased last visit from 5mg to 15mg in attempt to improve BP.  On this admission, additional IV hydrocortisone is being given today for same problem.  Patient will need maintenance therapy, which can be started as outpatient.  CT chest shows pleural effusion L>R, with compressive atelectasis, increased since last CT on prior admission.  Moderate ascites is also present, and in this patient the pleural effusion may be due to hypoalbuminemia/cirrhosis/atelectasis, but would consider thoracentesis to rule out exudative causes such as RA/malignancy/TB which are also possible causes in this patient.   Recommend f/u CXR to assess.

## 2017-04-28 LAB
ANION GAP SERPL CALC-SCNC: 12 MMOL/L — SIGNIFICANT CHANGE UP (ref 5–17)
BUN SERPL-MCNC: 28 MG/DL — HIGH (ref 7–23)
CALCIUM SERPL-MCNC: 7.9 MG/DL — LOW (ref 8.5–10.1)
CHLORIDE SERPL-SCNC: 105 MMOL/L — SIGNIFICANT CHANGE UP (ref 96–108)
CO2 SERPL-SCNC: 17 MMOL/L — LOW (ref 22–31)
CREAT SERPL-MCNC: 1.08 MG/DL — SIGNIFICANT CHANGE UP (ref 0.5–1.3)
GLUCOSE SERPL-MCNC: 86 MG/DL — SIGNIFICANT CHANGE UP (ref 70–99)
HCT VFR BLD CALC: 31.5 % — LOW (ref 34.5–45)
HCT VFR BLD CALC: 32 % — LOW (ref 34.5–45)
HGB BLD-MCNC: 10.2 G/DL — LOW (ref 11.5–15.5)
HGB BLD-MCNC: 10.6 G/DL — LOW (ref 11.5–15.5)
LACTATE SERPL-SCNC: 2.7 MMOL/L — HIGH (ref 0.7–2)
MCHC RBC-ENTMCNC: 31.8 GM/DL — LOW (ref 32–36)
MCHC RBC-ENTMCNC: 33.8 GM/DL — SIGNIFICANT CHANGE UP (ref 32–36)
MCHC RBC-ENTMCNC: 36.8 PG — HIGH (ref 27–34)
MCHC RBC-ENTMCNC: 38.5 PG — HIGH (ref 27–34)
MCV RBC AUTO: 114 FL — HIGH (ref 80–100)
MCV RBC AUTO: 115.6 FL — HIGH (ref 80–100)
PLATELET # BLD AUTO: 131 K/UL — LOW (ref 150–400)
PLATELET # BLD AUTO: 143 K/UL — LOW (ref 150–400)
POTASSIUM SERPL-MCNC: 4.1 MMOL/L — SIGNIFICANT CHANGE UP (ref 3.5–5.3)
POTASSIUM SERPL-SCNC: 4.1 MMOL/L — SIGNIFICANT CHANGE UP (ref 3.5–5.3)
RBC # BLD: 2.76 M/UL — LOW (ref 3.8–5.2)
RBC # BLD: 2.77 M/UL — LOW (ref 3.8–5.2)
RBC # FLD: 14.2 % — SIGNIFICANT CHANGE UP (ref 10.3–14.5)
RBC # FLD: 14.5 % — SIGNIFICANT CHANGE UP (ref 10.3–14.5)
SODIUM SERPL-SCNC: 134 MMOL/L — LOW (ref 135–145)
WBC # BLD: 16.1 K/UL — HIGH (ref 3.8–10.5)
WBC # BLD: 9.4 K/UL — SIGNIFICANT CHANGE UP (ref 3.8–10.5)
WBC # FLD AUTO: 16.1 K/UL — HIGH (ref 3.8–10.5)
WBC # FLD AUTO: 9.4 K/UL — SIGNIFICANT CHANGE UP (ref 3.8–10.5)

## 2017-04-28 PROCEDURE — 71010: CPT | Mod: 26

## 2017-04-28 RX ORDER — PHENYLEPHRINE HYDROCHLORIDE 10 MG/ML
0.5 INJECTION INTRAVENOUS
Qty: 80 | Refills: 0 | Status: DISCONTINUED | OUTPATIENT
Start: 2017-04-28 | End: 2017-05-03

## 2017-04-28 RX ADMIN — GABAPENTIN 300 MILLIGRAM(S): 400 CAPSULE ORAL at 06:07

## 2017-04-28 RX ADMIN — Medication 100 MILLIGRAM(S): at 12:06

## 2017-04-28 RX ADMIN — PHENYLEPHRINE HYDROCHLORIDE 14.96 MICROGRAM(S)/KG/MIN: 10 INJECTION INTRAVENOUS at 16:14

## 2017-04-28 RX ADMIN — Medication 10 MILLIGRAM(S): at 21:58

## 2017-04-28 RX ADMIN — Medication 325 MILLIGRAM(S): at 12:06

## 2017-04-28 RX ADMIN — SODIUM CHLORIDE 125 MILLILITER(S): 9 INJECTION INTRAMUSCULAR; INTRAVENOUS; SUBCUTANEOUS at 06:08

## 2017-04-28 RX ADMIN — Medication 20 MILLIGRAM(S): at 06:07

## 2017-04-28 RX ADMIN — Medication 1 MILLIGRAM(S): at 12:06

## 2017-04-28 RX ADMIN — GABAPENTIN 300 MILLIGRAM(S): 400 CAPSULE ORAL at 21:58

## 2017-04-28 RX ADMIN — GABAPENTIN 300 MILLIGRAM(S): 400 CAPSULE ORAL at 17:36

## 2017-04-28 RX ADMIN — HEPARIN SODIUM 5000 UNIT(S): 5000 INJECTION INTRAVENOUS; SUBCUTANEOUS at 06:07

## 2017-04-28 RX ADMIN — ATORVASTATIN CALCIUM 40 MILLIGRAM(S): 80 TABLET, FILM COATED ORAL at 21:58

## 2017-04-28 RX ADMIN — CLOPIDOGREL BISULFATE 75 MILLIGRAM(S): 75 TABLET, FILM COATED ORAL at 12:06

## 2017-04-28 RX ADMIN — PANTOPRAZOLE SODIUM 40 MILLIGRAM(S): 20 TABLET, DELAYED RELEASE ORAL at 09:07

## 2017-04-28 RX ADMIN — Medication 1 TABLET(S): at 12:06

## 2017-04-28 RX ADMIN — HEPARIN SODIUM 5000 UNIT(S): 5000 INJECTION INTRAVENOUS; SUBCUTANEOUS at 17:36

## 2017-04-28 NOTE — PROGRESS NOTE ADULT - ASSESSMENT
66F with extensive hx p/w CP.    1. CP- LHC reveals patent stents, nonobstructive CAD. No further PCI done. Cont medical mgmt with asa/statin/plavix.   No Bbl with hypotension.    2. Hypotension- likely due to adrenal insufficiency. Rheum following. Continue steroids.     3. ETOH abuse/cirrhosis- as per hospitalist team, GI.     4. DVT proph., replete lytes as needed.     5. Can transfer to tele.

## 2017-04-28 NOTE — PROGRESS NOTE ADULT - SUBJECTIVE AND OBJECTIVE BOX
Cardiology Progress Note:    HPI:  65 yo F with PMH of liver cirrhosis 2/2 chronic alcohol abuse, macrocytic anemia, HTN, RA, esophageal varices s/p banding, CAD s/p stent, h/o prolonged Qtc, p/w chest pain/abdominal pain. States pain is more like pressure in L chest and abdomen. States pain has diminished a lot now, but is still there mildly. Denies radiation to arm / jaw / neck. Denies nausea / vomiting / diophoresis. In ED, troponins trended from 0.707 -> 79.3. Called on call cardio Dr. Flannery and discussed case, he recommended calling Dr. Anderson who is the on call interventionlist. Discussed case in detail with Dr. Anderson, including troponins trending up, new ST depressions on EKG and on going residual chest discomfort. Dr. Anderson recommends heparin drip without initial bolus due to esophageal varices / liver cirrhosis and due to patient being at increased risk for bleeding. Also recommends to c/w ASA / plavix / beta blocker / statin. Dr. Anderson believes patient may have had cardiac event earlier, and troponins are just lagging behind. Cardiac cath to be performed in AM.    4/28- No CP/SOB. SR/Stach on tele. No fevers.     PSH: PCI, esophageal varices banding  Social Hx: Former smoker - quit in 2005, h/o heavy etoh use - quit 2/2016, Drugs - h/o marijuana use previously  Family Hx: Father - stomach problems / heart disease, mother - pancreatic cancer (24 Apr 2017 21:23)    Allergies  sulfa drugs (Unknown)    SOCIAL HISTORY: Denies tobacco, etoh abuse or illicit drug use    FAMILY HISTORY: No pertinent family history in first degree relatives    MEDICATIONS  (STANDING):  atorvastatin 40milliGRAM(s) Oral at bedtime  aspirin 325milliGRAM(s) Oral daily  gabapentin 300milliGRAM(s) Oral three times a day  clopidogrel Tablet 75milliGRAM(s) Oral daily  rifaximin 550milliGRAM(s) Oral two times a day  pantoprazole    Tablet 40milliGRAM(s) Oral before breakfast  multivitamin 1Tablet(s) Oral daily  folic acid 1milliGRAM(s) Oral daily  thiamine 100milliGRAM(s) Oral daily  sodium chloride 0.9%. 1000milliLiter(s) IV Continuous <Continuous>  hydrocortisone 10milliGRAM(s) Oral at bedtime  hydrocortisone 20milliGRAM(s) Oral daily  heparin  Injectable 5000Unit(s) SubCutaneous every 12 hours    MEDICATIONS  (PRN):  naphazoline 0.025%/pheniramine 0.3% Ophthalmic Solution - Peds 1Drop(s) Both EYES two times a day PRN watery eyes.    Vital Signs Last 24 Hrs  T(C): 36.3, Max: 36.6 (04-28 @ 00:00)  T(F): 97.4, Max: 97.8 (04-28 @ 00:00)  HR: 105 (83 - 112)  BP: 98/50 (77/40 - 113/62)  BP(mean): 61 (49 - 71)  RR: 9 (8 - 23)  SpO2: 96% (91% - 97%)    REVIEW OF SYSTEMS:    CONSTITUTIONAL:  As per HPI.  HEENT:  Eyes:  No diplopia or blurred vision. ENT:  No earache, sore throat or runny nose.  CARDIOVASCULAR:  No pressure, squeezing, strangling, tightness, heaviness or aching about the chest, neck, axilla or epigastrium.  RESPIRATORY:  No cough, shortness of breath, PND or orthopnea.  GASTROINTESTINAL:  No nausea, vomiting or diarrhea.  GENITOURINARY:  No dysuria, frequency or urgency.  MUSCULOSKELETAL:  As per HPI.  SKIN:  No change in skin, hair or nails.  NEUROLOGIC:  No paresthesias, fasciculations, seizures or weakness.  PSYCHIATRIC:  No disorder of thought or mood.  ENDOCRINE:  No heat or cold intolerance, polyuria or polydipsia.  HEMATOLOGICAL:  No easy bruising or bleedings.     PHYSICAL EXAMINATION:    GENERAL APPEARANCE:  Pt. is not currently dyspneic, in no distress. Pt. is alert, oriented, and pleasant.  HEENT:  Pupils are normal and react normally. No icterus. Mucous membranes well colored.  NECK:  Supple. No lymphadenopathy. Jugular venous pressure not elevated. Carotids equal.   HEART:   The cardiac impulse has a normal quality. There are no murmurs, rubs or gallops noted  CHEST:  Chest is clear to auscultation. Normal respiratory effort.  ABDOMEN:  Soft and nontender.   EXTREMITIES:  There is no edema.   SKIN:  No rash or significant lesions are noted.    LABS:                        10.2   9.4   )-----------( 131      ( 28 Apr 2017 05:36 )             32.0     04-27    133<L>  |  103  |  23  ----------------------------<  117<H>  4.3   |  18<L>  |  0.81    Ca    7.5<L>      27 Apr 2017 04:47    EKG:   Diagnosis Line Normal sinus rhythm  ST & T wave abnormality, consider inferior ischemia  ST & T wave abnormality, consider anterolateral ischemia  Prolonged QT    TELEMETRY: Stach    CARDIAC TESTS: 2Decho- Limited study to evaluate left ventricular systolic function.   Estimated left ventricular ejection fraction is 55-60 %.    RADIOLOGY & ADDITIONAL STUDIES: CTA- Limited evaluation of the pulmonary artery secondary to suboptimal bolus   timing. No pulmonary embolus the trunk, main right or left pulmonary   artery. Small right and moderate left pleural effusion with bibasilar compressive   atelectasis, incomplete on the right and complete of the left.  Cirrhosis and moderate ascites in upper abdomen.    ASSESSMENT & PLAN:

## 2017-04-28 NOTE — PROGRESS NOTE ADULT - SUBJECTIVE AND OBJECTIVE BOX
PCP: DR Lees    CC-Patient is a 66y old  Female who presents with a chief complaint of chest pain (2017 23:38)    HPI:  67 yo F with PMH of liver cirrhosis 2/2 chronic alcohol abuse, macrocytic anemia, HTN, RA, esophageal varices s/p banding, CAD s/p stent, h/o prolonged Qtc, p/w chest pain/abdominal pain. States pain is more like pressure in L chest and abdomen. States pain has diminished a lot now, but is still there mildly. Denies radiation to arm / jaw / neck. Denies nausea / vomiting / diophoresis. In ED, troponins trended from 0.707 -> 79.3. Called on call cardio Dr. Flannery and discussed case, he recommended calling Dr. Anderson who is the on call interventionlist. Discussed case in detail with Dr. Anderson, including troponins trending up, new ST depressions on EKG and on going residual chest discomfort. Dr. Anderson recommends heparin drip without initial bolus due to esophageal varices / liver cirrhosis and due to patient being at increased risk for bleeding. Also recommends to c/w ASA / plavix / beta blocker / statin. Dr. Anderson believes patient may have had cardiac event earlier, and troponins are just lagging behind. Cardiac cath to be performed in AM.    17 s/p card cath- stent open, no intervention done  17 c/o tingling and numbness of both arms  17 remains hypotensive, bp 70/30's, +rigors but no fever, +chest congestion. States she does not "feel good"    Vital Signs Last 24 Hrs  T(C): 36.8, Max: 36.8 ( @ 15:07)  T(F): 98.3, Max: 98.3 ( @ 15:07)  HR: 123 (85 - 123)  BP: 80/41 (77/40 - 109/57)  BP(mean): 49 (49 - 71)  RR: 19 (8 - 24)  SpO2: 95% (94% - 97%)    LABS:                                 10.2   9.4   )-----------( 131      ( 2017 05:36 )             32.0     2017 04:47    133    |  103    |  23     ----------------------------<  117    4.3     |  18     |  0.81     Ca    7.5        2017 04:47    RADIOLOGY & ADDITIONAL TESTS:  EXAM:  FOOT-LEFT                        PROCEDURE DATE:  2017    INTERPRETATION:  Exam Date: 2017 11:51 PM  Clinical Information: Left ankle foot pain with known fractures  Technique: 3 views of the left ankle and 2 views the left foot. Study   severely limited by patient positioning.     Findings:    Overlying casting is visualized which limits evaluation. Ankle mortise   appears intact. Known nondisplaced medial malleolus, anterior distal   tibia and posterior malleolus fractures seen on MRI not well delineated   on x-rays. Diffuse degenerative changes and osteopenia. Old fracture of   the left fifth metatarsal. Known nondisplaced left second and third   metatarsal fractures not well identified. No new definite fractures. Mild   soft tissue swelling.    Impression:  Known fractures seen on recent MRI not well delineated. No new fractures.    EXAM:  CERVICAL SPINE (MRI)W O CON                          PROCEDURE DATE:  2017    INTERPRETATION:      MR cervical  and thoracic without gadolinium     CLINICAL INFORMATION:  Bilateral arm numbness, cervical and thoracic back   pain       TECHNIQUE:   Sagittal T1-weighted images, sagittal STIR images, sagittal   T2-weighted images and axial T1 andT2-weighted gradient-echo images of   the cervical spine and thoracic were obtained.         FINDINGS:   No prior similar studies areavailable for review.         Cervical vertebral alignment is significant for straightening on a   degenerative basis.  Cervical vertebral body heights are maintained.    Marrow signal intensity within cervical vertebral bodies and posterior   elements is unremarkable.  No osseous expansion, epidural disease or   paraspinal abnormality is found.           Cervical intervertebral discs show disc degeneration and spondylosis at   C4-5 through C6-7 with loss of disc height and associated degenerative   endplate changes. There is narrowing of the RIGHT C3-4, BILATERAL C4-5,   BILATERAL C5-6 and BILATERAL C6-7 and LEFT C7-T1 neural foramina due to   uncovertebral spurring and facet osteophytic hypertrophy. Mild   degenerative cord flattening is seen at C5-6 and mild degenerative cord   impingement is seen at C6-7 due to posterior osteophytic ridge/disc   complexes.    The cervical cord maintains intact morphology  No cord signal intensity   abnormality or focal cord lesion is appreciated.  Thecervical medullary   junction remains intact.      Thoracic alignment is intact. Disc degeneration and mild spondylosis is   noted at T7-8 through T11-12 with loss of disc height and associated   degenerative endplate changes. Tiny RIGHT paracentral disc herniation is   noted at T8-9 small RIGHT and LEFT paracentral disc herniations are noted   at T8-9-10 with minimal cord impingement on the LEFT. Posterior   osteophytic ridge/disc complex at T11-12 indents the ventral subarachnoid   space and narrows the RIGHT neural foramen.      IMPRESSION:  Multilevel degenerative disc disease and spondylosis of the   cervical and thoracic spine.   Narrowing of the RIGHT C3-4, BILATERAL   C4-5, BILATERAL C5-6 and BILATERAL C6-7 and LEFT C7-T1 neural foramina   due to uncovertebral spurring and facet osteophytic hypertrophy. Mild   degenerative cord flattening is seen at C5-6 and mild degenerative cord   impingement is seen at C6-7 due to posterior osteophytic ridge/disc   complexes.   Tiny RIGHT paracentral disc herniation is noted at T8-9   small RIGHT and LEFT paracentral disc herniations are noted at T8-9-10   with minimal cord impingement on the LEFT. Posterior osteophytic   ridge/disc complex at T11-12 indents the ventral subarachnoid space and   narrows the RIGHT neural foramen.    EXAM:  LUMBAR SPINE(MRI)W O CON                          PROCEDURE DATE:  2017    INTERPRETATION:    MR lumbar without gadolinium       CLINICAL INFORMATION:   Bilateral leg weakness and numbness for 2 days   Lumbar spondylosis.    TECHNIQUE:   Sagittal and axial T1-weighted images, sagittal STIR images,   sagittal T2-weighted images and axial T1 and T2-weighted images of the   lumbar spine were obtained.         FINDINGS:   No prior similar studies are available for review.         Lumbar vertebral alignment is preserved.  Lumbar vertebral body heights   are maintained.  Marrow signal intensity within lumbar vertebral bodies   and posterior elements is unremarkable.  No osseous expansion, epidural   disease or paraspinal abnormality is found.          Lumbar intervertebral discs demonstrate disc degeneration and spondylosis   at T11-L3 L5-S1 with loss of disc height and associated degenerative   endplate changes. LEFT paracentral disc herniation is noted at L2-3   compressing the ventral thecal sac and narrowing the LEFT neural foramen.   Disc bulges at L3-4 and L4-5 and L5-S1 mild central stenosis at L2-3   through L4-5 on a degenerative basis due to disc bulge, facet osteophytic   hypertrophy and redundancy ligament of flavum. Flatten the ventral thecal   sac and narrow the BILATERAL neural foramina.    The distal cord maintains intact morphology.  Distal cord signal   intensity is preserved.  The conus is normally positioned at the T12   level.  Nerve roots of the cauda equina appear intact.  1 cm RIGHT renal   cysts.      IMPRESSION:  Disc degeneration and spondylosis at T11-12 through L5-S1   with loss of disc height and associated degenerative endplate changes.   LEFT paracentral disc herniation is noted at L2-3 compressing the ventral   thecal sac and narrowing the LEFT neural foramen. Disc bulges at L3-4 and   L4-5 and L5-S1 flatten the ventral thecal sac and narrow the BILATERAL   neural foramina.  Mild central stenosis at L2-3 through L4-5 on a degenerative basis.      PHYSICAL EXAM:  GENERAL: looks pale and ill  HEAD:  Atraumatic, Normocephalic  EYES: EOMI, PERRLA, conjunctiva and sclera clear  HEENT: Moist mucous membranes  NECK: Supple, No JVD  NERVOUS SYSTEM:  Alert & Oriented X3, Motor Strength 5/5 B/L upper and lower extremities; DTRs 2+ intact and symmetric  CHEST/LUNG: +crackles  HEART: Regular rate and rhythm; No murmurs, rubs, or gallops  ABDOMEN: Soft, Nontender, Nondistended; Bowel sounds present  GENITOURINARY- Voiding, no palpable bladder  EXTREMITIES: LLE boot on, +anasarca up to upper abdomen  SKIN-no rash, no lesion  CNS- alert, oriented X3, non focal        Daily Weight in k.8 (2017 00:00)      atorvastatin 40milliGRAM(s) Oral at bedtime  lisinopril 2.5milliGRAM(s) Oral daily  heparin  Infusion. Unit(s)/Hr IV Continuous <Continuous>  heparin  Injectable 4700Unit(s) IV Push every 6 hours PRN  predniSONE   Tablet 15milliGRAM(s) Oral daily  aspirin 325milliGRAM(s) Oral daily  gabapentin 300milliGRAM(s) Oral three times a day  clopidogrel Tablet 75milliGRAM(s) Oral daily  rifaximin 550milliGRAM(s) Oral two times a day  naphazoline 0.025%/pheniramine 0.3% Ophthalmic Solution - Peds 1Drop(s) Both EYES two times a day PRN  pantoprazole    Tablet 40milliGRAM(s) Oral before breakfast  multivitamin 1Tablet(s) Oral daily  folic acid 1milliGRAM(s) Oral daily  thiamine 100milliGRAM(s) Oral daily  metoprolol succinate ER 12.5milliGRAM(s) Oral daily  sodium chloride 0.45%. 1000milliLiter(s) IV Continuous <Continuous>    Assessment/Plan  #Persistent hypotension  Despite large amounts of IVF received and IV stress doses of steroids  R/o sepsis- panculture, CXR, stat basic labs, lactate  Intensivist eval called to DR Kee, will likely need pressors  Would not give more IVF as looks fluid overloaded and third-spacing  ID eval    #NSTEMI/ CAD s/p recent stent  Card eval DR Ramesh appreciated  S/p card cath- stent patent, no other blockages  Cont aspirin, Plavix, statin. Not on BB 2 to hypotension    #Neuropathy/cervical stenosis  Seen by DR Gonzales previous admission. Refused surgical intervention  MRI's reviewed. Still does not want to proceed with surgery  Neuro eval DR Valdes    #Liver cirrhosis w/ esophageal varices   -Continue to monitor H/H on blood thinners  -Outpatient GI referral    #Rheumatoid Arthritis  Rheum f/u appreciated    #LT ankle fracture  Ortho f/u DR Fung appreciated. Xrays reviewed  Remains NWB to LLE in cam boot. WIll get PT eval    #Anemia/thrombocytopenia likely 2 to liver cirrhosis  Monitor HH    #Dispo- intensivist eval, start on pressors. Sepsis work up. ID eval

## 2017-04-29 LAB
ANION GAP SERPL CALC-SCNC: 10 MMOL/L — SIGNIFICANT CHANGE UP (ref 5–17)
BUN SERPL-MCNC: 26 MG/DL — HIGH (ref 7–23)
CALCIUM SERPL-MCNC: 7.9 MG/DL — LOW (ref 8.5–10.1)
CHLORIDE SERPL-SCNC: 105 MMOL/L — SIGNIFICANT CHANGE UP (ref 96–108)
CO2 SERPL-SCNC: 19 MMOL/L — LOW (ref 22–31)
CREAT SERPL-MCNC: 0.9 MG/DL — SIGNIFICANT CHANGE UP (ref 0.5–1.3)
GLUCOSE SERPL-MCNC: 97 MG/DL — SIGNIFICANT CHANGE UP (ref 70–99)
HCT VFR BLD CALC: 29.8 % — LOW (ref 34.5–45)
HGB BLD-MCNC: 9.7 G/DL — LOW (ref 11.5–15.5)
MCHC RBC-ENTMCNC: 32.4 GM/DL — SIGNIFICANT CHANGE UP (ref 32–36)
MCHC RBC-ENTMCNC: 37.2 PG — HIGH (ref 27–34)
MCV RBC AUTO: 114.7 FL — HIGH (ref 80–100)
PLATELET # BLD AUTO: 141 K/UL — LOW (ref 150–400)
POTASSIUM SERPL-MCNC: 4.3 MMOL/L — SIGNIFICANT CHANGE UP (ref 3.5–5.3)
POTASSIUM SERPL-SCNC: 4.3 MMOL/L — SIGNIFICANT CHANGE UP (ref 3.5–5.3)
RBC # BLD: 2.6 M/UL — LOW (ref 3.8–5.2)
RBC # FLD: 14.5 % — SIGNIFICANT CHANGE UP (ref 10.3–14.5)
SODIUM SERPL-SCNC: 134 MMOL/L — LOW (ref 135–145)
WBC # BLD: 18.2 K/UL — HIGH (ref 3.8–10.5)
WBC # FLD AUTO: 18.2 K/UL — HIGH (ref 3.8–10.5)

## 2017-04-29 PROCEDURE — 99232 SBSQ HOSP IP/OBS MODERATE 35: CPT

## 2017-04-29 RX ORDER — FUROSEMIDE 40 MG
40 TABLET ORAL ONCE
Qty: 0 | Refills: 0 | Status: COMPLETED | OUTPATIENT
Start: 2017-04-29 | End: 2017-04-29

## 2017-04-29 RX ADMIN — Medication 1 TABLET(S): at 11:51

## 2017-04-29 RX ADMIN — PHENYLEPHRINE HYDROCHLORIDE 14.96 MICROGRAM(S)/KG/MIN: 10 INJECTION INTRAVENOUS at 09:44

## 2017-04-29 RX ADMIN — Medication 20 MILLIGRAM(S): at 06:37

## 2017-04-29 RX ADMIN — Medication 100 MILLIGRAM(S): at 11:51

## 2017-04-29 RX ADMIN — GABAPENTIN 300 MILLIGRAM(S): 400 CAPSULE ORAL at 13:29

## 2017-04-29 RX ADMIN — HEPARIN SODIUM 5000 UNIT(S): 5000 INJECTION INTRAVENOUS; SUBCUTANEOUS at 17:21

## 2017-04-29 RX ADMIN — Medication 1 MILLIGRAM(S): at 11:51

## 2017-04-29 RX ADMIN — Medication 10 MILLIGRAM(S): at 21:12

## 2017-04-29 RX ADMIN — ATORVASTATIN CALCIUM 40 MILLIGRAM(S): 80 TABLET, FILM COATED ORAL at 21:12

## 2017-04-29 RX ADMIN — PANTOPRAZOLE SODIUM 40 MILLIGRAM(S): 20 TABLET, DELAYED RELEASE ORAL at 08:47

## 2017-04-29 RX ADMIN — Medication 325 MILLIGRAM(S): at 11:50

## 2017-04-29 RX ADMIN — HEPARIN SODIUM 5000 UNIT(S): 5000 INJECTION INTRAVENOUS; SUBCUTANEOUS at 06:37

## 2017-04-29 RX ADMIN — CLOPIDOGREL BISULFATE 75 MILLIGRAM(S): 75 TABLET, FILM COATED ORAL at 11:51

## 2017-04-29 RX ADMIN — Medication 40 MILLIGRAM(S): at 11:50

## 2017-04-29 RX ADMIN — GABAPENTIN 300 MILLIGRAM(S): 400 CAPSULE ORAL at 06:37

## 2017-04-29 RX ADMIN — GABAPENTIN 300 MILLIGRAM(S): 400 CAPSULE ORAL at 21:12

## 2017-04-29 NOTE — PROGRESS NOTE ADULT - SUBJECTIVE AND OBJECTIVE BOX
PCP: DR Lees    CC-Patient is a 66y old  Female who presents with a chief complaint of chest pain (2017 23:38)    HPI:  67 yo F with PMH of liver cirrhosis 2/2 chronic alcohol abuse, macrocytic anemia, HTN, RA, esophageal varices s/p banding, CAD s/p stent, h/o prolonged Qtc, p/w chest pain/abdominal pain. States pain is more like pressure in L chest and abdomen. States pain has diminished a lot now, but is still there mildly. Denies radiation to arm / jaw / neck. Denies nausea / vomiting / diophoresis. In ED, troponins trended from 0.707 -> 79.3. Called on call cardio Dr. Flannery and discussed case, he recommended calling Dr. Anderson who is the on call interventionlist. Discussed case in detail with Dr. Anderson, including troponins trending up, new ST depressions on EKG and on going residual chest discomfort. Dr. Anderson recommends heparin drip without initial bolus due to esophageal varices / liver cirrhosis and due to patient being at increased risk for bleeding. Also recommends to c/w ASA / plavix / beta blocker / statin. Dr. Anderson believes patient may have had cardiac event earlier, and troponins are just lagging behind. Cardiac cath to be performed in AM.    17 s/p card cath- stent open, no intervention done  17 c/o tingling and numbness of both arms  17 remains hypotensive, bp 70/30's, +rigors but no fever, +chest congestion. States she does not "feel good"  17- started on pressors yesterday- BP improved. No localized signs of infection despite elevtaed lactate an WBC- pancultures taken. Pt feels better today    Vital Signs Last 24 Hrs  T(C): 36.5, Max: 36.8 (- @ 15:07)  T(F): 97.7, Max: 98.3 (- @ 15:07)  HR: 108 (76 - 123)  BP: 119/68 (65/45 - 120/42)  BP(mean): 80 (49 - 80)  RR: 25 (8 - 25)  SpO2: 94% (92% - 96%)    LABS:                                          9.7    18.2  )-----------( 141      ( 2017 04:51 )             29.8     2017 04:51    134    |  105    |  26     ----------------------------<  97     4.3     |  19     |  0.90     Ca    7.9        2017 04:51    RADIOLOGY & ADDITIONAL TESTS:  EXAM:  CHEST SINGLE VIEW FRONTAL                          PROCEDURE DATE:  2017    INTERPRETATION:  Clinical information: Chills. Rule out pneumonia    Portable AP chest radiograph from 1632 hours:    COMPARISON:  2017    FINDINGS:  There is retrocardiac opacity compatible with small to   moderate pleural effusion which appears grossly unchanged in size. There   is no pneumothorax. The right lung is clear. No bony abnormality is   identified.    IMPRESSION:    No change inleft pleural effusion.      PHYSICAL EXAM:  GENERAL: looks pale and ill  HEAD:  Atraumatic, Normocephalic  EYES: EOMI, PERRLA, conjunctiva and sclera clear  HEENT: Moist mucous membranes  NECK: Supple, No JVD  NERVOUS SYSTEM:  Alert & Oriented X3, Motor Strength 5/5 B/L upper and lower extremities; DTRs 2+ intact and symmetric  CHEST/LUNG: +crackles  HEART: Regular rate and rhythm; No murmurs, rubs, or gallops  ABDOMEN: Soft, Nontender, Large ascites  GENITOURINARY- Voiding, no palpable bladder  EXTREMITIES: LLE boot on, +anasarca up to upper abdomen  SKIN-no rash, no lesion  CNS- alert, oriented X3, non focal        Daily Weight in k.8 (2017 00:00)    atorvastatin 40milliGRAM(s) Oral at bedtime  lisinopril 2.5milliGRAM(s) Oral daily  heparin  Infusion. Unit(s)/Hr IV Continuous <Continuous>  heparin  Injectable 4700Unit(s) IV Push every 6 hours PRN  predniSONE   Tablet 15milliGRAM(s) Oral daily  aspirin 325milliGRAM(s) Oral daily  gabapentin 300milliGRAM(s) Oral three times a day  clopidogrel Tablet 75milliGRAM(s) Oral daily  rifaximin 550milliGRAM(s) Oral two times a day  naphazoline 0.025%/pheniramine 0.3% Ophthalmic Solution - Peds 1Drop(s) Both EYES two times a day PRN  pantoprazole    Tablet 40milliGRAM(s) Oral before breakfast  multivitamin 1Tablet(s) Oral daily  folic acid 1milliGRAM(s) Oral daily  thiamine 100milliGRAM(s) Oral daily  metoprolol succinate ER 12.5milliGRAM(s) Oral daily  sodium chloride 0.45%. 1000milliLiter(s) IV Continuous <Continuous>    Assessment/Plan  #Persistent hypotension- now on pressors  Unclear underlying reason for hypotension  Sepsis not excluded but no obvious source found despite elevated lactate and WBC. Pt has no localizing signs- no SOB/cough, no abd pain, no dysuria  CXR- no pneumonia  Leukocytosis unclear if from stress doses of steroids or indicative of underlying infection  Pancultures taken- awaiting results  ID eval appreciated- no abx recommended as of now as no source elicited  Will get CT chest/abd/pelvis today. May need paracenthesis on Monday  Intensivist f/u appreciated, d/w DR Becerra    #NSTEMI/ CAD s/p recent stent  Card eval DR Ramesh appreciated  S/p card cath- stent patent, no other blockages  Cont aspirin, Plavix, statin. Not on BB 2 to hypotension    #Neuropathy/cervical stenosis  Seen by DR Gonzales previous admission. Refused surgical intervention  MRI's reviewed. Still does not want to proceed with surgery  Neuro eval DR Valdes    #Liver cirrhosis w/ esophageal varices/anasarca from third-spacing   -Lasix IV  -May need paracenthesis    #Rheumatoid Arthritis  Rheum f/u appreciated    #LT ankle fracture  Ortho f/u DR Fung appreciated. Xrays reviewed  Remains NWB to LLE in cam boot. WIll get PT eval    #Anemia/thrombocytopenia likely 2 to liver cirrhosis  Monitor HH    #Dispo- cont pressors. Ct chest/abd/pelvis- ?source of infection search

## 2017-04-29 NOTE — PROGRESS NOTE ADULT - ASSESSMENT
66y old F:  NSTEMI - s/p LHC with clean coronaries  s/p LAD stent  Hypotension on jacquelyn  Cirrhosis  RA - On steroids  GIB    Plan:  Cont CCU Care  Serial neuro Exams  titrate JACQUELYN  ID fu for high WBC  PO steroids  PO diet  cont ASA/Plavix  Monitor renal function  Strict I/O's  DVT prophylaxis - Hep SQ  d/w staff

## 2017-04-29 NOTE — CONSULT NOTE ADULT - SUBJECTIVE AND OBJECTIVE BOX
Patient is a 66y old  Female who presents with a chief complaint of chest pain (24 Apr 2017 23:38)      HPI:  65 yo F with PMH of liver cirrhosis 2/2 chronic alcohol abuse, macrocytic anemia, HTN, RA, esophageal varices s/p banding, CAD s/p stent, h/o prolonged Qtc intially  p/w chest pain/abdominal pain on 4/24 here found to have elevated trops and EKG changes s/p cardiac cath with no stent thrombosis or occlusion, her course has been c/b by hypotension that has been persistent, she is now on low dose phenlyephrine, has was on IV fluids but had issues with fluid overload and has been receiving diuresis as well, has had some rigors but no fevers since admission, her abd has been distended but denies pain, she was started on stress dose steroids 4/26 d/t concern for adrenal insufficiency as she has been on chronic low dose steroids for her RA. She had an xray showing moderate R pleural effusion no obvious infiltrate or consolidation, she denies cough or urinary symptoms and no diarrhea or rashes. No hx of prior SBP or recent GI bleeding. I.D consulted for eval and r/o infection.    PSH: PCI, esophageal varices banding      Meds: per reconciliation sheet, noted below    MEDICATIONS  (STANDING):  atorvastatin 40milliGRAM(s) Oral at bedtime  aspirin 325milliGRAM(s) Oral daily  gabapentin 300milliGRAM(s) Oral three times a day  clopidogrel Tablet 75milliGRAM(s) Oral daily  rifaximin 550milliGRAM(s) Oral two times a day  pantoprazole    Tablet 40milliGRAM(s) Oral before breakfast  multivitamin 1Tablet(s) Oral daily  folic acid 1milliGRAM(s) Oral daily  thiamine 100milliGRAM(s) Oral daily  hydrocortisone 10milliGRAM(s) Oral at bedtime  hydrocortisone 20milliGRAM(s) Oral daily  heparin  Injectable 5000Unit(s) SubCutaneous every 12 hours  phenylephrine    Infusion 0.5MICROgram(s)/kG/Min IV Continuous <Continuous>      Allergies    sulfa drugs (Unknown)    Intolerances        Social Hx: Former smoker - quit in 2005, h/o heavy etoh use - quit 2/2016, Drugs - h/o marijuana use previously  Family Hx: Father - stomach problems / heart disease, mother - pancreatic cancer (24 Apr 2017 21:23)      ROS: the patient denies fever, no chills, no HA, no dizziness, no sore throat, no blurry vision, no CP, no palpitations, no SOB, no cough, no diarrhea, no N/V, no dysuria, no leg pain, no claudication, no rash, no joint aches, no rectal pain or bleeding, no night sweats    Vital Signs Last 24 Hrs  T(C): 36.5, Max: 36.8 (04-28 @ 15:07)  T(F): 97.7, Max: 98.3 (04-28 @ 15:07)  HR: 104 (76 - 123)  BP: 90/48 (65/45 - 120/42)  BP(mean): 59 (49 - 80)  RR: 22 (8 - 25)  SpO2: 94% (92% - 96%)      PE:  Constitutional: frail looking  HEENT: NC/AT, EOMI, PERRLA  Neck: supple  Back: no tenderness  Respiratory: clear  Cardiovascular: S1S2 regular, no murmurs  Abdomen: soft, not tender, not distended, positive BS  Genitourinary: deferred  Rectal: deferred  Musculoskeletal: no muscle tenderness, no joint swelling or tenderness  Extremities: +pedal edema  Neurological:  no focal deficits  Skin: no rashes    Labs:                        9.7    18.2  )-----------( 141      ( 29 Apr 2017 04:51 )             29.8     04-29    134<L>  |  105  |  26<H>  ----------------------------<  97  4.3   |  19<L>  |  0.90    Ca    7.9<L>      29 Apr 2017 04:51                 Radiology:   EXAM:  CHEST SINGLE VIEW FRONTAL                            PROCEDURE DATE:  04/28/2017        INTERPRETATION:  Clinical information: Chills. Rule out pneumonia    Portable AP chest radiograph from 1632 hours:    COMPARISON:  April 24, 2017    FINDINGS:  There is retrocardiac opacity compatible with small to   moderate pleural effusion which appears grossly unchanged in size. There   is no pneumothorax. The right lung is clear. No bony abnormality is   identified.    IMPRESSION:    No change inleft pleural effusion.        Advanced directives addressed: full resuscitation

## 2017-04-29 NOTE — CONSULT NOTE ADULT - ASSESSMENT
67 yo F with PMH of liver cirrhosis 2/2 chronic alcohol abuse, macrocytic anemia, HTN, RA, esophageal varices s/p banding, CAD s/p stent, h/o prolonged Qtc intially  p/w chest pain/abdominal pain on 4/24 here found to have elevated trops and EKG changes s/p cardiac cath with no stent thrombosis or occlusion, her course has been c/b by hypotension that has been persistent, she is now on low dose phenlyephrine, has was on IV fluids but had issues with fluid overload and has been receiving diuresis as well, has had some rigors but no fevers since admission, her abd has been distended but denies pain, she was started on stress dose steroids 4/26 d/t concern for adrenal insufficiency as she has been on chronic low dose steroids for her RA. She had an xray showing moderate R pleural effusion no obvious infiltrate or consolidation, she denies cough or urinary symptoms and no diarrhea or rashes. No hx of prior SBP or GI bleeding. I.D consulted for eval and r/o infection.    1. leukocytosis/hypotension/alcoholic liver cirrhosis    - elevated wbc ct could be d/t recent stress dose steroids vs reactive to liver disease    - no localizing source of infection     - has remained afebrile, no rashes, no resp sxs, no dysuria, no abd pain, no diarrhea     - xray with stable L pleural effusion, no consolidation or infiltrate    - hypotension/lactic acidosis likely d/t third-spacing and poor perfusion d/t chronic volume depletion d/t liver cirrhosis/portal htn    - suggest diagnostic/therapeutic paracentesis    - f/u urine cultures, blood cultures    - would observe off antibiotics at this time    - if febrile, start rocephin 1gm daily for SBP coverage    - f/u CBC    - monitor temps    - supportive care    2. other issues- liver cirrhosis 2/2 chronic alcohol abuse, macrocytic anemia, HTN, RA, esophageal varices s/p banding, CAD s/p stent, h/o prolonged Qtc - care per medicine

## 2017-04-29 NOTE — PROGRESS NOTE ADULT - SUBJECTIVE AND OBJECTIVE BOX
CC:    HPI:  65 yo F with PMH of liver cirrhosis 2/2 chronic alcohol abuse, macrocytic anemia, HTN, RA, esophageal varices s/p banding, CAD s/p stent, h/o prolonged Qtc, p/w chest pain/abdominal pain. States pain is more like pressure in L chest and abdomen. States pain has diminished a lot now, but is still there mildly. Denies radiation to arm / jaw / neck. Denies nausea / vomiting / diophoresis. In ED, troponins trended from 0.707 -> 79.3. Called on call cardio Dr. Flannery and discussed case, he recommended calling Dr. Anderson who is the on call interventionlist. Discussed case in detail with Dr. Anderson, including troponins trending up, new ST depressions on EKG and on going residual chest discomfort. Dr. Anderson recommends heparin drip without initial bolus due to esophageal varices / liver cirrhosis and due to patient being at increased risk for bleeding. Also recommends to c/w ASA / plavix / beta blocker / statin. Dr. Anderson believes patient may have had cardiac event earlier, and troponins are just lagging behind. Cardiac cath to be performed in AM.    4/29: pat better, still on david, fluid overload, lying comfortably feeling cold.    PSH: PCI, esophageal varices banding  Social Hx: Former smoker - quit in 2005, h/o heavy etoh use - quit 2/2016, Drugs - h/o marijuana use previously  Family Hx: Father - stomach problems / heart disease, mother - pancreatic cancer (24 Apr 2017 21:23)      PAST MEDICAL & SURGICAL HISTORY:  HTN (hypertension)  Rheumatoid arthritis  No significant past surgical history      FAMILY HISTORY:  No pertinent family history in first degree relatives      Social Hx:    Allergies    sulfa drugs (Unknown)    Intolerances            ICU Vital Signs Last 24 Hrs  T(C): 36.5, Max: 36.8 (04-28 @ 15:07)  T(F): 97.7, Max: 98.3 (04-28 @ 15:07)  HR: 104 (76 - 123)  BP: 90/48 (65/45 - 120/42)  BP(mean): 59 (49 - 80)  ABP: --  ABP(mean): --  RR: 22 (8 - 25)  SpO2: 94% (92% - 96%)          I&O's Summary  I & Os for 24h ending 29 Apr 2017 07:00  =============================================  IN: 2037.2 ml / OUT: 300 ml / NET: 1737.2 ml    I & Os for current day (as of 29 Apr 2017 12:46)  =============================================  IN: 0 ml / OUT: 200 ml / NET: -200 ml                            9.7    18.2  )-----------( 141      ( 29 Apr 2017 04:51 )             29.8       04-29    134<L>  |  105  |  26<H>  ----------------------------<  97  4.3   |  19<L>  |  0.90    Ca    7.9<L>      29 Apr 2017 04:51                      MEDICATIONS  (STANDING):  atorvastatin 40milliGRAM(s) Oral at bedtime  aspirin 325milliGRAM(s) Oral daily  gabapentin 300milliGRAM(s) Oral three times a day  clopidogrel Tablet 75milliGRAM(s) Oral daily  rifaximin 550milliGRAM(s) Oral two times a day  pantoprazole    Tablet 40milliGRAM(s) Oral before breakfast  multivitamin 1Tablet(s) Oral daily  folic acid 1milliGRAM(s) Oral daily  thiamine 100milliGRAM(s) Oral daily  hydrocortisone 10milliGRAM(s) Oral at bedtime  hydrocortisone 20milliGRAM(s) Oral daily  heparin  Injectable 5000Unit(s) SubCutaneous every 12 hours  phenylephrine    Infusion 0.5MICROgram(s)/kG/Min IV Continuous <Continuous>    MEDICATIONS  (PRN):  naphazoline 0.025%/pheniramine 0.3% Ophthalmic Solution - Peds 1Drop(s) Both EYES two times a day PRN watery eyes.      DVT Prophylaxis:    Advanced Directives:  Discussed with:    Visit Information:    ** Time is exclusive of billed procedures and/or teaching and/or routine family updates.

## 2017-04-29 NOTE — PROGRESS NOTE ADULT - SUBJECTIVE AND OBJECTIVE BOX
CHIEF COMPLAINT:    SUBJECTIVE:   67 yo F with PMH of liver cirrhosis 2/2 chronic alcohol abuse, macrocytic anemia, HTN, RA, esophageal varices s/p banding, CAD s/p stent, h/o prolonged Qtc, p/w chest pain/abdominal pain.  Patient states that she felt a "hollow pressure feeling" on her left side (points to left lower chest/abdomen/thigh).   Denied radiation to arm / jaw / neck. Denies nausea / vomiting / diaphoresis. In ED, troponins trended from 0.707 -> 79.3. Called on call cardio Dr. Flannery and discussed case, he recommended calling Dr. Anderson who is the on call interventionlist. Discussed case in detail with Dr. Anderson, including troponins trending up, new ST depressions on EKG and on going residual chest discomfort.  She was started on heparin drip.  Also  to c/w ASA / plavix / beta blocker / statin. Dr. Anderson believed patient may have had cardiac event earlier, and troponins were lagging behind. Cardiac cath performed and stent was patent.    Patient is known to our service from last admission.  She previously followed with Rheumatologist Dr. Montes, diagnosed in 2009, on MTX and prednisone 5mg daily since then.  Her MTX was discontinued 3/10/17 due to the alcohol liver cirrhosis, and prednisone was increased to 15mg daily to attempt to improve her BP as there ?adrenal insufficiency.  Patient continues to feel that her RA is quiet.  She has had RA flares in the past including the elbows,shoulders, hands, and denies any stiffness or pain in joints.  She has multiple fractures in left ankle, now with CAM boot.  She feels pain diffusely in lower extremities b/L, and has swelling in both legs. She continues to complain of numbness/tingling in hands/feet.  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  She was started on thiamine and gabapentin.  Patient does not feel her symptoms have improved.      4/29/17  Patient feeling better.  No fevers, chills.  RA still quiet.      PSH: PCI, esophageal varices banding  Social Hx: Former smoker - quit in 2005, h/o heavy etoh use - quit 2/2016, Drugs - h/o marijuana use previously  Family Hx: Father - stomach problems / heart disease, mother - pancreatic cancer (24 Apr 2017 21:23)      REVIEW OF SYSTEMS:    CONSTITUTIONAL: No weakness, fevers or chills  EYES/ENT: No visual changes;  No vertigo or throat pain   NECK: No pain or stiffness  RESPIRATORY: No cough, wheezing, hemoptysis; No shortness of breath  CARDIOVASCULAR: No chest pain or palpitations  GASTROINTESTINAL: As above.  No nausea, vomiting, or hematemesis; No diarrhea or constipation. No melena or hematochezia.  GENITOURINARY: No dysuria, frequency or hematuria  NEUROLOGICAL: numbness/tingling  SKIN: No itching, burning, rashes, or lesions   All other review of systems is negative unless indicated above      Vital Signs Last 24 Hrs  T(C): 36.5, Max: 36.8 (04-28 @ 15:07)  T(F): 97.7, Max: 98.3 (04-28 @ 15:07)  HR: 115 (76 - 122)  BP: 100/64 (65/45 - 120/42)  BP(mean): 72 (50 - 80)  RR: 13 (8 - 25)  SpO2: 94% (92% - 96%)    I&O's Summary  I & Os for 24h ending 29 Apr 2017 07:00  =============================================  IN: 2037.2 ml / OUT: 300 ml / NET: 1737.2 ml    I & Os for current day (as of 29 Apr 2017 15:01)  =============================================  IN: 0 ml / OUT: 200 ml / NET: -200 ml      CAPILLARY BLOOD GLUCOSE      PHYSICAL EXAM:    HEENT- no oral lesions noted, no lymphadenoapthy noted  Heart- S1 S2 reg  Lungs- CTA b/l  Abd- Soft NT  Ext- no edema  Skin- no rashes  Musculoskelatal- FROM all joints, no active synovitis noted.  No tenderness in joints.  Some discomfort on manipulation of right ankle, but patient has pain throughout the RLE including non joint areas when gripping the RLE.  Unable to examine left foot/ankle.    Neurological- intact strength upper extremities.  Decreased strength lower extremities.          MEDICATIONS:  MEDICATIONS  (STANDING):  atorvastatin 40milliGRAM(s) Oral at bedtime  aspirin 325milliGRAM(s) Oral daily  gabapentin 300milliGRAM(s) Oral three times a day  clopidogrel Tablet 75milliGRAM(s) Oral daily  rifaximin 550milliGRAM(s) Oral two times a day  pantoprazole    Tablet 40milliGRAM(s) Oral before breakfast  multivitamin 1Tablet(s) Oral daily  folic acid 1milliGRAM(s) Oral daily  thiamine 100milliGRAM(s) Oral daily  hydrocortisone 10milliGRAM(s) Oral at bedtime  hydrocortisone 20milliGRAM(s) Oral daily  heparin  Injectable 5000Unit(s) SubCutaneous every 12 hours  phenylephrine    Infusion 0.5MICROgram(s)/kG/Min IV Continuous <Continuous>      LABS: All Labs Reviewed:                        9.7    18.2  )-----------( 141      ( 29 Apr 2017 04:51 )             29.8     04-29    134<L>  |  105  |  26<H>  ----------------------------<  97  4.3   |  19<L>  |  0.90    Ca    7.9<L>      29 Apr 2017 04:51            Blood Culture:     RADIOLOGY/EKG:    INTERPRETATION:  Clinical information: Chills. Rule out pneumonia    Portable AP chest radiograph from 1632 hours:    COMPARISON:  April 24, 2017    FINDINGS:  There is retrocardiac opacity compatible with small to   moderate pleural effusion which appears grossly unchanged in size. There   is no pneumothorax. The right lung is clear. No bony abnormality is   identified.    IMPRESSION:    No change in left pleural effusion.    A/P:

## 2017-04-29 NOTE — PROGRESS NOTE ADULT - ASSESSMENT
66 year old woman with hx of RA since 2009, with alcoholic liver cirrhosis, esophageal varices s/p banding, macrocytosis, multi level disc disease with spinal stenosis, alcohol neuropathy,  CAD s/p stent, h/o prolonged Qtc, presenting with chest/abdominal pressure while at [facility] for LE weakness.  Our service was called for management of RA.  Patient's arthritis is currently stable/quiescent. Would continue prednisone at current dose.   Prednisone was increased last visit from 5mg to 15mg in attempt to improve BP.  On this admission, additional IV hydrocortisone is being given today for same problem.  Patient will need maintenance therapy, which can be started as outpatient.  CT chest shows pleural effusion L>R, with compressive atelectasis, increased since last CT on prior admission.  f/u CXR 4/28/17 shows pleural effusion is stable.      RA  -stable, RA joints are not active.   Continue prednisone 15mg daily.  Patient is also now getting hydrocortisone for low BP/possible adrenal insufficiency with noted good response.  From an RA standpoint, patient does not need to be on 15mg daily, and can start slow taper.  If not needed from a BP standpoint, would begin slow taper and decrease to 12.5mg daily.  Patient was maintained on 5mg prednisone long term prior to her recent multiple hospital admissions.  -Patient will need maintenance therapy which can be started as outpatient.      Leukocytosis  -May be due to the added hydrocortisone (4/26/17) vs. infection  -ID consult appreciated.  To observe off antibiotics for now as there is currently no identifiable source of infection.    -pleural effusion stable on f/u CXR.  No infiltrate.  Patient not complaining of chest pain/SOB. 66 year old woman with hx of RA since 2009, with alcoholic liver cirrhosis, esophageal varices s/p banding, macrocytosis, multi level disc disease with spinal stenosis, alcohol neuropathy,  CAD s/p stent, h/o prolonged Qtc, presenting with chest/abdominal pressure while at [facility] for LE weakness.  Our service was called for management of RA.  Patient's arthritis is currently stable/quiescent.   Prednisone was increased last visit from 5mg to 15mg in attempt to improve BP.  On this admission, IV hydrocortisone is being given for same problem.  Patient will need maintenance therapy, which can be started as outpatient.  CT chest shows pleural effusion L>R, with compressive atelectasis, increased since last CT on prior admission.  f/u CXR 4/28/17 shows pleural effusion is stable.      RA  -stable, RA is not active.  Patient is on hydrocortisone for low BB, secondary to  possible adrenal insufficiency.  Improvement in BP noted.      - Patient will need maintenance RA therapy, which can be started as an outpatient.      Leukocytosis  -May be due to the hydrocortisone (4/26/17) vs. infection  -ID consult appreciated.  To observe off antibiotics for now as there is currently no identifiable source of infection.    -pleural effusion stable on f/u CXR.  No infiltrate.  Patient not complaining of chest pain/SOB.

## 2017-04-30 LAB
ANION GAP SERPL CALC-SCNC: 11 MMOL/L — SIGNIFICANT CHANGE UP (ref 5–17)
BUN SERPL-MCNC: 23 MG/DL — SIGNIFICANT CHANGE UP (ref 7–23)
CALCIUM SERPL-MCNC: 8 MG/DL — LOW (ref 8.5–10.1)
CHLORIDE SERPL-SCNC: 106 MMOL/L — SIGNIFICANT CHANGE UP (ref 96–108)
CO2 SERPL-SCNC: 20 MMOL/L — LOW (ref 22–31)
CREAT SERPL-MCNC: 0.79 MG/DL — SIGNIFICANT CHANGE UP (ref 0.5–1.3)
GLUCOSE SERPL-MCNC: 118 MG/DL — HIGH (ref 70–99)
HCT VFR BLD CALC: 31.3 % — LOW (ref 34.5–45)
HGB BLD-MCNC: 10 G/DL — LOW (ref 11.5–15.5)
LACTATE SERPL-SCNC: 1.4 MMOL/L — SIGNIFICANT CHANGE UP (ref 0.7–2)
MCHC RBC-ENTMCNC: 32 GM/DL — SIGNIFICANT CHANGE UP (ref 32–36)
MCHC RBC-ENTMCNC: 37 PG — HIGH (ref 27–34)
MCV RBC AUTO: 115.7 FL — HIGH (ref 80–100)
PLATELET # BLD AUTO: 118 K/UL — LOW (ref 150–400)
POTASSIUM SERPL-MCNC: 4 MMOL/L — SIGNIFICANT CHANGE UP (ref 3.5–5.3)
POTASSIUM SERPL-SCNC: 4 MMOL/L — SIGNIFICANT CHANGE UP (ref 3.5–5.3)
RBC # BLD: 2.7 M/UL — LOW (ref 3.8–5.2)
RBC # FLD: 14.7 % — HIGH (ref 10.3–14.5)
SODIUM SERPL-SCNC: 137 MMOL/L — SIGNIFICANT CHANGE UP (ref 135–145)
WBC # BLD: 10.6 K/UL — HIGH (ref 3.8–10.5)
WBC # FLD AUTO: 10.6 K/UL — HIGH (ref 3.8–10.5)

## 2017-04-30 PROCEDURE — 74177 CT ABD & PELVIS W/CONTRAST: CPT | Mod: 26

## 2017-04-30 PROCEDURE — 71260 CT THORAX DX C+: CPT | Mod: 26

## 2017-04-30 RX ORDER — PIPERACILLIN AND TAZOBACTAM 4; .5 G/20ML; G/20ML
3.38 INJECTION, POWDER, LYOPHILIZED, FOR SOLUTION INTRAVENOUS EVERY 8 HOURS
Qty: 0 | Refills: 0 | Status: DISCONTINUED | OUTPATIENT
Start: 2017-04-30 | End: 2017-05-05

## 2017-04-30 RX ORDER — PIPERACILLIN AND TAZOBACTAM 4; .5 G/20ML; G/20ML
3.38 INJECTION, POWDER, LYOPHILIZED, FOR SOLUTION INTRAVENOUS ONCE
Qty: 0 | Refills: 0 | Status: COMPLETED | OUTPATIENT
Start: 2017-04-30 | End: 2017-04-30

## 2017-04-30 RX ADMIN — Medication 325 MILLIGRAM(S): at 12:22

## 2017-04-30 RX ADMIN — Medication 10 MILLIGRAM(S): at 22:09

## 2017-04-30 RX ADMIN — Medication 20 MILLIGRAM(S): at 06:09

## 2017-04-30 RX ADMIN — PIPERACILLIN AND TAZOBACTAM 200 GRAM(S): 4; .5 INJECTION, POWDER, LYOPHILIZED, FOR SOLUTION INTRAVENOUS at 12:24

## 2017-04-30 RX ADMIN — GABAPENTIN 300 MILLIGRAM(S): 400 CAPSULE ORAL at 16:15

## 2017-04-30 RX ADMIN — ATORVASTATIN CALCIUM 40 MILLIGRAM(S): 80 TABLET, FILM COATED ORAL at 22:08

## 2017-04-30 RX ADMIN — PANTOPRAZOLE SODIUM 40 MILLIGRAM(S): 20 TABLET, DELAYED RELEASE ORAL at 06:10

## 2017-04-30 RX ADMIN — CLOPIDOGREL BISULFATE 75 MILLIGRAM(S): 75 TABLET, FILM COATED ORAL at 12:22

## 2017-04-30 RX ADMIN — PIPERACILLIN AND TAZOBACTAM 25 GRAM(S): 4; .5 INJECTION, POWDER, LYOPHILIZED, FOR SOLUTION INTRAVENOUS at 22:08

## 2017-04-30 RX ADMIN — HEPARIN SODIUM 5000 UNIT(S): 5000 INJECTION INTRAVENOUS; SUBCUTANEOUS at 06:10

## 2017-04-30 RX ADMIN — GABAPENTIN 300 MILLIGRAM(S): 400 CAPSULE ORAL at 06:10

## 2017-04-30 RX ADMIN — Medication 100 MILLIGRAM(S): at 12:22

## 2017-04-30 RX ADMIN — HEPARIN SODIUM 5000 UNIT(S): 5000 INJECTION INTRAVENOUS; SUBCUTANEOUS at 17:56

## 2017-04-30 RX ADMIN — Medication 1 TABLET(S): at 12:22

## 2017-04-30 RX ADMIN — Medication 1 MILLIGRAM(S): at 12:22

## 2017-04-30 RX ADMIN — GABAPENTIN 300 MILLIGRAM(S): 400 CAPSULE ORAL at 22:08

## 2017-04-30 NOTE — PROGRESS NOTE ADULT - ASSESSMENT
65 yo F with PMH of liver cirrhosis 2/2 chronic alcohol abuse, macrocytic anemia, HTN, RA, esophageal varices s/p banding, CAD s/p stent, h/o prolonged Qtc intially  p/w chest pain/abdominal pain on 4/24 here found to have elevated trops and EKG changes s/p cardiac cath with no stent thrombosis or occlusion, her course has been c/b by hypotension that has been persistent, she is now on low dose phenlyephrine, has was on IV fluids but had issues with fluid overload and has been receiving diuresis as well, has had some rigors but no fevers since admission, her abd has been distended but denies pain, she was started on stress dose steroids 4/26 d/t concern for adrenal insufficiency as she has been on chronic low dose steroids for her RA. She had an xray showing moderate R pleural effusion no obvious infiltrate or consolidation, she denies cough or urinary symptoms and no diarrhea or rashes. No hx of prior SBP or GI bleeding. I.D consulted for eval and r/o infection.    1.gnr sepsis/UTI/leukocytosis/hypotension/alcoholic liver cirrhosis    - afebrile, wbc ct trending down, still on pressor support    -blood cx 1 bottle with GNRs source ? abdominal ?    - urine cx growing E faceium/E facealis    - would start zosyn 3.375q8h for GI tract/UTI coverage    - recommend CT abd/pelvis to eval for abd source and eval upper urinary tract/kidney, ro abscess    - xray with stable L pleural effusion, no consolidation or infiltrate    - hypotension/lactic acidosis likely d/t third-spacing and poor perfusion d/t chronic volume depletion d/t liver cirrhosis/portal htn    - suggest diagnostic/therapeutic paracentesis    - f/u CBC    - monitor temps    - supportive care    2. other issues- liver cirrhosis 2/2 chronic alcohol abuse, macrocytic anemia, HTN, RA, esophageal varices s/p banding, CAD s/p stent, h/o prolonged Qtc - care per medicine

## 2017-04-30 NOTE — PROGRESS NOTE ADULT - ASSESSMENT
66y old F:  NSTEMI - s/p LHC with clean coronaries  s/p LAD stent  Hypotension on jacquelyn  Cirrhosis  RA - On steroids  GIB    Plan:  Cont CCU Care  Serial neuro Exams  titrate JACQUELYN to decd  ID fu for high WBC  PO steroids  PO diet  cont ASA/Plavix  Monitor renal function  Strict I/O's  DVT prophylaxis - Hep SQ  d/w staff

## 2017-04-30 NOTE — PROGRESS NOTE ADULT - SUBJECTIVE AND OBJECTIVE BOX
PCP: DR Lees    CC-Patient is a 66y old  Female who presents with a chief complaint of chest pain (2017 23:38)    HPI:  67 yo F with PMH of liver cirrhosis 2/2 chronic alcohol abuse, macrocytic anemia, HTN, RA, esophageal varices s/p banding, CAD s/p stent, h/o prolonged Qtc, p/w chest pain/abdominal pain. States pain is more like pressure in L chest and abdomen. States pain has diminished a lot now, but is still there mildly. Denies radiation to arm / jaw / neck. Denies nausea / vomiting / diophoresis. In ED, troponins trended from 0.707 -> 79.3. Called on call cardio Dr. Flannery and discussed case, he recommended calling Dr. Anderson who is the on call interventionlist. Discussed case in detail with Dr. Anderson, including troponins trending up, new ST depressions on EKG and on going residual chest discomfort. Dr. Anderson recommends heparin drip without initial bolus due to esophageal varices / liver cirrhosis and due to patient being at increased risk for bleeding. Also recommends to c/w ASA / plavix / beta blocker / statin. Dr. Anderson believes patient may have had cardiac event earlier, and troponins are just lagging behind. Cardiac cath to be performed in AM.    17 s/p card cath- stent open, no intervention done  17 c/o tingling and numbness of both arms  17 remains hypotensive, bp 70/30's, +rigors but no fever, +chest congestion. States she does not "feel good"  17- started on pressors yesterday- BP improved. No localized signs of infection despite elevtaed lactate an WBC- pancultures taken. Pt feels better today  17 remains on pressors, tapered down a little. For pan scan today    Vital Signs Last 24 Hrs  T(C): 36.6, Max: 37 (- @ 15:38)  T(F): 97.9, Max: 98.6 ( @ 15:38)  HR: 103 (88 - 117)  BP: 110/58 (87/46 - 117/53)  BP(mean): 71 (55 - 74)  RR: 12 (9 - 15)  SpO2: 94% (93% - 98%)    LABS:                        10.0   10.6  )-----------( 118      ( 2017 06:08 )             31.3     2017 06:08    137    |  106    |  23     ----------------------------<  118    4.0     |  20     |  0.79     Ca    8.0        2017 06:08    RADIOLOGY & ADDITIONAL TESTS:  EXAM:  CHEST SINGLE VIEW FRONTAL                          PROCEDURE DATE:  2017    INTERPRETATION:  Clinical information: Chills. Rule out pneumonia    Portable AP chest radiograph from 1632 hours:    COMPARISON:  2017    FINDINGS:  There is retrocardiac opacity compatible with small to   moderate pleural effusion which appears grossly unchanged in size. There   is no pneumothorax. The right lung is clear. No bony abnormality is   identified.    IMPRESSION:    No change inleft pleural effusion.    PHYSICAL EXAM:  GENERAL: looks pale and ill  HEAD:  Atraumatic, Normocephalic  EYES: EOMI, PERRLA, conjunctiva and sclera clear  HEENT: Moist mucous membranes  NECK: Supple, No JVD  NERVOUS SYSTEM:  Alert & Oriented X3, Motor Strength 5/5 B/L upper and lower extremities; DTRs 2+ intact and symmetric  CHEST/LUNG: +crackles  HEART: Regular rate and rhythm; No murmurs, rubs, or gallops  ABDOMEN: Soft, Nontender, Large ascites  GENITOURINARY- Voiding, no palpable bladder  EXTREMITIES: LLE boot on, +anasarca up to upper abdomen  SKIN-no rash, no lesion  CNS- alert, oriented X3, non focal        Daily Weight in k.8 (2017 00:00)    atorvastatin 40milliGRAM(s) Oral at bedtime  lisinopril 2.5milliGRAM(s) Oral daily  heparin  Infusion. Unit(s)/Hr IV Continuous <Continuous>  heparin  Injectable 4700Unit(s) IV Push every 6 hours PRN  predniSONE   Tablet 15milliGRAM(s) Oral daily  aspirin 325milliGRAM(s) Oral daily  gabapentin 300milliGRAM(s) Oral three times a day  clopidogrel Tablet 75milliGRAM(s) Oral daily  rifaximin 550milliGRAM(s) Oral two times a day  naphazoline 0.025%/pheniramine 0.3% Ophthalmic Solution - Peds 1Drop(s) Both EYES two times a day PRN  pantoprazole    Tablet 40milliGRAM(s) Oral before breakfast  multivitamin 1Tablet(s) Oral daily  folic acid 1milliGRAM(s) Oral daily  thiamine 100milliGRAM(s) Oral daily  metoprolol succinate ER 12.5milliGRAM(s) Oral daily  sodium chloride 0.45%. 1000milliLiter(s) IV Continuous <Continuous>    Assessment/Plan  #Persistent hypotension- now on pressors  Unclear underlying reason for hypotension  Sepsis not excluded but doubt that UTI a cause of it as patient is asymptomatic. D/w DR Raygoza, though it looks like asymptomatic bacteriuria, will treat it with Zosyn in view of hypotension  CXR- no pneumonia  Leukocytosis unclear if from stress doses of steroids or indicative of underlying infection- WBC is better today  Pancultures taken- blood c/s-neg, urine c/s- Enterococcus+  ID f/u appreciated- started on IV Zosyn  Will get CT chest/abd/pelvis today. May need paracenthesis on Monday  Intensivist f/u appreciated, d/w DR Becerra    #NSTEMI/ CAD s/p recent stent  Card eval DR Ramesh appreciated  S/p card cath- stent patent, no other blockages  Cont aspirin, Plavix, statin. Not on BB 2 to hypotension    #Neuropathy/cervical stenosis  Seen by DR Gonzales previous admission. Refused surgical intervention  MRI's reviewed. Still does not want to proceed with surgery  Neuro eval DR Valdes    #Liver cirrhosis w/ esophageal varices/anasarca from third-spacing   -Lasix IV  -May need paracenthesis    #Rheumatoid Arthritis  Rheum f/u appreciated    #LT ankle fracture  Ortho f/u DR Fung appreciated. Xrays reviewed  Remains NWB to LLE in cam boot. WIll get PT eval    #Anemia/thrombocytopenia likely 2 to liver cirrhosis  Monitor HH    #Dispo- cont pressors. Ct chest/abd/pelvis- ?source of infection search. Treat UTI

## 2017-04-30 NOTE — PROGRESS NOTE ADULT - SUBJECTIVE AND OBJECTIVE BOX
Subjective:    HPI:  67 yo F with PMH of liver cirrhosis 2/2 chronic alcohol abuse, macrocytic anemia, HTN, RA, esophageal varices s/p banding, CAD s/p stent, h/o prolonged Qtc, p/w chest pain/abdominal pain. States pain is more like pressure in L chest and abdomen. States pain has diminished a lot now, but is still there mildly. Denies radiation to arm / jaw / neck. Denies nausea / vomiting / diophoresis. In ED, troponins trended from 0.707 -> 79.3. Called on call cardio Dr. Flannery and discussed case, he recommended calling Dr. Anderson who is the on call interventionlist. Discussed case in detail with Dr. Anderson, including troponins trending up, new ST depressions on EKG and on going residual chest discomfort. Dr. Anderson recommends heparin drip without initial bolus due to esophageal varices / liver cirrhosis and due to patient being at increased risk for bleeding. Also recommends to c/w ASA / plavix / beta blocker / statin. Dr. Anderson believes patient may have had cardiac event earlier, and troponins are just lagging behind. Cardiac cath to be performed in AM.    4/29: pat better, still on david, fluid overload, lying comfortably feeling cold.    4/30: pat on tapering david on .08mcg, waiting CT chest/abd with high WBC, urine enterococus feacalis.    MEDICATIONS  (STANDING):  atorvastatin 40milliGRAM(s) Oral at bedtime  aspirin 325milliGRAM(s) Oral daily  gabapentin 300milliGRAM(s) Oral three times a day  clopidogrel Tablet 75milliGRAM(s) Oral daily  rifaximin 550milliGRAM(s) Oral two times a day  pantoprazole    Tablet 40milliGRAM(s) Oral before breakfast  multivitamin 1Tablet(s) Oral daily  folic acid 1milliGRAM(s) Oral daily  thiamine 100milliGRAM(s) Oral daily  hydrocortisone 10milliGRAM(s) Oral at bedtime  hydrocortisone 20milliGRAM(s) Oral daily  heparin  Injectable 5000Unit(s) SubCutaneous every 12 hours  phenylephrine    Infusion 0.5MICROgram(s)/kG/Min IV Continuous <Continuous>  piperacillin/tazobactam IVPB. 3.375Gram(s) IV Intermittent once  piperacillin/tazobactam IVPB. 3.375Gram(s) IV Intermittent every 8 hours    MEDICATIONS  (PRN):  naphazoline 0.025%/pheniramine 0.3% Ophthalmic Solution - Peds 1Drop(s) Both EYES two times a day PRN watery eyes.      Allergies    sulfa drugs (Unknown)    Intolerances        Vital Signs Last 24 Hrs  T(C): 36.6, Max: 37 (04-29 @ 15:38)  T(F): 97.9, Max: 98.6 (04-29 @ 15:38)  HR: 92 (88 - 117)  BP: 101/55 (87/46 - 113/58)  BP(mean): 67 (55 - 74)  RR: 9 (9 - 22)  SpO2: 94% (93% - 98%)    PHYSICAL EXAMINATION:    NECK:  Supple. No lymphadenopathy. Jugular venous pressure not elevated. Carotids equal.   HEART:   The cardiac impulse has a normal quality. Reg., Nl S1 and S2.  There are no murmurs, rubs or gallops noted  CHEST:  Chest is clear to auscultation. Normal respiratory effort.  ABDOMEN:  Soft and nontender.   EXTREMITIES:  There is no edema.       LABS:                        10.0   10.6  )-----------( 118      ( 30 Apr 2017 06:08 )             31.3     04-30    137  |  106  |  23  ----------------------------<  118<H>  4.0   |  20<L>  |  0.79    Ca    8.0<L>      30 Apr 2017 06:08      Culture Results:   >100,000 CFU/ml Enterococcus faecium  >100,000 CFU/ml Enterococcus faecalis (04.28.17 @ 17:30)

## 2017-04-30 NOTE — PROGRESS NOTE ADULT - SUBJECTIVE AND OBJECTIVE BOX
65 yo F with PMH of liver cirrhosis 2/2 chronic alcohol abuse, macrocytic anemia, HTN, RA, esophageal varices s/p banding, CAD s/p stent, h/o prolonged Qtc intially  p/w chest pain/abdominal pain on 4/24 here found to have elevated trops and EKG changes s/p cardiac cath with no stent thrombosis or occlusion, her course has been c/b by hypotension that has been persistent, she is now on low dose phenlyephrine, has was on IV fluids but had issues with fluid overload and has been receiving diuresis as well, has had some rigors but no fevers since admission, her abd has been distended but denies pain, she was started on stress dose steroids 4/26 d/t concern for adrenal insufficiency as she has been on chronic low dose steroids for her RA. She had an xray showing moderate R pleural effusion no obvious infiltrate or consolidation, she denies cough or urinary symptoms and no diarrhea or rashes. No hx of prior SBP or recent GI bleeding. I.D consulted for eval and r/o infection.    no fevers  remains on pressor support    MEDICATIONS  (STANDING):  atorvastatin 40milliGRAM(s) Oral at bedtime  aspirin 325milliGRAM(s) Oral daily  gabapentin 300milliGRAM(s) Oral three times a day  clopidogrel Tablet 75milliGRAM(s) Oral daily  rifaximin 550milliGRAM(s) Oral two times a day  pantoprazole    Tablet 40milliGRAM(s) Oral before breakfast  multivitamin 1Tablet(s) Oral daily  folic acid 1milliGRAM(s) Oral daily  thiamine 100milliGRAM(s) Oral daily  hydrocortisone 10milliGRAM(s) Oral at bedtime  hydrocortisone 20milliGRAM(s) Oral daily  heparin  Injectable 5000Unit(s) SubCutaneous every 12 hours  phenylephrine    Infusion 0.5MICROgram(s)/kG/Min IV Continuous <Continuous>  piperacillin/tazobactam IVPB. 3.375Gram(s) IV Intermittent every 8 hours      Vital Signs Last 24 Hrs  T(C): 36.4, Max: 36.7 (04-30 @ 00:00)  T(F): 97.5, Max: 98 (04-30 @ 00:00)  HR: 110 (88 - 110)  BP: 104/65 (92/49 - 117/53)  BP(mean): 74 (57 - 76)  RR: 11 (9 - 19)  SpO2: 94% (94% - 98%)            PE:  Constitutional: frail looking  HEENT: NC/AT, EOMI, PERRLA  Neck: supple  Back: no tenderness  Respiratory: clear  Cardiovascular: S1S2 regular, no murmurs  Abdomen: soft, not tender, not distended, positive BS  Genitourinary: deferred  Rectal: deferred  Musculoskeletal: no muscle tenderness, no joint swelling or tenderness  Extremities: +pedal edema  Neurological:  no focal deficits  Skin: no rashes    Labs:                                   10.0   10.6  )-----------( 118      ( 30 Apr 2017 06:08 )             31.3     04-30    137  |  106  |  23  ----------------------------<  118<H>  4.0   |  20<L>  |  0.79    Ca    8.0<L>      30 Apr 2017 06:08             Cultures: Culture - Urine (04.28.17 @ 17:30)    Specimen Source: .Urine Catheterized    Culture Results:   >100,000 CFU/ml Enterococcus faecium  >100,000 CFU/ml Enterococcus faecalis    Culture - Blood (04.28.17 @ 16:17)    Gram Stain:   Growth in anaerobic bottle: Gram Negative Rods    Specimen Source: .Blood None    Culture Results:   Growth in anaerobic bottle: Gram Negative Rods              Radiology:   EXAM:  CHEST SINGLE VIEW FRONTAL                            PROCEDURE DATE:  04/28/2017        INTERPRETATION:  Clinical information: Chills. Rule out pneumonia    Portable AP chest radiograph from 1632 hours:    COMPARISON:  April 24, 2017    FINDINGS:  There is retrocardiac opacity compatible with small to   moderate pleural effusion which appears grossly unchanged in size. There   is no pneumothorax. The right lung is clear. No bony abnormality is   identified.    IMPRESSION:    No change inleft pleural effusion.        Advanced directives addressed: full resuscitation

## 2017-05-01 RX ADMIN — Medication 1 MILLIGRAM(S): at 11:52

## 2017-05-01 RX ADMIN — GABAPENTIN 300 MILLIGRAM(S): 400 CAPSULE ORAL at 22:20

## 2017-05-01 RX ADMIN — PANTOPRAZOLE SODIUM 40 MILLIGRAM(S): 20 TABLET, DELAYED RELEASE ORAL at 06:10

## 2017-05-01 RX ADMIN — GABAPENTIN 300 MILLIGRAM(S): 400 CAPSULE ORAL at 06:10

## 2017-05-01 RX ADMIN — GABAPENTIN 300 MILLIGRAM(S): 400 CAPSULE ORAL at 14:13

## 2017-05-01 RX ADMIN — Medication 10 MILLIGRAM(S): at 22:20

## 2017-05-01 RX ADMIN — HEPARIN SODIUM 5000 UNIT(S): 5000 INJECTION INTRAVENOUS; SUBCUTANEOUS at 17:48

## 2017-05-01 RX ADMIN — HEPARIN SODIUM 5000 UNIT(S): 5000 INJECTION INTRAVENOUS; SUBCUTANEOUS at 06:10

## 2017-05-01 RX ADMIN — ATORVASTATIN CALCIUM 40 MILLIGRAM(S): 80 TABLET, FILM COATED ORAL at 22:21

## 2017-05-01 RX ADMIN — PIPERACILLIN AND TAZOBACTAM 25 GRAM(S): 4; .5 INJECTION, POWDER, LYOPHILIZED, FOR SOLUTION INTRAVENOUS at 22:20

## 2017-05-01 RX ADMIN — Medication 100 MILLIGRAM(S): at 14:13

## 2017-05-01 RX ADMIN — PIPERACILLIN AND TAZOBACTAM 25 GRAM(S): 4; .5 INJECTION, POWDER, LYOPHILIZED, FOR SOLUTION INTRAVENOUS at 06:10

## 2017-05-01 RX ADMIN — CLOPIDOGREL BISULFATE 75 MILLIGRAM(S): 75 TABLET, FILM COATED ORAL at 11:52

## 2017-05-01 RX ADMIN — PIPERACILLIN AND TAZOBACTAM 25 GRAM(S): 4; .5 INJECTION, POWDER, LYOPHILIZED, FOR SOLUTION INTRAVENOUS at 14:14

## 2017-05-01 RX ADMIN — Medication 1 TABLET(S): at 11:52

## 2017-05-01 RX ADMIN — Medication 325 MILLIGRAM(S): at 11:52

## 2017-05-01 RX ADMIN — Medication 20 MILLIGRAM(S): at 06:12

## 2017-05-01 NOTE — PROGRESS NOTE ADULT - ASSESSMENT
65 yo F with PMH of liver cirrhosis 2/2 chronic alcohol abuse, macrocytic anemia, HTN, RA, esophageal varices s/p banding, CAD s/p stent, h/o prolonged Qtc intially  p/w chest pain/abdominal pain on 4/24 here found to have elevated trops and EKG changes s/p cardiac cath with no stent thrombosis or occlusion, her course has been c/b by hypotension that has been persistent, she is now on low dose phenlyephrine, has was on IV fluids but had issues with fluid overload and has been receiving diuresis as well, has had some rigors but no fevers since admission, her abd has been distended but denies pain, she was started on stress dose steroids 4/26 d/t concern for adrenal insufficiency as she has been on chronic low dose steroids for her RA. She had an xray showing moderate R pleural effusion no obvious infiltrate or consolidation, she denies cough or urinary symptoms and no diarrhea or rashes. No hx of prior SBP or GI bleeding. I.D consulted for eval and r/o infection.    1.morganella sepsis/UTI/leukocytosis/hypotension/alcoholic liver cirrhosis    - improving, off pressors, wbc ct normalized    -blood cx 1 bottle with morganella morganii - likely abd source    - fu id/sensitivities, repeat blood cx 5/1    - urine cx growing E faceium/E facealis    -continue with IV zosyn 3.375q8h for GI tract/UTI coverage day#2    -CT chest/abd/pelvis 4/30 sig for L pleural effusion/atelectasis/gallbladder sludge/stones     - xray with stable L pleural effusion, no consolidation or infiltrate    - diagnostic/therapeutic paracentesis planned for tomorrow, f/u fluid analysis for sbp    - hypotension/lactic acidosis likely d/t third-spacing and poor perfusion d/t chronic volume depletion d/t liver cirrhosis/portal htn    - f/u CBC    - monitor temps    - supportive care    2. other issues- liver cirrhosis 2/2 chronic alcohol abuse, macrocytic anemia, HTN, RA, esophageal varices s/p banding, CAD s/p stent, h/o prolonged Qtc - care per medicine

## 2017-05-01 NOTE — PROGRESS NOTE ADULT - ASSESSMENT
67yo F suffering from gram negative bacteremia and septic shock requiring pressor - Currently OFF pressors and hemodynamics/respiratory function reasonable  CT of Chest/Abdomen 4/30 grossly unrevealing of source of bacteremia  underlying liver cirrhosis with ascites - ? SBP as source  UTI present but not with gram neg org.    Plan at this time should include continued antibiotic therapy  f/u cultures for sensitivity  consider paracentesis to obtain sample for culture/analysis  diet/mobilize  GI/DVT prophylaxis  supportive care    please reconsult CCM if condition changes or new issues develop.

## 2017-05-01 NOTE — PROGRESS NOTE ADULT - SUBJECTIVE AND OBJECTIVE BOX
67 yo F with PMH of liver cirrhosis 2/2 chronic alcohol abuse, macrocytic anemia, HTN, RA, esophageal varices s/p banding, CAD s/p stent, h/o prolonged Qtc intially  p/w chest pain/abdominal pain on 4/24 here found to have elevated trops and EKG changes s/p cardiac cath with no stent thrombosis or occlusion, her course has been c/b by hypotension that has been persistent, she is now on low dose phenlyephrine, has was on IV fluids but had issues with fluid overload and has been receiving diuresis as well, has had some rigors but no fevers since admission, her abd has been distended but denies pain, she was started on stress dose steroids 4/26 d/t concern for adrenal insufficiency as she has been on chronic low dose steroids for her RA. She had an xray showing moderate R pleural effusion no obvious infiltrate or consolidation, she denies cough or urinary symptoms and no diarrhea or rashes. No hx of prior SBP or recent GI bleeding. I.D consulted for eval and r/o infection.    no fevers  off pressors  feels better   no abd pain    MEDICATIONS  (STANDING):  atorvastatin 40milliGRAM(s) Oral at bedtime  aspirin 325milliGRAM(s) Oral daily  gabapentin 300milliGRAM(s) Oral three times a day  clopidogrel Tablet 75milliGRAM(s) Oral daily  rifaximin 550milliGRAM(s) Oral two times a day  pantoprazole    Tablet 40milliGRAM(s) Oral before breakfast  multivitamin 1Tablet(s) Oral daily  folic acid 1milliGRAM(s) Oral daily  thiamine 100milliGRAM(s) Oral daily  hydrocortisone 10milliGRAM(s) Oral at bedtime  hydrocortisone 20milliGRAM(s) Oral daily  heparin  Injectable 5000Unit(s) SubCutaneous every 12 hours  phenylephrine    Infusion 0.5MICROgram(s)/kG/Min IV Continuous <Continuous>  piperacillin/tazobactam IVPB. 3.375Gram(s) IV Intermittent every 8 hours      Vital Signs Last 24 Hrs  T(C): 36.7, Max: 36.7 (05-01 @ 08:00)  T(F): 98.1, Max: 98.1 (05-01 @ 08:00)  HR: 103 (98 - 113)  BP: 104/62 (91/59 - 123/67)  BP(mean): 72 (59 - 80)  RR: 17 (8 - 17)  SpO2: 97% (91% - 97%)        PE:  Constitutional: frail looking  HEENT: NC/AT, EOMI, PERRLA  Neck: supple  Back: no tenderness  Respiratory: decreased breath sounds L>R  Cardiovascular: S1S2 regular, no murmurs  Abdomen: soft, not tender, tympanic, distended, positive BS  Genitourinary: deferred  Rectal: deferred  Musculoskeletal: no muscle tenderness, no joint swelling or tenderness  Extremities: +pedal edema  Neurological:  no focal deficits  Skin: no rashes    Labs:                                   10.0   10.6  )-----------( 118      ( 30 Apr 2017 06:08 )             31.3     04-30    137  |  106  |  23  ----------------------------<  118<H>  4.0   |  20<L>  |  0.79    Ca    8.0<L>      30 Apr 2017 06:08             Cultures: Culture - Urine (04.28.17 @ 17:30)    Specimen Source: .Urine Catheterized    Culture Results:   >100,000 CFU/ml Enterococcus faecium  >100,000 CFU/ml Enterococcus faecalis    Culture - Blood (04.28.17 @ 16:17)    Gram Stain:   Growth in anaerobic bottle: Gram Negative Rods    Specimen Source: .Blood None    Culture Results:   Growth in anaerobic bottle: Morganella morganii                  Radiology:       EXAM:  CT ABDOMEN AND PELVIS OC IC                            PROCEDURE DATE:  04/30/2017        INTERPRETATION:      Three examinations were performed on this patient:   1. CT scan of the chest with intravenous contrast  2. CT scan of the abdomen with intravenous contrast  3. CT scan of the pelvis with intravenous contrast        CLINICAL INFORMATION (all 3 exams):  Hypotension, fever and leukocytosis.     TECHNIQUE:  Contiguous axial 3 mm sections were obtained through the   chest, abdomen and pelvis using single helical acquisition.  Images were   acquired during the rapid bolus administration of 90 cc of Omnipaque 350/   10 cc discarded.  Imaging post processing software was employed to   generate reformatted images in 3 mm sagittal andcoronal imaging planes.     Oral contrast was administered.   This scan was performed using   automatic exposure control (radiation dose reduction software) to obtain   a diagnostic image quality scan with patient dose as low as reasonably   achievable.         FINDINGS:   CT of the chest dated 4/24/2017 is available for review. CT   of the abdomen and pelvis dated 04/02/2017 is available for review.    The thyroid gland appears intact.    The lungs are remarkable for small right pleural effusionand   moderate-sized left pleural effusion. There is near complete atelectasis   of the left lower lobe. Lungs are otherwise clear.  Lung volumes are   preserved.   The trachea and major bronchi are patent.    No enlarged axillary, hilar or mediastinal lymph nodes are found.   The   heart size is normal. The chest wall and thoracic spine are unremarkable.    The liver demonstrates homogeneous attenuation without focal lesion or   abnormal enhancement. The liver has a micronodular contour suggesting   cirrhosis.  Hepatic and portal veins are patent and not displaced.  No   intrahepatic or common ductal dilatation is recognized.  The gallbladder   is distended and filled with sludge and stones.  The pancreas is intact   without ductal dilatation or focal lesion.  The spleen is normal in size.    The adrenal glands are intact.  The kidneys demonstrate symmetric   nephrograms.   No suspicious renal mass is found.  No renal calculus,   hydronephrosis or perinephric infiltration is found.  The ureters are not   dilated.   The bladder appears unremarkable.    There is no evidence of pelvic mass.    No enlarged lymph nodes are found.    The bowel appears unremarkable.  No obstruction, perforation or abscess   is recognized.  There is edema inthe abdominal wall compatible with   anasarca.      IMPRESSION:          1.   Chest:  Small right pleural effusion and moderate left pleural   effusion. Near complete atelectasis of the left lower lobe.              2.   Abdomen and pelvis: Large amount of ascites in the abdomen.   Micronodular liver surface suggesting cirrhosis.   Distended gallbladder   with sludge and stones. Abdominal wall edema compatible with anasarca.  EXAM:  CHEST SINGLE VIEW FRONTAL                            PROCEDURE DATE:  04/28/2017        INTERPRETATION:  Clinical information: Chills. Rule out pneumonia    Portable AP chest radiograph from 1632 hours:    COMPARISON:  April 24, 2017    FINDINGS:  There is retrocardiac opacity compatible with small to   moderate pleural effusion which appears grossly unchanged in size. There   is no pneumothorax. The right lung is clear. No bony abnormality is   identified.    IMPRESSION:    No change inleft pleural effusion.        Advanced directives addressed: full resuscitation

## 2017-05-01 NOTE — PROGRESS NOTE ADULT - SUBJECTIVE AND OBJECTIVE BOX
Pt has been OFF pressors since 4pm yesterday - hemodynamics and respiratory function reasonable - Offers no new complaints and no overnight events noted.      Allergies    sulfa drugs (Unknown)    ICU Vital Signs Last 24 Hrs  T(C): 36.7, Max: 36.7 (05-01 @ 08:00)  T(F): 98.1, Max: 98.1 (05-01 @ 08:00)  HR: 102 (96 - 113)  BP: 104/60 (91/59 - 123/67)  BP(mean): 69 (59 - 80)  ABP: --  ABP(mean): --  RR: 9 (8 - 19)  SpO2: 97% (95% - 97%)          I&O's Summary  I & Os for 24h ending 01 May 2017 07:00  =============================================  IN: 486 ml / OUT: 600 ml / NET: -114 ml    I & Os for current day (as of 01 May 2017 09:10)  =============================================  IN: 300 ml / OUT: 0 ml / NET: 300 ml                            9.5    7.9   )-----------( 109      ( 01 May 2017 04:59 )             29.9       05-01    134<L>  |  107  |  21  ----------------------------<  120<H>  3.7   |  19<L>  |  0.71    Ca    8.0<L>      01 May 2017 04:59  Phos  2.6     05-01  Mg     2.1     05-01          PT/INR - ( 01 May 2017 04:59 )   PT: 14.0 sec;   INR: 1.29 ratio         MEDICATIONS  (STANDING):  atorvastatin 40milliGRAM(s) Oral at bedtime  aspirin 325milliGRAM(s) Oral daily  gabapentin 300milliGRAM(s) Oral three times a day  clopidogrel Tablet 75milliGRAM(s) Oral daily  rifaximin 550milliGRAM(s) Oral two times a day  pantoprazole    Tablet 40milliGRAM(s) Oral before breakfast  multivitamin 1Tablet(s) Oral daily  folic acid 1milliGRAM(s) Oral daily  thiamine 100milliGRAM(s) Oral daily  hydrocortisone 10milliGRAM(s) Oral at bedtime  hydrocortisone 20milliGRAM(s) Oral daily  heparin  Injectable 5000Unit(s) SubCutaneous every 12 hours  phenylephrine    Infusion 0.5MICROgram(s)/kG/Min IV Continuous <Continuous>  piperacillin/tazobactam IVPB. 3.375Gram(s) IV Intermittent every 8 hours    MEDICATIONS  (PRN):  naphazoline 0.025%/pheniramine 0.3% Ophthalmic Solution - Peds 1Drop(s) Both EYES two times a day PRN watery eyes.          DVT Prophylaxis: SQ heparin    Visit Information: SSQ    ** Time is exclusive of billed procedures and/or teaching and/or routine family updates.

## 2017-05-01 NOTE — PROGRESS NOTE ADULT - SUBJECTIVE AND OBJECTIVE BOX
PCP: DR Lees    CC-Patient is a 66y old  Female who presents with a chief complaint of chest pain (2017 23:38)    HPI:  67 yo F with PMH of liver cirrhosis 2/2 chronic alcohol abuse, macrocytic anemia, HTN, RA, esophageal varices s/p banding, CAD s/p stent, h/o prolonged Qtc, p/w chest pain/abdominal pain. States pain is more like pressure in L chest and abdomen. States pain has diminished a lot now, but is still there mildly. Denies radiation to arm / jaw / neck. Denies nausea / vomiting / diophoresis. In ED, troponins trended from 0.707 -> 79.3. Called on call cardio Dr. Flannery and discussed case, he recommended calling Dr. Anderson who is the on call interventionlist. Discussed case in detail with Dr. Anderson, including troponins trending up, new ST depressions on EKG and on going residual chest discomfort. Dr. Anderson recommends heparin drip without initial bolus due to esophageal varices / liver cirrhosis and due to patient being at increased risk for bleeding. Also recommends to c/w ASA / plavix / beta blocker / statin. Dr. Anderson believes patient may have had cardiac event earlier, and troponins are just lagging behind. Cardiac cath to be performed in AM.    17 s/p card cath- stent open, no intervention done  17 c/o tingling and numbness of both arms  17 remains hypotensive, bp 70/30's, +rigors but no fever, +chest congestion. States she does not "feel good"  17- started on pressors yesterday- BP improved. No localized signs of infection despite elevtaed lactate an WBC- pancultures taken. Pt feels better today  17 remains on pressors, tapered down a little. For pan scan today  17 off pressors    Vital Signs Last 24 Hrs  T(C): 36.7, Max: 36.7 (- @ 08:00)  T(F): 98.1, Max: 98.1 (- @ 08:00)  HR: 104 (97 - 113)  BP: 103/66 (93/57 - 123/67)  BP(mean): 76 (59 - 80)  RR: 9 (8 - 17)  SpO2: 97% (91% - 97%)    LABS:                                 9.5    7.9   )-----------( 109      ( 01 May 2017 04:59 )             29.9     01 May 2017 04:59    134    |  107    |  21     ----------------------------<  120    3.7     |  19     |  0.71     Ca    8.0        01 May 2017 04:59  Phos  2.6       01 May 2017 04:59  Mg     2.1       01 May 2017 04:59  PT/INR - ( 01 May 2017 04:59 )   PT: 14.0 sec;   INR: 1.29 ratio      RADIOLOGY & ADDITIONAL TESTS:  EXAM:  CHEST SINGLE VIEW FRONTAL                          PROCEDURE DATE:  2017    INTERPRETATION:  Clinical information: Chills. Rule out pneumonia    Portable AP chest radiograph from 1632 hours:    COMPARISON:  2017    FINDINGS:  There is retrocardiac opacity compatible with small to   moderate pleural effusion which appears grossly unchanged in size. There   is no pneumothorax. The right lung is clear. No bony abnormality is   identified.    IMPRESSION:    No change in left pleural effusion.    PHYSICAL EXAM:  GENERAL: looks pale and ill  HEAD:  Atraumatic, Normocephalic  EYES: EOMI, PERRLA, conjunctiva and sclera clear  HEENT: Moist mucous membranes  NECK: Supple, No JVD  NERVOUS SYSTEM:  Alert & Oriented X3, Motor Strength 5/5 B/L upper and lower extremities; DTRs 2+ intact and symmetric  CHEST/LUNG: +crackles  HEART: Regular rate and rhythm; No murmurs, rubs, or gallops  ABDOMEN: Soft, Nontender, Large ascites  GENITOURINARY- Voiding, no palpable bladder  EXTREMITIES: LLE boot on, +anasarca up to upper abdomen  SKIN-several large bruises, one on the left side of the abdomen, few on the legs  CNS- alert, oriented X3, non focal        Daily Weight in k.8 (2017 00:00)    atorvastatin 40milliGRAM(s) Oral at bedtime  lisinopril 2.5milliGRAM(s) Oral daily  heparin  Infusion. Unit(s)/Hr IV Continuous <Continuous>  heparin  Injectable 4700Unit(s) IV Push every 6 hours PRN  predniSONE   Tablet 15milliGRAM(s) Oral daily  aspirin 325milliGRAM(s) Oral daily  gabapentin 300milliGRAM(s) Oral three times a day  clopidogrel Tablet 75milliGRAM(s) Oral daily  rifaximin 550milliGRAM(s) Oral two times a day  naphazoline 0.025%/pheniramine 0.3% Ophthalmic Solution - Peds 1Drop(s) Both EYES two times a day PRN  pantoprazole    Tablet 40milliGRAM(s) Oral before breakfast  multivitamin 1Tablet(s) Oral daily  folic acid 1milliGRAM(s) Oral daily  thiamine 100milliGRAM(s) Oral daily  metoprolol succinate ER 12.5milliGRAM(s) Oral daily  sodium chloride 0.45%. 1000milliLiter(s) IV Continuous <Continuous>    Assessment/Plan  #Persistent hypotension- now resolved, off pressors  Unclear underlying reason for hypotension  Urine c/s +Enterococcus, blood +GNR Different bugs-?unclear sourse of bacteremia  CXR- no pneumonia  ID f/u appreciated- started on IV Zosyn  For paracenthesis in am to r/o SBP as source of bacteremia    #NSTEMI/ CAD s/p recent stent  Card eval DR Ramesh appreciated  S/p card cath- stent patent, no other blockages  Cont aspirin, Plavix, statin.    #Neuropathy/cervical stenosis  Seen by DR Gonzales previous admission. Refused surgical intervention  MRI's reviewed. Still does not want to proceed with surgery  Neuro eval DR Valdes    #Liver cirrhosis w/ esophageal varices/anasarca from third-spacing   For paracenthesis in am    #Rheumatoid Arthritis  Rheum f/u appreciated    #LT ankle fracture  Ortho f/u DR Fung appreciated. Xrays reviewed  Remains NWB to LLE in cam boot. WIll get PT eval    #Anemia/thrombocytopenia likely 2 to liver cirrhosis  Monitor HH    #Dispo- for paracenthesis in am to r/o BSP as cause of GNR bacteremia. Cont current abx

## 2017-05-01 NOTE — PROGRESS NOTE ADULT - SUBJECTIVE AND OBJECTIVE BOX
Cardiology Progress Note:    HPI:  67 yo F with PMH of liver cirrhosis 2/2 chronic alcohol abuse, macrocytic anemia, HTN, RA, esophageal varices s/p banding, CAD s/p stent, h/o prolonged Qtc, p/w chest pain/abdominal pain. Stated pain is more like pressure in L chest and abdomen. Denied radiation to arm / jaw / neck. Denied nausea / vomiting / diaphoresis.  She was noted to have NSTEMI.  Pt underwent left heart cath and was noted to have patent prior placed stent. She was monitored in CCU.    PSH: PCI, esophageal varices banding  Social Hx: Former smoker - quit in 2005, h/o heavy etoh use - quit 2/2016, Drugs - h/o marijuana use previously  Family Hx: Father - stomach problems / heart disease, mother - pancreatic cancer (24 Apr 2017 21:23)    Allergies  sulfa drugs (Unknown)    SOCIAL HISTORY: Denies tobacco, etoh abuse or illicit drug use    FAMILY HISTORY: No pertinent family history in first degree relatives    MEDICATIONS  (STANDING):  atorvastatin 40milliGRAM(s) Oral at bedtime  aspirin 325milliGRAM(s) Oral daily  gabapentin 300milliGRAM(s) Oral three times a day  clopidogrel Tablet 75milliGRAM(s) Oral daily  rifaximin 550milliGRAM(s) Oral two times a day  pantoprazole    Tablet 40milliGRAM(s) Oral before breakfast  multivitamin 1Tablet(s) Oral daily  folic acid 1milliGRAM(s) Oral daily  thiamine 100milliGRAM(s) Oral daily  sodium chloride 0.9%. 1000milliLiter(s) IV Continuous <Continuous>  hydrocortisone 10milliGRAM(s) Oral at bedtime  hydrocortisone 20milliGRAM(s) Oral daily  heparin  Injectable 5000Unit(s) SubCutaneous every 12 hours    MEDICATIONS  (PRN):  naphazoline 0.025%/pheniramine 0.3% Ophthalmic Solution - Peds 1Drop(s) Both EYES two times a day PRN watery eyes.    Vital Signs Last 24 Hrs  T(C): 36.3, Max: 36.6 (04-28 @ 00:00)  T(F): 97.4, Max: 97.8 (04-28 @ 00:00)  HR: 105 (83 - 112)  BP: 98/50 (77/40 - 113/62)  BP(mean): 61 (49 - 71)  RR: 9 (8 - 23)  SpO2: 96% (91% - 97%)    REVIEW OF SYSTEMS:    CONSTITUTIONAL:  As per HPI.  HEENT:  Eyes:  No diplopia or blurred vision. ENT:  No earache, sore throat or runny nose.  CARDIOVASCULAR:  No pressure, squeezing, strangling, tightness, heaviness or aching about the chest, neck, axilla or epigastrium.  RESPIRATORY:  No cough, shortness of breath, PND or orthopnea.  GASTROINTESTINAL:  No nausea, vomiting or diarrhea.  GENITOURINARY:  No dysuria, frequency or urgency.  MUSCULOSKELETAL:  As per HPI.  SKIN:  No change in skin, hair or nails.  NEUROLOGIC:  No paresthesias, fasciculations, seizures or weakness.  PSYCHIATRIC:  No disorder of thought or mood.  ENDOCRINE:  No heat or cold intolerance, polyuria or polydipsia.  HEMATOLOGICAL:  No easy bruising or bleedings.     PHYSICAL EXAMINATION:    GENERAL APPEARANCE:  Pt. is not currently dyspneic, in no distress. Pt. is alert, oriented, and pleasant.  HEENT:  Pupils are normal and react normally. No icterus. Mucous membranes well colored.  NECK:  Supple. No lymphadenopathy. Jugular venous pressure not elevated. Carotids equal.   HEART:   The cardiac impulse has a normal quality. There are no murmurs, rubs or gallops noted  CHEST:  Chest is clear to auscultation. Normal respiratory effort.  ABDOMEN:  Soft and nontender.   EXTREMITIES:  There is no edema.   SKIN:  No rash or significant lesions are noted.    LABS:                        10.2   9.4   )-----------( 131      ( 28 Apr 2017 05:36 )             32.0     04-27    133<L>  |  103  |  23  ----------------------------<  117<H>  4.3   |  18<L>  |  0.81    Ca    7.5<L>      27 Apr 2017 04:47    EKG:   Diagnosis Line Normal sinus rhythm  ST & T wave abnormality, consider inferior ischemia  ST & T wave abnormality, consider anterolateral ischemia  Prolonged QT    TELEMETRY: sinus tachycardia    CARDIAC TESTS: 2Decho- Limited study to evaluate left ventricular systolic function.   Estimated left ventricular ejection fraction is 55-60 %.    RADIOLOGY & ADDITIONAL STUDIES: CTA- Limited evaluation of the pulmonary artery secondary to suboptimal bolus   timing. No pulmonary embolus the trunk, main right or left pulmonary   artery. Small right and moderate left pleural effusion with bibasilar compressive   atelectasis, incomplete on the right and complete of the left.  Cirrhosis and moderate ascites in upper abdomen.    ASSESSMENT & PLAN: Cardiology Progress Note:    HPI:  65 yo F with PMH of liver cirrhosis 2/2 chronic alcohol abuse, macrocytic anemia, HTN, RA, esophageal varices s/p banding, CAD s/p stent, h/o prolonged Qtc, p/w chest pain/abdominal pain. Stated pain is more like pressure in L chest and abdomen. Denied radiation to arm / jaw / neck. Denied nausea / vomiting / diaphoresis.  She was noted to have NSTEMI.  Pt underwent left heart cath and was noted to have patent prior placed stent. She was monitored in CCU.  Pt this am denies any symptoms except for her tingling and numbness in her hands.    PSH: PCI, esophageal varices banding  Social Hx: Former smoker - quit in 2005, h/o heavy etoh use - quit 2/2016, Drugs - h/o marijuana use previously  Family Hx: Father - stomach problems / heart disease, mother - pancreatic cancer (24 Apr 2017 21:23)    Allergies  sulfa drugs (Unknown)    SOCIAL HISTORY: Denies tobacco, etoh abuse or illicit drug use    FAMILY HISTORY: No pertinent family history in first degree relatives    MEDICATIONS  (STANDING):  atorvastatin 40milliGRAM(s) Oral at bedtime  aspirin 325milliGRAM(s) Oral daily  gabapentin 300milliGRAM(s) Oral three times a day  clopidogrel Tablet 75milliGRAM(s) Oral daily  rifaximin 550milliGRAM(s) Oral two times a day  pantoprazole    Tablet 40milliGRAM(s) Oral before breakfast  multivitamin 1Tablet(s) Oral daily  folic acid 1milliGRAM(s) Oral daily  thiamine 100milliGRAM(s) Oral daily  sodium chloride 0.9%. 1000milliLiter(s) IV Continuous <Continuous>  hydrocortisone 10milliGRAM(s) Oral at bedtime  hydrocortisone 20milliGRAM(s) Oral daily  heparin  Injectable 5000Unit(s) SubCutaneous every 12 hours    MEDICATIONS  (PRN):  naphazoline 0.025%/pheniramine 0.3% Ophthalmic Solution - Peds 1Drop(s) Both EYES two times a day PRN watery eyes.    Vital Signs Last 24 Hrs  T(C): 36.3, Max: 36.6 (04-28 @ 00:00)  T(F): 97.4, Max: 97.8 (04-28 @ 00:00)  HR: 105 (83 - 112)  BP: 98/50 (77/40 - 113/62)  BP(mean): 61 (49 - 71)  RR: 9 (8 - 23)  SpO2: 96% (91% - 97%)    REVIEW OF SYSTEMS:    CONSTITUTIONAL:  As per HPI.  HEENT:  Eyes:  No diplopia or blurred vision. ENT:  No earache, sore throat or runny nose.  CARDIOVASCULAR:  No pressure, squeezing, strangling, tightness, heaviness or aching about the chest, neck, axilla or epigastrium.  RESPIRATORY:  No cough, shortness of breath, PND or orthopnea.  GASTROINTESTINAL:  No nausea, vomiting or diarrhea.  GENITOURINARY:  No dysuria, frequency or urgency.  MUSCULOSKELETAL:  As per HPI.  SKIN:  No change in skin, hair or nails.  NEUROLOGIC:  c/o tingling and numbnes in her hands that she had for some time.  PSYCHIATRIC:  No disorder of thought or mood.  ENDOCRINE:  No heat or cold intolerance, polyuria or polydipsia.  HEMATOLOGICAL:  No easy bruising or bleedings.     PHYSICAL EXAMINATION:    GENERAL APPEARANCE:  Pt. is not currently dyspneic, in no distress. Pt. is alert, oriented, and pleasant.  HEENT:  Pupils are normal and react normally. No icterus. Mucous membranes well colored.  NECK:  Supple. No lymphadenopathy. Jugular venous pressure not elevated. Carotids equal.   HEART:   The cardiac impulse has a normal quality. There are no murmurs, rubs or gallops noted  CHEST:  Chest is clear to auscultation. Normal respiratory effort.  ABDOMEN:  Soft and nontender.   EXTREMITIES:  There is no edema.   SKIN:  No rash or significant lesions are noted.    LABS:                        10.2   9.4   )-----------( 131      ( 28 Apr 2017 05:36 )             32.0     04-27    133<L>  |  103  |  23  ----------------------------<  117<H>  4.3   |  18<L>  |  0.81    Ca    7.5<L>      27 Apr 2017 04:47    EKG:   Diagnosis Line Normal sinus rhythm  ST & T wave abnormality, consider inferior ischemia  ST & T wave abnormality, consider anterolateral ischemia  Prolonged QT    TELEMETRY: sinus tachycardia    CARDIAC TESTS: 2Decho- Limited study to evaluate left ventricular systolic function.   Estimated left ventricular ejection fraction is 55-60 %.    RADIOLOGY & ADDITIONAL STUDIES: CTA- Limited evaluation of the pulmonary artery secondary to suboptimal bolus   timing. No pulmonary embolus the trunk, main right or left pulmonary   artery. Small right and moderate left pleural effusion with bibasilar compressive   atelectasis, incomplete on the right and complete of the left.  Cirrhosis and moderate ascites in upper abdomen.    ASSESSMENT & PLAN:

## 2017-05-01 NOTE — DIETITIAN INITIAL EVALUATION ADULT. - OTHER INFO
Pt reports no wt loss or gain in recent history. Pt appears well nourished, NKFA, no problems chewing, on DASH diet. Says she keep refrigerator full of fruits and vegetables.

## 2017-05-01 NOTE — PROGRESS NOTE ADULT - ASSESSMENT
66F with extensive hx p/w CP.    1. Chest pain- no further recurrence- LHC reveals patent stents, nonobstructive CAD.Cont medical mgmt with asa/statin/plavix.   No Bbl with hypotension.    2. Hypotension- likely due to adrenal insufficiency. Management pre primary team.  Off pressors at this time.   She will benefit from low dose beta blockers when she tolerates from CAD standpoint.    3. ETOH abuse/cirrhosis- Management pre primary team.    4. DVT proph., replete lytes as needed.

## 2017-05-02 ENCOUNTER — RESULT REVIEW (OUTPATIENT)
Age: 67
End: 2017-05-02

## 2017-05-02 DIAGNOSIS — E78.00 PURE HYPERCHOLESTEROLEMIA, UNSPECIFIED: ICD-10-CM

## 2017-05-02 DIAGNOSIS — E78.5 HYPERLIPIDEMIA, UNSPECIFIED: ICD-10-CM

## 2017-05-02 LAB
-  AMIKACIN: SIGNIFICANT CHANGE UP
-  AMPICILLIN/SULBACTAM: SIGNIFICANT CHANGE UP
-  AMPICILLIN: SIGNIFICANT CHANGE UP
-  AZTREONAM: SIGNIFICANT CHANGE UP
-  CEFAZOLIN: SIGNIFICANT CHANGE UP
-  CEFEPIME: SIGNIFICANT CHANGE UP
-  CEFOXITIN: SIGNIFICANT CHANGE UP
-  CEFTAZIDIME: SIGNIFICANT CHANGE UP
-  CEFTRIAXONE: SIGNIFICANT CHANGE UP
-  CIPROFLOXACIN: SIGNIFICANT CHANGE UP
-  ERTAPENEM: SIGNIFICANT CHANGE UP
-  GENTAMICIN: SIGNIFICANT CHANGE UP
-  IMIPENEM: SIGNIFICANT CHANGE UP
-  LEVOFLOXACIN: SIGNIFICANT CHANGE UP
-  MEROPENEM: SIGNIFICANT CHANGE UP
-  NITROFURANTOIN: SIGNIFICANT CHANGE UP
-  NITROFURANTOIN: SIGNIFICANT CHANGE UP
-  PIPERACILLIN/TAZOBACTAM: SIGNIFICANT CHANGE UP
-  TETRACYCLINE: SIGNIFICANT CHANGE UP
-  TETRACYCLINE: SIGNIFICANT CHANGE UP
-  TOBRAMYCIN: SIGNIFICANT CHANGE UP
-  TRIMETHOPRIM/SULFAMETHOXAZOLE: SIGNIFICANT CHANGE UP
-  VANCOMYCIN: SIGNIFICANT CHANGE UP
-  VANCOMYCIN: SIGNIFICANT CHANGE UP
ALBUMIN FLD-MCNC: <0.5 G/DL — SIGNIFICANT CHANGE UP
AMYLASE FLD-CCNC: 42 U/L — SIGNIFICANT CHANGE UP
ANION GAP SERPL CALC-SCNC: 10 MMOL/L — SIGNIFICANT CHANGE UP (ref 5–17)
APTT BLD: 33.2 SEC — SIGNIFICANT CHANGE UP (ref 27.5–37.4)
B PERT IGG+IGM PNL SER: (no result)
BUN SERPL-MCNC: 17 MG/DL — SIGNIFICANT CHANGE UP (ref 7–23)
CALCIUM SERPL-MCNC: 8 MG/DL — LOW (ref 8.5–10.1)
CHLORIDE SERPL-SCNC: 105 MMOL/L — SIGNIFICANT CHANGE UP (ref 96–108)
CO2 SERPL-SCNC: 19 MMOL/L — LOW (ref 22–31)
COLOR FLD: SIGNIFICANT CHANGE UP
CREAT SERPL-MCNC: 0.64 MG/DL — SIGNIFICANT CHANGE UP (ref 0.5–1.3)
CULTURE RESULTS: SIGNIFICANT CHANGE UP
CULTURE RESULTS: SIGNIFICANT CHANGE UP
FLUID INTAKE SUBSTANCE CLASS: SIGNIFICANT CHANGE UP
FLUID SEGMENTED GRANULOCYTES: 44 % — SIGNIFICANT CHANGE UP
GLUCOSE FLD-MCNC: 135 MG/DL — SIGNIFICANT CHANGE UP
GLUCOSE SERPL-MCNC: 122 MG/DL — HIGH (ref 70–99)
GRAM STN FLD: SIGNIFICANT CHANGE UP
HCT VFR BLD CALC: 28.3 % — LOW (ref 34.5–45)
HGB BLD-MCNC: 9.6 G/DL — LOW (ref 11.5–15.5)
INR BLD: 1.27 RATIO — HIGH (ref 0.88–1.16)
LDH SERPL L TO P-CCNC: 42 U/L — SIGNIFICANT CHANGE UP
LYMPHOCYTES # FLD: 15 % — SIGNIFICANT CHANGE UP
MAGNESIUM SERPL-MCNC: 2 MG/DL — SIGNIFICANT CHANGE UP (ref 1.8–2.4)
MCHC RBC-ENTMCNC: 33.8 GM/DL — SIGNIFICANT CHANGE UP (ref 32–36)
MCHC RBC-ENTMCNC: 38.1 PG — HIGH (ref 27–34)
MCV RBC AUTO: 112.6 FL — HIGH (ref 80–100)
MESOTHL CELL # FLD: 22 % — SIGNIFICANT CHANGE UP
METHOD TYPE: SIGNIFICANT CHANGE UP
MONOS+MACROS # FLD: 19 % — SIGNIFICANT CHANGE UP
ORGANISM # SPEC MICROSCOPIC CNT: SIGNIFICANT CHANGE UP
PHOSPHATE SERPL-MCNC: 2.8 MG/DL — SIGNIFICANT CHANGE UP (ref 2.5–4.5)
PLATELET # BLD AUTO: 107 K/UL — LOW (ref 150–400)
POTASSIUM SERPL-MCNC: 3.7 MMOL/L — SIGNIFICANT CHANGE UP (ref 3.5–5.3)
POTASSIUM SERPL-SCNC: 3.7 MMOL/L — SIGNIFICANT CHANGE UP (ref 3.5–5.3)
PROT FLD-MCNC: <1 G/DL — SIGNIFICANT CHANGE UP
PROTHROM AB SERPL-ACNC: 13.8 SEC — HIGH (ref 9.8–12.7)
RBC # BLD: 2.52 M/UL — LOW (ref 3.8–5.2)
RBC # FLD: 13.8 % — SIGNIFICANT CHANGE UP (ref 10.3–14.5)
RCV VOL RI: 72 /UL — HIGH (ref 0–5)
SODIUM SERPL-SCNC: 134 MMOL/L — LOW (ref 135–145)
SPECIMEN SOURCE FLD: SIGNIFICANT CHANGE UP
SPECIMEN SOURCE: SIGNIFICANT CHANGE UP
TOTAL NUCLEATED CELL COUNT, BODY FLUID: 52 /UL — HIGH (ref 0–5)
TUBE TYPE: SIGNIFICANT CHANGE UP
WBC # BLD: 7.3 K/UL — SIGNIFICANT CHANGE UP (ref 3.8–10.5)
WBC # FLD AUTO: 7.3 K/UL — SIGNIFICANT CHANGE UP (ref 3.8–10.5)

## 2017-05-02 PROCEDURE — 88108 CYTOPATH CONCENTRATE TECH: CPT | Mod: 26

## 2017-05-02 PROCEDURE — 49083 ABD PARACENTESIS W/IMAGING: CPT

## 2017-05-02 RX ADMIN — CLOPIDOGREL BISULFATE 75 MILLIGRAM(S): 75 TABLET, FILM COATED ORAL at 12:22

## 2017-05-02 RX ADMIN — PIPERACILLIN AND TAZOBACTAM 25 GRAM(S): 4; .5 INJECTION, POWDER, LYOPHILIZED, FOR SOLUTION INTRAVENOUS at 06:10

## 2017-05-02 RX ADMIN — Medication 100 MILLIGRAM(S): at 12:22

## 2017-05-02 RX ADMIN — Medication 10 MILLIGRAM(S): at 22:03

## 2017-05-02 RX ADMIN — ATORVASTATIN CALCIUM 40 MILLIGRAM(S): 80 TABLET, FILM COATED ORAL at 22:03

## 2017-05-02 RX ADMIN — GABAPENTIN 300 MILLIGRAM(S): 400 CAPSULE ORAL at 22:03

## 2017-05-02 RX ADMIN — Medication 1 TABLET(S): at 12:23

## 2017-05-02 RX ADMIN — GABAPENTIN 300 MILLIGRAM(S): 400 CAPSULE ORAL at 06:10

## 2017-05-02 RX ADMIN — PIPERACILLIN AND TAZOBACTAM 25 GRAM(S): 4; .5 INJECTION, POWDER, LYOPHILIZED, FOR SOLUTION INTRAVENOUS at 22:02

## 2017-05-02 RX ADMIN — PANTOPRAZOLE SODIUM 40 MILLIGRAM(S): 20 TABLET, DELAYED RELEASE ORAL at 10:35

## 2017-05-02 RX ADMIN — Medication 20 MILLIGRAM(S): at 06:10

## 2017-05-02 RX ADMIN — PIPERACILLIN AND TAZOBACTAM 25 GRAM(S): 4; .5 INJECTION, POWDER, LYOPHILIZED, FOR SOLUTION INTRAVENOUS at 14:22

## 2017-05-02 RX ADMIN — HEPARIN SODIUM 5000 UNIT(S): 5000 INJECTION INTRAVENOUS; SUBCUTANEOUS at 18:31

## 2017-05-02 RX ADMIN — Medication 1 MILLIGRAM(S): at 12:23

## 2017-05-02 RX ADMIN — GABAPENTIN 300 MILLIGRAM(S): 400 CAPSULE ORAL at 14:22

## 2017-05-02 RX ADMIN — Medication 325 MILLIGRAM(S): at 12:22

## 2017-05-02 NOTE — PROGRESS NOTE ADULT - ASSESSMENT
66F with extensive hx p/w CP.    1. Chest pain- no further recurrence- LHC reveals patent stents, nonobstructive CAD.Cont medical mgmt with asa/statin/plavix. No Bbl with hypotension.    2. Hypotension- likely due to adrenal insufficiency. Management pre primary team. Currently, off pressors at this time.   She will benefit from low dose beta blockers when she tolerates from CAD standpoint.     3. ETOH abuse/cirrhosis- Management pre primary team. S/p paracentesis today.     4. DVT proph., replete lytes as needed.

## 2017-05-02 NOTE — PROGRESS NOTE ADULT - SUBJECTIVE AND OBJECTIVE BOX
65 yo F with PMH of liver cirrhosis 2/2 chronic alcohol abuse, macrocytic anemia, HTN, RA, esophageal varices s/p banding, CAD s/p stent, h/o prolonged Qtc, p/w chest pain/abdominal pain. States pain is more like pressure in L chest and abdomen. States pain has diminished a lot now, but is still there mildly. Denies radiation to arm / jaw / neck. Denies nausea / vomiting / diophoresis. In ED, troponins trended from 0.707 -> 79.3. Called on call cardio Dr. Flannery and discussed case, he recommended calling Dr. Anderson who is the on call interventionlist. Discussed case in detail with Dr. Anderson, including troponins trending up, new ST depressions on EKG and on going residual chest discomfort. Dr. Anderson recommends heparin drip without initial bolus due to esophageal varices / liver cirrhosis and due to patient being at increased risk for bleeding. Also recommends to c/w ASA / plavix / beta blocker / statin. Dr. Anderson believes patient may have had cardiac event earlier, and troponins are just lagging behind. Cardiac cath to be performed in AM.    4/26/17 s/p card cath- stent open, no intervention done  4/27/17 c/o tingling and numbness of both arms  4/28/17 remains hypotensive, bp 70/30's, +rigors but no fever, +chest congestion. States she does not "feel good"  4/29/17- started on pressors yesterday- BP improved. No localized signs of infection despite elevtaed lactate an WBC- pancultures taken. Pt feels better today  4/30/17 remains on pressors, tapered down a little. For pan scan today  5/1/17 off pressors  5/2 Stable s/p paracentesis by IR with 4 Liters fluid removed.     Vital Signs Last 24 Hrs  T(C): 36.7  HR: 103  BP: 112/64  RR: 17  SpO2: 94%    LABS:                                 9.6    7.3   )-----------( 107                  28.3      134    |  105   |  17     ----------------------------<  122    3.7     |  19     |  0.64      RADIOLOGY & ADDITIONAL TESTS:  EXAM:  CHEST SINGLE VIEW FRONTAL                          PROCEDURE DATE:  04/28/2017    INTERPRETATION:  Clinical information: Chills. Rule out pneumonia    Portable AP chest radiograph from 1632 hours:    COMPARISON:  April 24, 2017    FINDINGS:  There is retrocardiac opacity compatible with small to   moderate pleural effusion which appears grossly unchanged in size. There   is no pneumothorax. The right lung is clear. No bony abnormality is   identified.    IMPRESSION:    No change in left pleural effusion.    PHYSICAL EXAM:  GENERAL: looks pale and ill  HEAD:  Atraumatic, Normocephalic  HEENT: Moist mucous membranes  NECK: Supple, No JVD  CHEST/LUNG: +crackles  HEART: Regular rate and rhythm; No murmurs, rubs, or gallops  ABDOMEN: Soft, Nontender, Large ascites  GENITOURINARY- Voiding, no palpable bladder  EXTREMITIES: LLE boot on, +anasarca up to upper abdomen  SKIN-several large bruises, one on the left side of the abdomen, few on the legs  CNS- alert, oriented X3, non focal          atorvastatin 40milliGRAM(s) Oral at bedtime  lisinopril 2.5milliGRAM(s) Oral daily  heparin  Infusion. Unit(s)/Hr IV Continuous <Continuous>  heparin  Injectable 4700Unit(s) IV Push every 6 hours PRN  predniSONE   Tablet 15milliGRAM(s) Oral daily  aspirin 325milliGRAM(s) Oral daily  gabapentin 300milliGRAM(s) Oral three times a day  clopidogrel Tablet 75milliGRAM(s) Oral daily  rifaximin 550milliGRAM(s) Oral two times a day  naphazoline 0.025%/pheniramine 0.3% Ophthalmic Solution - Peds 1Drop(s) Both EYES two times a day PRN  pantoprazole    Tablet 40milliGRAM(s) Oral before breakfast  multivitamin 1Tablet(s) Oral daily  folic acid 1milliGRAM(s) Oral daily  thiamine 100milliGRAM(s) Oral daily  metoprolol succinate ER 12.5milliGRAM(s) Oral daily  sodium chloride 0.45%. 1000milliLiter(s) IV Continuous <Continuous>    Assessment/Plan    # Persistent hypotension- now resolved, off pressors  Unclear underlying reason for hypotension  Urine c/s +Enterococcus, blood +GNR  Different bugs-? unclear source of bacteremia  CXR- no pneumonia  ID f/u appreciated- started on IV Zosyn on 5/1  s/p paracenthesis 5/2 to r/o SBP as source of bacteremia    # NSTEMI/ CAD s/p recent stent  [facility] eval Dr. Ramesh appreciated  S/p card cath on 4/26- stent patent, no other blockages  Continue aspirin, Plavix, statin.    # Neuropathy/cervical stenosis  Seen by Dr. Gonzales previous admission. Refused surgical intervention  MRI's reviewed. Still does not want to proceed with surgery  Neuro eval DR Valdes    # Liver cirrhosis w/ esophageal varices/anasarca from third-spacing   s/p paracentesis on 5/2 to rule out SBP as source of bacteremia with 4 Liters fluid removed    # Rheumatoid Arthritis  Rheum f/u appreciated    # LT ankle fracture  Ortho f/u DR Fung appreciated. Xrays 4/26 reviewed  Remains NWB to LLE in cam boot. WIll get PT eval    # Anemia/thrombocytopenia likely 2 to liver cirrhosis  Monitor Hgb baseline 9-10    # Dispo- s/p paracenthesis on 5/2 to r/o SBP as cause of GNR bacteremia. Continue current abx

## 2017-05-02 NOTE — BRIEF OPERATIVE NOTE - OPERATION/FINDINGS
sono guidelisabeth. 5fr access rlq. cloudy yellow fluid withdrawn. Specimen sent as ordered. pt kyle proc well. no complications

## 2017-05-02 NOTE — PROGRESS NOTE ADULT - SUBJECTIVE AND OBJECTIVE BOX
Cardiology Progress Note:    HPI: 67 yo F with PMH of liver cirrhosis 2/2 chronic alcohol abuse, macrocytic anemia, HTN, RA, esophageal varices s/p banding, CAD s/p stent, h/o prolonged Qtc, p/w chest pain/abdominal pain. Stated pain is more like pressure in L chest and abdomen. Denied radiation to arm / jaw / neck. Denied nausea / vomiting / diaphoresis.  She was noted to have NSTEMI.  Pt underwent left heart cath and was noted to have patent prior placed stent. She was monitored in CCU.  Pt this am denies any symptoms except for her tingling and numbness in her hands.    5/2- No CP/SOB. Had paracentesis by IR today with 4L drained. SBP  this AM.    PSH: PCI, esophageal varices banding    Social Hx: Former smoker - quit in 2005, h/o heavy etoh use - quit 2/2016, Drugs - h/o marijuana use previously    Family Hx: Father - stomach problems / heart disease, mother - pancreatic cancer (24 Apr 2017 21:23)    Allergies  sulfa drugs (Unknown)    MEDICATIONS  (STANDING):  atorvastatin 40milliGRAM(s) Oral at bedtime  aspirin 325milliGRAM(s) Oral daily  gabapentin 300milliGRAM(s) Oral three times a day  clopidogrel Tablet 75milliGRAM(s) Oral daily  rifaximin 550milliGRAM(s) Oral two times a day  pantoprazole    Tablet 40milliGRAM(s) Oral before breakfast  multivitamin 1Tablet(s) Oral daily  folic acid 1milliGRAM(s) Oral daily  thiamine 100milliGRAM(s) Oral daily  sodium chloride 0.9%. 1000milliLiter(s) IV Continuous <Continuous>  hydrocortisone 10milliGRAM(s) Oral at bedtime  hydrocortisone 20milliGRAM(s) Oral daily  heparin  Injectable 5000Unit(s) SubCutaneous every 12 hours    MEDICATIONS  (PRN):  naphazoline 0.025%/pheniramine 0.3% Ophthalmic Solution - Peds 1Drop(s) Both EYES two times a day PRN watery eyes.    Vital Signs Last 24 Hrs  T(C): 36.3, Max: 36.6 (04-28 @ 00:00)  T(F): 97.4, Max: 97.8 (04-28 @ 00:00)  HR: 105 (83 - 112)  BP: 98/50 (77/40 - 113/62)  BP(mean): 61 (49 - 71)  RR: 9 (8 - 23)  SpO2: 96% (91% - 97%)    REVIEW OF SYSTEMS:    CONSTITUTIONAL:  As per HPI.  HEENT:  Eyes:  No diplopia or blurred vision. ENT:  No earache, sore throat or runny nose.  CARDIOVASCULAR:  No pressure, squeezing, strangling, tightness, heaviness or aching about the chest, neck, axilla or epigastrium.  RESPIRATORY:  No cough, shortness of breath, PND or orthopnea.  GASTROINTESTINAL:  No nausea, vomiting or diarrhea.  GENITOURINARY:  No dysuria, frequency or urgency.  MUSCULOSKELETAL:  As per HPI.  SKIN:  No change in skin, hair or nails.  NEUROLOGIC:  c/o tingling and numbnes in her hands that she had for some time.  PSYCHIATRIC:  No disorder of thought or mood.  ENDOCRINE:  No heat or cold intolerance, polyuria or polydipsia.  HEMATOLOGICAL:  No easy bruising or bleedings.     PHYSICAL EXAMINATION:    GENERAL APPEARANCE:  Pt. is not currently dyspneic, in no distress. Pt. is alert, oriented, and pleasant.  HEENT:  Pupils are normal and react normally. No icterus. Mucous membranes well colored.  NECK:  Supple. No lymphadenopathy. Jugular venous pressure not elevated. Carotids equal.   HEART:   The cardiac impulse has a normal quality. There are no murmurs, rubs or gallops noted  CHEST:  Chest is clear to auscultation. Normal respiratory effort.  ABDOMEN:  Soft and nontender.   EXTREMITIES:  There is no edema.   SKIN:  No rash or significant lesions are noted.    LABS:                        10.2   9.4   )-----------( 131      ( 28 Apr 2017 05:36 )             32.0     04-27    133<L>  |  103  |  23  ----------------------------<  117<H>  4.3   |  18<L>  |  0.81    Ca    7.5<L>      27 Apr 2017 04:47    EKG: Diagnosis Line Normal sinus rhythm  ST & T wave abnormality, consider inferior ischemia  ST & T wave abnormality, consider anterolateral ischemia  Prolonged QT    TELEMETRY: sinus tachycardia    CARDIAC TESTS: 2Decho- Limited study to evaluate left ventricular systolic function.   Estimated left ventricular ejection fraction is 55-60 %.    RADIOLOGY & ADDITIONAL STUDIES: CTA- Limited evaluation of the pulmonary artery secondary to suboptimal bolus   timing. No pulmonary embolus the trunk, main right or left pulmonary   artery. Small right and moderate left pleural effusion with bibasilar compressive   atelectasis, incomplete on the right and complete of the left.  Cirrhosis and moderate ascites in upper abdomen.    ASSESSMENT & PLAN:

## 2017-05-03 LAB
HCT VFR BLD CALC: 28.4 % — LOW (ref 34.5–45)
HGB BLD-MCNC: 9.6 G/DL — LOW (ref 11.5–15.5)
MCHC RBC-ENTMCNC: 33.7 GM/DL — SIGNIFICANT CHANGE UP (ref 32–36)
MCHC RBC-ENTMCNC: 38.1 PG — HIGH (ref 27–34)
MCV RBC AUTO: 113.1 FL — HIGH (ref 80–100)
PLATELET # BLD AUTO: 110 K/UL — LOW (ref 150–400)
RBC # BLD: 2.52 M/UL — LOW (ref 3.8–5.2)
RBC # FLD: 13.8 % — SIGNIFICANT CHANGE UP (ref 10.3–14.5)
WBC # BLD: 7.1 K/UL — SIGNIFICANT CHANGE UP (ref 3.8–10.5)
WBC # FLD AUTO: 7.1 K/UL — SIGNIFICANT CHANGE UP (ref 3.8–10.5)

## 2017-05-03 RX ADMIN — Medication 325 MILLIGRAM(S): at 11:00

## 2017-05-03 RX ADMIN — HEPARIN SODIUM 5000 UNIT(S): 5000 INJECTION INTRAVENOUS; SUBCUTANEOUS at 17:40

## 2017-05-03 RX ADMIN — GABAPENTIN 300 MILLIGRAM(S): 400 CAPSULE ORAL at 13:02

## 2017-05-03 RX ADMIN — HEPARIN SODIUM 5000 UNIT(S): 5000 INJECTION INTRAVENOUS; SUBCUTANEOUS at 06:01

## 2017-05-03 RX ADMIN — GABAPENTIN 300 MILLIGRAM(S): 400 CAPSULE ORAL at 06:02

## 2017-05-03 RX ADMIN — Medication 20 MILLIGRAM(S): at 06:01

## 2017-05-03 RX ADMIN — GABAPENTIN 300 MILLIGRAM(S): 400 CAPSULE ORAL at 21:14

## 2017-05-03 RX ADMIN — CLOPIDOGREL BISULFATE 75 MILLIGRAM(S): 75 TABLET, FILM COATED ORAL at 11:00

## 2017-05-03 RX ADMIN — Medication 10 MILLIGRAM(S): at 21:14

## 2017-05-03 RX ADMIN — Medication 1 MILLIGRAM(S): at 11:00

## 2017-05-03 RX ADMIN — Medication 1 TABLET(S): at 11:00

## 2017-05-03 RX ADMIN — PIPERACILLIN AND TAZOBACTAM 25 GRAM(S): 4; .5 INJECTION, POWDER, LYOPHILIZED, FOR SOLUTION INTRAVENOUS at 06:01

## 2017-05-03 RX ADMIN — ATORVASTATIN CALCIUM 40 MILLIGRAM(S): 80 TABLET, FILM COATED ORAL at 21:14

## 2017-05-03 RX ADMIN — PANTOPRAZOLE SODIUM 40 MILLIGRAM(S): 20 TABLET, DELAYED RELEASE ORAL at 06:01

## 2017-05-03 RX ADMIN — PIPERACILLIN AND TAZOBACTAM 25 GRAM(S): 4; .5 INJECTION, POWDER, LYOPHILIZED, FOR SOLUTION INTRAVENOUS at 13:02

## 2017-05-03 RX ADMIN — PIPERACILLIN AND TAZOBACTAM 25 GRAM(S): 4; .5 INJECTION, POWDER, LYOPHILIZED, FOR SOLUTION INTRAVENOUS at 21:14

## 2017-05-03 RX ADMIN — Medication 100 MILLIGRAM(S): at 11:00

## 2017-05-03 NOTE — PROGRESS NOTE ADULT - SUBJECTIVE AND OBJECTIVE BOX
66 year old Female with a PMH of liver cirrhosis 2/2 chronic alcohol abuse, macrocytic anemia, HTN, RA, esophageal varices s/p banding, CAD s/p stent, h/o prolonged Qtc, p/w chest pain/abdominal pain. States pain is more like pressure in L chest and abdomen. States pain has diminished a lot now, but is still there mildly. Denies radiation to arm / jaw / neck. Denies nausea / vomiting / diophoresis. In ED, troponins trended from 0.707 -> 79.3. Called on call cardio Dr. Flannery and discussed case, he recommended calling Dr. Anderson who is the on call interventionlist. Discussed case in detail with Dr. Anderson, including troponins trending up, new ST depressions on EKG and on going residual chest discomfort. Dr. Anderson recommends heparin drip without initial bolus due to esophageal varices / liver cirrhosis and due to patient being at increased risk for bleeding. Also recommends to c/w ASA / plavix / beta blocker / statin. Dr. Anderson believes patient may have had cardiac event earlier, and troponins are just lagging behind. Cardiac cath to be performed in AM.    4/26/17 s/p card cath- stent open, no intervention done  4/27/17 c/o tingling and numbness of both arms  4/28/17 remains hypotensive, bp 70/30's, +rigors but no fever, +chest congestion. States she does not "feel good"  4/29/17- started on pressors yesterday- BP improved. No localized signs of infection despite elevtaed lactate an WBC- pancultures taken. Pt feels better today  4/30/17 remains on pressors, tapered down a little. For pan scan today  5/1/17 off pressors  5/2 Stable s/p paracentesis by IR with 4 Liters fluid removed.   5/3 No events overnight    Vital Signs Last 24 Hrs  T(C): 36.6  HR: 103  BP: 107/53  RR: 18  SpO2: 98%    LABS:                                 9.6    7.1   )-----------( 110                 28.4      134    |  105   |  17     ----------------------------<  122    3.7     |  19     |  0.64      RADIOLOGY & ADDITIONAL TESTS:  EXAM:  CHEST SINGLE VIEW FRONTAL                          PROCEDURE DATE:  04/28/2017    INTERPRETATION:  Clinical information: Chills. Rule out pneumonia    Portable AP chest radiograph from 1632 hours:    COMPARISON:  April 24, 2017    FINDINGS:  There is retrocardiac opacity compatible with small to   moderate pleural effusion which appears grossly unchanged in size. There   is no pneumothorax. The right lung is clear. No bony abnormality is   identified.    IMPRESSION:    No change in left pleural effusion.    PHYSICAL EXAM:  GENERAL: NAD  HEAD:  Atraumatic, Normocephalic  HEENT: Moist mucous membranes  NECK: Supple, No JVD  CHEST/LUNG: +crackles  HEART: Regular rate and rhythm; No murmurs, rubs, or gallops  ABDOMEN: Soft, Nontender, Large ascites  GENITOURINARY- Voiding, no palpable bladder  EXTREMITIES: LLE boot on, +anasarca up to upper abdomen  SKIN-several large bruises, one on the left side of the abdomen, few on the legs  CNS- alert, oriented X3, non focal          atorvastatin 40milliGRAM(s) Oral at bedtime  lisinopril 2.5milliGRAM(s) Oral daily  heparin  Infusion. Unit(s)/Hr IV Continuous <Continuous>  heparin  Injectable 4700Unit(s) IV Push every 6 hours PRN  predniSONE   Tablet 15milliGRAM(s) Oral daily  aspirin 325milliGRAM(s) Oral daily  gabapentin 300milliGRAM(s) Oral three times a day  clopidogrel Tablet 75milliGRAM(s) Oral daily  rifaximin 550milliGRAM(s) Oral two times a day  naphazoline 0.025%/pheniramine 0.3% Ophthalmic Solution - Peds 1Drop(s) Both EYES two times a day PRN  pantoprazole    Tablet 40milliGRAM(s) Oral before breakfast  multivitamin 1Tablet(s) Oral daily  folic acid 1milliGRAM(s) Oral daily  thiamine 100milliGRAM(s) Oral daily  metoprolol succinate ER 12.5milliGRAM(s) Oral daily  sodium chloride 0.45%. 1000milliLiter(s) IV Continuous <Continuous>    Assessment/Plan    # Persistent hypotension- now resolved, off pressors  Unclear underlying reason for hypotension  Urine c/s +Enterococcus, blood +GNR  Different bugs-? unclear source of bacteremia  CXR- no pneumonia  ID f/u appreciated- started on IV Zosyn for GI/UTI coverage day #4  s/p paracenthesis 5/2 to r/o SBP as source of bacteremia    # NSTEMI/ CAD s/p recent stent  [facility] eval Dr. Ramesh appreciated  S/p card cath on 4/26- stent patent, no other blockages  Continue aspirin, Plavix, statin.    # Neuropathy/cervical stenosis  Seen by Dr. Gonzales previous admission. Refused surgical intervention  MRI's reviewed. Still does not want to proceed with surgery  Neuro eval DR Valdes    # Liver cirrhosis w/ esophageal varices/anasarca from third-spacing   s/p paracentesis on 5/2 to rule out SBP as source of bacteremia with 4 Liters fluid removed    # Rheumatoid Arthritis  Rheum f/u appreciated    # LT ankle fracture  Ortho f/u DR Fung appreciated. Xrays 4/26 reviewed  Remains NWB to LLE in cam boot.   PT following    # Anemia/thrombocytopenia likely 2 to liver cirrhosis  Monitor Hgb baseline 9-10    # Dispo- s/p paracenthesis on 5/2 to r/o SBP as cause of GNR bacteremia. Continue current abx

## 2017-05-03 NOTE — PROGRESS NOTE ADULT - ASSESSMENT
66F with extensive hx p/w CP.    1. Chest pain- no further recurrence- LHC reveals patent stents, nonobstructive CAD.Cont medical mgmt with asa/statin/plavix. No Bbl with hypotension.    2. Hypotension- likely due to adrenal insufficiency. Management pre primary team. Currently, off pressors and BP stable at this time.   She will benefit from low dose beta blockers when she tolerates from CAD standpoint.     3. ETOH abuse/cirrhosis- Management pre primary team. S/p paracentesis today.     4. DVT proph., replete lytes as needed.     5. Outpt f/u upon discharge.

## 2017-05-03 NOTE — PROGRESS NOTE ADULT - ASSESSMENT
65 yo F with PMH of liver cirrhosis 2/2 chronic alcohol abuse, macrocytic anemia, HTN, RA, esophageal varices s/p banding, CAD s/p stent, h/o prolonged Qtc intially  p/w chest pain/abdominal pain on 4/24 here found to have elevated trops and EKG changes s/p cardiac cath with no stent thrombosis or occlusion, her course has been c/b by hypotension that has been persistent, she is now on low dose phenlyephrine, has was on IV fluids but had issues with fluid overload and has been receiving diuresis as well, has had some rigors but no fevers since admission, her abd has been distended but denies pain, she was started on stress dose steroids 4/26 d/t concern for adrenal insufficiency as she has been on chronic low dose steroids for her RA. She had an xray showing moderate R pleural effusion no obvious infiltrate or consolidation, she denies cough or urinary symptoms and no diarrhea or rashes. No hx of prior SBP or GI bleeding. I.D consulted for eval and r/o infection.    1.morganella sepsis/UTI/leukocytosis/hypotension/alcoholic liver cirrhosis    - improving, off pressors, s/p 4 L paracentesis 5/2    - body fluid cx no growth    -blood cx 1 bottle with morganella morganii - likely abd source    - repeat blood cx 5/1 no growth    - urine cx growing E faceium/E facealis    -on IV zosyn 3.375q8h for GI tract/UTI coverage day#4    - plan to switch to oral cipro 500mg q12h for 10 day course for UTI/morganella sepsis coverage until 5/11/2017    -CT chest/abd/pelvis 4/30 sig for L pleural effusion/atelectasis/gallbladder sludge/stones     - xray with stable L pleural effusion, no consolidation or infiltrate    - hypotension/lactic acidosis likely d/t third-spacing and poor perfusion d/t chronic volume depletion d/t liver cirrhosis/portal htn    - f/u CBC    - monitor temps    - supportive care    2. other issues- liver cirrhosis 2/2 chronic alcohol abuse, macrocytic anemia, HTN, RA, esophageal varices s/p banding, CAD s/p stent, h/o prolonged Qtc - care per medicine

## 2017-05-03 NOTE — PROGRESS NOTE ADULT - SUBJECTIVE AND OBJECTIVE BOX
65 yo F with PMH of liver cirrhosis 2/2 chronic alcohol abuse, macrocytic anemia, HTN, RA, esophageal varices s/p banding, CAD s/p stent, h/o prolonged Qtc intially  p/w chest pain/abdominal pain on 4/24 here found to have elevated trops and EKG changes s/p cardiac cath with no stent thrombosis or occlusion, her course has been c/b by hypotension that has been persistent, she is now on low dose phenlyephrine, has was on IV fluids but had issues with fluid overload and has been receiving diuresis as well, has had some rigors but no fevers since admission, her abd has been distended but denies pain, she was started on stress dose steroids 4/26 d/t concern for adrenal insufficiency as she has been on chronic low dose steroids for her RA. She had an xray showing moderate R pleural effusion no obvious infiltrate or consolidation, she denies cough or urinary symptoms and no diarrhea or rashes. No hx of prior SBP or recent GI bleeding. I.D consulted for eval and r/o infection.    no fevers  feels better   no abd pain  s/p paracentesis 5/2 4 L drained        MEDICATIONS  (STANDING):  atorvastatin 40milliGRAM(s) Oral at bedtime  aspirin 325milliGRAM(s) Oral daily  gabapentin 300milliGRAM(s) Oral three times a day  clopidogrel Tablet 75milliGRAM(s) Oral daily  rifaximin 550milliGRAM(s) Oral two times a day  pantoprazole    Tablet 40milliGRAM(s) Oral before breakfast  multivitamin 1Tablet(s) Oral daily  folic acid 1milliGRAM(s) Oral daily  thiamine 100milliGRAM(s) Oral daily  hydrocortisone 10milliGRAM(s) Oral at bedtime  hydrocortisone 20milliGRAM(s) Oral daily  heparin  Injectable 5000Unit(s) SubCutaneous every 12 hours  phenylephrine    Infusion 0.5MICROgram(s)/kG/Min IV Continuous <Continuous>  piperacillin/tazobactam IVPB. 3.375Gram(s) IV Intermittent every 8 hours      Vital Signs Last 24 Hrs  T(C): 36.6, Max: 36.8 (05-02 @ 15:56)  T(F): 97.8, Max: 98.2 (05-02 @ 15:56)  HR: 104 (93 - 111)  BP: 107/53 (95/51 - 115/53)  BP(mean): 57 (57 - 87)  RR: 18 (8 - 18)  SpO2: 98% (98% - 98%)          PE:  Constitutional: frail looking  HEENT: NC/AT, EOMI, PERRLA  Neck: supple  Back: no tenderness  Respiratory: decreased breath sounds L>R  Cardiovascular: S1S2 regular, no murmurs  Abdomen: soft, not tender, tympanic, distended, positive BS  Genitourinary: deferred  Rectal: deferred  Musculoskeletal: no muscle tenderness, no joint swelling or tenderness  Extremities:  hematoma/stasis changes noted along LLE no erythema, no drainage b/l pitting edema  Neurological:  no focal deficits  Skin: see above    Labs:                                   9.6    7.1   )-----------( 110      ( 03 May 2017 05:15 )             28.4     05-02    134<L>  |  105  |  17  ----------------------------<  122<H>  3.7   |  19<L>  |  0.64    Ca    8.0<L>      02 May 2017 05:58  Phos  2.8     05-02  Mg     2.0     05-02                   Cultures: Culture - Urine (04.28.17 @ 17:30)    Specimen Source: .Urine Catheterized    Culture Results:   >100,000 CFU/ml Enterococcus faecium  >100,000 CFU/ml Enterococcus faecalis    Culture -    Culture - Blood (05.01.17 @ 12:57)    Specimen Source: .Blood None    Culture Results:   No growth to date.    Culture - Blood (04.28.17 @ 16:17)    -  Ampicillin/Sulbactam: R >16/8    -  Aztreonam: S <=4    -  Ceftazidime: S <=1    -  Ciprofloxacin: S <=1    -  Tobramycin: S <=4    -  Trimethoprim/Sulfamethoxazole: S <=2/38    -  Ampicillin: R >16    -  Cefazolin: R >16    -  Meropenem: S <=1    -  Ceftriaxone: S <=1    -  Levofloxacin: S <=2    -  Piperacillin/Tazobactam: S <=16    Gram Stain:   Growth in anaerobic bottle: Gram Negative Rods    -  Amikacin: S <=16    -  Cefepime: S <=4    -  Cefoxitin: S <=8    -  Ertapenem: S <=1    -  Gentamicin: S <=4    -  Imipenem: R 4    Specimen Source: .Blood None    Organism: Morganella morganii    Culture Results:   Growth in anaerobic bottle: Morganella morganii    Organism Identification: Morganella morganii    Method Type: JULIUS        Culture - Body Fluid with Gram Stain (05.01.17 @ 08:30)    Gram Stain:   No polymorphonuclear leukocytes per low power field  No organisms seen per oil power field    Specimen Source: .Body Fluid Peritoneal Fluid    cup recieved    Culture Results:   No growth        Culture - Urine (04.28.17 @ 17:30)    -  Ciprofloxacin: S <=1    -  Ciprofloxacin: S <=1    -  Nitrofurantoin: R >64    -  Nitrofurantoin: S <=32    -  Tetra/Doxy: R >8    -  Vancomycin: S 1    -  Vancomycin: S 2    -  Ampicillin: S <=2    -  Ampicillin: S <=2    -  Tetra/Doxy: S <=4    Specimen Source: .Urine Catheterized    Culture Results:   >100,000 CFU/ml Enterococcus faecalis  >100,000 CFU/ml Enterococcus faecium    Organism Identification: Enterococcus faecalis  Enterococcus faecium    Organism: Enterococcus faecalis    Organism: Enterococcus faecium    Method Type: JULIUS    Method Type: JULIUS                Radiology:   Culture - Urine (04.28.17 @ 17:30)    -  Ciprofloxacin: S <=1    -  Ciprofloxacin: S <=1    -  Nitrofurantoin: R >64    -  Nitrofurantoin: S <=32    -  Tetra/Doxy: R >8    -  Vancomycin: S 1    -  Vancomycin: S 2    -  Ampicillin: S <=2    -  Ampicillin: S <=2    -  Tetra/Doxy: S <=4    Specimen Source: .Urine Catheterized    Culture Results:   >100,000 CFU/ml Enterococcus faecalis  >100,000 CFU/ml Enterococcus faecium    Organism Identification: Enterococcus faecalis  Enterococcus faecium    Organism: Enterococcus faecalis    Organism: Enterococcus faecium    Method Type: JULIUS    Method Type: JULIUS        EXAM:  CT ABDOMEN AND PELVIS OC IC                            PROCEDURE DATE:  04/30/2017        INTERPRETATION:      Three examinations were performed on this patient:   1. CT scan of the chest with intravenous contrast  2. CT scan of the abdomen with intravenous contrast  3. CT scan of the pelvis with intravenous contrast        CLINICAL INFORMATION (all 3 exams):  Hypotension, fever and leukocytosis.     TECHNIQUE:  Contiguous axial 3 mm sections were obtained through the   chest, abdomen and pelvis using single helical acquisition.  Images were   acquired during the rapid bolus administration of 90 cc of Omnipaque 350/   10 cc discarded.  Imaging post processing software was employed to   generate reformatted images in 3 mm sagittal andcoronal imaging planes.     Oral contrast was administered.   This scan was performed using   automatic exposure control (radiation dose reduction software) to obtain   a diagnostic image quality scan with patient dose as low as reasonably   achievable.         FINDINGS:   CT of the chest dated 4/24/2017 is available for review. CT   of the abdomen and pelvis dated 04/02/2017 is available for review.    The thyroid gland appears intact.    The lungs are remarkable for small right pleural effusionand   moderate-sized left pleural effusion. There is near complete atelectasis   of the left lower lobe. Lungs are otherwise clear.  Lung volumes are   preserved.   The trachea and major bronchi are patent.    No enlarged axillary, hilar or mediastinal lymph nodes are found.   The   heart size is normal. The chest wall and thoracic spine are unremarkable.    The liver demonstrates homogeneous attenuation without focal lesion or   abnormal enhancement. The liver has a micronodular contour suggesting   cirrhosis.  Hepatic and portal veins are patent and not displaced.  No   intrahepatic or common ductal dilatation is recognized.  The gallbladder   is distended and filled with sludge and stones.  The pancreas is intact   without ductal dilatation or focal lesion.  The spleen is normal in size.    The adrenal glands are intact.  The kidneys demonstrate symmetric   nephrograms.   No suspicious renal mass is found.  No renal calculus,   hydronephrosis or perinephric infiltration is found.  The ureters are not   dilated.   The bladder appears unremarkable.    There is no evidence of pelvic mass.    No enlarged lymph nodes are found.    The bowel appears unremarkable.  No obstruction, perforation or abscess   is recognized.  There is edema inthe abdominal wall compatible with   anasarca.      IMPRESSION:          1.   Chest:  Small right pleural effusion and moderate left pleural   effusion. Near complete atelectasis of the left lower lobe.              2.   Abdomen and pelvis: Large amount of ascites in the abdomen.   Micronodular liver surface suggesting cirrhosis.   Distended gallbladder   with sludge and stones. Abdominal wall edema compatible with anasarca.  EXAM:  CHEST SINGLE VIEW FRONTAL                            PROCEDURE DATE:  04/28/2017        INTERPRETATION:  Clinical information: Chills. Rule out pneumonia    Portable AP chest radiograph from 1632 hours:    COMPARISON:  April 24, 2017    FINDINGS:  There is retrocardiac opacity compatible with small to   moderate pleural effusion which appears grossly unchanged in size. There   is no pneumothorax. The right lung is clear. No bony abnormality is   identified.    IMPRESSION:    No change inleft pleural effusion.        Advanced directives addressed: full resuscitation

## 2017-05-03 NOTE — PROGRESS NOTE ADULT - SUBJECTIVE AND OBJECTIVE BOX
Cardiology Progress Note:    HPI: 67 yo F with PMH of liver cirrhosis 2/2 chronic alcohol abuse, macrocytic anemia, HTN, RA, esophageal varices s/p banding, CAD s/p stent, h/o prolonged Qtc, p/w chest pain/abdominal pain. Stated pain is more like pressure in L chest and abdomen. Denied radiation to arm / jaw / neck. Denied nausea / vomiting / diaphoresis.  She was noted to have NSTEMI.  Pt underwent left heart cath and was noted to have patent prior placed stent. She was monitored in CCU.  Pt this am denies any symptoms except for her tingling and numbness in her hands.    5/2- No CP/SOB. Had paracentesis by IR today with 4L drained. SBP  this AM.    5/3- No CP/SOB, tx to telemetry yesterday. No fevers.    PSH: PCI, esophageal varices banding    Social Hx: Former smoker - quit in 2005, h/o heavy etoh use - quit 2/2016, Drugs - h/o marijuana use previously    Family Hx: Father - stomach problems / heart disease, mother - pancreatic cancer (24 Apr 2017 21:23)    Allergies  sulfa drugs (Unknown)    MEDICATIONS  (STANDING):  atorvastatin 40milliGRAM(s) Oral at bedtime  aspirin 325milliGRAM(s) Oral daily  gabapentin 300milliGRAM(s) Oral three times a day  clopidogrel Tablet 75milliGRAM(s) Oral daily  rifaximin 550milliGRAM(s) Oral two times a day  pantoprazole    Tablet 40milliGRAM(s) Oral before breakfast  multivitamin 1Tablet(s) Oral daily  folic acid 1milliGRAM(s) Oral daily  thiamine 100milliGRAM(s) Oral daily  sodium chloride 0.9%. 1000milliLiter(s) IV Continuous <Continuous>  hydrocortisone 10milliGRAM(s) Oral at bedtime  hydrocortisone 20milliGRAM(s) Oral daily  heparin  Injectable 5000Unit(s) SubCutaneous every 12 hours    MEDICATIONS  (PRN):  naphazoline 0.025%/pheniramine 0.3% Ophthalmic Solution - Peds 1Drop(s) Both EYES two times a day PRN watery eyes.    Vital Signs Last 24 Hrs  T(C): 36.3, Max: 36.6 (04-28 @ 00:00)  T(F): 97.4, Max: 97.8 (04-28 @ 00:00)  HR: 105 (83 - 112)  BP: 98/50 (77/40 - 113/62)  BP(mean): 61 (49 - 71)  RR: 9 (8 - 23)  SpO2: 96% (91% - 97%)    REVIEW OF SYSTEMS:    CONSTITUTIONAL:  As per HPI.  HEENT:  Eyes:  No diplopia or blurred vision. ENT:  No earache, sore throat or runny nose.  CARDIOVASCULAR:  No pressure, squeezing, strangling, tightness, heaviness or aching about the chest, neck, axilla or epigastrium.  RESPIRATORY:  No cough, shortness of breath, PND or orthopnea.  GASTROINTESTINAL:  No nausea, vomiting or diarrhea.  GENITOURINARY:  No dysuria, frequency or urgency.  MUSCULOSKELETAL:  As per HPI.  SKIN:  No change in skin, hair or nails.  NEUROLOGIC:  c/o tingling and numbnes in her hands that she had for some time.  PSYCHIATRIC:  No disorder of thought or mood.  ENDOCRINE:  No heat or cold intolerance, polyuria or polydipsia.  HEMATOLOGICAL:  No easy bruising or bleedings.     PHYSICAL EXAMINATION:    GENERAL APPEARANCE:  Pt. is not currently dyspneic, in no distress. Pt. is alert, oriented, and pleasant.  HEENT:  Pupils are normal and react normally. No icterus. Mucous membranes well colored.  NECK:  Supple. No lymphadenopathy. Jugular venous pressure not elevated. Carotids equal.   HEART:   The cardiac impulse has a normal quality. There are no murmurs, rubs or gallops noted  CHEST:  Chest is clear to auscultation. Normal respiratory effort.  ABDOMEN:  Soft and nontender.   EXTREMITIES:  There is no edema.   SKIN:  No rash or significant lesions are noted.    LABS:                        10.2   9.4   )-----------( 131      ( 28 Apr 2017 05:36 )             32.0     04-27    133<L>  |  103  |  23  ----------------------------<  117<H>  4.3   |  18<L>  |  0.81    Ca    7.5<L>      27 Apr 2017 04:47    EKG: Diagnosis Line Normal sinus rhythm  ST & T wave abnormality, consider inferior ischemia  ST & T wave abnormality, consider anterolateral ischemia  Prolonged QT    TELEMETRY: sinus tachycardia    CARDIAC TESTS: 2Decho- Limited study to evaluate left ventricular systolic function.   Estimated left ventricular ejection fraction is 55-60 %.    RADIOLOGY & ADDITIONAL STUDIES: CTA- Limited evaluation of the pulmonary artery secondary to suboptimal bolus   timing. No pulmonary embolus the trunk, main right or left pulmonary   artery. Small right and moderate left pleural effusion with bibasilar compressive   atelectasis, incomplete on the right and complete of the left.  Cirrhosis and moderate ascites in upper abdomen.    ASSESSMENT & PLAN:

## 2017-05-04 LAB
HCT VFR BLD CALC: 30 % — LOW (ref 34.5–45)
HCT VFR BLD CALC: 31.6 % — LOW (ref 34.5–45)
HGB BLD-MCNC: 10.1 G/DL — LOW (ref 11.5–15.5)
HGB BLD-MCNC: 9.6 G/DL — LOW (ref 11.5–15.5)
MCHC RBC-ENTMCNC: 32 GM/DL — SIGNIFICANT CHANGE UP (ref 32–36)
MCHC RBC-ENTMCNC: 32 GM/DL — SIGNIFICANT CHANGE UP (ref 32–36)
MCHC RBC-ENTMCNC: 36.1 PG — HIGH (ref 27–34)
MCHC RBC-ENTMCNC: 36.3 PG — HIGH (ref 27–34)
MCV RBC AUTO: 112.9 FL — HIGH (ref 80–100)
MCV RBC AUTO: 113.5 FL — HIGH (ref 80–100)
NON-GYN CYTOLOGY SPEC: SIGNIFICANT CHANGE UP
PLATELET # BLD AUTO: 132 K/UL — LOW (ref 150–400)
PLATELET # BLD AUTO: 137 K/UL — LOW (ref 150–400)
RBC # BLD: 2.65 M/UL — LOW (ref 3.8–5.2)
RBC # BLD: 2.79 M/UL — LOW (ref 3.8–5.2)
RBC # FLD: 14 % — SIGNIFICANT CHANGE UP (ref 10.3–14.5)
RBC # FLD: 14.3 % — SIGNIFICANT CHANGE UP (ref 10.3–14.5)
WBC # BLD: 7.7 K/UL — SIGNIFICANT CHANGE UP (ref 3.8–10.5)
WBC # BLD: 8.3 K/UL — SIGNIFICANT CHANGE UP (ref 3.8–10.5)
WBC # FLD AUTO: 7.7 K/UL — SIGNIFICANT CHANGE UP (ref 3.8–10.5)
WBC # FLD AUTO: 8.3 K/UL — SIGNIFICANT CHANGE UP (ref 3.8–10.5)

## 2017-05-04 RX ADMIN — CLOPIDOGREL BISULFATE 75 MILLIGRAM(S): 75 TABLET, FILM COATED ORAL at 13:47

## 2017-05-04 RX ADMIN — Medication 10 MILLIGRAM(S): at 21:06

## 2017-05-04 RX ADMIN — Medication 325 MILLIGRAM(S): at 13:47

## 2017-05-04 RX ADMIN — PIPERACILLIN AND TAZOBACTAM 25 GRAM(S): 4; .5 INJECTION, POWDER, LYOPHILIZED, FOR SOLUTION INTRAVENOUS at 21:06

## 2017-05-04 RX ADMIN — GABAPENTIN 300 MILLIGRAM(S): 400 CAPSULE ORAL at 06:12

## 2017-05-04 RX ADMIN — Medication 1 TABLET(S): at 13:48

## 2017-05-04 RX ADMIN — Medication 1 MILLIGRAM(S): at 13:48

## 2017-05-04 RX ADMIN — HEPARIN SODIUM 5000 UNIT(S): 5000 INJECTION INTRAVENOUS; SUBCUTANEOUS at 06:12

## 2017-05-04 RX ADMIN — ATORVASTATIN CALCIUM 40 MILLIGRAM(S): 80 TABLET, FILM COATED ORAL at 21:06

## 2017-05-04 RX ADMIN — PIPERACILLIN AND TAZOBACTAM 25 GRAM(S): 4; .5 INJECTION, POWDER, LYOPHILIZED, FOR SOLUTION INTRAVENOUS at 13:53

## 2017-05-04 RX ADMIN — GABAPENTIN 300 MILLIGRAM(S): 400 CAPSULE ORAL at 21:06

## 2017-05-04 RX ADMIN — GABAPENTIN 300 MILLIGRAM(S): 400 CAPSULE ORAL at 13:49

## 2017-05-04 RX ADMIN — Medication 100 MILLIGRAM(S): at 13:49

## 2017-05-04 RX ADMIN — PIPERACILLIN AND TAZOBACTAM 25 GRAM(S): 4; .5 INJECTION, POWDER, LYOPHILIZED, FOR SOLUTION INTRAVENOUS at 06:12

## 2017-05-04 RX ADMIN — PANTOPRAZOLE SODIUM 40 MILLIGRAM(S): 20 TABLET, DELAYED RELEASE ORAL at 06:12

## 2017-05-04 RX ADMIN — Medication 20 MILLIGRAM(S): at 06:12

## 2017-05-04 NOTE — PROGRESS NOTE ADULT - SUBJECTIVE AND OBJECTIVE BOX
65 yo Female with a PMH of liver cirrhosis 2/2 chronic alcohol abuse, macrocytic anemia, HTN, RA, esophageal varices s/p banding, CAD s/p stent, h/o prolonged Qtc, p/w chest pain/abdominal pain. States pain is more like pressure in L chest and abdomen. States pain has diminished a lot now, but is still there mildly. Denies radiation to arm / jaw / neck. Denies nausea / vomiting / diophoresis. In ED, troponins trended from 0.707 -> 79.3. Called on call cardio Dr. Flannery and discussed case, he recommended calling Dr. Anderson who is the on call interventionlist. Discussed case in detail with Dr. Anderson, including troponins trending up, new ST depressions on EKG and on going residual chest discomfort. Dr. Anderson recommends heparin drip without initial bolus due to esophageal varices / liver cirrhosis and due to patient being at increased risk for bleeding. Also recommends to c/w ASA / plavix / beta blocker / statin. Dr. Anderson believes patient may have had cardiac event earlier, and troponins are just lagging behind. Cardiac cath to be performed in AM.    4/26/17 s/p card cath- stent open, no intervention done  4/27/17 c/o tingling and numbness of both arms  4/28/17 remains hypotensive, bp 70/30's, +rigors but no fever, +chest congestion. States she does not "feel good"  4/29/17- started on pressors yesterday- BP improved. No localized signs of infection despite elevtaed lactate an WBC- pancultures taken. Pt feels better today  4/30/17 remains on pressors, tapered down a little. For pan scan today  5/1/17 off pressors  5/2 Stable s/p paracentesis by IR with 4 Liters fluid removed.   5/4 Seen and examined.  Chart reviewed.  No acute events overnight.      Vital Signs Last 24 Hrs  T(C): 36.6  HR: 100  BP: 119/60  RR: 18  SpO2: 96%    LABS:                                 9.6    8.3   )-----------( 132                  30.4      134    |  105   |  17     ----------------------------<  122    3.7     |  19     |  0.64      RADIOLOGY & ADDITIONAL TESTS:  EXAM:  CHEST SINGLE VIEW FRONTAL                          PROCEDURE DATE:  04/28/2017    INTERPRETATION:  Clinical information: Chills. Rule out pneumonia    Portable AP chest radiograph from 1632 hours:    COMPARISON:  April 24, 2017    FINDINGS:  There is retrocardiac opacity compatible with small to   moderate pleural effusion which appears grossly unchanged in size. There   is no pneumothorax. The right lung is clear. No bony abnormality is   identified.    IMPRESSION:    No change in left pleural effusion.    PHYSICAL EXAM:  GENERAL: NAD  HEAD:  Atraumatic, Normocephalic  HEENT: Moist mucous membranes  NECK: Supple, No JVD  CHEST/LUNG: +crackles  HEART: Regular rate and rhythm; No murmurs, rubs, or gallops  ABDOMEN: Soft, Nontender, Large ascites  EXTREMITIES: LLE boot on, +anasarca up to upper abdomen  SKIN-several large bruises, one on the left side of the abdomen, few on the legs  CNS- alert, oriented X3, non focal          atorvastatin 40milliGRAM(s) Oral at bedtime  lisinopril 2.5milliGRAM(s) Oral daily  heparin  Infusion. Unit(s)/Hr IV Continuous <Continuous>  heparin  Injectable 4700Unit(s) IV Push every 6 hours PRN  predniSONE   Tablet 15milliGRAM(s) Oral daily  aspirin 325milliGRAM(s) Oral daily  gabapentin 300milliGRAM(s) Oral three times a day  clopidogrel Tablet 75milliGRAM(s) Oral daily  rifaximin 550milliGRAM(s) Oral two times a day  naphazoline 0.025%/pheniramine 0.3% Ophthalmic Solution - Peds 1Drop(s) Both EYES two times a day PRN  pantoprazole    Tablet 40milliGRAM(s) Oral before breakfast  multivitamin 1Tablet(s) Oral daily  folic acid 1milliGRAM(s) Oral daily  thiamine 100milliGRAM(s) Oral daily  metoprolol succinate ER 12.5milliGRAM(s) Oral daily  sodium chloride 0.45%. 1000milliLiter(s) IV Continuous <Continuous>    Assessment/Plan    # Persistent hypotension- now resolved, off pressors  Unclear underlying reason for hypotension  Urine c/s +Enterococcus, blood +GNR  Different bugs-? unclear source of bacteremia  CXR- no pneumonia  ID f/u appreciated- started on IV Zosyn on 4/30  s/p paracenthesis 5/2 to r/o SBP as source of bacteremia  As per ID plan to switch to oral cipro 500mg q12H for 10 day course for UTI/morganella sepsis coverage until 5/11/2017    # NSTEMI/ CAD s/p recent stent  Card[facility] eval Dr. Ramesh appreciated  Status-post cardiac catherization on 4/26- stent patent, no other blockages  Continue with aspirin, Plavix, and statin.    # Neuropathy/cervical stenosis  Seen by Dr. Gonzales previous admission. Refused surgical intervention  MRI's reviewed. Still does not want to proceed with surgery  Neurology eval with Dr. Valdes    # Liver cirrhosis w/ esophageal varices/anasarca from third-spacing   s/p paracentesis on 5/2 to rule out SBP as source of bacteremia with 4 Liters fluid removed    # Rheumatoid Arthritis  Rheumatology follow-up appreciated    # Left ankle fracture  Ortho f/u DR Fung appreciated. Xrays 4/26 reviewed  Remains NWB to LLE in cam boot.   PT following    # Anemia/thrombocytopenia likely 2 to liver cirrhosis  Monitor Hgb baseline 9-10    # Dispo- s/p paracenthesis on 5/2 to r/o SBP as cause of GNR bacteremia. Continue current abx

## 2017-05-05 LAB
ANION GAP SERPL CALC-SCNC: 12 MMOL/L — SIGNIFICANT CHANGE UP (ref 5–17)
BUN SERPL-MCNC: 14 MG/DL — SIGNIFICANT CHANGE UP (ref 7–23)
CALCIUM SERPL-MCNC: 7.9 MG/DL — LOW (ref 8.5–10.1)
CHLORIDE SERPL-SCNC: 102 MMOL/L — SIGNIFICANT CHANGE UP (ref 96–108)
CO2 SERPL-SCNC: 19 MMOL/L — LOW (ref 22–31)
CREAT SERPL-MCNC: 0.72 MG/DL — SIGNIFICANT CHANGE UP (ref 0.5–1.3)
GLUCOSE SERPL-MCNC: 124 MG/DL — HIGH (ref 70–99)
HCT VFR BLD CALC: 30.6 % — LOW (ref 34.5–45)
HGB BLD-MCNC: 9.9 G/DL — LOW (ref 11.5–15.5)
MAGNESIUM SERPL-MCNC: 1.9 MG/DL — SIGNIFICANT CHANGE UP (ref 1.8–2.4)
MCHC RBC-ENTMCNC: 32.5 GM/DL — SIGNIFICANT CHANGE UP (ref 32–36)
MCHC RBC-ENTMCNC: 36.1 PG — HIGH (ref 27–34)
MCV RBC AUTO: 111 FL — HIGH (ref 80–100)
PHOSPHATE SERPL-MCNC: 2.9 MG/DL — SIGNIFICANT CHANGE UP (ref 2.5–4.5)
PLATELET # BLD AUTO: 147 K/UL — LOW (ref 150–400)
POTASSIUM SERPL-MCNC: 3.6 MMOL/L — SIGNIFICANT CHANGE UP (ref 3.5–5.3)
POTASSIUM SERPL-SCNC: 3.6 MMOL/L — SIGNIFICANT CHANGE UP (ref 3.5–5.3)
RBC # BLD: 2.75 M/UL — LOW (ref 3.8–5.2)
RBC # FLD: 14.4 % — SIGNIFICANT CHANGE UP (ref 10.3–14.5)
SODIUM SERPL-SCNC: 133 MMOL/L — LOW (ref 135–145)
WBC # BLD: 7.4 K/UL — SIGNIFICANT CHANGE UP (ref 3.8–10.5)
WBC # FLD AUTO: 7.4 K/UL — SIGNIFICANT CHANGE UP (ref 3.8–10.5)

## 2017-05-05 RX ORDER — CIPROFLOXACIN LACTATE 400MG/40ML
500 VIAL (ML) INTRAVENOUS EVERY 12 HOURS
Qty: 0 | Refills: 0 | Status: COMPLETED | OUTPATIENT
Start: 2017-05-05 | End: 2017-05-11

## 2017-05-05 RX ADMIN — Medication 100 MILLIGRAM(S): at 13:21

## 2017-05-05 RX ADMIN — Medication 1 MILLIGRAM(S): at 13:21

## 2017-05-05 RX ADMIN — ATORVASTATIN CALCIUM 40 MILLIGRAM(S): 80 TABLET, FILM COATED ORAL at 21:25

## 2017-05-05 RX ADMIN — HEPARIN SODIUM 5000 UNIT(S): 5000 INJECTION INTRAVENOUS; SUBCUTANEOUS at 05:48

## 2017-05-05 RX ADMIN — Medication 1 TABLET(S): at 13:21

## 2017-05-05 RX ADMIN — PANTOPRAZOLE SODIUM 40 MILLIGRAM(S): 20 TABLET, DELAYED RELEASE ORAL at 05:48

## 2017-05-05 RX ADMIN — GABAPENTIN 300 MILLIGRAM(S): 400 CAPSULE ORAL at 13:22

## 2017-05-05 RX ADMIN — Medication 10 MILLIGRAM(S): at 21:26

## 2017-05-05 RX ADMIN — Medication 20 MILLIGRAM(S): at 05:48

## 2017-05-05 RX ADMIN — GABAPENTIN 300 MILLIGRAM(S): 400 CAPSULE ORAL at 05:48

## 2017-05-05 RX ADMIN — HEPARIN SODIUM 5000 UNIT(S): 5000 INJECTION INTRAVENOUS; SUBCUTANEOUS at 18:46

## 2017-05-05 RX ADMIN — GABAPENTIN 300 MILLIGRAM(S): 400 CAPSULE ORAL at 21:25

## 2017-05-05 RX ADMIN — Medication 500 MILLIGRAM(S): at 18:46

## 2017-05-05 RX ADMIN — PIPERACILLIN AND TAZOBACTAM 25 GRAM(S): 4; .5 INJECTION, POWDER, LYOPHILIZED, FOR SOLUTION INTRAVENOUS at 05:48

## 2017-05-05 RX ADMIN — CLOPIDOGREL BISULFATE 75 MILLIGRAM(S): 75 TABLET, FILM COATED ORAL at 13:21

## 2017-05-05 RX ADMIN — Medication 325 MILLIGRAM(S): at 13:21

## 2017-05-05 NOTE — PROGRESS NOTE ADULT - ASSESSMENT
66F with extensive hx p/w CP.    1. Chest pain- no further recurrence- LHC reveals patent stents, nonobstructive CAD.Cont medical mgmt with asa/statin/plavix. No Bbl with hypotension.    2. Hypotension- adrenal insufficiency vs ?sepsis. Management pre primary team. Currently, off pressors and SBP  at this time.   She will benefit from low dose beta blockers when she tolerates from CAD standpoint.     3. ETOH abuse/cirrhosis- Management pre primary team. S/p paracentesis.    4. DVT proph., replete lytes as needed.     5. Stach/PAT- likely related to underlying sepsis, comorbidities. No Bbl/CCB in setting of hypotension. Cont to follow.     6. DVT proph, outpt f/u upon discharge.

## 2017-05-05 NOTE — PROGRESS NOTE ADULT - SUBJECTIVE AND OBJECTIVE BOX
Cardiology Progress Note:    HPI: 67 yo F with PMH of liver cirrhosis 2/2 chronic alcohol abuse, macrocytic anemia, HTN, RA, esophageal varices s/p banding, CAD s/p stent, h/o prolonged Qtc, p/w chest pain/abdominal pain. States pain is more like pressure in L chest and abdomen. States pain has diminished a lot now, but is still there mildly. Denies radiation to arm / jaw / neck. Denies nausea / vomiting / diophoresis. In ED, troponins trended from 0.707 -> 79.3. Called on call cardio Dr. Flannery and discussed case, he recommended calling Dr. Anderson who is the on call interventionlist. Discussed case in detail with Dr. Anderson, including troponins trending up, new ST depressions on EKG and on going residual chest discomfort. Dr. Anderson recommends heparin drip without initial bolus due to esophageal varices / liver cirrhosis and due to patient being at increased risk for bleeding. Also recommends to c/w ASA / plavix / beta blocker / statin. Dr. Anderson believes patient may have had cardiac event earlier, and troponins are just lagging behind.     5/2- No CP/SOB. Had paracentesis by IR today with 4L drained. SBP  this AM.    5/3- No CP/SOB, tx to telemetry yesterday. No fevers.    5/5- SR/Stach on tele. Lying flat in bed.     Social Hx: Former smoker - quit in 2005, h/o heavy etoh use - quit 2/2016, Drugs - h/o marijuana use previously    Family Hx: Father - stomach problems / heart disease, mother - pancreatic cancer (24 Apr 2017 21:23)    PAST MEDICAL & SURGICAL HISTORY:  HTN (hypertension)  Rheumatoid arthritis  CAD s/p PCI  banding of varices    Allergies  sulfa drugs (Unknown)    MEDICATIONS  (STANDING):  atorvastatin 40milliGRAM(s) Oral at bedtime  aspirin 325milliGRAM(s) Oral daily  gabapentin 300milliGRAM(s) Oral three times a day  clopidogrel Tablet 75milliGRAM(s) Oral daily  rifaximin 550milliGRAM(s) Oral two times a day  pantoprazole    Tablet 40milliGRAM(s) Oral before breakfast  multivitamin 1Tablet(s) Oral daily  folic acid 1milliGRAM(s) Oral daily  thiamine 100milliGRAM(s) Oral daily  hydrocortisone 10milliGRAM(s) Oral at bedtime  hydrocortisone 20milliGRAM(s) Oral daily  heparin  Injectable 5000Unit(s) SubCutaneous every 12 hours  piperacillin/tazobactam IVPB. 3.375Gram(s) IV Intermittent every 8 hours    MEDICATIONS  (PRN):  naphazoline 0.025%/pheniramine 0.3% Ophthalmic Solution - Peds 1Drop(s) Both EYES two times a day PRN watery eyes.    Vital Signs Last 24 Hrs  T(C): 36.5, Max: 36.8 (05-04 @ 17:22)  T(F): 97.7, Max: 98.2 (05-04 @ 17:22)  HR: 104 (100 - 104)  BP: 95/51 (95/51 - 119/60)  BP(mean): 58 (58 - 67)  RR: 18 (18 - 18)  SpO2: 94% (94% - 97%)    REVIEW OF SYSTEMS:    CONSTITUTIONAL:  As per HPI.  HEENT:  Eyes:  No diplopia or blurred vision. ENT:  No earache, sore throat or runny nose.  CARDIOVASCULAR:  No pressure, squeezing, strangling, tightness, heaviness or aching about the chest, neck, axilla or epigastrium.  RESPIRATORY:  No cough, shortness of breath, PND or orthopnea.  GASTROINTESTINAL:  No nausea, vomiting or diarrhea.  GENITOURINARY:  No dysuria, frequency or urgency.  MUSCULOSKELETAL:  As per HPI.  SKIN:  No change in skin, hair or nails.  NEUROLOGIC:  No paresthesias, fasciculations, seizures or weakness.  PSYCHIATRIC:  No disorder of thought or mood.  ENDOCRINE:  No heat or cold intolerance, polyuria or polydipsia.  HEMATOLOGICAL:  No easy bruising or bleedings.     PHYSICAL EXAMINATION:    GENERAL APPEARANCE:  Pt. is not currently dyspneic, in no distress. Pt. is alert, oriented, and pleasant.  HEENT:  Pupils are normal and react normally. No icterus. Mucous membranes well colored.  NECK:  Supple. No lymphadenopathy. Jugular venous pressure not elevated. Carotids equal.   HEART:   The cardiac impulse has a normal quality. There are no murmurs, rubs or gallops noted  CHEST:  Chest is clear to auscultation. Normal respiratory effort.  ABDOMEN:  Soft and nontender.   EXTREMITIES:  There is no edema.   SKIN:  No rash or significant lesions are noted.    I&O's Summary    I & Os for current day (as of 05 May 2017 08:10)  =============================================  IN: 220 ml / OUT: 0 ml / NET: 220 ml      LABS:                        9.9    7.4   )-----------( 147      ( 05 May 2017 06:25 )             30.6     05-05    133<L>  |  102  |  14  ----------------------------<  124<H>  3.6   |  19<L>  |  0.72    Ca    7.9<L>      05 May 2017 06:25  Phos  2.9     05-05  Mg     1.9     05-05    EKG: Diagnosis Line Normal sinus rhythm  ST & T wave abnormality, consider inferior ischemia  ST & T wave abnormality, consider anterolateral ischemia  Prolonged QT    TELEMETRY: SR/Stach    CARDIAC TESTS: 2Decho- Limited study to evaluate left ventricular systolic function.   Estimated left ventricular ejection fraction is 55-60 %.    RADIOLOGY & ADDITIONAL STUDIES: CTA- Limited evaluation of the pulmonary artery secondary to suboptimal bolus   timing. No pulmonary embolus the trunk, main right or left pulmonary   artery. Small right and moderate left pleural effusion with bibasilar compressive   atelectasis, incomplete on the right and complete of the left.  Cirrhosis and moderate ascites in upper abdomen.    ASSESSMENT & PLAN:

## 2017-05-05 NOTE — PROGRESS NOTE ADULT - SUBJECTIVE AND OBJECTIVE BOX
7 yo Female with a PMH of liver cirrhosis 2/2 chronic alcohol abuse, macrocytic anemia, HTN, RA, esophageal varices s/p banding, CAD s/p stent, h/o prolonged Qtc, p/w chest pain/abdominal pain. States pain is more like pressure in L chest and abdomen. States pain has diminished a lot now, but is still there mildly. Denies radiation to arm / jaw / neck. Denies nausea / vomiting / diophoresis. In ED, troponins trended from 0.707 -> 79.3. Called on call cardio Dr. Flannery and discussed case, he recommended calling Dr. Anderson who is the on call interventionlist. Discussed case in detail with Dr. Anderson, including troponins trending up, new ST depressions on EKG and on going residual chest discomfort. Dr. Anderson recommends heparin drip without initial bolus due to esophageal varices / liver cirrhosis and due to patient being at increased risk for bleeding. Also recommends to c/w ASA / plavix / beta blocker / statin. Dr. Anderson believes patient may have had cardiac event earlier, and troponins are just lagging behind.    4/26/17 s/p card cath- stent open, no intervention done  4/27/17 c/o tingling and numbness of both arms  4/28/17 remains hypotensive, bp 70/30's, +rigors but no fever, +chest congestion. States she does not "feel good"  4/29/17- started on pressors yesterday- BP improved. No localized signs of infection despite elevtaed lactate an WBC- pancultures taken. Pt feels better today  4/30/17 remains on pressors, tapered down a little. For pan scan today  5/1/17 off pressors  5/2 Stable s/p paracentesis by IR with 4 Liters fluid removed.   5/4 Seen and examined.  Chart reviewed.  No acute events overnight.    5/5 Abdomen distended     Vital Signs Last 24 Hrs  T(C): 36.5  HR: 104  BP: 95/51  RR: 18  SpO2: 96%    LABS:                                 9.9    7.4   )-----------( 147                 30.6      133    |  102  |  14     ----------------------------<  124  3.6     |  19     |  0.72      RADIOLOGY & ADDITIONAL TESTS:  EXAM:  CHEST SINGLE VIEW FRONTAL                          PROCEDURE DATE:  04/28/2017    INTERPRETATION:  Clinical information: Chills. Rule out pneumonia    Portable AP chest radiograph from 1632 hours:    COMPARISON:  April 24, 2017    FINDINGS:  There is retrocardiac opacity compatible with small to   moderate pleural effusion which appears grossly unchanged in size. There   is no pneumothorax. The right lung is clear. No bony abnormality is   identified.    IMPRESSION:    No change in left pleural effusion.    PHYSICAL EXAM:  GENERAL: NAD  HEAD:  Atraumatic, Normocephalic  HEENT: Moist mucous membranes  NECK: Supple, No JVD  CHEST/LUNG: +crackles  HEART: Regular rate and rhythm; No murmurs, rubs, or gallops  ABDOMEN: Soft, Nontender, Large ascites  EXTREMITIES: LLE boot on, +anasarca up to upper abdomen  SKIN-several large bruises, one on the left side of the abdomen, few on the legs  CNS- alert, oriented X3, non focal          atorvastatin 40milliGRAM(s) Oral at bedtime  lisinopril 2.5milliGRAM(s) Oral daily  heparin  Infusion. Unit(s)/Hr IV Continuous <Continuous>  heparin  Injectable 4700Unit(s) IV Push every 6 hours PRN  predniSONE   Tablet 15milliGRAM(s) Oral daily  aspirin 325milliGRAM(s) Oral daily  gabapentin 300milliGRAM(s) Oral three times a day  clopidogrel Tablet 75milliGRAM(s) Oral daily  rifaximin 550milliGRAM(s) Oral two times a day  naphazoline 0.025%/pheniramine 0.3% Ophthalmic Solution - Peds 1Drop(s) Both EYES two times a day PRN  pantoprazole    Tablet 40milliGRAM(s) Oral before breakfast  multivitamin 1Tablet(s) Oral daily  folic acid 1milliGRAM(s) Oral daily  thiamine 100milliGRAM(s) Oral daily  metoprolol succinate ER 12.5milliGRAM(s) Oral daily  sodium chloride 0.45%. 1000milliLiter(s) IV Continuous <Continuous>    Assessment/Plan    # Liver cirrhosis w/ esophageal varices/anasarca from third-spacing   s/p paracentesis on 5/2 to rule out SBP as source of bacteremia with 4 Liters fluid removed  *5/5 Abdomen distended, ascites fluid likely reaccumulated.  Consult IR & GI likely will need another paracentesis.  - Albumin low at 1.8 consider IV albumin infusion    # Persistent hypotension- now resolved, off pressors  Unclear underlying reason for hypotension  Urine c/s +Enterococcus, blood +GNR  Different bugs-? unclear source of bacteremia  CXR- no pneumonia  ID f/u appreciated- started on IV Zosyn on 4/30  s/p paracenthesis 5/2 to r/o SBP as source of bacteremia  As per ID switch to oral cipro 500mg q12H for 10 day course for UTI/morganella sepsis coverage until 5/11/2017    # NSTEMI/ CAD s/p recent stent    Card[facility] eval Dr. Ramesh appreciated  Status-post cardiac catherization on 4/26- stent patent, no other blockages  Continue with aspirin, Plavix, and statin.  As per cardiology patient would benifit from low dose BB when she tolerates from CAD standpoint    # Neuropathy/cervical stenosis  Seen by Dr. Gonzales previous admission. Refused surgical intervention  MRI's reviewed. Still does not want to proceed with surgery  Neurology eval with Dr. Valdes      # Rheumatoid Arthritis  Rheumatology follow-up appreciated    # Left ankle fracture  Ortho f/u DR Fung appreciated. Xrays 4/26 reviewed  Remains NWB to LLE in cam boot.   PT following    # Anemia/thrombocytopenia likely 2 to liver cirrhosis  Monitor Hgb baseline 9-10    # Dispo- s/p paracenthesis on 5/2 to r/o SBP as cause of GNR bacteremia. Continue current abx

## 2017-05-06 LAB
ANION GAP SERPL CALC-SCNC: 10 MMOL/L — SIGNIFICANT CHANGE UP (ref 5–17)
BUN SERPL-MCNC: 15 MG/DL — SIGNIFICANT CHANGE UP (ref 7–23)
CALCIUM SERPL-MCNC: 7.9 MG/DL — LOW (ref 8.5–10.1)
CHLORIDE SERPL-SCNC: 104 MMOL/L — SIGNIFICANT CHANGE UP (ref 96–108)
CO2 SERPL-SCNC: 19 MMOL/L — LOW (ref 22–31)
CREAT SERPL-MCNC: 0.86 MG/DL — SIGNIFICANT CHANGE UP (ref 0.5–1.3)
CULTURE RESULTS: SIGNIFICANT CHANGE UP
CULTURE RESULTS: SIGNIFICANT CHANGE UP
GLUCOSE SERPL-MCNC: 105 MG/DL — HIGH (ref 70–99)
HCT VFR BLD CALC: 30 % — LOW (ref 34.5–45)
HGB BLD-MCNC: 9.8 G/DL — LOW (ref 11.5–15.5)
MCHC RBC-ENTMCNC: 32.6 GM/DL — SIGNIFICANT CHANGE UP (ref 32–36)
MCHC RBC-ENTMCNC: 36.6 PG — HIGH (ref 27–34)
MCV RBC AUTO: 112.3 FL — HIGH (ref 80–100)
PLATELET # BLD AUTO: 152 K/UL — SIGNIFICANT CHANGE UP (ref 150–400)
POTASSIUM SERPL-MCNC: 3.9 MMOL/L — SIGNIFICANT CHANGE UP (ref 3.5–5.3)
POTASSIUM SERPL-SCNC: 3.9 MMOL/L — SIGNIFICANT CHANGE UP (ref 3.5–5.3)
RBC # BLD: 2.68 M/UL — LOW (ref 3.8–5.2)
RBC # FLD: 14 % — SIGNIFICANT CHANGE UP (ref 10.3–14.5)
SODIUM SERPL-SCNC: 133 MMOL/L — LOW (ref 135–145)
SPECIMEN SOURCE: SIGNIFICANT CHANGE UP
SPECIMEN SOURCE: SIGNIFICANT CHANGE UP
WBC # BLD: 7.9 K/UL — SIGNIFICANT CHANGE UP (ref 3.8–10.5)
WBC # FLD AUTO: 7.9 K/UL — SIGNIFICANT CHANGE UP (ref 3.8–10.5)

## 2017-05-06 RX ORDER — SPIRONOLACTONE 25 MG/1
50 TABLET, FILM COATED ORAL DAILY
Qty: 0 | Refills: 0 | Status: DISCONTINUED | OUTPATIENT
Start: 2017-05-06 | End: 2017-05-09

## 2017-05-06 RX ORDER — FUROSEMIDE 40 MG
20 TABLET ORAL DAILY
Qty: 0 | Refills: 0 | Status: DISCONTINUED | OUTPATIENT
Start: 2017-05-06 | End: 2017-05-09

## 2017-05-06 RX ADMIN — GABAPENTIN 300 MILLIGRAM(S): 400 CAPSULE ORAL at 22:44

## 2017-05-06 RX ADMIN — Medication 20 MILLIGRAM(S): at 16:10

## 2017-05-06 RX ADMIN — GABAPENTIN 300 MILLIGRAM(S): 400 CAPSULE ORAL at 13:03

## 2017-05-06 RX ADMIN — Medication 1 MILLIGRAM(S): at 11:27

## 2017-05-06 RX ADMIN — GABAPENTIN 300 MILLIGRAM(S): 400 CAPSULE ORAL at 05:30

## 2017-05-06 RX ADMIN — Medication 500 MILLIGRAM(S): at 05:30

## 2017-05-06 RX ADMIN — Medication 325 MILLIGRAM(S): at 11:27

## 2017-05-06 RX ADMIN — PANTOPRAZOLE SODIUM 40 MILLIGRAM(S): 20 TABLET, DELAYED RELEASE ORAL at 06:29

## 2017-05-06 RX ADMIN — Medication 100 MILLIGRAM(S): at 11:27

## 2017-05-06 RX ADMIN — HEPARIN SODIUM 5000 UNIT(S): 5000 INJECTION INTRAVENOUS; SUBCUTANEOUS at 17:34

## 2017-05-06 RX ADMIN — Medication 500 MILLIGRAM(S): at 17:34

## 2017-05-06 RX ADMIN — CLOPIDOGREL BISULFATE 75 MILLIGRAM(S): 75 TABLET, FILM COATED ORAL at 11:27

## 2017-05-06 RX ADMIN — Medication 20 MILLIGRAM(S): at 05:30

## 2017-05-06 RX ADMIN — SPIRONOLACTONE 50 MILLIGRAM(S): 25 TABLET, FILM COATED ORAL at 16:10

## 2017-05-06 RX ADMIN — ATORVASTATIN CALCIUM 40 MILLIGRAM(S): 80 TABLET, FILM COATED ORAL at 22:44

## 2017-05-06 RX ADMIN — Medication 10 MILLIGRAM(S): at 22:44

## 2017-05-06 RX ADMIN — Medication 1 TABLET(S): at 11:27

## 2017-05-06 RX ADMIN — HEPARIN SODIUM 5000 UNIT(S): 5000 INJECTION INTRAVENOUS; SUBCUTANEOUS at 05:30

## 2017-05-06 NOTE — PROGRESS NOTE ADULT - SUBJECTIVE AND OBJECTIVE BOX
5 yo Female with a PMH of liver cirrhosis 2/2 chronic alcohol abuse, macrocytic anemia, HTN, RA, esophageal varices s/p banding, CAD s/p stent, h/o prolonged Qtc, p/w chest pain/abdominal pain. States pain is more like pressure in L chest and abdomen. States pain has diminished a lot now, but is still there mildly. Denies radiation to arm / jaw / neck. Denies nausea / vomiting / diophoresis. In ED, troponins trended from 0.707 -> 79.3. Called on call cardio Dr. Flannery and discussed case, he recommended calling Dr. Anderson who is the on call interventionlist. Discussed case in detail with Dr. Anderson, including troponins trending up, new ST depressions on EKG and on going residual chest discomfort. Dr. Anderson recommends heparin drip without initial bolus due to esophageal varices / liver cirrhosis and due to patient being at increased risk for bleeding. Also recommends to c/w ASA / plavix / beta blocker / statin. Dr. Anderson believes patient may have had cardiac event earlier, and troponins are just lagging behind.    4/26/17 s/p card cath- stent open, no intervention done  4/27/17 c/o tingling and numbness of both arms  4/28/17 remains hypotensive, bp 70/30's, +rigors but no fever, +chest congestion. States she does not "feel good"  4/29/17- started on pressors yesterday- BP improved. No localized signs of infection despite elevtaed lactate an WBC- pancultures taken. Pt feels better today  4/30/17 remains on pressors, tapered down a little. For pan scan today  5/1/17 off pressors  5/2 Stable s/p paracentesis by IR with 4 Liters fluid removed.   5/4 Seen and examined.  Chart reviewed.  No acute events overnight.    5/5 Abdomen distended     5/6:CA  - above noted  - having sob due to reaccumulation of ascites  - no fever    Vital Signs Last 24 Hrs  T(C): 36.5  HR: 104  BP: 95/51  RR: 18  SpO2: 96%    LABS:                                 9.9    7.4   )-----------( 147                 30.6      133    |  102  |  14     ----------------------------<  124  3.6     |  19     |  0.72      RADIOLOGY & ADDITIONAL TESTS:  EXAM:  CHEST SINGLE VIEW FRONTAL                          PROCEDURE DATE:  04/28/2017    INTERPRETATION:  Clinical information: Chills. Rule out pneumonia    Portable AP chest radiograph from 1632 hours:    COMPARISON:  April 24, 2017    FINDINGS:  There is retrocardiac opacity compatible with small to   moderate pleural effusion which appears grossly unchanged in size. There   is no pneumothorax. The right lung is clear. No bony abnormality is   identified.    IMPRESSION:    No change in left pleural effusion.    PHYSICAL EXAM:  GENERAL: NAD  HEAD:  Atraumatic, Normocephalic  HEENT: Moist mucous membranes  NECK: Supple, No JVD  CHEST/LUNG: +crackles  HEART: Regular rate and rhythm; No murmurs, rubs, or gallops  ABDOMEN: Soft, Nontender, Large ascites  EXTREMITIES: LLE boot on, +anasarca up to upper abdomen  SKIN-several large bruises, one on the left side of the abdomen, few on the legs  CNS- alert, oriented X3, non focal          atorvastatin 40milliGRAM(s) Oral at bedtime  lisinopril 2.5milliGRAM(s) Oral daily  heparin  Infusion. Unit(s)/Hr IV Continuous <Continuous>  heparin  Injectable 4700Unit(s) IV Push every 6 hours PRN  predniSONE   Tablet 15milliGRAM(s) Oral daily  aspirin 325milliGRAM(s) Oral daily  gabapentin 300milliGRAM(s) Oral three times a day  clopidogrel Tablet 75milliGRAM(s) Oral daily  rifaximin 550milliGRAM(s) Oral two times a day  naphazoline 0.025%/pheniramine 0.3% Ophthalmic Solution - Peds 1Drop(s) Both EYES two times a day PRN  pantoprazole    Tablet 40milliGRAM(s) Oral before breakfast  multivitamin 1Tablet(s) Oral daily  folic acid 1milliGRAM(s) Oral daily  thiamine 100milliGRAM(s) Oral daily  metoprolol succinate ER 12.5milliGRAM(s) Oral daily  sodium chloride 0.45%. 1000milliLiter(s) IV Continuous <Continuous>    Assessment/Plan    # Liver cirrhosis w/ esophageal varices/anasarca from third-spacing   s/p paracentesis on 5/2 to rule out SBP as source of bacteremia with 4 Liters fluid removed  *5/5 Abdomen distended, ascites fluid likely reaccumulated.  Consult IR & GI likely will need another paracentesis.  - Albumin low at 1.8 consider IV albumin infusion    # Persistent hypotension- now resolved, off pressors  Unclear underlying reason for hypotension  Urine c/s +Enterococcus, blood +GNR  Different bugs-? unclear source of bacteremia  CXR- no pneumonia  ID f/u appreciated- started on IV Zosyn on 4/30  s/p paracenthesis 5/2 to r/o SBP as source of bacteremia  As per ID switch to oral cipro 500mg q12H for 10 day course for UTI/morganella sepsis coverage until 5/11/2017    # NSTEMI/ CAD s/p recent stent    Card[facility] eval Dr. Ramesh appreciated  Status-post cardiac catherization on 4/26- stent patent, no other blockages  Continue with aspirin, Plavix, and statin.  As per cardiology patient would benifit from low dose BB when she tolerates from CAD standpoint    # Neuropathy/cervical stenosis  Seen by Dr. Gonzales previous admission. Refused surgical intervention  MRI's reviewed. Still does not want to proceed with surgery  Neurology eval with Dr. Valdes      # Rheumatoid Arthritis  Rheumatology follow-up appreciated    # Left ankle fracture  Ortho f/u DR Fung appreciated. Xrays 4/26 reviewed  Remains NWB to LLE in cam boot.   PT following    # Anemia/thrombocytopenia likely 2 to liver cirrhosis  Monitor Hgb baseline 9-10    # Dispo- s/p paracenthesis on 5/2 to r/o SBP as cause of GNR bacteremia. Continue current abx    5/6:  - cont abx   - plan is for repeat paracentesis on 5/8  - labs in am  - cont venodynes

## 2017-05-06 NOTE — CONSULT NOTE ADULT - SUBJECTIVE AND OBJECTIVE BOX
Full note to be dictated    hypotensiona nd recent infection complicated by likely adrenal insuff  on steroid    Ascites, S/P approx 4 liter tap and reaccumulation  low salt diet  gentle diuresis  for now would avoid repeated paracentesis and monitor BP closely      CAD S/P Stent, stent patent with cath  inc risk bleed with anti plt due to EV    RA and LE Fx

## 2017-05-07 DIAGNOSIS — I21.4 NON-ST ELEVATION (NSTEMI) MYOCARDIAL INFARCTION: ICD-10-CM

## 2017-05-07 DIAGNOSIS — A41.50 GRAM-NEGATIVE SEPSIS, UNSPECIFIED: ICD-10-CM

## 2017-05-07 DIAGNOSIS — R06.00 DYSPNEA, UNSPECIFIED: ICD-10-CM

## 2017-05-07 DIAGNOSIS — M06.9 RHEUMATOID ARTHRITIS, UNSPECIFIED: ICD-10-CM

## 2017-05-07 DIAGNOSIS — J90 PLEURAL EFFUSION, NOT ELSEWHERE CLASSIFIED: ICD-10-CM

## 2017-05-07 DIAGNOSIS — R18.8 OTHER ASCITES: ICD-10-CM

## 2017-05-07 LAB
ANION GAP SERPL CALC-SCNC: 12 MMOL/L — SIGNIFICANT CHANGE UP (ref 5–17)
ANISOCYTOSIS BLD QL: SIGNIFICANT CHANGE UP
BASOPHILS # BLD AUTO: 0.1 K/UL — SIGNIFICANT CHANGE UP (ref 0–0.2)
BASOPHILS NFR BLD AUTO: 0.7 % — SIGNIFICANT CHANGE UP (ref 0–2)
BUN SERPL-MCNC: 24 MG/DL — HIGH (ref 7–23)
CALCIUM SERPL-MCNC: 7.9 MG/DL — LOW (ref 8.5–10.1)
CHLORIDE SERPL-SCNC: 102 MMOL/L — SIGNIFICANT CHANGE UP (ref 96–108)
CO2 SERPL-SCNC: 18 MMOL/L — LOW (ref 22–31)
CREAT SERPL-MCNC: 1.17 MG/DL — SIGNIFICANT CHANGE UP (ref 0.5–1.3)
CULTURE RESULTS: SIGNIFICANT CHANGE UP
EOSINOPHIL # BLD AUTO: 0.1 K/UL — SIGNIFICANT CHANGE UP (ref 0–0.5)
EOSINOPHIL NFR BLD AUTO: 1.5 % — SIGNIFICANT CHANGE UP (ref 0–6)
GLUCOSE SERPL-MCNC: 124 MG/DL — HIGH (ref 70–99)
HCT VFR BLD CALC: 29.1 % — LOW (ref 34.5–45)
HGB BLD-MCNC: 9.4 G/DL — LOW (ref 11.5–15.5)
HYPOCHROMIA BLD QL: SIGNIFICANT CHANGE UP
LYMPHOCYTES # BLD AUTO: 1.5 K/UL — SIGNIFICANT CHANGE UP (ref 1–3.3)
LYMPHOCYTES # BLD AUTO: 18.8 % — SIGNIFICANT CHANGE UP (ref 13–44)
MACROCYTES BLD QL: SIGNIFICANT CHANGE UP
MANUAL DIF COMMENT BLD-IMP: SIGNIFICANT CHANGE UP
MCHC RBC-ENTMCNC: 32.4 GM/DL — SIGNIFICANT CHANGE UP (ref 32–36)
MCHC RBC-ENTMCNC: 35.9 PG — HIGH (ref 27–34)
MCV RBC AUTO: 110.8 FL — HIGH (ref 80–100)
MONOCYTES # BLD AUTO: 0.9 K/UL — SIGNIFICANT CHANGE UP (ref 0–0.9)
MONOCYTES NFR BLD AUTO: 11.3 % — SIGNIFICANT CHANGE UP (ref 2–14)
NEUTROPHILS # BLD AUTO: 5.4 K/UL — SIGNIFICANT CHANGE UP (ref 1.8–7.4)
NEUTROPHILS NFR BLD AUTO: 67.8 % — SIGNIFICANT CHANGE UP (ref 43–77)
PLAT MORPH BLD: NORMAL — SIGNIFICANT CHANGE UP
PLATELET # BLD AUTO: 148 K/UL — LOW (ref 150–400)
POLYCHROMASIA BLD QL SMEAR: SLIGHT — SIGNIFICANT CHANGE UP
POTASSIUM SERPL-MCNC: 3.9 MMOL/L — SIGNIFICANT CHANGE UP (ref 3.5–5.3)
POTASSIUM SERPL-SCNC: 3.9 MMOL/L — SIGNIFICANT CHANGE UP (ref 3.5–5.3)
RBC # BLD: 2.62 M/UL — LOW (ref 3.8–5.2)
RBC # FLD: 14.3 % — SIGNIFICANT CHANGE UP (ref 10.3–14.5)
RBC BLD AUTO: (no result)
SODIUM SERPL-SCNC: 132 MMOL/L — LOW (ref 135–145)
SPECIMEN SOURCE: SIGNIFICANT CHANGE UP
TARGETS BLD QL SMEAR: SLIGHT — SIGNIFICANT CHANGE UP
WBC # BLD: 8.3 K/UL — SIGNIFICANT CHANGE UP (ref 3.8–10.5)
WBC # FLD AUTO: 8.3 K/UL — SIGNIFICANT CHANGE UP (ref 3.8–10.5)

## 2017-05-07 RX ADMIN — Medication 100 MILLIGRAM(S): at 11:16

## 2017-05-07 RX ADMIN — Medication 1 TABLET(S): at 11:15

## 2017-05-07 RX ADMIN — GABAPENTIN 300 MILLIGRAM(S): 400 CAPSULE ORAL at 14:21

## 2017-05-07 RX ADMIN — Medication 20 MILLIGRAM(S): at 06:35

## 2017-05-07 RX ADMIN — HEPARIN SODIUM 5000 UNIT(S): 5000 INJECTION INTRAVENOUS; SUBCUTANEOUS at 06:35

## 2017-05-07 RX ADMIN — Medication 325 MILLIGRAM(S): at 11:12

## 2017-05-07 RX ADMIN — Medication 1 MILLIGRAM(S): at 11:13

## 2017-05-07 RX ADMIN — PANTOPRAZOLE SODIUM 40 MILLIGRAM(S): 20 TABLET, DELAYED RELEASE ORAL at 06:35

## 2017-05-07 RX ADMIN — CLOPIDOGREL BISULFATE 75 MILLIGRAM(S): 75 TABLET, FILM COATED ORAL at 11:13

## 2017-05-07 RX ADMIN — ATORVASTATIN CALCIUM 40 MILLIGRAM(S): 80 TABLET, FILM COATED ORAL at 22:26

## 2017-05-07 RX ADMIN — Medication 500 MILLIGRAM(S): at 06:34

## 2017-05-07 RX ADMIN — GABAPENTIN 300 MILLIGRAM(S): 400 CAPSULE ORAL at 22:26

## 2017-05-07 RX ADMIN — Medication 500 MILLIGRAM(S): at 17:53

## 2017-05-07 RX ADMIN — HEPARIN SODIUM 5000 UNIT(S): 5000 INJECTION INTRAVENOUS; SUBCUTANEOUS at 17:54

## 2017-05-07 RX ADMIN — GABAPENTIN 300 MILLIGRAM(S): 400 CAPSULE ORAL at 06:35

## 2017-05-07 RX ADMIN — Medication 20 MILLIGRAM(S): at 11:14

## 2017-05-07 RX ADMIN — SPIRONOLACTONE 50 MILLIGRAM(S): 25 TABLET, FILM COATED ORAL at 11:18

## 2017-05-07 RX ADMIN — Medication 10 MILLIGRAM(S): at 22:26

## 2017-05-07 NOTE — PROGRESS NOTE ADULT - ASSESSMENT
on po cipro  labs noted  discussed w GI. No repeat para centesis at present  cont with diuresis  cardio f/u noted  - on sub q heparin

## 2017-05-07 NOTE — PROGRESS NOTE ADULT - SUBJECTIVE AND OBJECTIVE BOX
Subjective:  still c/o dyspnea  abdominal discomfort      MEDICATIONS  (STANDING):  atorvastatin 40milliGRAM(s) Oral at bedtime  aspirin 325milliGRAM(s) Oral daily  gabapentin 300milliGRAM(s) Oral three times a day  clopidogrel Tablet 75milliGRAM(s) Oral daily  rifaximin 550milliGRAM(s) Oral two times a day  pantoprazole    Tablet 40milliGRAM(s) Oral before breakfast  multivitamin 1Tablet(s) Oral daily  folic acid 1milliGRAM(s) Oral daily  thiamine 100milliGRAM(s) Oral daily  hydrocortisone 10milliGRAM(s) Oral at bedtime  hydrocortisone 20milliGRAM(s) Oral daily  heparin  Injectable 5000Unit(s) SubCutaneous every 12 hours  ciprofloxacin     Tablet 500milliGRAM(s) Oral every 12 hours  spironolactone 50milliGRAM(s) Oral daily  furosemide    Tablet 20milliGRAM(s) Oral daily    MEDICATIONS  (PRN):  naphazoline 0.025%/pheniramine 0.3% Ophthalmic Solution - Peds 1Drop(s) Both EYES two times a day PRN watery eyes.      Allergies    sulfa drugs (Unknown)    Intolerances        REVIEW OF SYSTEMS:    CONSTITUTIONAL:  As per HPI.  HEENT:  Eyes:  No diplopia or blurred vision. ENT:  No earache, sore throat or runny nose.  CARDIOVASCULAR:  No pressure, squeezing, tightness, heaviness or aching about the chest, neck, axilla or epigastrium.  RESPIRATORY:  sob.  GASTROINTESTINAL:  abdominal swelling  GENITOURINARY:  No dysuria, frequency or urgency.  MUSCULOSKELETAL:  no joint pain, deformity, tenderness  EXTREMITIES: leg pain and swelling  SKIN:  No change in skin, hair or nails.  NEUROLOGIC:  No paresthesias, fasciculations, seizures or weakness.  PSYCHIATRIC:  No disorder of thought or mood.  ENDOCRINE:  No heat or cold intolerance, polyuria or polydipsia.  HEMATOLOGICAL:  No easy bruising or bleedings:    Vital Signs Last 24 Hrs  T(C): 36.4, Max: 36.6 (05-06 @ 12:16)  T(F): 97.5, Max: 97.9 (05-06 @ 12:16)  HR: 103 (68 - 104)  BP: 99/51 (99/51 - 108/54)  BP(mean): 68 (68 - 68)  RR: 17 (17 - 17)  SpO2: 95% (94% - 95%)    PHYSICAL EXAMINATION:  SKIN: no rashes  HEAD: NC/AT  EYES: PERRLA, EOMI  EARS: TM's intact  NOSE: no abnormalities  NECK:  Supple. No lymphadenopathy. Jugular venous pressure not elevated. Carotids equal.   HEART:   The cardiac impulse has a normal quality. Reg., Nl S1 and S2.  There are no murmurs, rubs or gallops noted  CHEST:  decreased BS right side  ABDOMEN:  ascites  EXTREMITIES:  bilat leg edema; legs wrapped; R leg wound  NEURO: AAO x 3, no focal deficts       LABS:                        9.4    8.3   )-----------( x        ( 07 May 2017 05:28 )             29.1     05-07    132<L>  |  102  |  24<H>  ----------------------------<  124<H>  3.9   |  18<L>  |  1.17    Ca    7.9<L>      07 May 2017 05:28            RADIOLOGY & ADDITIONAL TESTS:

## 2017-05-07 NOTE — PROGRESS NOTE ADULT - ASSESSMENT
hypotension and recent infection complicated by likely adrenal insuff  on steroid, consider taper    Ascites, S/P approx 4 liter tap and reaccumulation adn pleural effusion  low salt diet  gentle diuresis  for now would avoid repeated paracentesis and monitor BP closely  daily saad mosley I O        CAD S/P Stent, stent patent with cath  inc risk bleed with anti plt due to EV    RA and LE Fx

## 2017-05-07 NOTE — PROGRESS NOTE ADULT - SUBJECTIVE AND OBJECTIVE BOX
Patient is a 66y old  Female who presents with a chief complaint of chest pain (24 Apr 2017 23:38)      HPI:  67 yo F with PMH of liver cirrhosis 2/2 chronic alcohol abuse, macrocytic anemia, HTN, RA, esophageal varices s/p banding, CAD s/p stent, h/o prolonged Qtc, p/w chest pain/abdominal pain. States pain is more like pressure in L chest and abdomen. States pain has diminished a lot now, but is still there mildly. Denies radiation to arm / jaw / neck. Denies nausea / vomiting / diophoresis. In ED, troponins trended from 0.707 -> 79.3. Called on call cardio Dr. Flannery and discussed case, he recommended calling Dr. Anderson who is the on call interventionlist. Discussed case in detail with Dr. Anderson, including troponins trending up, new ST depressions on EKG and on going residual chest discomfort. Dr. Anderson recommends heparin drip without initial bolus due to esophageal varices / liver cirrhosis and due to patient being at increased risk for bleeding. Also recommends to c/w ASA / plavix / beta blocker / statin. Dr. Anderson believes patient may have had cardiac event earlier, and troponins are just lagging behind. Cardiac cath to be performed in AM.    CO fatigue and GABRIEL  LE pain, mild     abd distension and some pressure throughout the abd, neg radiation  neg cp       PSH: PCI, esophageal varices banding  Social Hx: Former smoker - quit in 2005, h/o heavy etoh use - quit 2/2016, Drugs - h/o marijuana use previously  Family Hx: Father - stomach problems / heart disease, mother - pancreatic cancer (24 Apr 2017 21:23)      PAST MEDICAL & SURGICAL HISTORY:  HTN (hypertension)  Rheumatoid arthritis  No significant past surgical history      MEDICATIONS  (STANDING):  atorvastatin 40milliGRAM(s) Oral at bedtime  aspirin 325milliGRAM(s) Oral daily  gabapentin 300milliGRAM(s) Oral three times a day  clopidogrel Tablet 75milliGRAM(s) Oral daily  rifaximin 550milliGRAM(s) Oral two times a day  pantoprazole    Tablet 40milliGRAM(s) Oral before breakfast  multivitamin 1Tablet(s) Oral daily  folic acid 1milliGRAM(s) Oral daily  thiamine 100milliGRAM(s) Oral daily  hydrocortisone 10milliGRAM(s) Oral at bedtime  hydrocortisone 20milliGRAM(s) Oral daily  heparin  Injectable 5000Unit(s) SubCutaneous every 12 hours  ciprofloxacin     Tablet 500milliGRAM(s) Oral every 12 hours  spironolactone 50milliGRAM(s) Oral daily  furosemide    Tablet 20milliGRAM(s) Oral daily    MEDICATIONS  (PRN):  naphazoline 0.025%/pheniramine 0.3% Ophthalmic Solution - Peds 1Drop(s) Both EYES two times a day PRN watery eyes.      Allergies    sulfa drugs (Unknown)    Intolerances        SOCIAL HISTORY:unchanged    FAMILY HISTORY:  No pertinent family history in first degree relatives      REVIEW OF SYSTEMS:    CONSTITUTIONAL: No weakness, fevers or chills  EYES/ENT: No visual changes;  No vertigo or throat pain   NECK: No pain or stiffness  RESPIRATORY: No cough, wheezing, hemoptysis; No shortness of breath  CARDIOVASCULAR: No chest pain or palpitations  GENITOURINARY: No dysuria, frequency or hematuria  NEUROLOGICAL: No numbness or weakness  SKIN: No itching, burning, rashes, or lesions   All other review of systems is negative unless indicated above.    Vital Signs Last 24 Hrs  T(C): 36.1, Max: 36.4 (05-06 @ 18:32)  T(F): 97, Max: 97.5 (05-06 @ 18:32)  HR: 101 (100 - 103)  BP: 103/47 (99/51 - 108/54)  BP(mean): 68 (68 - 68)  RR: 18 (18 - 18)  SpO2: 96% (94% - 96%)    PHYSICAL EXAM:    Constitutional: NAD, well-developed  HEENT: EOMI, throat clear  Neck: No LAD, supple  Respiratory: CTA and P  Cardiovascular: S1 and S2, RRR, no M  Gastrointestinal: BS+, soft, NT/ND, neg HSM,  Extremities: 2 plus peripheral edema, neg clubing, cyanosis  Vascular: 2+ peripheral pulses  Neurological: A/O x 3, no focal deficits  Psychiatric: Normal mood, normal affect  Skin: No rashes    LABS:  CBC Full  -  ( 07 May 2017 05:28 )  WBC Count : 8.3 K/uL  Hemoglobin : 9.4 g/dL  Hematocrit : 29.1 %  Platelet Count - Automated : 148 K/uL  Mean Cell Volume : 110.8 fl  Mean Cell Hemoglobin : 35.9 pg  Mean Cell Hemoglobin Concentration : 32.4 gm/dL  Auto Neutrophil # : 5.4 K/uL  Auto Lymphocyte # : 1.5 K/uL  Auto Monocyte # : 0.9 K/uL  Auto Eosinophil # : 0.1 K/uL  Auto Basophil # : 0.1 K/uL  Auto Neutrophil % : 67.8 %  Auto Lymphocyte % : 18.8 %  Auto Monocyte % : 11.3 %  Auto Eosinophil % : 1.5 %  Auto Basophil % : 0.7 %    05-07    132<L>  |  102  |  24<H>  ----------------------------<  124<H>  3.9   |  18<L>  |  1.17    Ca    7.9<L>      07 May 2017 05:28              RADIOLOGY & ADDITIONAL STUDIES:EXAM:  CT ABDOMEN AND PELVIS OC IC                            PROCEDURE DATE:  04/30/2017        INTERPRETATION:      Three examinations were performed on this patient:   1. CT scan of the chest with intravenous contrast  2. CT scan of the abdomen with intravenous contrast  3. CT scan of the pelvis with intravenous contrast        CLINICAL INFORMATION (all 3 exams):  Hypotension, fever and leukocytosis.     TECHNIQUE:  Contiguous axial 3 mm sections were obtained through the   chest, abdomen and pelvis using single helical acquisition.  Images were   acquired during the rapid bolus administration of 90 cc of Omnipaque 350/   10 cc discarded.  Imaging post processing software was employed to   generate reformatted images in 3 mm sagittal andcoronal imaging planes.     Oral contrast was administered.   This scan was performed using   automatic exposure control (radiation dose reduction software) to obtain   a diagnostic image quality scan with patient dose as low as reasonably   achievable.         FINDINGS:   CT of the chest dated 4/24/2017 is available for review. CT   of the abdomen and pelvis dated 04/02/2017 is available for review.    The thyroid gland appears intact.    The lungs are remarkable for small right pleural effusionand   moderate-sized left pleural effusion. There is near complete atelectasis   of the left lower lobe. Lungs are otherwise clear.  Lung volumes are   preserved.   The trachea and major bronchi are patent.    No enlarged axillary, hilar or mediastinal lymph nodes are found.   The   heart size is normal. The chest wall and thoracic spine are unremarkable.    The liver demonstrates homogeneous attenuation without focal lesion or   abnormal enhancement. The liver has a micronodular contour suggesting   cirrhosis.  Hepatic and portal veins are patent and not displaced.  No   intrahepatic or common ductal dilatation is recognized.  The gallbladder   is distended and filled with sludge and stones.  The pancreas is intact   without ductal dilatation or focal lesion.  The spleen is normal in size.    The adrenal glands are intact.  The kidneys demonstrate symmetric   nephrograms.   No suspicious renal mass is found.  No renal calculus,   hydronephrosis or perinephric infiltration is found.  The ureters are not   dilated.   The bladder appears unremarkable.    There is no evidence of pelvic mass.    No enlarged lymph nodes are found.    The bowel appears unremarkable.  No obstruction, perforation or abscess   is recognized.  There is edema inthe abdominal wall compatible with   anasarca.      IMPRESSION:          1.   Chest:  Small right pleural effusion and moderate left pleural   effusion. Near complete atelectasis of the left lower lobe.              2.   Abdomen and pelvis: Large amount of ascites in the abdomen.   Micronodular liver surface suggesting cirrhosis.   Distended gallbladder   with sludge and stones. Abdominal wall edema compatible with anasarca.              ROWENA CARLIN M.D., ATTENDING RADIOLOGIST  This document has been electronically signed. Apr 30 2017 12:01PM

## 2017-05-08 LAB
HCT VFR BLD CALC: 28.1 % — LOW (ref 34.5–45)
HGB BLD-MCNC: 9.6 G/DL — LOW (ref 11.5–15.5)
MCHC RBC-ENTMCNC: 34 GM/DL — SIGNIFICANT CHANGE UP (ref 32–36)
MCHC RBC-ENTMCNC: 37.9 PG — HIGH (ref 27–34)
MCV RBC AUTO: 111.2 FL — HIGH (ref 80–100)
PLATELET # BLD AUTO: 147 K/UL — LOW (ref 150–400)
RBC # BLD: 2.53 M/UL — LOW (ref 3.8–5.2)
RBC # FLD: 13.8 % — SIGNIFICANT CHANGE UP (ref 10.3–14.5)
WBC # BLD: 9.1 K/UL — SIGNIFICANT CHANGE UP (ref 3.8–10.5)
WBC # FLD AUTO: 9.1 K/UL — SIGNIFICANT CHANGE UP (ref 3.8–10.5)

## 2017-05-08 RX ADMIN — HEPARIN SODIUM 5000 UNIT(S): 5000 INJECTION INTRAVENOUS; SUBCUTANEOUS at 07:09

## 2017-05-08 RX ADMIN — Medication 500 MILLIGRAM(S): at 07:09

## 2017-05-08 RX ADMIN — Medication 20 MILLIGRAM(S): at 08:35

## 2017-05-08 RX ADMIN — GABAPENTIN 300 MILLIGRAM(S): 400 CAPSULE ORAL at 07:09

## 2017-05-08 RX ADMIN — Medication 1 MILLIGRAM(S): at 11:33

## 2017-05-08 RX ADMIN — Medication 325 MILLIGRAM(S): at 11:33

## 2017-05-08 RX ADMIN — Medication 100 MILLIGRAM(S): at 11:27

## 2017-05-08 RX ADMIN — Medication 10 MILLIGRAM(S): at 23:02

## 2017-05-08 RX ADMIN — PANTOPRAZOLE SODIUM 40 MILLIGRAM(S): 20 TABLET, DELAYED RELEASE ORAL at 07:09

## 2017-05-08 RX ADMIN — Medication 20 MILLIGRAM(S): at 07:09

## 2017-05-08 RX ADMIN — SPIRONOLACTONE 50 MILLIGRAM(S): 25 TABLET, FILM COATED ORAL at 11:10

## 2017-05-08 RX ADMIN — GABAPENTIN 300 MILLIGRAM(S): 400 CAPSULE ORAL at 21:18

## 2017-05-08 RX ADMIN — ATORVASTATIN CALCIUM 40 MILLIGRAM(S): 80 TABLET, FILM COATED ORAL at 21:18

## 2017-05-08 RX ADMIN — HEPARIN SODIUM 5000 UNIT(S): 5000 INJECTION INTRAVENOUS; SUBCUTANEOUS at 16:59

## 2017-05-08 RX ADMIN — GABAPENTIN 300 MILLIGRAM(S): 400 CAPSULE ORAL at 13:31

## 2017-05-08 RX ADMIN — Medication 500 MILLIGRAM(S): at 17:00

## 2017-05-08 RX ADMIN — CLOPIDOGREL BISULFATE 75 MILLIGRAM(S): 75 TABLET, FILM COATED ORAL at 11:33

## 2017-05-08 RX ADMIN — Medication 1 TABLET(S): at 11:33

## 2017-05-08 NOTE — PROGRESS NOTE ADULT - SUBJECTIVE AND OBJECTIVE BOX
PCP: DR Lees    CC-Patient is a 66y old  Female who presents with a chief complaint of chest pain (2017 23:38)    HPI:  67 yo F with PMH of liver cirrhosis 2/2 chronic alcohol abuse, macrocytic anemia, HTN, RA, esophageal varices s/p banding, CAD s/p stent, h/o prolonged Qtc, p/w chest pain/abdominal pain. States pain is more like pressure in L chest and abdomen. States pain has diminished a lot now, but is still there mildly. Denies radiation to arm / jaw / neck. Denies nausea / vomiting / diophoresis. In ED, troponins trended from 0.707 -> 79.3. Called on call cardio Dr. Flannery and discussed case, he recommended calling Dr. Anderson who is the on call interventionlist. Discussed case in detail with Dr. Anderson, including troponins trending up, new ST depressions on EKG and on going residual chest discomfort. Dr. Anderson recommends heparin drip without initial bolus due to esophageal varices / liver cirrhosis and due to patient being at increased risk for bleeding. Also recommends to c/w ASA / plavix / beta blocker / statin. Dr. Anderson believes patient may have had cardiac event earlier, and troponins are just lagging behind. Cardiac cath to be performed in AM.    17 s/p card cath- stent open, no intervention done  17 c/o tingling and numbness of both arms  17 remains hypotensive, bp 70/30's, +rigors but no fever, +chest congestion. States she does not "feel good"  17- started on pressors yesterday- BP improved. No localized signs of infection despite elevtaed lactate an WBC- pancultures taken. Pt feels better today  17 remains on pressors, tapered down a little. For pan scan today  17 off pressors  17 c/o "not feeling her bladder anymore", urinary incontinence    Vital Signs Last 24 Hrs  T(C): 36.3, Max: 36.4 (05-07 @ 17:51)  T(F): 97.4, Max: 97.5 (05-07 @ 17:51)  HR: 97 (96 - 102)  BP: 105/56 (91/43 - 105/59)  BP(mean): 56 (56 - 56)  RR: 17 (17 - 18)  SpO2: 97% (95% - 97%)    LABS:                        9.6    9.1   )-----------( 147      ( 08 May 2017 05:58 )             28.1     07 May 2017 05:28    132    |  102    |  24     ----------------------------<  124    3.9     |  18     |  1.17     Ca    7.9        07 May 2017 05:28    RADIOLOGY & ADDITIONAL TESTS:      PHYSICAL EXAM:  GENERAL: looks pale and ill  HEAD:  Atraumatic, Normocephalic  EYES: EOMI, PERRLA, conjunctiva and sclera clear  HEENT: Moist mucous membranes  NECK: Supple, No JVD  NERVOUS SYSTEM:  Alert & Oriented X3,  CHEST/LUNG: occasional crackles  HEART: Regular rate and rhythm; No murmurs, rubs, or gallops  ABDOMEN: Soft, Nontender, Large ascites  GENITOURINARY- Voiding, no palpable bladder  EXTREMITIES: anasarca  SKIN-several large bruises, one on the left side of the abdomen, few on the legs  CNS- alert, oriented X3       Daily Weight in k.8 (2017 00:00)    atorvastatin 40milliGRAM(s) Oral at bedtime  lisinopril 2.5milliGRAM(s) Oral daily  heparin  Infusion. Unit(s)/Hr IV Continuous <Continuous>  heparin  Injectable 4700Unit(s) IV Push every 6 hours PRN  predniSONE   Tablet 15milliGRAM(s) Oral daily  aspirin 325milliGRAM(s) Oral daily  gabapentin 300milliGRAM(s) Oral three times a day  clopidogrel Tablet 75milliGRAM(s) Oral daily  rifaximin 550milliGRAM(s) Oral two times a day  naphazoline 0.025%/pheniramine 0.3% Ophthalmic Solution - Peds 1Drop(s) Both EYES two times a day PRN  pantoprazole    Tablet 40milliGRAM(s) Oral before breakfast  multivitamin 1Tablet(s) Oral daily  folic acid 1milliGRAM(s) Oral daily  thiamine 100milliGRAM(s) Oral daily  metoprolol succinate ER 12.5milliGRAM(s) Oral daily  sodium chloride 0.45%. 1000milliLiter(s) IV Continuous <Continuous>    Assessment/Plan  #Bacteremia likely abdominal source  Repeated cultures terrie negative  ID switched to PO Cipro    #Hypotension- adrenal insufficiency  improved, off pressors  Cont steroids    #NSTEMI/ CAD s/p recent stent  Card eval DR Ramesh appreciated  S/p card cath- stent patent, no other blockages  Cont aspirin, Plavix, statin.    #Neuropathy/cervical stenosis/lumbar stenosis  Seen by DR Gonzales previous admission. Refused surgical intervention  MRI's reviewed.   Now with new urinary incontinence- will reconsult DR Gonzales    #Liver cirrhosis w/ esophageal varices/anasarca from third-spacing   S/p paracenthesis  GI f/u DR Underwood appreciated  No further paracenthesis planned, started on diuretics    #Rheumatoid Arthritis  Rheum f/u appreciated    #LT ankle fracture  Ortho f/u DR Fung appreciated. Xrays reviewed  Remains NWB to LLE in cam boot. WIll get PT eval    #Anemia/thrombocytopenia likely 2 to liver cirrhosis  Monitor     #Dispo- PT, neurosurgical reeval- if no surgery planned, will proceed with rehab placement

## 2017-05-08 NOTE — PROGRESS NOTE ADULT - SUBJECTIVE AND OBJECTIVE BOX
Patient is a 66y old  Female who presents with a chief complaint of chest pain (24 Apr 2017 23:38)      HPI:  65 yo F with PMH of liver cirrhosis 2/2 chronic alcohol abuse, macrocytic anemia, HTN, RA, esophageal varices s/p banding, CAD s/p stent, h/o prolonged Qtc, p/w chest pain/abdominal pain. States pain is more like pressure in L chest and abdomen. States pain has diminished a lot now, but is still there mildly. Denies radiation to arm / jaw / neck. Denies nausea / vomiting / diophoresis. In ED, troponins trended from 0.707 -> 79.3. Called on call cardio Dr. Flannery and discussed case, he recommended calling Dr. Anderson who is the on call interventionlist. Discussed case in detail with Dr. Anderson, including troponins trending up, new ST depressions on EKG and on going residual chest discomfort. Dr. Anderson recommends heparin drip without initial bolus due to esophageal varices / liver cirrhosis and due to patient being at increased risk for bleeding. Also recommends to c/w ASA / plavix / beta blocker / statin. Dr. Anderson believes patient may have had cardiac event earlier, and troponins are just lagging behind. Cardiac cath to be performed in AM.    pt comf  did not get diuretics due to BP today and I DW RN to give diuretics  neg cp or mild sob  CO inc abd girth and pressure, unchanged with eating, diffuse    tomlinson  has hard time with urinating and will check bladder, GIL RN    PSH: PCI, esophageal varices banding  Social Hx: Former smoker - quit in 2005, h/o heavy etoh use - quit 2/2016, Drugs - h/o marijuana use previously  Family Hx: Father - stomach problems / heart disease, mother - pancreatic cancer (24 Apr 2017 21:23)      PAST MEDICAL & SURGICAL HISTORY:  HTN (hypertension)  Rheumatoid arthritis  No significant past surgical history      MEDICATIONS  (STANDING):  atorvastatin 40milliGRAM(s) Oral at bedtime  aspirin 325milliGRAM(s) Oral daily  gabapentin 300milliGRAM(s) Oral three times a day  clopidogrel Tablet 75milliGRAM(s) Oral daily  rifaximin 550milliGRAM(s) Oral two times a day  pantoprazole    Tablet 40milliGRAM(s) Oral before breakfast  multivitamin 1Tablet(s) Oral daily  folic acid 1milliGRAM(s) Oral daily  thiamine 100milliGRAM(s) Oral daily  hydrocortisone 10milliGRAM(s) Oral at bedtime  hydrocortisone 20milliGRAM(s) Oral daily  heparin  Injectable 5000Unit(s) SubCutaneous every 12 hours  ciprofloxacin     Tablet 500milliGRAM(s) Oral every 12 hours  spironolactone 50milliGRAM(s) Oral daily  furosemide    Tablet 20milliGRAM(s) Oral daily    MEDICATIONS  (PRN):  naphazoline 0.025%/pheniramine 0.3% Ophthalmic Solution - Peds 1Drop(s) Both EYES two times a day PRN watery eyes.      Allergies    sulfa drugs (Unknown)    Intolerances        SOCIAL HISTORY:unchanged    FAMILY HISTORY:  No pertinent family history in first degree relatives      REVIEW OF SYSTEMS:    CONSTITUTIONAL: No weakness, fevers or chills  EYES/ENT: No visual changes;  No vertigo or throat pain   NECK: No pain or stiffness  RESPIRATORY: No cough, wheezing, hemoptysis; No shortness of breath  CARDIOVASCULAR: No chest pain or palpitations  GENITOURINARY: No dysuria, frequency or hematuria  NEUROLOGICAL: No numbness or weakness  SKIN: No itching, burning, rashes, or lesions   All other review of systems is negative unless indicated above.    Vital Signs Last 24 Hrs  T(C): 36.4, Max: 36.4 (05-07 @ 17:51)  T(F): 97.5, Max: 97.5 (05-07 @ 17:51)  HR: 102 (96 - 102)  BP: 91/43 (91/43 - 103/47)  BP(mean): 56 (56 - 56)  RR: 17 (17 - 18)  SpO2: 95% (95% - 96%)    PHYSICAL EXAM:    Constitutional: NAD, well-developed  HEENT: EOMI, throat clear  Neck: No LAD, supple  Respiratory: CTA and P  Cardiovascular: S1 and S2, RRR, no M  Gastrointestinal: BS+, soft, NT/ND, neg HSM,  Extremities: 2 plus peripheral edema, neg clubing, cyanosis  Vascular: 2+ peripheral pulses  Neurological: A/O x 3, no focal deficits  Psychiatric: Normal mood, normal affect  Skin: No rashes    LABS:  CBC Full  -  ( 08 May 2017 05:58 )  WBC Count : 9.1 K/uL  Hemoglobin : 9.6 g/dL  Hematocrit : 28.1 %  Platelet Count - Automated : 147 K/uL  Mean Cell Volume : 111.2 fl  Mean Cell Hemoglobin : 37.9 pg  Mean Cell Hemoglobin Concentration : 34.0 gm/dL  Auto Neutrophil # : x  Auto Lymphocyte # : x  Auto Monocyte # : x  Auto Eosinophil # : x  Auto Basophil # : x  Auto Neutrophil % : x  Auto Lymphocyte % : x  Auto Monocyte % : x  Auto Eosinophil % : x  Auto Basophil % : x    05-07    132<L>  |  102  |  24<H>  ----------------------------<  124<H>  3.9   |  18<L>  |  1.17    Ca    7.9<L>      07 May 2017 05:28              RADIOLOGY & ADDITIONAL STUDIES:

## 2017-05-08 NOTE — PROGRESS NOTE ADULT - ASSESSMENT
66F with extensive hx p/w CP.    1. Chest pain- no further recurrence- LHC reveals patent stents, nonobstructive CAD.Cont medical mgmt with asa/statin/plavix. No Bbl with hypotension.    2. Hypotension- adrenal insufficiency vs ?sepsis. Management pre primary team. Currently, off pressors and SBP  at this time.   She will benefit from low dose beta blockers when she tolerates from CAD standpoint.     3. ETOH abuse/cirrhosis- Management pre primary team. S/p paracentesis with 4L taken off.    4. DVT proph., replete lytes as needed.     5. DVT proph, outpt f/u upon discharge. Can d/c tele today.

## 2017-05-08 NOTE — PROGRESS NOTE ADULT - ASSESSMENT
hypotension and recent infection complicated by likely adrenal insuff  on steroid, consider taper    Ascites, S/P approx 4 liter tap and reaccumulation adn pleural effusion  low salt diet  gentle diuresis  for now would avoid repeated paracentesis and monitor BP closely  daily saad mosley I O    check bladder, GIL RN and to get diuretics today        CAD S/P Stent, stent patent with cath  inc risk bleed with anti plt due to EV    RA and LE Fx

## 2017-05-08 NOTE — PROGRESS NOTE ADULT - SUBJECTIVE AND OBJECTIVE BOX
Cardiology Progress Note:    HPI: 67 yo F with PMH of liver cirrhosis 2/2 chronic alcohol abuse, macrocytic anemia, HTN, RA, esophageal varices s/p banding, CAD s/p stent, h/o prolonged Qtc, p/w chest pain/abdominal pain. States pain is more like pressure in L chest and abdomen. States pain has diminished a lot now, but is still there mildly. Denies radiation to arm / jaw / neck. Denies nausea / vomiting / diophoresis. In ED, troponins trended from 0.707 -> 79.3. Called on call cardio Dr. Flannery and discussed case, he recommended calling Dr. Anderson who is the on call interventionlist. Discussed case in detail with Dr. Anderson, including troponins trending up, new ST depressions on EKG and on going residual chest discomfort. Dr. Anderson recommends heparin drip without initial bolus due to esophageal varices / liver cirrhosis and due to patient being at increased risk for bleeding. Also recommends to c/w ASA / plavix / beta blocker / statin. Dr. Anderson believes patient may have had cardiac event earlier, and troponins are just lagging behind. Cardiac cath to be performed in AM.    5/2- No CP/SOB. Had paracentesis by IR today with 4L drained. SBP  this AM.    5/3- No CP/SOB, tx to telemetry yesterday. No fevers.    5/5- SR/Stach on tele. Lying flat in bed.     Social Hx: Former smoker - quit in 2005, h/o heavy etoh use - quit 2/2016, Drugs - h/o marijuana use previously    Family Hx: Father - stomach problems / heart disease, mother - pancreatic cancer (24 Apr 2017 21:23)    PAST MEDICAL & SURGICAL HISTORY:  HTN (hypertension)  Rheumatoid arthritis  CAD s/p PCI  banding of varices    MEDICATIONS  (STANDING):  atorvastatin 40milliGRAM(s) Oral at bedtime  aspirin 325milliGRAM(s) Oral daily  gabapentin 300milliGRAM(s) Oral three times a day  clopidogrel Tablet 75milliGRAM(s) Oral daily  rifaximin 550milliGRAM(s) Oral two times a day  pantoprazole    Tablet 40milliGRAM(s) Oral before breakfast  multivitamin 1Tablet(s) Oral daily  folic acid 1milliGRAM(s) Oral daily  thiamine 100milliGRAM(s) Oral daily  hydrocortisone 10milliGRAM(s) Oral at bedtime  hydrocortisone 20milliGRAM(s) Oral daily  heparin  Injectable 5000Unit(s) SubCutaneous every 12 hours  ciprofloxacin     Tablet 500milliGRAM(s) Oral every 12 hours  spironolactone 50milliGRAM(s) Oral daily  furosemide    Tablet 20milliGRAM(s) Oral daily    MEDICATIONS  (PRN):  naphazoline 0.025%/pheniramine 0.3% Ophthalmic Solution - Peds 1Drop(s) Both EYES two times a day PRN watery eyes.      Vital Signs Last 24 Hrs  T(C): 36.4, Max: 36.4 (05-07 @ 17:51)  T(F): 97.5, Max: 97.5 (05-07 @ 17:51)  HR: 102 (96 - 102)  BP: 91/43 (91/43 - 103/47)  BP(mean): 56 (56 - 56)  RR: 17 (17 - 18)  SpO2: 95% (95% - 96%)    REVIEW OF SYSTEMS:    CONSTITUTIONAL:  As per HPI.  HEENT:  Eyes:  No diplopia or blurred vision. ENT:  No earache, sore throat or runny nose.  CARDIOVASCULAR:  No pressure, squeezing, strangling, tightness, heaviness or aching about the chest, neck, axilla or epigastrium.  RESPIRATORY:  No cough, shortness of breath, PND or orthopnea.  GASTROINTESTINAL:  No nausea, vomiting or diarrhea.  GENITOURINARY:  No dysuria, frequency or urgency.  MUSCULOSKELETAL:  As per HPI.  SKIN:  No change in skin, hair or nails.  NEUROLOGIC:  No paresthesias, fasciculations, seizures or weakness.  PSYCHIATRIC:  No disorder of thought or mood.  ENDOCRINE:  No heat or cold intolerance, polyuria or polydipsia.  HEMATOLOGICAL:  No easy bruising or bleedings.     PHYSICAL EXAMINATION:    GENERAL APPEARANCE:  Pt. is not currently dyspneic, in no distress. Pt. is alert, oriented, and pleasant.  HEENT:  Pupils are normal and react normally. No icterus. Mucous membranes well colored.  NECK:  Supple. No lymphadenopathy. Jugular venous pressure not elevated. Carotids equal.   HEART:   The cardiac impulse has a normal quality. There are no murmurs, rubs or gallops noted  CHEST:  Chest is clear to auscultation. Normal respiratory effort.  ABDOMEN:  Soft and nontender.   EXTREMITIES:  There is no edema.   SKIN:  No rash or significant lesions are noted.    I&O's Summary  I & Os for 24h ending 08 May 2017 07:00  =============================================  IN: 30 ml / OUT: 0 ml / NET: 30 ml    I & Os for current day (as of 08 May 2017 08:51)  =============================================  IN: 0 ml / OUT: 200 ml / NET: -200 ml    LABS:                        9.6    9.1   )-----------( 147      ( 08 May 2017 05:58 )             28.1     05-07    132<L>  |  102  |  24<H>  ----------------------------<  124<H>  3.9   |  18<L>  |  1.17    Ca    7.9<L>      07 May 2017 05:28    EKG: Diagnosis Line Normal sinus rhythm  ST & T wave abnormality, consider inferior ischemia  ST & T wave abnormality, consider anterolateral ischemia  Prolonged QT    TELEMETRY: SR/Stach    CARDIAC TESTS: 2Decho- Limited study to evaluate left ventricular systolic function.   Estimated left ventricular ejection fraction is 55-60 %.    RADIOLOGY & ADDITIONAL STUDIES: CTA- Limited evaluation of the pulmonary artery secondary to suboptimal bolus   timing. No pulmonary embolus the trunk, main right or left pulmonary   artery. Small right and moderate left pleural effusion with bibasilar compressive   atelectasis, incomplete on the right and complete of the left.  Cirrhosis and moderate ascites in upper abdomen.    ASSESSMENT & PLAN:

## 2017-05-09 LAB
HCT VFR BLD CALC: 29.4 % — LOW (ref 34.5–45)
HGB BLD-MCNC: 9.6 G/DL — LOW (ref 11.5–15.5)
MCHC RBC-ENTMCNC: 32.6 GM/DL — SIGNIFICANT CHANGE UP (ref 32–36)
MCHC RBC-ENTMCNC: 35.9 PG — HIGH (ref 27–34)
MCV RBC AUTO: 110.3 FL — HIGH (ref 80–100)
PLATELET # BLD AUTO: 152 K/UL — SIGNIFICANT CHANGE UP (ref 150–400)
RBC # BLD: 2.66 M/UL — LOW (ref 3.8–5.2)
RBC # FLD: 13.7 % — SIGNIFICANT CHANGE UP (ref 10.3–14.5)
WBC # BLD: 9.9 K/UL — SIGNIFICANT CHANGE UP (ref 3.8–10.5)
WBC # FLD AUTO: 9.9 K/UL — SIGNIFICANT CHANGE UP (ref 3.8–10.5)

## 2017-05-09 RX ORDER — SPIRONOLACTONE 25 MG/1
50 TABLET, FILM COATED ORAL ONCE
Qty: 0 | Refills: 0 | Status: COMPLETED | OUTPATIENT
Start: 2017-05-09 | End: 2017-05-09

## 2017-05-09 RX ORDER — SPIRONOLACTONE 25 MG/1
100 TABLET, FILM COATED ORAL DAILY
Qty: 0 | Refills: 0 | Status: DISCONTINUED | OUTPATIENT
Start: 2017-05-10 | End: 2017-05-11

## 2017-05-09 RX ORDER — FUROSEMIDE 40 MG
40 TABLET ORAL DAILY
Qty: 0 | Refills: 0 | Status: DISCONTINUED | OUTPATIENT
Start: 2017-05-09 | End: 2017-05-11

## 2017-05-09 RX ORDER — FUROSEMIDE 40 MG
20 TABLET ORAL ONCE
Qty: 0 | Refills: 0 | Status: COMPLETED | OUTPATIENT
Start: 2017-05-09 | End: 2017-05-09

## 2017-05-09 RX ADMIN — Medication 20 MILLIGRAM(S): at 08:41

## 2017-05-09 RX ADMIN — GABAPENTIN 300 MILLIGRAM(S): 400 CAPSULE ORAL at 22:36

## 2017-05-09 RX ADMIN — Medication 10 MILLIGRAM(S): at 22:36

## 2017-05-09 RX ADMIN — ATORVASTATIN CALCIUM 40 MILLIGRAM(S): 80 TABLET, FILM COATED ORAL at 22:36

## 2017-05-09 RX ADMIN — Medication 20 MILLIGRAM(S): at 06:42

## 2017-05-09 RX ADMIN — Medication 500 MILLIGRAM(S): at 06:39

## 2017-05-09 RX ADMIN — Medication 500 MILLIGRAM(S): at 17:30

## 2017-05-09 RX ADMIN — Medication 20 MILLIGRAM(S): at 18:11

## 2017-05-09 RX ADMIN — Medication 1 TABLET(S): at 11:40

## 2017-05-09 RX ADMIN — Medication 100 MILLIGRAM(S): at 11:40

## 2017-05-09 RX ADMIN — CLOPIDOGREL BISULFATE 75 MILLIGRAM(S): 75 TABLET, FILM COATED ORAL at 11:40

## 2017-05-09 RX ADMIN — HEPARIN SODIUM 5000 UNIT(S): 5000 INJECTION INTRAVENOUS; SUBCUTANEOUS at 17:31

## 2017-05-09 RX ADMIN — PANTOPRAZOLE SODIUM 40 MILLIGRAM(S): 20 TABLET, DELAYED RELEASE ORAL at 06:41

## 2017-05-09 RX ADMIN — HEPARIN SODIUM 5000 UNIT(S): 5000 INJECTION INTRAVENOUS; SUBCUTANEOUS at 06:41

## 2017-05-09 RX ADMIN — Medication 325 MILLIGRAM(S): at 11:40

## 2017-05-09 RX ADMIN — SPIRONOLACTONE 50 MILLIGRAM(S): 25 TABLET, FILM COATED ORAL at 06:39

## 2017-05-09 RX ADMIN — SPIRONOLACTONE 50 MILLIGRAM(S): 25 TABLET, FILM COATED ORAL at 17:30

## 2017-05-09 RX ADMIN — GABAPENTIN 300 MILLIGRAM(S): 400 CAPSULE ORAL at 06:39

## 2017-05-09 RX ADMIN — Medication 1 MILLIGRAM(S): at 11:40

## 2017-05-09 RX ADMIN — GABAPENTIN 300 MILLIGRAM(S): 400 CAPSULE ORAL at 14:58

## 2017-05-09 NOTE — CONSULT NOTE ADULT - ATTENDING COMMENTS
Patient seen and examined with the neurosurgery PA, agree with the notes.  Patient with known history of cervical and Lumbar stenosis, and MRI shows L2-5 and C4-7 stenosis. She has no back pain or neck pain at the present time, and she does not want any spine surgery. She is moving all extremities in the bed and she gets OOB with the PT. She has no signs of Myelopathy on exam and she is hyporeflexic throughout. She appears mildly SOB at the present time.  Case was discussed with the patient and the Hospitalist in detail. Patient does not want any spinal surgery, and we will follow as needed.

## 2017-05-09 NOTE — PROGRESS NOTE ADULT - SUBJECTIVE AND OBJECTIVE BOX
Patient is a 66y old  Female who presents with a chief complaint of chest pain (24 Apr 2017 23:38)      HPI:  65 yo F with PMH of liver cirrhosis 2/2 chronic alcohol abuse, macrocytic anemia, HTN, RA, esophageal varices s/p banding, CAD s/p stent, h/o prolonged Qtc, p/w chest pain/abdominal pain. States pain is more like pressure in L chest and abdomen. States pain has diminished a lot now, but is still there mildly. Denies radiation to arm / jaw / neck. Denies nausea / vomiting / diophoresis. In ED, troponins trended from 0.707 -> 79.3. Called on call cardio Dr. Flannery and discussed case, he recommended calling Dr. Anderson who is the on call interventionlist. Discussed case in detail with Dr. Anderson, including troponins trending up, new ST depressions on EKG and on going residual chest discomfort. Dr. Anderson recommends heparin drip without initial bolus due to esophageal varices / liver cirrhosis and due to patient being at increased risk for bleeding. Also recommends to c/w ASA / plavix / beta blocker / statin. Dr. Anderson believes patient may have had cardiac event earlier, and troponins are just lagging behind. Cardiac cath to be performed in AM.    pt with cont fatigue and has mild diffuse abd pain  poor appetite  not urinating much, but I O maybe inaccurate  she massimo trbernardo to help document better UO    PSH: PCI, esophageal varices banding  Social Hx: Former smoker - quit in 2005, h/o heavy etoh use - quit 2/2016, Drugs - h/o marijuana use previously  Family Hx: Father - stomach problems / heart disease, mother - pancreatic cancer (24 Apr 2017 21:23)      PAST MEDICAL & SURGICAL HISTORY:  HTN (hypertension)  Rheumatoid arthritis  No significant past surgical history      MEDICATIONS  (STANDING):  atorvastatin 40milliGRAM(s) Oral at bedtime  aspirin 325milliGRAM(s) Oral daily  gabapentin 300milliGRAM(s) Oral three times a day  clopidogrel Tablet 75milliGRAM(s) Oral daily  rifaximin 550milliGRAM(s) Oral two times a day  pantoprazole    Tablet 40milliGRAM(s) Oral before breakfast  multivitamin 1Tablet(s) Oral daily  folic acid 1milliGRAM(s) Oral daily  thiamine 100milliGRAM(s) Oral daily  hydrocortisone 10milliGRAM(s) Oral at bedtime  hydrocortisone 20milliGRAM(s) Oral daily  heparin  Injectable 5000Unit(s) SubCutaneous every 12 hours  ciprofloxacin     Tablet 500milliGRAM(s) Oral every 12 hours  furosemide    Tablet 40milliGRAM(s) Oral daily    MEDICATIONS  (PRN):  naphazoline 0.025%/pheniramine 0.3% Ophthalmic Solution - Peds 1Drop(s) Both EYES two times a day PRN watery eyes.      Allergies    sulfa drugs (Unknown)    Intolerances        SOCIAL HISTORY:uncahnged    FAMILY HISTORY:  No pertinent family history in first degree relatives      REVIEW OF SYSTEMS:    CONSTITUTIONAL: No weakness, fevers or chills  EYES/ENT: No visual changes;  No vertigo or throat pain   NECK: No pain or stiffness  RESPIRATORY: No cough, wheezing, hemoptysis; No shortness of breath  CARDIOVASCULAR: No chest pain or palpitations  GENITOURINARY: No dysuria, frequency or hematuria  NEUROLOGICAL: No numbness or weakness  SKIN: No itching, burning, rashes, or lesions   All other review of systems is negative unless indicated above.    Vital Signs Last 24 Hrs  T(C): 36.6, Max: 36.7 (05-09 @ 06:48)  T(F): 97.9, Max: 98.1 (05-09 @ 06:48)  HR: 98 (98 - 109)  BP: 111/59 (97/55 - 111/59)  BP(mean): --  RR: 18 (17 - 20)  SpO2: 95% (95% - 97%)    PHYSICAL EXAM:    Constitutional: NAD, well-developed  HEENT: EOMI, throat clear  Neck: No LAD, supple  Respiratory: CTA and P  Cardiovascular: S1 and S2, RRR, no M  Gastrointestinal: BS+, soft, NT/ND, neg HSM,  Extremities: No peripheral edema, neg clubing, cyanosis  Vascular: 2+ peripheral pulses  Neurological: A/O x 3, no focal deficits  Psychiatric: Normal mood, normal affect  Skin: No rashes    LABS:  CBC Full  -  ( 09 May 2017 07:09 )  WBC Count : 9.9 K/uL  Hemoglobin : 9.6 g/dL  Hematocrit : 29.4 %  Platelet Count - Automated : 152 K/uL  Mean Cell Volume : 110.3 fl  Mean Cell Hemoglobin : 35.9 pg  Mean Cell Hemoglobin Concentration : 32.6 gm/dL  Auto Neutrophil # : x  Auto Lymphocyte # : x  Auto Monocyte # : x  Auto Eosinophil # : x  Auto Basophil # : x  Auto Neutrophil % : x  Auto Lymphocyte % : x  Auto Monocyte % : x  Auto Eosinophil % : x  Auto Basophil % : x                  RADIOLOGY & ADDITIONAL STUDIES:

## 2017-05-09 NOTE — PROGRESS NOTE ADULT - SUBJECTIVE AND OBJECTIVE BOX
PCP: DR Lees    CC-Patient is a 66y old  Female who presents with a chief complaint of chest pain (2017 23:38)    HPI:  67 yo F with PMH of liver cirrhosis 2/2 chronic alcohol abuse, macrocytic anemia, HTN, RA, esophageal varices s/p banding, CAD s/p stent, h/o prolonged Qtc, p/w chest pain/abdominal pain. States pain is more like pressure in L chest and abdomen. States pain has diminished a lot now, but is still there mildly. Denies radiation to arm / jaw / neck. Denies nausea / vomiting / diophoresis. In ED, troponins trended from 0.707 -> 79.3. Called on call cardio Dr. Flannery and discussed case, he recommended calling Dr. Anderson who is the on call interventionlist. Discussed case in detail with Dr. Anderson, including troponins trending up, new ST depressions on EKG and on going residual chest discomfort. Dr. Anderson recommends heparin drip without initial bolus due to esophageal varices / liver cirrhosis and due to patient being at increased risk for bleeding. Also recommends to c/w ASA / plavix / beta blocker / statin. Dr. Anderson believes patient may have had cardiac event earlier, and troponins are just lagging behind. Cardiac cath to be performed in AM.    17 s/p card cath- stent open, no intervention done  17 c/o tingling and numbness of both arms  17 remains hypotensive, bp 70/30's, +rigors but no fever, +chest congestion. States she does not "feel good"  17- started on pressors yesterday- BP improved. No localized signs of infection despite elevtaed lactate an WBC- pancultures taken. Pt feels better today  17 remains on pressors, tapered down a little. For pan scan today  17 off pressors  17 c/o "not feeling her bladder anymore", urinary incontinence  17 no new complaints.    Vital Signs Last 24 Hrs  T(C): 36.6, Max: 36.7 (05-09 @ 06:48)  T(F): 97.9, Max: 98.1 (05-09 @ 06:48)  HR: 109 (101 - 109)  BP: 98/43 (97/55 - 102/49)  BP(mean): --  RR: 18 (17 - 20)  SpO2: 95% (95% - 97%)    LABS:                                   9.6    9.9   )-----------( 152      ( 09 May 2017 07:09 )             29.4     RADIOLOGY & ADDITIONAL TESTS:      PHYSICAL EXAM:  GENERAL: looks pale and ill  HEAD:  Atraumatic, Normocephalic  EYES: EOMI, PERRLA, conjunctiva and sclera clear  HEENT: Moist mucous membranes  NECK: Supple, No JVD  NERVOUS SYSTEM:  Alert & Oriented X3,  CHEST/LUNG: occasional crackles  HEART: Regular rate and rhythm; No murmurs, rubs, or gallops  ABDOMEN: Soft, Nontender, Large ascites  GENITOURINARY- Voiding, no palpable bladder  EXTREMITIES: anasarca  SKIN-several large bruises, one on the left side of the abdomen, few on the legs  CNS- alert, oriented X3       Daily Weight in k.8 (2017 00:00)    atorvastatin 40milliGRAM(s) Oral at bedtime  lisinopril 2.5milliGRAM(s) Oral daily  heparin  Infusion. Unit(s)/Hr IV Continuous <Continuous>  heparin  Injectable 4700Unit(s) IV Push every 6 hours PRN  predniSONE   Tablet 15milliGRAM(s) Oral daily  aspirin 325milliGRAM(s) Oral daily  gabapentin 300milliGRAM(s) Oral three times a day  clopidogrel Tablet 75milliGRAM(s) Oral daily  rifaximin 550milliGRAM(s) Oral two times a day  naphazoline 0.025%/pheniramine 0.3% Ophthalmic Solution - Peds 1Drop(s) Both EYES two times a day PRN  pantoprazole    Tablet 40milliGRAM(s) Oral before breakfast  multivitamin 1Tablet(s) Oral daily  folic acid 1milliGRAM(s) Oral daily  thiamine 100milliGRAM(s) Oral daily  metoprolol succinate ER 12.5milliGRAM(s) Oral daily  sodium chloride 0.45%. 1000milliLiter(s) IV Continuous <Continuous>    Assessment/Plan  #Bacteremia likely abdominal source- resolved  Repeated cultures are negative  ID switched to PO Cipro    #Hypotension- adrenal insufficiency. Resolved  Off pressors. Continue PO maintanence steroids    #NSTEMI/ CAD s/p recent stent  Card eval DR Ramesh appreciated  S/p card cath- stent patent, no other blockages  Cont aspirin, Plavix, statin.    #Neuropathy/cervical stenosis/lumbar stenosis  Neurosurgery f/u appreciated  Follow up MRI's ordered.  Patient still refusing surgery    #Liver cirrhosis w/ esophageal varices/anasarca from third-spacing   S/p paracenthesis  GI f/u DR Underwood appreciated  No further paracenthesis planned, continue PO diuretics    #Rheumatoid Arthritis  Rheum f/u appreciated    #LT ankle fracture  Ortho f/u DR Fung appreciated. Xrays reviewed  Remains NWB to LLE in cam boot.  Harmony lift at present    #Anemia/thrombocytopenia likely 2 to liver cirrhosis  Monitor     #Dispo- if MRI's are stable will plan for discharge. Explained to patient that she will benefit from [facility]. She only wants to go to [facility]. Patient has multiple medical problems that are currently stable and do not require ongoing inpatient hospitalization. She plans to appeal the discharge as per current conversation.

## 2017-05-09 NOTE — CONSULT NOTE ADULT - SUBJECTIVE AND OBJECTIVE BOX
CC: Lumbar stenosis      · Subjective and Objective:   Called by the Hospitalist Dr. Isaacs to see this 67 yo female with PMHX of RA, CAD with stents on ASA/Plavix, alcoholic cirrhosis with esophageal varices s/p banding, HTN, who presented to the ED 12 days ago s/p fall from the couch with c/o trouble ambulating and leg weakness. This was not the first time she fell from the couch, as it had happened the night before, and she had also had a previous fall on 3/3. She also stated when she was at home she resorted to wearing diapers because she would not get the urge to urinate or defecate and then it would suddenly happen. Since her last admission, She had a complex L ankle fracture that she now has a CAM walker for, but is NWB at this time. She also complains of numbness/tingling in the L LE and feeling like "sand paper" in both her hands for the last several weeks. She denies any back, mid-back or neck pain. MRI of the lumbar spine shows multi-level DDD with foraminal stenosis mostly on the left, slight scoliosis, but no compression of the cauda equina or conus.   PMHX- as above  Meds- see MAR, ASA/Plavix/Lovenox, prednisone 15mg of interest  PsxHx- denies  Psocial: quit smoking 2005, last drink of alcohol 5 days prior to admission  VSS, afebrile  Neuro- Awake, alert, orientedx3  NAD, speech clear and appropriate  follows commands  HURLEY well in bed  no hoffmans  flexes b/l LEs in bed, flexes and extends R foot, cannot test L foot which is swollen  b/l UE 5/5  sensate intact to LT  Abdomen- visibly distended  MRI- Disc degeneration and spondylosis at T11-12 through L5-S1   with loss of disc height and associated degenerative endplate changes.   LEFT paracentral disc herniation is noted at L2-3 compressing the ventral   thecal sac and narrowing the LEFT neural foramen. Disc bulges at L3-4 and   L4-5 and L5-S1 flatten the ventral thecal sac and narrow the BILATERAL   neural foramina.  Mild central stenosis at L2-3 through L4-5 on a degenerative basis.      Assessment and Recommendation:   · Assessment

## 2017-05-09 NOTE — CONSULT NOTE ADULT - ASSESSMENT
65 yo female s/p multiple falls with subjective LE weakness, numbness/tingling in the LLE and b/l hands and incontinence of urine and stool at home prior to admission:    Problem/Recommendation - 1:  Problem: Degenerative disc disease, lumbar. Recommendation: Recommend MRI C-T spine given her bowel and bladder symptoms and UE complaints. Her Lumbar stenosis is not enough to warrant all her symptoms. May be the source of her subjective LE weakness or numbness/tingling, but unlikely to be causing her B/B symptoms and definitely not her hand numbness or tingling. Either way, her symptoms have been ongoing for several weeks now and this is not emergent. Her lumbar stenosis is chronic. No emergent intervention needed. Will follow up her scans.

## 2017-05-09 NOTE — PROGRESS NOTE ADULT - ASSESSMENT
hypotension and recent infection complicated by likely adrenal insuff  on steroid, consider taper    Ascites, S/P approx 4 liter tap and reaccumulation adn pleural effusion  low salt diet  gentle diuresis, will double dose  for now would avoid repeated paracentesis and monitor BP closely  daily saad mosley I O    check bladder, GIL RN and to get diuretics today        CAD S/P Stent, stent patent with cath  inc risk bleed with anti plt due to EV    RA and LE Fx

## 2017-05-10 ENCOUNTER — TRANSCRIPTION ENCOUNTER (OUTPATIENT)
Age: 67
End: 2017-05-10

## 2017-05-10 LAB
HCT VFR BLD CALC: 29.3 % — LOW (ref 34.5–45)
HGB BLD-MCNC: 10.1 G/DL — LOW (ref 11.5–15.5)
MCHC RBC-ENTMCNC: 34.5 GM/DL — SIGNIFICANT CHANGE UP (ref 32–36)
MCHC RBC-ENTMCNC: 38.1 PG — HIGH (ref 27–34)
MCV RBC AUTO: 110.3 FL — HIGH (ref 80–100)
PLATELET # BLD AUTO: 152 K/UL — SIGNIFICANT CHANGE UP (ref 150–400)
RBC # BLD: 2.66 M/UL — LOW (ref 3.8–5.2)
RBC # FLD: 13.2 % — SIGNIFICANT CHANGE UP (ref 10.3–14.5)
WBC # BLD: 9.9 K/UL — SIGNIFICANT CHANGE UP (ref 3.8–10.5)
WBC # FLD AUTO: 9.9 K/UL — SIGNIFICANT CHANGE UP (ref 3.8–10.5)

## 2017-05-10 PROCEDURE — 74176 CT ABD & PELVIS W/O CONTRAST: CPT | Mod: 26

## 2017-05-10 RX ORDER — FUROSEMIDE 40 MG
1 TABLET ORAL
Qty: 0 | Refills: 0 | COMMUNITY
Start: 2017-05-10

## 2017-05-10 RX ORDER — ATORVASTATIN CALCIUM 80 MG/1
1 TABLET, FILM COATED ORAL
Qty: 0 | Refills: 0 | COMMUNITY
Start: 2017-05-10

## 2017-05-10 RX ORDER — SPIRONOLACTONE 25 MG/1
4 TABLET, FILM COATED ORAL
Qty: 0 | Refills: 0 | COMMUNITY
Start: 2017-05-10

## 2017-05-10 RX ORDER — CLOPIDOGREL BISULFATE 75 MG/1
1 TABLET, FILM COATED ORAL
Qty: 0 | Refills: 0 | COMMUNITY
Start: 2017-05-10

## 2017-05-10 RX ORDER — ASPIRIN/CALCIUM CARB/MAGNESIUM 324 MG
1 TABLET ORAL
Qty: 0 | Refills: 0 | COMMUNITY
Start: 2017-05-10

## 2017-05-10 RX ORDER — FOLIC ACID 0.8 MG
1 TABLET ORAL
Qty: 0 | Refills: 0 | COMMUNITY
Start: 2017-05-10

## 2017-05-10 RX ORDER — HYDROCORTISONE 20 MG
1 TABLET ORAL
Qty: 0 | Refills: 0 | COMMUNITY
Start: 2017-05-10

## 2017-05-10 RX ORDER — GABAPENTIN 400 MG/1
1 CAPSULE ORAL
Qty: 0 | Refills: 0 | COMMUNITY
Start: 2017-05-10

## 2017-05-10 RX ORDER — CIPROFLOXACIN LACTATE 400MG/40ML
1 VIAL (ML) INTRAVENOUS
Qty: 0 | Refills: 0 | COMMUNITY
Start: 2017-05-10

## 2017-05-10 RX ORDER — PANTOPRAZOLE SODIUM 20 MG/1
1 TABLET, DELAYED RELEASE ORAL
Qty: 0 | Refills: 0 | COMMUNITY
Start: 2017-05-10

## 2017-05-10 RX ORDER — THIAMINE MONONITRATE (VIT B1) 100 MG
1 TABLET ORAL
Qty: 0 | Refills: 0 | COMMUNITY
Start: 2017-05-10

## 2017-05-10 RX ORDER — GLYCERIN 1 %
1 DROPS OPHTHALMIC (EYE)
Qty: 0 | Refills: 0 | COMMUNITY
Start: 2017-05-10

## 2017-05-10 RX ORDER — ENOXAPARIN SODIUM 100 MG/ML
40 INJECTION SUBCUTANEOUS
Qty: 30 | Refills: 0 | OUTPATIENT
Start: 2017-05-10 | End: 2017-06-09

## 2017-05-10 RX ADMIN — GABAPENTIN 300 MILLIGRAM(S): 400 CAPSULE ORAL at 22:36

## 2017-05-10 RX ADMIN — GABAPENTIN 300 MILLIGRAM(S): 400 CAPSULE ORAL at 14:12

## 2017-05-10 RX ADMIN — Medication 325 MILLIGRAM(S): at 12:16

## 2017-05-10 RX ADMIN — Medication 40 MILLIGRAM(S): at 08:53

## 2017-05-10 RX ADMIN — Medication 1 MILLIGRAM(S): at 12:16

## 2017-05-10 RX ADMIN — HEPARIN SODIUM 5000 UNIT(S): 5000 INJECTION INTRAVENOUS; SUBCUTANEOUS at 17:37

## 2017-05-10 RX ADMIN — PANTOPRAZOLE SODIUM 40 MILLIGRAM(S): 20 TABLET, DELAYED RELEASE ORAL at 06:18

## 2017-05-10 RX ADMIN — Medication 500 MILLIGRAM(S): at 06:18

## 2017-05-10 RX ADMIN — GABAPENTIN 300 MILLIGRAM(S): 400 CAPSULE ORAL at 06:18

## 2017-05-10 RX ADMIN — CLOPIDOGREL BISULFATE 75 MILLIGRAM(S): 75 TABLET, FILM COATED ORAL at 12:16

## 2017-05-10 RX ADMIN — Medication 10 MILLIGRAM(S): at 22:36

## 2017-05-10 RX ADMIN — Medication 20 MILLIGRAM(S): at 06:19

## 2017-05-10 RX ADMIN — HEPARIN SODIUM 5000 UNIT(S): 5000 INJECTION INTRAVENOUS; SUBCUTANEOUS at 06:19

## 2017-05-10 RX ADMIN — ATORVASTATIN CALCIUM 40 MILLIGRAM(S): 80 TABLET, FILM COATED ORAL at 22:35

## 2017-05-10 RX ADMIN — Medication 1 TABLET(S): at 12:16

## 2017-05-10 RX ADMIN — SPIRONOLACTONE 100 MILLIGRAM(S): 25 TABLET, FILM COATED ORAL at 07:04

## 2017-05-10 RX ADMIN — Medication 500 MILLIGRAM(S): at 17:37

## 2017-05-10 RX ADMIN — Medication 100 MILLIGRAM(S): at 12:16

## 2017-05-10 NOTE — DISCHARGE NOTE ADULT - SECONDARY DIAGNOSIS.
Ascites NSTEMI (non-ST elevated myocardial infarction) Adrenal insufficiency End stage renal failure on dialysis

## 2017-05-10 NOTE — DISCHARGE NOTE ADULT - HOSPITAL COURSE
Patient is a 66y old  Female who presents with a chief complaint of chest pain (2017 23:38)  HPI:  67 yo F with PMH of liver cirrhosis 2/2 chronic alcohol abuse, macrocytic anemia, HTN, RA, esophageal varices s/p banding, CAD s/p stent, h/o prolonged Qtc, p/w chest pain/abdominal pain. States pain is more like pressure in L chest and abdomen. States pain has diminished a lot now, but is still there mildly. Denies radiation to arm / jaw / neck. Denies nausea / vomiting / diophoresis. In ED, troponins trended from 0.707 -> 79.3. Called on call cardio Dr. Flannery and discussed case, he recommended calling Dr. nAderson who is the on call interventionlist. Discussed case in detail with Dr. Anderson, including troponins trending up, new ST depressions on EKG and on going residual chest discomfort. Dr. Anderson recommends heparin drip without initial bolus due to esophageal varices / liver cirrhosis and due to patient being at increased risk for bleeding. Also recommends to c/w ASA / plavix / beta blocker / statin. Dr. Anderson believes patient may have had cardiac event earlier, and troponins are just lagging behind. Cardiac cath to be performed in AM.  Hospital course- card cath- stent patent. Pt had hypotension and was on pressors, resolved. Found to have sepsis 2 to GNR likely abdominal source- treated with IV abx- switched to PO cipro for 5 more days. F/u blood c/s negative. Developed urinary retention- Garcia placed and will need voiding trial as outpatient. Seen by DR Gonzales- refused surgery. Pt is NWB to LLE from previous fall and fractures. Had paracenthesis with 4 liters fluid drained    T(C): 36.4, Max: 36.4 (05-10 @ 11:47)  HR: 99 (98 - 104)  BP: 104/41 (90/45 - 111/59)  RR: 18 (18 - 18)  SpO2: 95% (95% - 96%)  Wt(kg): --  LABS:                        10.1   9.9   )-----------( 152      ( 10 May 2017 06:32 )             29.3     PHYSICAL EXAM:  GENERAL: ill  HEAD:  Atraumatic, Normocephalic  EYES: EOMI, PERRLA, conjunctiva and sclera clear  HEENT: Moist mucous membranes  NECK: Supple, No JVD  NERVOUS SYSTEM:  Alert & Oriented X3,  CHEST/LUNG: occasional crackles  HEART: Regular rate and rhythm; No murmurs, rubs, or gallops  ABDOMEN: Soft, +ascites but not tense  GENITOURINARY- +bladder distention,   EXTREMITIES:  +anasarca  MUSCULOSKELTAL- passive ROM      Daily Weight in k (10 May 2017 06:44)    atorvastatin 40milliGRAM(s) Oral at bedtime  aspirin 325milliGRAM(s) Oral daily  gabapentin 300milliGRAM(s) Oral three times a day  clopidogrel Tablet 75milliGRAM(s) Oral daily  rifaximin 550milliGRAM(s) Oral two times a day  naphazoline 0.025%/pheniramine 0.3% Ophthalmic Solution - Peds 1Drop(s) Both EYES two times a day PRN  pantoprazole    Tablet 40milliGRAM(s) Oral before breakfast  multivitamin 1Tablet(s) Oral daily  folic acid 1milliGRAM(s) Oral daily  thiamine 100milliGRAM(s) Oral daily  hydrocortisone 10milliGRAM(s) Oral at bedtime  hydrocortisone 20milliGRAM(s) Oral daily  heparin  Injectable 5000Unit(s) SubCutaneous every 12 hours  ciprofloxacin     Tablet 500milliGRAM(s) Oral every 12 hours  spironolactone 100milliGRAM(s) Oral daily  furosemide    Tablet 40milliGRAM(s) Oral daily    Assessment/Plan  #Acute MI/CAD h/o stent not on present admission- s/p card cath, stent patent. Cont Aspirin, Plavix, statin. No BB or ACE 2 to hypotension  #Sepsis 2 to GNR, likely abdominal source- resolved, cultures negative. On PO Cipro for 5 more days  #Spinal stenosis C and Lspine  NS cleared for discharge, refused surgery.  #Urinary retention- Garcia placed. Outpatient voiding trial in 3-5 days  #Adrenal insufficiency 2 to long-term steroid intake for RA- on Hydrocortisone PO  #Liver cirrhosis with ascites, s/p therapeutic paracenthesis  GI cleared for discharge on PO Lasix+Aldactone. Outpatient f/u  #Anasrca 2 to third spacing and hypomobility- rehab encouraged  #Fractures LT ankle- NWB to LLE, wear boor. Outpatient f/u with DR Fung in 2 weeks  #Dispo- medically stable for discharge. Rehab recommended. D/w  on the phone who refused rehab and wants to take her home. SW on the case for safe discharge planning. Patient is a 66y old  Female who presents with a chief complaint of chest pain (24 Apr 2017 23:38)  HPI:  65 yo F with PMH of liver cirrhosis 2/2 chronic alcohol abuse, macrocytic anemia, HTN, RA, esophageal varices s/p banding, CAD s/p stent, h/o prolonged Qtc, NWB to LLE from previous fall and fractures, p/w chest pain/abdominal pain. In ED, troponins trended from 0.707 -> 79.3.  Diagnosed with NSTEMI.  Cardiology recommended heparin drip without initial bolus due to esophageal varices / liver cirrhosis and due to patient being at increased risk for bleeding. Also recommended to c/w ASA / plavix / beta blocker / statin.  card cath revealed patent stent.  Pts  Hospitalization was complicated by   1. Pt had hypotension and was on pressors, resolved. Despite NSTEMI on admission pt maintained off of BB d/t hypotension.  2. Found to have sepsis 2 to GNR likely abdominal source- completed course of antibiotics. F/u blood c/s negative.   3. Ascites and anasarca- Had paracentesis with 4 liters fluid drained.   4. Acute renal failure- failed midodrine, sandostatin and albumin and required initiation of HD, renal failure was initially concerning for ATN from hypotensive episode/pressors/sepsis however later believed to be due to hepatorenal syndrome.  Despite NSTEMI on admission pt maintained off ACEI d/t renal failure.  5. Pt had episode of BRBPR and there was concern for variceal bleed however pt remained HD stable and prior to upper endoscopy pts stool was guiac negative and pt not having any more blood per rectum so procedure was aborted.  Despite the NSTEMI on admission decision was to discontinue plavix and decrease aspirin to 81mg due to risk of bleeding.  6. Neuropathy, Cervical and Lumbar spinal stenosis, was eval by NSG and pt declined surgery, being maintained on gabapentin  7. Adrenal insufficiency 2 to long-term steroid intake for RA- on Hydrocortisone PO    Pt to be transferred to [facility] for transplant evaluation

## 2017-05-10 NOTE — PROGRESS NOTE ADULT - SUBJECTIVE AND OBJECTIVE BOX
Patient is a 66y old  Female who presents with a chief complaint of chest pain (10 May 2017 14:59)      HPI:  65 yo F with PMH of liver cirrhosis 2/2 chronic alcohol abuse, macrocytic anemia, HTN, RA, esophageal varices s/p banding, CAD s/p stent, h/o prolonged Qtc, p/w chest pain/abdominal pain. States pain is more like pressure in L chest and abdomen. States pain has diminished a lot now, but is still there mildly. Denies radiation to arm / jaw / neck. Denies nausea / vomiting / diophoresis. In ED, troponins trended from 0.707 -> 79.3. Called on call cardio Dr. Flannery and discussed case, he recommended calling Dr. Anderson who is the on call interventionlist. Discussed case in detail with Dr. Anderson, including troponins trending up, new ST depressions on EKG and on going residual chest discomfort. Dr. Anderson recommends heparin drip without initial bolus due to esophageal varices / liver cirrhosis and due to patient being at increased risk for bleeding. Also recommends to c/w ASA / plavix / beta blocker / statin. Dr. Anderson believes patient may have had cardiac event earlier, and troponins are just lagging behind. Cardiac cath to be performed in AM.    patient with some mild burning epigastric sensation. This increases after eating. She denies any nausea or vomiting.  She denies any chest pain or shortness of breath.  She does have fatigue. She does have some dyspnea on exertion.      PSH: PCI, esophageal varices banding  Social Hx: Former smoker - quit in 2005, h/o heavy etoh use - quit 2/2016, Drugs - h/o marijuana use previously  Family Hx: Father - stomach problems / heart disease, mother - pancreatic cancer (24 Apr 2017 21:23)      PAST MEDICAL & SURGICAL HISTORY:  HTN (hypertension)  Rheumatoid arthritis  No significant past surgical history      MEDICATIONS  (STANDING):  atorvastatin 40milliGRAM(s) Oral at bedtime  aspirin 325milliGRAM(s) Oral daily  gabapentin 300milliGRAM(s) Oral three times a day  clopidogrel Tablet 75milliGRAM(s) Oral daily  rifaximin 550milliGRAM(s) Oral two times a day  pantoprazole    Tablet 40milliGRAM(s) Oral before breakfast  multivitamin 1Tablet(s) Oral daily  folic acid 1milliGRAM(s) Oral daily  thiamine 100milliGRAM(s) Oral daily  hydrocortisone 10milliGRAM(s) Oral at bedtime  hydrocortisone 20milliGRAM(s) Oral daily  heparin  Injectable 5000Unit(s) SubCutaneous every 12 hours  ciprofloxacin     Tablet 500milliGRAM(s) Oral every 12 hours  spironolactone 100milliGRAM(s) Oral daily  furosemide    Tablet 40milliGRAM(s) Oral daily    MEDICATIONS  (PRN):  naphazoline 0.025%/pheniramine 0.3% Ophthalmic Solution - Peds 1Drop(s) Both EYES two times a day PRN watery eyes.      Allergies    sulfa drugs (Unknown)    Intolerances        SOCIAL HISTORY:unchanged    FAMILY HISTORY:  No pertinent family history in first degree relatives      REVIEW OF SYSTEMS:    CONSTITUTIONAL: No weakness, fevers or chills  EYES/ENT: No visual changes;  No vertigo or throat pain   NECK: No pain or stiffness  RESPIRATORY: No cough, wheezing, hemoptysis; No shortness of breath  CARDIOVASCULAR: No chest pain or palpitations  GENITOURINARY: No dysuria, frequency or hematuria  NEUROLOGICAL: No numbness or weakness  SKIN: No itching, burning, rashes, or lesions   All other review of systems is negative unless indicated above.    Vital Signs Last 24 Hrs  T(C): 36.4, Max: 36.4 (05-10 @ 11:47)  T(F): 97.6, Max: 97.6 (05-10 @ 11:47)  HR: 99 (98 - 104)  BP: 104/41 (90/45 - 108/43)  BP(mean): --  RR: 18 (18 - 18)  SpO2: 95% (95% - 96%)    PHYSICAL EXAM:    Constitutional: NAD, well-developed  HEENT: EOMI, throat clear  Neck: No LAD, supple  Respiratory: CTA and P  Cardiovascular: S1 and S2, RRR, no M  Gastrointestinal: BS+, soft, NT/ND, neg HSM,  Extremities: No peripheral edema, neg clubing, cyanosis  Vascular: 2+ peripheral pulses  Neurological: A/O x 3, no focal deficits  Psychiatric: Normal mood, normal affect  Skin: No rashes    LABS:  CBC Full  -  ( 10 May 2017 06:32 )  WBC Count : 9.9 K/uL  Hemoglobin : 10.1 g/dL  Hematocrit : 29.3 %  Platelet Count - Automated : 152 K/uL  Mean Cell Volume : 110.3 fl  Mean Cell Hemoglobin : 38.1 pg  Mean Cell Hemoglobin Concentration : 34.5 gm/dL  Auto Neutrophil # : x  Auto Lymphocyte # : x  Auto Monocyte # : x  Auto Eosinophil # : x  Auto Basophil # : x  Auto Neutrophil % : x  Auto Lymphocyte % : x  Auto Monocyte % : x  Auto Eosinophil % : x  Auto Basophil % : x                  RADIOLOGY & ADDITIONAL STUDIES:Patient is a 66y old  Female who presents with a chief complaint of chest pain (10 May 2017 14:59)      HPI:  65 yo F with PMH of liver cirrhosis 2/2 chronic alcohol abuse, macrocytic anemia, HTN, RA, esophageal varices s/p banding, CAD s/p stent, h/o prolonged Qtc, p/w chest pain/abdominal pain. States pain is more like pressure in L chest and abdomen. States pain has diminished a lot now, but is still there mildly. Denies radiation to arm / jaw / neck. Denies nausea / vomiting / diophoresis. In ED, troponins trended from 0.707 -> 79.3. Called on call cardio Dr. Flannery and discussed case, he recommended calling Dr. Anderson who is the on call interventionlist. Discussed case in detail with Dr. Anderson, including troponins trending up, new ST depressions on EKG and on going residual chest discomfort. Dr. Anderson recommends heparin drip without initial bolus due to esophageal varices / liver cirrhosis and due to patient being at increased risk for bleeding. Also recommends to c/w ASA / plavix / beta blocker / statin. Dr. nAderson believes patient may have had cardiac event earlier, and troponins are just lagging behind. Cardiac cath to be performed in AM.    PSH: PCI, esophageal varices banding  Social Hx: Former smoker - quit in 2005, h/o heavy etoh use - quit 2/2016, Drugs - h/o marijuana use previously  Family Hx: Father - stomach problems / heart disease, mother - pancreatic cancer (24 Apr 2017 21:23)      PAST MEDICAL & SURGICAL HISTORY:  HTN (hypertension)  Rheumatoid arthritis  No significant past surgical history      MEDICATIONS  (STANDING):  atorvastatin 40milliGRAM(s) Oral at bedtime  aspirin 325milliGRAM(s) Oral daily  gabapentin 300milliGRAM(s) Oral three times a day  clopidogrel Tablet 75milliGRAM(s) Oral daily  rifaximin 550milliGRAM(s) Oral two times a day  pantoprazole    Tablet 40milliGRAM(s) Oral before breakfast  multivitamin 1Tablet(s) Oral daily  folic acid 1milliGRAM(s) Oral daily  thiamine 100milliGRAM(s) Oral daily  hydrocortisone 10milliGRAM(s) Oral at bedtime  hydrocortisone 20milliGRAM(s) Oral daily  heparin  Injectable 5000Unit(s) SubCutaneous every 12 hours  ciprofloxacin     Tablet 500milliGRAM(s) Oral every 12 hours  spironolactone 100milliGRAM(s) Oral daily  furosemide    Tablet 40milliGRAM(s) Oral daily    MEDICATIONS  (PRN):  naphazoline 0.025%/pheniramine 0.3% Ophthalmic Solution - Peds 1Drop(s) Both EYES two times a day PRN watery eyes.      Allergies    sulfa drugs (Unknown)    Intolerances        SOCIAL HISTORY:    FAMILY HISTORY:  No pertinent family history in first degree relatives      REVIEW OF SYSTEMS:    CONSTITUTIONAL: No weakness, fevers or chills  EYES/ENT: No visual changes;  No vertigo or throat pain   NECK: No pain or stiffness  RESPIRATORY: No cough, wheezing, hemoptysis; No shortness of breath  CARDIOVASCULAR: No chest pain or palpitations  GENITOURINARY: No dysuria, frequency or hematuria  NEUROLOGICAL: No numbness or weakness  SKIN: No itching, burning, rashes, or lesions   All other review of systems is negative unless indicated above.    Vital Signs Last 24 Hrs  T(C): 36.4, Max: 36.4 (05-10 @ 11:47)  T(F): 97.6, Max: 97.6 (05-10 @ 11:47)  HR: 99 (98 - 104)  BP: 104/41 (90/45 - 108/43)  BP(mean): --  RR: 18 (18 - 18)  SpO2: 95% (95% - 96%)    PHYSICAL EXAM:    Constitutional: NAD, well-developed  HEENT: EOMI, throat clear  Neck: No LAD, supple  Respiratory: CTA and P  Cardiovascular: S1 and S2, RRR, no M  Gastrointestinal: BS+, soft, NT/ND, neg HSM,  Extremities: 2 plus peripheral edema, neg clubing, cyanosis  Vascular: 2+ peripheral pulses  Neurological: A/O x 3, no focal deficits  Psychiatric: Normal mood, normal affect  Skin: No rashes    LABS:  CBC Full  -  ( 10 May 2017 06:32 )  WBC Count : 9.9 K/uL  Hemoglobin : 10.1 g/dL  Hematocrit : 29.3 %  Platelet Count - Automated : 152 K/uL  Mean Cell Volume : 110.3 fl  Mean Cell Hemoglobin : 38.1 pg  Mean Cell Hemoglobin Concentration : 34.5 gm/dL  Auto Neutrophil # : x  Auto Lymphocyte # : x  Auto Monocyte # : x  Auto Eosinophil # : x  Auto Basophil # : x  Auto Neutrophil % : x  Auto Lymphocyte % : x  Auto Monocyte % : x  Auto Eosinophil % : x  Auto Basophil % : x                  RADIOLOGY & ADDITIONAL STUDIES:

## 2017-05-10 NOTE — DISCHARGE NOTE ADULT - PATIENT PORTAL LINK FT
“You can access the Follow[employer] Patient Portal, offered by [facility], by registering with the following website: [email or web address]”

## 2017-05-10 NOTE — DISCHARGE NOTE ADULT - CARE PROVIDER_API CALL
Tommie Underwood), Gastroenterology  [address] [place] [address]  Phone: [phone]  Fax: [phone]    Jose Guadalupe Ramesh (DO), Cardiology; Internal Medicine  [address] [place] [address]  Phone: [phone]  Fax: [phone]    Jas Gonzales), Neurological Surgery  [address] [facility] [address] [place] [address]  Phone: [phone]  Fax: [phone]

## 2017-05-10 NOTE — DISCHARGE NOTE ADULT - MEDICATION SUMMARY - MEDICATIONS TO STOP TAKING
I will STOP taking the medications listed below when I get home from the hospital:    predniSONE 5 mg oral tablet  -- 3 tab(s) by mouth once a day I will STOP taking the medications listed below when I get home from the hospital:    clopidogrel 75 mg oral tablet  -- 1 tab(s) by mouth once a day    furosemide 20 mg oral tablet  -- 1 tab(s) by mouth once a day, hold for SBP<100    magnesium oxide 400 mg (241.3 mg elemental magnesium) oral tablet  -- 2 tab(s) by mouth once a day (at bedtime)    potassium chloride 10 mEq oral tablet, extended release  -- 1 tab(s) by mouth once a day    predniSONE 5 mg oral tablet  -- 3 tab(s) by mouth once a day

## 2017-05-10 NOTE — DISCHARGE NOTE ADULT - MEDICATION SUMMARY - MEDICATIONS TO CHANGE
I will SWITCH the dose or number of times a day I take the medications listed below when I get home from the hospital:    furosemide 20 mg oral tablet  -- 1 tab(s) by mouth once a day, hold for SBP<100 I will SWITCH the dose or number of times a day I take the medications listed below when I get home from the hospital:    atorvastatin 20 mg oral tablet  -- 1 tab(s) by mouth once a day (at bedtime)    aspirin 325 mg oral tablet  -- 1 tab(s) by mouth once a day, continue until 5/3/17 then decrease to Aspirin 81mg daily

## 2017-05-10 NOTE — PROGRESS NOTE ADULT - ASSESSMENT
hypotension and recent infection complicated by likely adrenal insuff  on steroid, consider taper    Ascites, S/P approx 4 liter tap and reaccumulation adn pleural effusion  low salt diet  gentle diuresis, will double dose  daily weightsas well as strict I's and O's discussed with both patient and nurse this morning  Low salt diet  for now would avoid repeated paracentesis and monitor BP closely  daily saad mosley I O    check bladder, GIL RN and to get diuretics today        CAD S/P Stent, stent patent with cath  inc risk bleed with anti plt due to EV    nname delay in esophageal variceal banding secondary to coronary artery disease and being on antiplatelet agents discussed with patient    RA and FRIDA Epstein

## 2017-05-10 NOTE — DISCHARGE NOTE ADULT - CARE PLAN
Principal Discharge DX:	NSTEMI (non-ST elevated myocardial infarction)  Goal:	rehab  Instructions for follow-up, activity and diet:	outpatient f/u  Secondary Diagnosis:	Ascites Principal Discharge DX:	Alcoholic cirrhosis of liver with ascites  Goal:	transplant evaluation  Instructions for follow-up, activity and diet:	Transfer to [facility] for evaluation  continue xifaxan  Secondary Diagnosis:	End stage renal failure on dialysis  Instructions for follow-up, activity and diet:	Acute renal failure likely related to liver failure  continue dialysis  continue midodrine  Secondary Diagnosis:	NSTEMI (non-ST elevated myocardial infarction)  Instructions for follow-up, activity and diet:	continue aspirin low dose only due to bleeding risk  continue atorvastatin  Secondary Diagnosis:	Adrenal insufficiency  Instructions for follow-up, activity and diet:	due to chronic steroid use  taper steroid at [facility] Principal Discharge DX:	Alcoholic cirrhosis of liver with ascites  Goal:	transplant evaluation  Instructions for follow-up, activity and diet:	Transfer to [facility] for evaluation  continue xifaxan  Secondary Diagnosis:	End stage renal failure on dialysis  Instructions for follow-up, activity and diet:	Acute renal failure likely related to liver failure  continue dialysis  continue midodrine  Secondary Diagnosis:	NSTEMI (non-ST elevated myocardial infarction)  Instructions for follow-up, activity and diet:	continue aspirin low dose only due to bleeding risk  continue atorvastatin  Secondary Diagnosis:	Adrenal insufficiency  Instructions for follow-up, activity and diet:	due to chronic steroid use  taper steroid at [facility] Principal Discharge DX:	Alcoholic cirrhosis of liver with ascites  Goal:	transplant evaluation  Instructions for follow-up, activity and diet:	Transfer to [facility] for evaluation  continue xifaxan  Secondary Diagnosis:	End stage renal failure on dialysis  Instructions for follow-up, activity and diet:	Acute renal failure likely related to liver failure  continue dialysis  continue midodrine  Secondary Diagnosis:	NSTEMI (non-ST elevated myocardial infarction)  Instructions for follow-up, activity and diet:	continue aspirin low dose only due to bleeding risk  continue atorvastatin  Secondary Diagnosis:	Adrenal insufficiency  Instructions for follow-up, activity and diet:	due to chronic steroid use  taper steroid at [facility]

## 2017-05-10 NOTE — DISCHARGE NOTE ADULT - MEDICATION SUMMARY - MEDICATIONS TO TAKE
I will START or STAY ON the medications listed below when I get home from the hospital:    hydrocortisone 10 mg oral tablet  -- 1 tab(s) by mouth once a day (at bedtime)  -- Indication: For Adrenal insufficiency    hydrocortisone 20 mg oral tablet  -- 1 tab(s) by mouth once a day  -- Indication: For Adrenal insufficiency    spironolactone 25 mg oral tablet  -- 4 tab(s) by mouth once a day  -- Indication: For Ascites    aspirin 325 mg oral tablet  -- 1 tab(s) by mouth once a day  -- Indication: For NSTEMI (non-ST elevated myocardial infarction)    enoxaparin 40 mg/0.4 mL injectable solution  -- 40 milligram(s) injectable Subcutaneously once a day  -- It is very important that you take or use this exactly as directed.  Do not skip doses or discontinue unless directed by your doctor.    -- Indication: For DVT proph    gabapentin 300 mg oral capsule  -- 1 cap(s) by mouth 3 times a day  -- Indication: For Neuropathy    atorvastatin 40 mg oral tablet  -- 1 tab(s) by mouth once a day (at bedtime)  -- Indication: For hyperlipidemia    clopidogrel 75 mg oral tablet  -- 1 tab(s) by mouth once a day  -- Indication: For NSTEMI (non-ST elevated myocardial infarction)    furosemide 40 mg oral tablet  -- 1 tab(s) by mouth once a day  -- Indication: For Ascites    rifAXIMin 550 mg oral tablet  -- 1 tab(s) by mouth 2 times a day  -- Indication: For Ascites    naphazoline-pheniramine 0.025%-0.3% ophthalmic solution  -- 1 drop(s) to each affected eye 2 times a day, As needed, watery eyes.  -- Indication: For Eye    pantoprazole 40 mg oral delayed release tablet  -- 1 tab(s) by mouth once a day (before a meal)  -- Indication: For gerd    ciprofloxacin 500 mg oral tablet  -- 1 tab(s) by mouth every 12 hours for 5 more days  -- Indication: For infection    Multiple Vitamins oral tablet  -- 1 tab(s) by mouth once a day  -- Indication: For vitamin    folic acid 1 mg oral tablet  -- 1 tab(s) by mouth once a day  -- Indication: For vitamin    thiamine 100 mg oral tablet  -- 1 tab(s) by mouth once a day  -- Indication: For vitamin I will START or STAY ON the medications listed below when I get home from the hospital:    hydrocortisone 10 mg oral tablet  -- 1 tab(s) by mouth once a day (at bedtime)  -- Indication: For Adrenal insufficiency    hydrocortisone 20 mg oral tablet  -- 1 tab(s) by mouth once a day  -- Indication: For Adrenal insufficiency    aspirin 81 mg oral tablet, chewable  -- 1 tab(s) by mouth once a day  -- Indication: For CAD    gabapentin 300 mg oral capsule  -- 1 cap(s) by mouth 3 times a day  -- Indication: For Neuropathy    atorvastatin 40 mg oral tablet  -- 1 tab(s) by mouth once a day (at bedtime)  -- Indication: For hyperlipidemia    rifAXIMin 550 mg oral tablet  -- 1 tab(s) by mouth 2 times a day  -- Indication: For Ascites    midodrine 5 mg oral tablet  -- 1 tab(s) by mouth 3 times a day  -- Indication: For HRS    naphazoline-pheniramine 0.025%-0.3% ophthalmic solution  -- 1 drop(s) to each affected eye 2 times a day, As needed, watery eyes.  -- Indication: For Eye    pantoprazole 40 mg oral delayed release tablet  -- 1 tab(s) by mouth once a day (before a meal)  -- Indication: For gerd    Multiple Vitamins oral tablet  -- 1 tab(s) by mouth once a day  -- Indication: For vitamin    folic acid 1 mg oral tablet  -- 1 tab(s) by mouth once a day  -- Indication: For vitamin    thiamine 100 mg oral tablet  -- 1 tab(s) by mouth once a day  -- Indication: For vitamin

## 2017-05-10 NOTE — DISCHARGE NOTE ADULT - PLAN OF CARE
rehab outpatient f/u Acute renal failure likely related to liver failure  continue dialysis  continue midodrine continue aspirin low dose only due to bleeding risk  continue atorvastatin due to chronic steroid use  taper steroid at [facility] transplant evaluation Transfer to [facility] for evaluation  continue xifaxan

## 2017-05-11 LAB
ANION GAP SERPL CALC-SCNC: 12 MMOL/L — SIGNIFICANT CHANGE UP (ref 5–17)
ANION GAP SERPL CALC-SCNC: 14 MMOL/L — SIGNIFICANT CHANGE UP (ref 5–17)
BUN SERPL-MCNC: 50 MG/DL — HIGH (ref 7–23)
BUN SERPL-MCNC: 53 MG/DL — HIGH (ref 7–23)
CALCIUM SERPL-MCNC: 7.4 MG/DL — LOW (ref 8.5–10.1)
CALCIUM SERPL-MCNC: 7.9 MG/DL — LOW (ref 8.5–10.1)
CHLORIDE SERPL-SCNC: 100 MMOL/L — SIGNIFICANT CHANGE UP (ref 96–108)
CHLORIDE SERPL-SCNC: 101 MMOL/L — SIGNIFICANT CHANGE UP (ref 96–108)
CO2 SERPL-SCNC: 13 MMOL/L — LOW (ref 22–31)
CO2 SERPL-SCNC: 17 MMOL/L — LOW (ref 22–31)
CREAT SERPL-MCNC: 3.06 MG/DL — HIGH (ref 0.5–1.3)
CREAT SERPL-MCNC: 3.28 MG/DL — HIGH (ref 0.5–1.3)
GLUCOSE SERPL-MCNC: 106 MG/DL — HIGH (ref 70–99)
GLUCOSE SERPL-MCNC: 133 MG/DL — HIGH (ref 70–99)
HCT VFR BLD CALC: 29.8 % — LOW (ref 34.5–45)
HGB BLD-MCNC: 9.6 G/DL — LOW (ref 11.5–15.5)
MCHC RBC-ENTMCNC: 32.2 GM/DL — SIGNIFICANT CHANGE UP (ref 32–36)
MCHC RBC-ENTMCNC: 35.9 PG — HIGH (ref 27–34)
MCV RBC AUTO: 111.5 FL — HIGH (ref 80–100)
PLATELET # BLD AUTO: 156 K/UL — SIGNIFICANT CHANGE UP (ref 150–400)
POTASSIUM SERPL-MCNC: 4.8 MMOL/L — SIGNIFICANT CHANGE UP (ref 3.5–5.3)
POTASSIUM SERPL-MCNC: 5.2 MMOL/L — SIGNIFICANT CHANGE UP (ref 3.5–5.3)
POTASSIUM SERPL-SCNC: 4.8 MMOL/L — SIGNIFICANT CHANGE UP (ref 3.5–5.3)
POTASSIUM SERPL-SCNC: 5.2 MMOL/L — SIGNIFICANT CHANGE UP (ref 3.5–5.3)
RBC # BLD: 2.68 M/UL — LOW (ref 3.8–5.2)
RBC # FLD: 14 % — SIGNIFICANT CHANGE UP (ref 10.3–14.5)
SODIUM SERPL-SCNC: 128 MMOL/L — LOW (ref 135–145)
SODIUM SERPL-SCNC: 129 MMOL/L — LOW (ref 135–145)
WBC # BLD: 10.2 K/UL — SIGNIFICANT CHANGE UP (ref 3.8–10.5)
WBC # FLD AUTO: 10.2 K/UL — SIGNIFICANT CHANGE UP (ref 3.8–10.5)

## 2017-05-11 RX ORDER — SODIUM CHLORIDE 9 MG/ML
1000 INJECTION INTRAMUSCULAR; INTRAVENOUS; SUBCUTANEOUS
Qty: 0 | Refills: 0 | Status: DISCONTINUED | OUTPATIENT
Start: 2017-05-11 | End: 2017-05-12

## 2017-05-11 RX ORDER — ALBUMIN HUMAN 25 %
50 VIAL (ML) INTRAVENOUS EVERY 8 HOURS
Qty: 0 | Refills: 0 | Status: COMPLETED | OUTPATIENT
Start: 2017-05-11 | End: 2017-05-11

## 2017-05-11 RX ORDER — SODIUM CHLORIDE 9 MG/ML
500 INJECTION INTRAMUSCULAR; INTRAVENOUS; SUBCUTANEOUS ONCE
Qty: 0 | Refills: 0 | Status: COMPLETED | OUTPATIENT
Start: 2017-05-11 | End: 2017-05-11

## 2017-05-11 RX ADMIN — GABAPENTIN 300 MILLIGRAM(S): 400 CAPSULE ORAL at 13:03

## 2017-05-11 RX ADMIN — Medication 325 MILLIGRAM(S): at 13:06

## 2017-05-11 RX ADMIN — Medication 100 MILLIGRAM(S): at 13:06

## 2017-05-11 RX ADMIN — ATORVASTATIN CALCIUM 40 MILLIGRAM(S): 80 TABLET, FILM COATED ORAL at 22:44

## 2017-05-11 RX ADMIN — SODIUM CHLORIDE 125 MILLILITER(S): 9 INJECTION INTRAMUSCULAR; INTRAVENOUS; SUBCUTANEOUS at 12:59

## 2017-05-11 RX ADMIN — SODIUM CHLORIDE 500 MILLILITER(S): 9 INJECTION INTRAMUSCULAR; INTRAVENOUS; SUBCUTANEOUS at 11:36

## 2017-05-11 RX ADMIN — GABAPENTIN 300 MILLIGRAM(S): 400 CAPSULE ORAL at 06:06

## 2017-05-11 RX ADMIN — Medication 20 MILLIGRAM(S): at 06:06

## 2017-05-11 RX ADMIN — Medication 50 MILLILITER(S): at 14:48

## 2017-05-11 RX ADMIN — HEPARIN SODIUM 5000 UNIT(S): 5000 INJECTION INTRAVENOUS; SUBCUTANEOUS at 18:52

## 2017-05-11 RX ADMIN — Medication 500 MILLIGRAM(S): at 06:06

## 2017-05-11 RX ADMIN — PANTOPRAZOLE SODIUM 40 MILLIGRAM(S): 20 TABLET, DELAYED RELEASE ORAL at 06:06

## 2017-05-11 RX ADMIN — Medication 50 MILLILITER(S): at 22:42

## 2017-05-11 RX ADMIN — GABAPENTIN 300 MILLIGRAM(S): 400 CAPSULE ORAL at 22:44

## 2017-05-11 RX ADMIN — Medication 1 MILLIGRAM(S): at 13:06

## 2017-05-11 RX ADMIN — HEPARIN SODIUM 5000 UNIT(S): 5000 INJECTION INTRAVENOUS; SUBCUTANEOUS at 06:07

## 2017-05-11 RX ADMIN — Medication 500 MILLIGRAM(S): at 18:49

## 2017-05-11 RX ADMIN — Medication 10 MILLIGRAM(S): at 22:44

## 2017-05-11 RX ADMIN — CLOPIDOGREL BISULFATE 75 MILLIGRAM(S): 75 TABLET, FILM COATED ORAL at 13:06

## 2017-05-11 RX ADMIN — Medication 1 TABLET(S): at 13:06

## 2017-05-11 RX ADMIN — SODIUM CHLORIDE 80 MILLILITER(S): 9 INJECTION INTRAMUSCULAR; INTRAVENOUS; SUBCUTANEOUS at 09:49

## 2017-05-11 NOTE — PROGRESS NOTE ADULT - ASSESSMENT
hypotension and recent infection complicated by likely adrenal insuff  on steroid, consider taper    Ascites, S/P approx 4 liter tap and reaccumulation adn pleural effusion  low salt diet      ARF  stop diuretics and gentle hydration  consider renal consult  daily weightsas well as strict I's and O's discussed with both patient and nurse this morning  Low salt diet  for now would avoid repeated paracentesis and monitor BP closely  daily dimitri, saad I O            CAD S/P Stent, stent patent with cath  inc risk bleed with anti plt due to EV     delay in esophageal variceal banding secondary to coronary artery disease and being on antiplatelet agents discussed with patient    RA and FRIDA Epstein

## 2017-05-11 NOTE — PROGRESS NOTE ADULT - SUBJECTIVE AND OBJECTIVE BOX
67 yo F with PMH of liver cirrhosis 2/2 chronic alcohol abuse, macrocytic anemia, HTN, RA, esophageal varices s/p banding, CAD s/p stent, h/o prolonged Qtc, p/w chest pain/abdominal pain.    RN is concern about low urine output Pt with noonan in place 20 ml over the past 5 hrs. Bladder scan revealed 250 ml residual. CT abd and pelvis done today revealed no hydronephrosis or obstructive uropathy. Interval reaccumulation of moderate to large volume abdominal and pelvic ascites. Small to moderate bilateral pleural effusions persist. Diffuse body wall edema persists. Morphologic changes of cirrhosis in the liver. Will repeat BMP stat, nephro consult in am. Strict I&O q4hr. Am team to f/u

## 2017-05-11 NOTE — PROGRESS NOTE ADULT - SUBJECTIVE AND OBJECTIVE BOX
Patient is a 66y old  Female who presents with a chief complaint of chest pain (10 May 2017 14:59)      HPI:  67 yo F with PMH of liver cirrhosis 2/2 chronic alcohol abuse, macrocytic anemia, HTN, RA, esophageal varices s/p banding, CAD s/p stent, h/o prolonged Qtc, p/w chest pain/abdominal pain. States pain is more like pressure in L chest and abdomen. States pain has diminished a lot now, but is still there mildly. Denies radiation to arm / jaw / neck. Denies nausea / vomiting / diophoresis. In ED, troponins trended from 0.707 -> 79.3. Called on call cardio Dr. Flannery and discussed case, he recommended calling Dr. Anderson who is the on call interventionlist. Discussed case in detail with Dr. Anderson, including troponins trending up, new ST depressions on EKG and on going residual chest discomfort. Dr. Anderson recommends heparin drip without initial bolus due to esophageal varices / liver cirrhosis and due to patient being at increased risk for bleeding. Also recommends to c/w ASA / plavix / beta blocker / statin. Dr. Anderson believes patient may have had cardiac event earlier, and troponins are just lagging behind. Cardiac cath to be performed in AM.      pt fatigued and tired  not urinating mucha nd 100 cc UO documented     dec BP noted        PSH: PCI, esophageal varices banding  Social Hx: Former smoker - quit in 2005, h/o heavy etoh use - quit 2/2016, Drugs - h/o marijuana use previously  Family Hx: Father - stomach problems / heart disease, mother - pancreatic cancer (24 Apr 2017 21:23)      PAST MEDICAL & SURGICAL HISTORY:  HTN (hypertension)  Rheumatoid arthritis  No significant past surgical history      MEDICATIONS  (STANDING):  atorvastatin 40milliGRAM(s) Oral at bedtime  aspirin 325milliGRAM(s) Oral daily  gabapentin 300milliGRAM(s) Oral three times a day  clopidogrel Tablet 75milliGRAM(s) Oral daily  rifaximin 550milliGRAM(s) Oral two times a day  pantoprazole    Tablet 40milliGRAM(s) Oral before breakfast  multivitamin 1Tablet(s) Oral daily  folic acid 1milliGRAM(s) Oral daily  thiamine 100milliGRAM(s) Oral daily  hydrocortisone 10milliGRAM(s) Oral at bedtime  hydrocortisone 20milliGRAM(s) Oral daily  heparin  Injectable 5000Unit(s) SubCutaneous every 12 hours  ciprofloxacin     Tablet 500milliGRAM(s) Oral every 12 hours  sodium chloride 0.9%. 1000milliLiter(s) IV Continuous <Continuous>    MEDICATIONS  (PRN):  naphazoline 0.025%/pheniramine 0.3% Ophthalmic Solution - Peds 1Drop(s) Both EYES two times a day PRN watery eyes.      Allergies    sulfa drugs (Unknown)    Intolerances        SOCIAL HISTORY:unchanged    FAMILY HISTORY:  No pertinent family history in first degree relatives      REVIEW OF SYSTEMS:    CONSTITUTIONAL: No weakness, fevers or chills  EYES/ENT: No visual changes;  No vertigo or throat pain   NECK: No pain or stiffness  RESPIRATORY: No cough, wheezing, hemoptysis; No shortness of breath  CARDIOVASCULAR: No chest pain or palpitations  GENITOURINARY: No dysuria, frequency or hematuria  NEUROLOGICAL: No numbness or weakness  SKIN: No itching, burning, rashes, or lesions   All other review of systems is negative unless indicated above.    Vital Signs Last 24 Hrs  T(C): 36.3, Max: 36.4 (05-10 @ 11:47)  T(F): 97.3, Max: 97.6 (05-10 @ 11:47)  HR: 99 (86 - 99)  BP: 86/42 (85/41 - 104/41)  BP(mean): --  RR: 17 (17 - 18)  SpO2: 97% (95% - 97%)    PHYSICAL EXAM:    Constitutional: NAD, well-developed  HEENT: EOMI, throat clear  Neck: No LAD, supple  Respiratory: CTA and P  Cardiovascular: S1 and S2, RRR, no M  Gastrointestinal: BS+, soft, NT/ND, neg HSM,  Extremities: 2 plus peripheral edema, neg clubing, cyanosis  Vascular: 2+ peripheral pulses  Neurological: A/O x 3, no focal deficits  Psychiatric: Normal mood, normal affect  Skin: No rashes    LABS:  CBC Full  -  ( 11 May 2017 05:35 )  WBC Count : 10.2 K/uL  Hemoglobin : 9.6 g/dL  Hematocrit : 29.8 %  Platelet Count - Automated : 156 K/uL  Mean Cell Volume : 111.5 fl  Mean Cell Hemoglobin : 35.9 pg  Mean Cell Hemoglobin Concentration : 32.2 gm/dL  Auto Neutrophil # : x  Auto Lymphocyte # : x  Auto Monocyte # : x  Auto Eosinophil # : x  Auto Basophil # : x  Auto Neutrophil % : x  Auto Lymphocyte % : x  Auto Monocyte % : x  Auto Eosinophil % : x  Auto Basophil % : x    05-11    129<L>  |  100  |  50<H>  ----------------------------<  106<H>  4.8   |  17<L>  |  3.06<H>    Ca    7.9<L>      11 May 2017 02:20              RADIOLOGY & ADDITIONAL STUDIES:

## 2017-05-11 NOTE — CONSULT NOTE ADULT - ASSESSMENT
Admission Info: 65 yo F with PMH of liver cirrhosis 2/2 chronic alcohol abuse, macrocytic anemia, HTN, RA, esophageal varices s/p banding, CAD s/p stent, h/o prolonged Qtc, p/w chest pain/abdominal pain. States pain is more like pressure in L chest and abdomen. States pain has diminished a lot now, but is still there mildly. Denies radiation to arm / jaw / neck. Denies nausea / vomiting / diophoresis.  Her hospital course included cardiac cath, stent, G- sepsis, hypotension, monitored bed admission, on pressors, paracentesis of 4 L, was place d on lasix aldactone.  Yesterday was oliguric, noonan placed, hematuria, flushed, but not much UO.  CT w/o contrast was done no hydro.    a/p  Pt had creat 0.88 mg/dl on admission, now up to 3, RADHA, prerenal, doubt HRS  diuretics held, on ivf NS  @ 80 ml/hr  will administer spa 25 g x 2 today, NS bolus 500 ml, then 125 ml/hr..  repeat labs later today..  may become more acidotic as NS infuses, may require bicarb as well.

## 2017-05-11 NOTE — CONSULT NOTE ADULT - SUBJECTIVE AND OBJECTIVE BOX
NEPHROLOGY CONSULT  HPI:  Admission Info: 65 yo F with PMH of liver cirrhosis 2/2 chronic alcohol abuse, macrocytic anemia, HTN, RA, esophageal varices s/p banding, CAD s/p stent, h/o prolonged Qtc, p/w chest pain/abdominal pain. States pain is more like pressure in L chest and abdomen. States pain has diminished a lot now, but is still there mildly. Denies radiation to arm / jaw / neck. Denies nausea / vomiting / diophoresis.  Her hospital course included cardiac cath, stent, G- sepsis, hypotension, monitored bed admission, on pressors, paracentesis of 4 L, was place d on lasix aldactone.  Yesterday was oliguric, noonan placed, hematuria, flushed, but not much UO.  CT w/o contrast was done no hydro.      PSH: PCI, esophageal varices banding  Social Hx: Former smoker - quit in , h/o heavy etoh use - quit 2016, Drugs - h/o marijuana use previously  Family Hx: Father - stomach problems / heart disease, mother - pancreatic cancer (2017 21:23)      PAST MEDICAL & SURGICAL HISTORY:  HTN (hypertension)  Rheumatoid arthritis  No significant past surgical history      FAMILY HISTORY:  No pertinent family history in first degree relatives      MEDICATIONS  (STANDING):  atorvastatin 40milliGRAM(s) Oral at bedtime  aspirin 325milliGRAM(s) Oral daily  gabapentin 300milliGRAM(s) Oral three times a day  clopidogrel Tablet 75milliGRAM(s) Oral daily  rifaximin 550milliGRAM(s) Oral two times a day  pantoprazole    Tablet 40milliGRAM(s) Oral before breakfast  multivitamin 1Tablet(s) Oral daily  folic acid 1milliGRAM(s) Oral daily  thiamine 100milliGRAM(s) Oral daily  hydrocortisone 10milliGRAM(s) Oral at bedtime  hydrocortisone 20milliGRAM(s) Oral daily  heparin  Injectable 5000Unit(s) SubCutaneous every 12 hours  ciprofloxacin     Tablet 500milliGRAM(s) Oral every 12 hours  sodium chloride 0.9%. 1000milliLiter(s) IV Continuous <Continuous>    MEDICATIONS  (PRN):  naphazoline 0.025%/pheniramine 0.3% Ophthalmic Solution - Peds 1Drop(s) Both EYES two times a day PRN watery eyes.      Allergies    sulfa drugs (Unknown)    Intolerances        I&O's Summary    I & Os for current day (as of 11 May 2017 10:43)  =============================================  IN: 0 ml / OUT: 100 ml / NET: -100 ml        REVIEW OF SYSTEMS:    CONSTITUTIONAL:  As per HPI.    HEENT:  Eyes:  No diplopia or blurred vision. ENT:  No earache, sore throat or runny nose.    CARDIOVASCULAR:  No pressure, squeezing, strangling, tightness, heaviness or aching about the chest, neck, axilla or epigastrium.    RESPIRATORY:  No cough, shortness of breath, PND or orthopnea.    GASTROINTESTINAL:  No nausea, vomiting or diarrhea. + ascitis    GENITOURINARY:  No dysuria, frequency or urgency.    MUSCULOSKELETAL:  As per HPI.    SKIN:  telangectasia noted    NEUROLOGIC:  No paresthesias, fasciculations, seizures or weakness.    PSYCHIATRIC:  No disorder of thought or mood.    ENDOCRINE:  No heat or cold intolerance, polyuria or polydipsia.    HEMATOLOGICAL:  + easy bruising or bleeding.          Vital Signs Last 24 Hrs  T(C): 36.3, Max: 36.4 (05-10 @ 11:47)  T(F): 97.3, Max: 97.6 (05-10 @ 11:47)  HR: 99 (86 - 99)  BP: 86/42 (85/41 - 104/41)  BP(mean): --  RR: 17 (17 - 18)  SpO2: 97% (95% - 97%)  Daily     Daily Weight in k.2 (11 May 2017 07:00)  I&O's Summary    I & Os for current day (as of 11 May 2017 10:43)  =============================================  IN: 0 ml / OUT: 100 ml / NET: -100 ml      PHYSICAL EXAM:    General:  Alert, well-developed ,No acute distress.    Neuro:  Alert and oriented to person, place, and time. Able to communicate  well. Cranial nerves 2-12 grossly intact. 5/5 strength in all  extremities bilaterally. Sensation intact in all extremities.  Appropriate affect.     HEENT:  No JVD, no masses, Eyes anicteric, No carotid bruits.No lymphadenopathy,    Cardiovascular:  Regular rate and rhythm, with normal S1 and S2. No murmurs, rubs,  or gallops. No JVD.     Lungs:  clear. no rales, no wheezing, .    Abdomen:  + ascitis, + bs, non tender, no s/p tenderness or fullness    Skin:  Warm, dry, well-perfused. + telangectasia     Extremities:  3+ edema, R leg wrapped    LABS:                        9.6    10.2  )-----------( 156      ( 11 May 2017 05:35 )             29.8     05-11    129<L>  |  100  |  50<H>  ----------------------------<  106<H>  4.8   |  17<L>  |  3.06<H>    Ca    7.9<L>      11 May 2017 02:20

## 2017-05-11 NOTE — PROGRESS NOTE ADULT - SUBJECTIVE AND OBJECTIVE BOX
PCP: DR Lees    CC-Patient is a 66y old  Female who presents with a chief complaint of chest pain (2017 23:38)    HPI:  65 yo F with PMH of liver cirrhosis 2/2 chronic alcohol abuse, macrocytic anemia, HTN, RA, esophageal varices s/p banding, CAD s/p stent, h/o prolonged Qtc, p/w chest pain/abdominal pain. States pain is more like pressure in L chest and abdomen. States pain has diminished a lot now, but is still there mildly. Denies radiation to arm / jaw / neck. Denies nausea / vomiting / diophoresis. In ED, troponins trended from 0.707 -> 79.3. Called on call cardio Dr. Flannery and discussed case, he recommended calling Dr. Anderson who is the on call interventionlist. Discussed case in detail with Dr. Anderson, including troponins trending up, new ST depressions on EKG and on going residual chest discomfort. Dr. Anderson recommends heparin drip without initial bolus due to esophageal varices / liver cirrhosis and due to patient being at increased risk for bleeding. Also recommends to c/w ASA / plavix / beta blocker / statin. Dr. Anderson believes patient may have had cardiac event earlier, and troponins are just lagging behind. Cardiac cath to be performed in AM.    17 s/p card cath- stent open, no intervention done  17 c/o tingling and numbness of both arms  17 remains hypotensive, bp 70/30's, +rigors but no fever, +chest congestion. States she does not "feel good"  17- started on pressors yesterday- BP improved. No localized signs of infection despite elevtaed lactate an WBC- pancultures taken. Pt feels better today  17 remains on pressors, tapered down a little. For pan scan today  17 off pressors  17 c/o "not feeling her bladder anymore", urinary incontinence  17 no new complaints.  17 Garcia placed yesterday for bladder scan greater then 400cc, however very minimal urine output overnight. BP 86/40's    Vital Signs Last 24 Hrs  T(C): 36.3, Max: 36.3 (05-10 @ 21:07)  T(F): 97.3, Max: 97.3 (05-10 @ 21:07)  HR: 99 (86 - 99)  BP: 86/42 (85/41 - 97/46)  BP(mean): --  RR: 17 (17 - 17)  SpO2: 97% (96% - 97%)    LABS:                        9.6    10.2  )-----------( 156      ( 11 May 2017 05:35 )             29.8     11 May 2017 02:20    129    |  100    |  50     ----------------------------<  106    4.8     |  17     |  3.06     Ca    7.9        11 May 2017 02:20    RADIOLOGY & ADDITIONAL TESTS:  EXAM:  CT ABDOMEN AND PELVIS                        PROCEDURE DATE:  05/10/2017    INTERPRETATION:  CT ABDOMEN AND PELVIS    HISTORY:  No urine output, acute renal failure  Technique: CT of the abdomen and pelvis is performed without oralor   intravenous contrast. Axial images are supplemented with coronal and   sagittal reformations. This study was performed using automatic exposure   control (radiation dose reduction software) to obtain a diagnostic image   quality scan with patient dose as low as reasonably achievable.  Contrast:     None    Comparison: CT abdomen and pelvis 2017    Findings:  LIVER: Cirrhotic morphology.  SPLEEN: Normal.  PANCREAS: Normal.  GALLBLADDER/BILIARY TREE: Nondilated. Gallbladder sludge and stones.  ADRENALS: Normal.  KIDNEYS: No hydronephrosis, urinary tract calculus, or obstructive   uropathy. Retention of previously administered IV contrast within the   renal parenchyma indicating acute renal failure.  LYMPHADENOPATHY/RETROPERITONEUM: No adenopathy.  VASCULATURE: Normal caliber aorta.  BOWEL: No bowel related abnormality. Normal appendix.  PELVIC VISCERA: No uterine or adnexal abnormality.  PELVIC LYMPH NODES: No pelvic adenopathy.  PERITONEUM/ABDOMINAL WALL: Moderate to large volume abdominal and pelvic   ascites has reaccumulated.  SKELETAL: No acute bony abnormality. Severe right hip osteoarthritis.  LUNG BASES: Stable small right and moderate left pleural effusion with   bibasilar compressive atelectasis.    IMPRESSION:     No hydronephrosis or obstructive uropathy.  Interval reaccumulation of moderate to large volume abdominal and pelvic   ascites.  Small to moderate bilateral pleural effusions persist.  Diffuse body wall edema persists.  Morphologic changes ofcirrhosis in the liver.      PHYSICAL EXAM:  GENERAL: looks pale and ill  HEAD:  Atraumatic, Normocephalic  EYES: EOMI, PERRLA, conjunctiva and sclera clear  HEENT: Moist mucous membranes  NECK: Supple, No JVD  NERVOUS SYSTEM:  Alert & Oriented X3,  CHEST/LUNG: occasional crackles  HEART: Regular rate and rhythm; No murmurs, rubs, or gallops  ABDOMEN: Soft, Nontender, Large ascites  GENITOURINARY- Voiding, no palpable bladder  EXTREMITIES: anasarca  SKIN-several large bruises, one on the left side of the abdomen, few on the legs  CNS- alert, oriented X3       Daily Weight in k.8 (2017 00:00)    atorvastatin 40milliGRAM(s) Oral at bedtime  lisinopril 2.5milliGRAM(s) Oral daily  heparin  Infusion. Unit(s)/Hr IV Continuous <Continuous>  heparin  Injectable 4700Unit(s) IV Push every 6 hours PRN  predniSONE   Tablet 15milliGRAM(s) Oral daily  aspirin 325milliGRAM(s) Oral daily  gabapentin 300milliGRAM(s) Oral three times a day  clopidogrel Tablet 75milliGRAM(s) Oral daily  rifaximin 550milliGRAM(s) Oral two times a day  naphazoline 0.025%/pheniramine 0.3% Ophthalmic Solution - Peds 1Drop(s) Both EYES two times a day PRN  pantoprazole    Tablet 40milliGRAM(s) Oral before breakfast  multivitamin 1Tablet(s) Oral daily  folic acid 1milliGRAM(s) Oral daily  thiamine 100milliGRAM(s) Oral daily  metoprolol succinate ER 12.5milliGRAM(s) Oral daily  sodium chloride 0.45%. 1000milliLiter(s) IV Continuous <Continuous>    Assessment/Plan  #Acute renal failure  Likely from overdiuresis  Stop furosemide and aldactone  Renal eval DR Hines appreciated, d/w  IVF bolus+albumin  Monitor labs    #Bacteremia likely abdominal source- resolved  Repeated cultures are negative  ID switched to PO Cipro    #Hypotension- adrenal insufficiency.  Now likely 2 to volume depletion  Cont PO steroids    #NSTEMI/ CAD s/p recent stent  Card xochilt Ramesh appreciated  S/p card cath- stent patent, no other blockages  Cont aspirin, Plavix, statin.    #Neuropathy/cervical stenosis/lumbar stenosis  Neurosurgery f/u appreciated  Follow up MRI's ordered.  Patient still refusing surgery    #Liver cirrhosis w/ esophageal varices/anasarca from third-spacing   S/p paracenthesis  GI f/u DR Underwood appreciated  No further paracenthesis planned for now    #Rheumatoid Arthritis  Rheum f/u appreciated    #LT ankle fracture  Ortho f/u DR Fung appreciated. Xrays reviewed  Remains NWB to LLE in cam boot.  Harmony lift at present    #Anemia/thrombocytopenia likely 2 to liver cirrhosis  Monitor HH    #Dispo- cancel discharge, unastable. Plan as above

## 2017-05-12 LAB
ANION GAP SERPL CALC-SCNC: 15 MMOL/L — SIGNIFICANT CHANGE UP (ref 5–17)
BUN SERPL-MCNC: 53 MG/DL — HIGH (ref 7–23)
CALCIUM SERPL-MCNC: 7.3 MG/DL — LOW (ref 8.5–10.1)
CHLORIDE SERPL-SCNC: 102 MMOL/L — SIGNIFICANT CHANGE UP (ref 96–108)
CO2 SERPL-SCNC: 14 MMOL/L — LOW (ref 22–31)
CREAT SERPL-MCNC: 3.53 MG/DL — HIGH (ref 0.5–1.3)
GLUCOSE SERPL-MCNC: 122 MG/DL — HIGH (ref 70–99)
HCT VFR BLD CALC: 26.4 % — LOW (ref 34.5–45)
HGB BLD-MCNC: 9 G/DL — LOW (ref 11.5–15.5)
MCHC RBC-ENTMCNC: 34.2 GM/DL — SIGNIFICANT CHANGE UP (ref 32–36)
MCHC RBC-ENTMCNC: 37.2 PG — HIGH (ref 27–34)
MCV RBC AUTO: 108.9 FL — HIGH (ref 80–100)
PLATELET # BLD AUTO: 152 K/UL — SIGNIFICANT CHANGE UP (ref 150–400)
POTASSIUM SERPL-MCNC: 5 MMOL/L — SIGNIFICANT CHANGE UP (ref 3.5–5.3)
POTASSIUM SERPL-SCNC: 5 MMOL/L — SIGNIFICANT CHANGE UP (ref 3.5–5.3)
RBC # BLD: 2.43 M/UL — LOW (ref 3.8–5.2)
RBC # FLD: 13.9 % — SIGNIFICANT CHANGE UP (ref 10.3–14.5)
SODIUM SERPL-SCNC: 131 MMOL/L — LOW (ref 135–145)
WBC # BLD: 9 K/UL — SIGNIFICANT CHANGE UP (ref 3.8–10.5)
WBC # FLD AUTO: 9 K/UL — SIGNIFICANT CHANGE UP (ref 3.8–10.5)

## 2017-05-12 PROCEDURE — 76857 US EXAM PELVIC LIMITED: CPT | Mod: 26

## 2017-05-12 RX ORDER — SODIUM CHLORIDE 9 MG/ML
250 INJECTION, SOLUTION INTRAVENOUS
Qty: 0 | Refills: 0 | Status: COMPLETED | OUTPATIENT
Start: 2017-05-12 | End: 2017-05-12

## 2017-05-12 RX ORDER — SODIUM CHLORIDE 9 MG/ML
1000 INJECTION INTRAMUSCULAR; INTRAVENOUS; SUBCUTANEOUS
Qty: 0 | Refills: 0 | Status: DISCONTINUED | OUTPATIENT
Start: 2017-05-12 | End: 2017-05-12

## 2017-05-12 RX ADMIN — PANTOPRAZOLE SODIUM 40 MILLIGRAM(S): 20 TABLET, DELAYED RELEASE ORAL at 05:44

## 2017-05-12 RX ADMIN — SODIUM CHLORIDE 125 MILLILITER(S): 9 INJECTION, SOLUTION INTRAVENOUS at 12:45

## 2017-05-12 RX ADMIN — HEPARIN SODIUM 5000 UNIT(S): 5000 INJECTION INTRAVENOUS; SUBCUTANEOUS at 18:35

## 2017-05-12 RX ADMIN — CLOPIDOGREL BISULFATE 75 MILLIGRAM(S): 75 TABLET, FILM COATED ORAL at 12:55

## 2017-05-12 RX ADMIN — GABAPENTIN 300 MILLIGRAM(S): 400 CAPSULE ORAL at 16:01

## 2017-05-12 RX ADMIN — HEPARIN SODIUM 5000 UNIT(S): 5000 INJECTION INTRAVENOUS; SUBCUTANEOUS at 05:44

## 2017-05-12 RX ADMIN — Medication 10 MILLIGRAM(S): at 22:58

## 2017-05-12 RX ADMIN — SODIUM CHLORIDE 125 MILLILITER(S): 9 INJECTION INTRAMUSCULAR; INTRAVENOUS; SUBCUTANEOUS at 00:18

## 2017-05-12 RX ADMIN — ATORVASTATIN CALCIUM 40 MILLIGRAM(S): 80 TABLET, FILM COATED ORAL at 22:58

## 2017-05-12 RX ADMIN — GABAPENTIN 300 MILLIGRAM(S): 400 CAPSULE ORAL at 22:58

## 2017-05-12 RX ADMIN — Medication 20 MILLIGRAM(S): at 05:44

## 2017-05-12 RX ADMIN — GABAPENTIN 300 MILLIGRAM(S): 400 CAPSULE ORAL at 05:44

## 2017-05-12 RX ADMIN — Medication 325 MILLIGRAM(S): at 12:52

## 2017-05-12 RX ADMIN — Medication 1 MILLIGRAM(S): at 12:55

## 2017-05-12 RX ADMIN — Medication 100 MILLIGRAM(S): at 12:55

## 2017-05-12 RX ADMIN — Medication 1 TABLET(S): at 12:52

## 2017-05-12 NOTE — PROGRESS NOTE ADULT - SUBJECTIVE AND OBJECTIVE BOX
PCP: DR Lees    CC-Patient is a 66y old  Female who presents with a chief complaint of chest pain (2017 23:38)    HPI:  65 yo F with PMH of liver cirrhosis 2/2 chronic alcohol abuse, macrocytic anemia, HTN, RA, esophageal varices s/p banding, CAD s/p stent, h/o prolonged Qtc, p/w chest pain/abdominal pain. States pain is more like pressure in L chest and abdomen. States pain has diminished a lot now, but is still there mildly. Denies radiation to arm / jaw / neck. Denies nausea / vomiting / diophoresis. In ED, troponins trended from 0.707 -> 79.3. Called on call cardio Dr. Flannery and discussed case, he recommended calling Dr. Anderson who is the on call interventionlist. Discussed case in detail with Dr. Anderson, including troponins trending up, new ST depressions on EKG and on going residual chest discomfort. Dr. Anderson recommends heparin drip without initial bolus due to esophageal varices / liver cirrhosis and due to patient being at increased risk for bleeding. Also recommends to c/w ASA / plavix / beta blocker / statin. Dr. Anderson believes patient may have had cardiac event earlier, and troponins are just lagging behind. Cardiac cath to be performed in AM.    17 s/p card cath- stent open, no intervention done  17 c/o tingling and numbness of both arms  17 remains hypotensive, bp 70/30's, +rigors but no fever, +chest congestion. States she does not "feel good"  17- started on pressors yesterday- BP improved. No localized signs of infection despite elevtaed lactate an WBC- pancultures taken. Pt feels better today  17 remains on pressors, tapered down a little. For pan scan today  17 off pressors  17 c/o "not feeling her bladder anymore", urinary incontinence  17 no new complaints.  17 Garcia placed yesterday for bladder scan greater then 400cc, however very minimal urine output overnight. BP 86/40's  17 almost anuric, BP improved.     Vital Signs Last 24 Hrs  T(C): 36.2, Max: 36.3 ( @ 21:12)  T(F): 97.2, Max: 97.4 (11 @ 21:12)  HR: 101 (99 - 103)  BP: 84/39 (84/39 - 109/51)  BP(mean): --  RR: 16 (16 - 19)  SpO2: 95% (94% - 96%)    LABS:                        9.0    9.0   )-----------( 152      ( 12 May 2017 08:03 )             26.4     12 May 2017 08:03    131    |  102    |  53     ----------------------------<  122    5.0     |  14     |  3.53     Ca    7.3        12 May 2017 08:03    RADIOLOGY & ADDITIONAL TESTS:  EXAM:  CT ABDOMEN AND PELVIS                        PROCEDURE DATE:  05/10/2017    INTERPRETATION:  CT ABDOMEN AND PELVIS    HISTORY:  No urine output, acute renal failure  Technique: CT of the abdomen and pelvis is performed without oralor   intravenous contrast. Axial images are supplemented with coronal and   sagittal reformations. This study was performed using automatic exposure   control (radiation dose reduction software) to obtain a diagnostic image   quality scan with patient dose as low as reasonably achievable.  Contrast:     None    Comparison: CT abdomen and pelvis 2017    Findings:  LIVER: Cirrhotic morphology.  SPLEEN: Normal.  PANCREAS: Normal.  GALLBLADDER/BILIARY TREE: Nondilated. Gallbladder sludge and stones.  ADRENALS: Normal.  KIDNEYS: No hydronephrosis, urinary tract calculus, or obstructive   uropathy. Retention of previously administered IV contrast within the   renal parenchyma indicating acute renal failure.  LYMPHADENOPATHY/RETROPERITONEUM: No adenopathy.  VASCULATURE: Normal caliber aorta.  BOWEL: No bowel related abnormality. Normal appendix.  PELVIC VISCERA: No uterine or adnexal abnormality.  PELVIC LYMPH NODES: No pelvic adenopathy.  PERITONEUM/ABDOMINAL WALL: Moderate to large volume abdominal and pelvic   ascites has reaccumulated.  SKELETAL: No acute bony abnormality. Severe right hip osteoarthritis.  LUNG BASES: Stable small right and moderate left pleural effusion with   bibasilar compressive atelectasis.    IMPRESSION:   No hydronephrosis or obstructive uropathy.  Interval reaccumulation of moderate to large volume abdominal and pelvic   ascites.  Small to moderate bilateral pleural effusions persist.  Diffuse body wall edema persists.  Morphologic changes ofcirrhosis in the liver.    EXAM:  US PELVIC LIMITED OR FOLLOW UP                        PROCEDURE DATE:  2017    INTERPRETATION:  US PELVIC LIMITED OR FOLLOW UP  HISTORY:  No urinary output, obstructive uropathy versus acute renal   failure                                                   COMPARISON: CT abdomen and pelvis 5/10/2017  PROCEDURE/TECHNIQUE: Multiple transverse and longitudinal images of the   pelvis were obtained.    URINARY BLADDER:    Pre void volume:  10 cc  The urinary bladder is under distended limiting evaluation of the wall.   No intraluminal calculus identified.  No ureteral jets were visualized using color Doppler.   Pelvic ascites is again noted.    IMPRESSION:  Collapsed urinary bladder measuring 10 cc in volume. Limited evaluation   of the wall secondary to under distention. No ureteral jets identified.   Pelvic ascites as on prior CT.    PHYSICAL EXAM:  GENERAL: looks pale and ill  HEAD:  Atraumatic, Normocephalic  EYES: EOMI, PERRLA, conjunctiva and sclera clear  HEENT: Moist mucous membranes  NECK: Supple, No JVD  NERVOUS SYSTEM:  Alert & Oriented X3,  CHEST/LUNG: occasional crackles, decrease entry at bases  HEART: Regular rate and rhythm; No murmurs, rubs, or gallops  ABDOMEN: Soft, Nontender, Large ascites  GENITOURINARY- Voiding, no palpable bladder  EXTREMITIES: anasarca overall  SKIN-several large bruises, one on the left side of the abdomen, few on the legs  CNS- alert, oriented X3       Daily Weight in k.8 (2017 00:00)    Assessment/Plan  #Acute renal failure, likely developing hepato-renal syndrome/acidosis/anuria  Worsening of renal failure  Renal eval appreciated  Started on Bicarb drip  Monitor renal function  Transfer arranged to [facility] by DR Underwood for evaluation for liver transplant to DR Agapito Walsh service, however, patient refused to get transfered. She stated "I understand my body is shutting down and I dont know how long I would need to wait for the liver, I am comfortable here at [facility] and if I would need dialysis I want to get it done here" Pros and cons of transfer discussed with patient in extend including the possibility of worsening her medical condition and she still does not want to be transferred. DR Underwood made aware of refusal for transfer    #Liver cirrhosis w/ esophageal varices/anasarca from third-spacing   S/p paracenthesis last week- 4 l removed  GI f/u DR Underwood appreciated    #Bacteremia likely abdominal source- resolved  Repeated cultures are negative  Completed abx treatment    #Hypotension- adrenal insufficiency.  Now likely 2 to volume depletion  Cont PO steroids    #NSTEMI/ CAD s/p recent stent  Card eval DR Ramesh appreciated  S/p card cath- stent patent, no other blockages  Cont aspirin, Plavix, statin.    #Neuropathy/cervical stenosis/lumbar stenosis  Neurosurgery f/u appreciated  Follow up MRI's ordered.  Patient still refusing surgery    #Rheumatoid Arthritis/ adrenal insufficiency due to long-term oral steroids  Rheum f/u appreciated    #LT ankle fracture  Ortho f/u DR Fung appreciated. Xrays reviewed  Remains NWB to LLE in cam boot.  Harmony lift at present    #Anemia/thrombocytopenia likely 2 to liver cirrhosis  Monitor     #Dispo- refusing transfer. Plan as above. Pt aware her condition deteriorates and wants to stay at [facility]

## 2017-05-12 NOTE — PROGRESS NOTE ADULT - ASSESSMENT
Admission Info: 65 yo F with PMH of liver cirrhosis 2/2 chronic alcohol abuse, macrocytic anemia, HTN, RA, esophageal varices s/p banding, CAD s/p stent, h/o prolonged Qtc, p/w chest pain/abdominal pain. States pain is more like pressure in L chest and abdomen. States pain has diminished a lot now, but is still there mildly. Denies radiation to arm / jaw / neck. Denies nausea / vomiting / diophoresis.  Her hospital course included cardiac cath, stent, G- sepsis, hypotension, monitored bed admission, on pressors, paracentesis of 4 L, was place d on lasix aldactone.  Yesterday was oliguric, noonan placed, hematuria, flushed, but not much UO.  CT w/o contrast was done no hydro.    a/p  Pt had creat 0.88 mg/dl on admission, now up to 3, RADHA, prerenal, doubt HRS  diuretics held, on ivf NS  @ 80 ml/hr  will administer spa 25 g x 2 today, NS bolus 500 ml, then 125 ml/hr..  repeat labs later today..  may become more acidotic as NS infuses, may require bicarb as well.    5/12 5/12  IVF given w/o improvement, due to pts h/o obstr uropathy and incontinence, will get an official janine of bladder size, bedside scanner inaccurate due  to ascitis, volumes of 150 to >525 ml obtained..  radha, creat higher  acidosis  will administer NaHCO3  may need to treat as HRS

## 2017-05-12 NOTE — PROGRESS NOTE ADULT - SUBJECTIVE AND OBJECTIVE BOX
Patient is a 66y old  Female who presents with a chief complaint of chest pain (10 May 2017 14:59)      HPI:  67 yo F with PMH of liver cirrhosis 2/2 chronic alcohol abuse, macrocytic anemia, HTN, RA, esophageal varices s/p banding, CAD s/p stent, h/o prolonged Qtc, p/w chest pain/abdominal pain. States pain is more like pressure in L chest and abdomen. States pain has diminished a lot now, but is still there mildly. Denies radiation to arm / jaw / neck. Denies nausea / vomiting / diophoresis. In ED, troponins trended from 0.707 -> 79.3. Called on call cardio Dr. Flannery and discussed case, he recommended calling Dr. Anderson who is the on call interventionlist. Discussed case in detail with Dr. Anderson, including troponins trending up, new ST depressions on EKG and on going residual chest discomfort. Dr. Anderson recommends heparin drip without initial bolus due to esophageal varices / liver cirrhosis and due to patient being at increased risk for bleeding. Also recommends to c/w ASA / plavix / beta blocker / statin. Dr. Anderson believes patient may have had cardiac event earlier, and troponins are just lagging behind. Cardiac cath to be performed in AM.    pt CO fatigue and not sleeping all night  neg N V  kyle diet  not peeing much  renal note appreciated      PSH: PCI, esophageal varices banding  Social Hx: Former smoker - quit in 2005, h/o heavy etoh use - quit 2/2016, Drugs - h/o marijuana use previously  Family Hx: Father - stomach problems / heart disease, mother - pancreatic cancer (24 Apr 2017 21:23)      PAST MEDICAL & SURGICAL HISTORY:  HTN (hypertension)  Rheumatoid arthritis  No significant past surgical history      MEDICATIONS  (STANDING):  atorvastatin 40milliGRAM(s) Oral at bedtime  aspirin 325milliGRAM(s) Oral daily  gabapentin 300milliGRAM(s) Oral three times a day  clopidogrel Tablet 75milliGRAM(s) Oral daily  rifaximin 550milliGRAM(s) Oral two times a day  pantoprazole    Tablet 40milliGRAM(s) Oral before breakfast  multivitamin 1Tablet(s) Oral daily  folic acid 1milliGRAM(s) Oral daily  thiamine 100milliGRAM(s) Oral daily  hydrocortisone 10milliGRAM(s) Oral at bedtime  hydrocortisone 20milliGRAM(s) Oral daily  heparin  Injectable 5000Unit(s) SubCutaneous every 12 hours  sodium chloride 0.9%. 1000milliLiter(s) IV Continuous <Continuous>    MEDICATIONS  (PRN):  naphazoline 0.025%/pheniramine 0.3% Ophthalmic Solution - Peds 1Drop(s) Both EYES two times a day PRN watery eyes.      Allergies    sulfa drugs (Unknown)    Intolerances        SOCIAL HISTORY:unchanged    FAMILY HISTORY:  No pertinent family history in first degree relatives      REVIEW OF SYSTEMS:    CONSTITUTIONAL: No weakness, fevers or chills  EYES/ENT: No visual changes;  No vertigo or throat pain   NECK: No pain or stiffness  RESPIRATORY: No cough, wheezing, hemoptysis; No shortness of breath  CARDIOVASCULAR: No chest pain or palpitations  GENITOURINARY: No dysuria, frequency or hematuria  NEUROLOGICAL: No numbness or weakness  SKIN: No itching, burning, rashes, or lesions   All other review of systems is negative unless indicated above.    Vital Signs Last 24 Hrs  T(C): 36.3, Max: 36.3 (05-11 @ 21:12)  T(F): 97.3, Max: 97.4 (05-11 @ 21:12)  HR: 103 (99 - 103)  BP: 95/43 (91/36 - 112/49)  BP(mean): --  RR: 19 (18 - 19)  SpO2: 94% (94% - 96%)    PHYSICAL EXAM:    Constitutional: NAD, well-developed  HEENT: EOMI, throat clear  Neck: No LAD, supple  Respiratory: CTA and P  Cardiovascular: S1 and S2, RRR, no M  Gastrointestinal: BS+, soft, NT/ND, neg HSM,  Extremities: No peripheral edema, neg clubing, cyanosis  Vascular: 2+ peripheral pulses  Neurological: A/O x 3, no focal deficits  Psychiatric: Normal mood, normal affect  Skin: No rashes    LABS:  CBC Full  -  ( 12 May 2017 08:03 )  WBC Count : 9.0 K/uL  Hemoglobin : 9.0 g/dL  Hematocrit : 26.4 %  Platelet Count - Automated : 152 K/uL  Mean Cell Volume : 108.9 fl  Mean Cell Hemoglobin : 37.2 pg  Mean Cell Hemoglobin Concentration : 34.2 gm/dL  Auto Neutrophil # : x  Auto Lymphocyte # : x  Auto Monocyte # : x  Auto Eosinophil # : x  Auto Basophil # : x  Auto Neutrophil % : x  Auto Lymphocyte % : x  Auto Monocyte % : x  Auto Eosinophil % : x  Auto Basophil % : x    05-12    131<L>  |  102  |  53<H>  ----------------------------<  122<H>  5.0   |  14<L>  |  3.53<H>    Ca    7.3<L>      12 May 2017 08:03              RADIOLOGY & ADDITIONAL STUDIES:

## 2017-05-12 NOTE — PROGRESS NOTE ADULT - ASSESSMENT
hypotension and recent infection complicated by likely adrenal insuff  on steroid, consider taper    Ascites, S/P approx 4 liter tap and reaccumulation adn pleural effusion  low salt diet  hold diuresis due to renal insufficiency      ARF  stop diuretics   Dw renal and suggestedd treating as HRS and Dr Donny guptae    daily weightsas well as strict I's and O's discussed with both patient and nurse this morning  Low salt diet  for now would avoid repeated paracentesis and monitor BP closely  daily saad mosley I O            CAD S/P Stent, stent patent with cath  inc risk bleed with anti plt due to EV     delay in esophageal variceal banding secondary to coronary artery disease and being on antiplatelet agents discussed with patient

## 2017-05-12 NOTE — PROGRESS NOTE ADULT - SUBJECTIVE AND OBJECTIVE BOX
NEPHROLOGY INTERVAL HPI/OVERNIGHT EVENTS:    HPI:  65 yo F with PMH of liver cirrhosis 2/2 chronic alcohol abuse, macrocytic anemia, HTN, RA, esophageal varices s/p banding, CAD s/p stent, h/o prolonged Qtc, p/w chest pain/abdominal pain. States pain is more like pressure in L chest and abdomen. States pain has diminished a lot now, but is still there mildly. Denies radiation to arm / jaw / neck. Denies nausea / vomiting / diophoresis. In ED, troponins trended from 0.707 -> 79.3. Called on call cardio Dr. Flannery and discussed case, he recommended calling Dr. Anderson who is the on call interventionlist. Discussed case in detail with Dr. Anderson, including troponins trending up, new ST depressions on EKG and on going residual chest discomfort. Dr. Anderson recommends heparin drip without initial bolus due to esophageal varices / liver cirrhosis and due to patient being at increased risk for bleeding. Also recommends to c/w ASA / plavix / beta blocker / statin. Dr. Anderson believes patient may have had cardiac event earlier, and troponins are just lagging behind. Cardiac cath to be performed in AM.    Her hospital course included cardiac cath, stent, G- sepsis, hypotension, monitored bed admission, on pressors, paracentesis of 4 L, was place d on lasix aldactone.  Yesterday was oliguric, noonan placed, hematuria, flushed, but not much UO.  CT w/o contrast was done no hydro.      IVF given w/o improvement, due to pts h/o obstr uropathy and incontinence, will get an official janine of bladder size, bedside scanner inaccurate due  to ascitis, volumes of 150 to >525 ml obtained..      PSH: PCI, esophageal varices banding  Social Hx: Former smoker - quit in , h/o heavy etoh use - quit 2016, Drugs - h/o marijuana use previously  Family Hx: Father - stomach problems / heart disease, mother - pancreatic cancer (2017 21:23)      PAST MEDICAL & SURGICAL HISTORY:  HTN (hypertension)  Rheumatoid arthritis  No significant past surgical history      FAMILY HISTORY:  No pertinent family history in first degree relatives      MEDICATIONS  (STANDING):  atorvastatin 40milliGRAM(s) Oral at bedtime  aspirin 325milliGRAM(s) Oral daily  gabapentin 300milliGRAM(s) Oral three times a day  clopidogrel Tablet 75milliGRAM(s) Oral daily  rifaximin 550milliGRAM(s) Oral two times a day  pantoprazole    Tablet 40milliGRAM(s) Oral before breakfast  multivitamin 1Tablet(s) Oral daily  folic acid 1milliGRAM(s) Oral daily  thiamine 100milliGRAM(s) Oral daily  hydrocortisone 10milliGRAM(s) Oral at bedtime  hydrocortisone 20milliGRAM(s) Oral daily  heparin  Injectable 5000Unit(s) SubCutaneous every 12 hours  sodium chloride 0.9%. 1000milliLiter(s) IV Continuous <Continuous>    MEDICATIONS  (PRN):  naphazoline 0.025%/pheniramine 0.3% Ophthalmic Solution - Peds 1Drop(s) Both EYES two times a day PRN watery eyes.      Allergies    sulfa drugs (Unknown)    Intolerances        I&O's Summary        REVIEW OF SYSTEMS:    sleepy, did not sleep all night      Vital Signs Last 24 Hrs  T(C): 36.3, Max: 36.3 (05-11 @ 21:12)  T(F): 97.3, Max: 97.4 (05-11 @ 21:12)  HR: 103 (99 - 103)  BP: 95/43 (91/36 - 112/49)  BP(mean): --  RR: 19 (18 - 19)  SpO2: 94% (94% - 96%)  Daily     Daily Weight in k.3 (12 May 2017 08:09)    PHYSICAL EXAM:    General:  Alert, well-developed ,No acute distress.    Neuro:  Alert and oriented to person, place, and time. Able to communicate  well. Cranial nerves 2-12 grossly intact. 5/5 strength in all  extremities bilaterally. Sensation intact in all extremities.  Appropriate affect.     HEENT:  No JVD, no masses, Eyes anicteric, No carotid bruits.No lymphadenopathy,    Cardiovascular:  Regular rate and rhythm, with normal S1 and S2. No murmurs, rubs,  or gallops. No JVD.    Lungs:  Lungs clear. no rales, no wheezing, .    Abdomen:  ascitis    Skin:  Warm, dry, well-perfused. No rashes or other lesions.     Extremities:  3 + edema, weeping    LABS:                        9.0    9.0   )-----------( 152      ( 12 May 2017 08:03 )             26.4     05-12    131<L>  |  102  |  53<H>  ----------------------------<  122<H>  5.0   |  14<L>  |  3.53<H>    Ca    7.3<L>      12 May 2017 08:03

## 2017-05-13 LAB
ANION GAP SERPL CALC-SCNC: 16 MMOL/L — SIGNIFICANT CHANGE UP (ref 5–17)
BUN SERPL-MCNC: 61 MG/DL — HIGH (ref 7–23)
CALCIUM SERPL-MCNC: 7.4 MG/DL — LOW (ref 8.5–10.1)
CHLORIDE SERPL-SCNC: 101 MMOL/L — SIGNIFICANT CHANGE UP (ref 96–108)
CO2 SERPL-SCNC: 14 MMOL/L — LOW (ref 22–31)
CREAT SERPL-MCNC: 3.86 MG/DL — HIGH (ref 0.5–1.3)
GLUCOSE SERPL-MCNC: 116 MG/DL — HIGH (ref 70–99)
LACTATE SERPL-SCNC: 2.1 MMOL/L — HIGH (ref 0.7–2)
POTASSIUM SERPL-MCNC: 5.3 MMOL/L — SIGNIFICANT CHANGE UP (ref 3.5–5.3)
POTASSIUM SERPL-SCNC: 5.3 MMOL/L — SIGNIFICANT CHANGE UP (ref 3.5–5.3)
SODIUM SERPL-SCNC: 131 MMOL/L — LOW (ref 135–145)

## 2017-05-13 RX ORDER — ALBUMIN HUMAN 25 %
100 VIAL (ML) INTRAVENOUS EVERY 8 HOURS
Qty: 0 | Refills: 0 | Status: DISCONTINUED | OUTPATIENT
Start: 2017-05-13 | End: 2017-05-14

## 2017-05-13 RX ORDER — OCTREOTIDE ACETATE 200 UG/ML
100 INJECTION, SOLUTION INTRAVENOUS; SUBCUTANEOUS THREE TIMES A DAY
Qty: 0 | Refills: 0 | Status: DISCONTINUED | OUTPATIENT
Start: 2017-05-13 | End: 2017-05-14

## 2017-05-13 RX ORDER — MIDODRINE HYDROCHLORIDE 2.5 MG/1
5 TABLET ORAL THREE TIMES A DAY
Qty: 0 | Refills: 0 | Status: DISCONTINUED | OUTPATIENT
Start: 2017-05-13 | End: 2017-05-19

## 2017-05-13 RX ADMIN — GABAPENTIN 300 MILLIGRAM(S): 400 CAPSULE ORAL at 23:12

## 2017-05-13 RX ADMIN — Medication 100 MILLIGRAM(S): at 12:09

## 2017-05-13 RX ADMIN — Medication 10 MILLIGRAM(S): at 23:11

## 2017-05-13 RX ADMIN — HEPARIN SODIUM 5000 UNIT(S): 5000 INJECTION INTRAVENOUS; SUBCUTANEOUS at 05:36

## 2017-05-13 RX ADMIN — Medication 1 MILLIGRAM(S): at 12:09

## 2017-05-13 RX ADMIN — OCTREOTIDE ACETATE 100 MICROGRAM(S): 200 INJECTION, SOLUTION INTRAVENOUS; SUBCUTANEOUS at 15:05

## 2017-05-13 RX ADMIN — ATORVASTATIN CALCIUM 40 MILLIGRAM(S): 80 TABLET, FILM COATED ORAL at 23:11

## 2017-05-13 RX ADMIN — PANTOPRAZOLE SODIUM 40 MILLIGRAM(S): 20 TABLET, DELAYED RELEASE ORAL at 05:37

## 2017-05-13 RX ADMIN — Medication 50 MILLILITER(S): at 14:52

## 2017-05-13 RX ADMIN — Medication 20 MILLIGRAM(S): at 05:36

## 2017-05-13 RX ADMIN — GABAPENTIN 300 MILLIGRAM(S): 400 CAPSULE ORAL at 05:36

## 2017-05-13 RX ADMIN — Medication 1 TABLET(S): at 12:09

## 2017-05-13 RX ADMIN — CLOPIDOGREL BISULFATE 75 MILLIGRAM(S): 75 TABLET, FILM COATED ORAL at 12:09

## 2017-05-13 RX ADMIN — Medication 50 MILLILITER(S): at 22:21

## 2017-05-13 RX ADMIN — GABAPENTIN 300 MILLIGRAM(S): 400 CAPSULE ORAL at 15:04

## 2017-05-13 RX ADMIN — HEPARIN SODIUM 5000 UNIT(S): 5000 INJECTION INTRAVENOUS; SUBCUTANEOUS at 18:07

## 2017-05-13 RX ADMIN — MIDODRINE HYDROCHLORIDE 5 MILLIGRAM(S): 2.5 TABLET ORAL at 15:05

## 2017-05-13 RX ADMIN — MIDODRINE HYDROCHLORIDE 5 MILLIGRAM(S): 2.5 TABLET ORAL at 23:11

## 2017-05-13 RX ADMIN — OCTREOTIDE ACETATE 100 MICROGRAM(S): 200 INJECTION, SOLUTION INTRAVENOUS; SUBCUTANEOUS at 23:12

## 2017-05-13 RX ADMIN — Medication 325 MILLIGRAM(S): at 12:08

## 2017-05-13 NOTE — PROGRESS NOTE ADULT - SUBJECTIVE AND OBJECTIVE BOX
Patient is a 66y old  Female who presents with a chief complaint of chest pain (10 May 2017 14:59)      HPI:  65 yo F with PMH of liver cirrhosis 2/2 chronic alcohol abuse, macrocytic anemia, HTN, RA, esophageal varices s/p banding, CAD s/p stent, h/o prolonged Qtc, p/w chest pain/abdominal pain. States pain is more like pressure in L chest and abdomen. States pain has diminished a lot now, but is still there mildly. Denies radiation to arm / jaw / neck. Denies nausea / vomiting / diophoresis. In ED, troponins trended from 0.707 -> 79.3. Called on call cardio Dr. Flannery and discussed case, he recommended calling Dr. Anderson who is the on call interventionlist. Discussed case in detail with Dr. Anderson, including troponins trending up, new ST depressions on EKG and on going residual chest discomfort. Dr. Anderson recommends heparin drip without initial bolus due to esophageal varices / liver cirrhosis and due to patient being at increased risk for bleeding. Also recommends to c/w ASA / plavix / beta blocker / statin. Dr. Anderson believes patient may have had cardiac event earlier, and troponins are just lagging behind. Cardiac cath to be performed in AM.    Patient is essentially anuric from hepatorenal syndrome. Issues discussed at length with patient about transfer to [facility] for OLTx. Patient adamantly refuses despite risks.       PSH: PCI, esophageal varices banding  Social Hx: Former smoker - quit in 2005, h/o heavy etoh use - quit 2/2016, Drugs - h/o marijuana use previously  Family Hx: Father - stomach problems / heart disease, mother - pancreatic cancer (24 Apr 2017 21:23)      PAST MEDICAL & SURGICAL HISTORY:  HTN (hypertension)  Rheumatoid arthritis  No significant past surgical history      MEDICATIONS  (STANDING):  atorvastatin 40milliGRAM(s) Oral at bedtime  aspirin 325milliGRAM(s) Oral daily  gabapentin 300milliGRAM(s) Oral three times a day  clopidogrel Tablet 75milliGRAM(s) Oral daily  rifaximin 550milliGRAM(s) Oral two times a day  pantoprazole    Tablet 40milliGRAM(s) Oral before breakfast  multivitamin 1Tablet(s) Oral daily  folic acid 1milliGRAM(s) Oral daily  thiamine 100milliGRAM(s) Oral daily  hydrocortisone 10milliGRAM(s) Oral at bedtime  hydrocortisone 20milliGRAM(s) Oral daily  heparin  Injectable 5000Unit(s) SubCutaneous every 12 hours  midodrine 5milliGRAM(s) Oral three times a day  albumin human 25% IVPB 100milliLiter(s) IV Intermittent every 8 hours  octreotide  Injectable 100MICROGram(s) SubCutaneous three times a day    MEDICATIONS  (PRN):  naphazoline 0.025%/pheniramine 0.3% Ophthalmic Solution - Peds 1Drop(s) Both EYES two times a day PRN watery eyes.      Allergies    sulfa drugs (Unknown)    Intolerances        REVIEW OF SYSTEMS:    CONSTITUTIONAL: No weakness, fevers or chills  RESPIRATORY: No cough, wheezing, hemoptysis; No shortness of breath  CARDIOVASCULAR: No chest pain or palpitations  GASTROINTESTINAL: No abdominal or epigastric pain. No nausea, vomiting, or hematemesis; No diarrhea or constipation. No melena or hematochezia.  All other review of systems is negative unless indicated above.    Vital Signs Last 24 Hrs  T(C): 36.3, Max: 36.4 (05-12 @ 21:17)  T(F): 97.3, Max: 97.5 (05-12 @ 21:17)  HR: 103 (101 - 105)  BP: 89/38 (84/39 - 107/38)  BP(mean): --  RR: 19 (16 - 22)  SpO2: 95% (95% - 95%)    PHYSICAL EXAM:      Respiratory: CTAB  Cardiovascular: S1 and S2, RRR, no M/G/R  Gastrointestinal: ascites  Extremities: asterixis is present      LABS:                        9.0    9.0   )-----------( 152      ( 12 May 2017 08:03 )             26.4     05-13    131<L>  |  101  |  61<H>  ----------------------------<  116<H>  5.3   |  14<L>  |  3.86<H>    Ca    7.4<L>      13 May 2017 06:55            RADIOLOGY & ADDITIONAL STUDIES:

## 2017-05-13 NOTE — PROGRESS NOTE ADULT - SUBJECTIVE AND OBJECTIVE BOX
PCP: DR Lees    CC-Patient is a 66y old  Female who presents with a chief complaint of chest pain (2017 23:38)    HPI:  65 yo F with PMH of liver cirrhosis 2/2 chronic alcohol abuse, macrocytic anemia, HTN, RA, esophageal varices s/p banding, CAD s/p stent, h/o prolonged Qtc, p/w chest pain/abdominal pain. States pain is more like pressure in L chest and abdomen. States pain has diminished a lot now, but is still there mildly. Denies radiation to arm / jaw / neck. Denies nausea / vomiting / diophoresis. In ED, troponins trended from 0.707 -> 79.3. Called on call cardio Dr. Flannery and discussed case, he recommended calling Dr. Anderson who is the on call interventionlist. Discussed case in detail with Dr. Adnerson, including troponins trending up, new ST depressions on EKG and on going residual chest discomfort. Dr. Anderson recommends heparin drip without initial bolus due to esophageal varices / liver cirrhosis and due to patient being at increased risk for bleeding. Also recommends to c/w ASA / plavix / beta blocker / statin. Dr. Anderson believes patient may have had cardiac event earlier, and troponins are just lagging behind. Cardiac cath to be performed in AM.    17 s/p card cath- stent open, no intervention done  17 c/o tingling and numbness of both arms  17 remains hypotensive, bp 70/30's, +rigors but no fever, +chest congestion. States she does not "feel good"  17- started on pressors yesterday- BP improved. No localized signs of infection despite elevtaed lactate an WBC- pancultures taken. Pt feels better today  17 remains on pressors, tapered down a little. For pan scan today  17 off pressors  17 c/o "not feeling her bladder anymore", urinary incontinence  17 no new complaints.  17 Garcia placed yesterday for bladder scan greater then 400cc, however very minimal urine output overnight. BP 86/40's  17 almost anuric, BP improved.   17 no urine output.     Vital Signs Last 24 Hrs  T(C): 36.4, Max: 36.4 (-12 @ 21:17)  T(F): 97.6, Max: 97.6 (-13 @ 11:46)  HR: 102 (102 - 105)  BP: 106/43 (89/38 - 107/38)  BP(mean): --  RR: 17 (17 - 22)  SpO2: 98% (95% - 98%)    LABS:                        9.0    9.0   )-----------( 152      ( 12 May 2017 08:03 )             26.4     13 May 2017 06:55    131    |  101    |  61     ----------------------------<  116    5.3     |  14     |  3.86     Ca    7.4        13 May 2017 06:55    RADIOLOGY & ADDITIONAL TESTS:  EXAM:  CT ABDOMEN AND PELVIS                        PROCEDURE DATE:  05/10/2017    INTERPRETATION:  CT ABDOMEN AND PELVIS    HISTORY:  No urine output, acute renal failure  Technique: CT of the abdomen and pelvis is performed without oralor   intravenous contrast. Axial images are supplemented with coronal and   sagittal reformations. This study was performed using automatic exposure   control (radiation dose reduction software) to obtain a diagnostic image   quality scan with patient dose as low as reasonably achievable.  Contrast:     None    Comparison: CT abdomen and pelvis 2017    Findings:  LIVER: Cirrhotic morphology.  SPLEEN: Normal.  PANCREAS: Normal.  GALLBLADDER/BILIARY TREE: Nondilated. Gallbladder sludge and stones.  ADRENALS: Normal.  KIDNEYS: No hydronephrosis, urinary tract calculus, or obstructive   uropathy. Retention of previously administered IV contrast within the   renal parenchyma indicating acute renal failure.  LYMPHADENOPATHY/RETROPERITONEUM: No adenopathy.  VASCULATURE: Normal caliber aorta.  BOWEL: No bowel related abnormality. Normal appendix.  PELVIC VISCERA: No uterine or adnexal abnormality.  PELVIC LYMPH NODES: No pelvic adenopathy.  PERITONEUM/ABDOMINAL WALL: Moderate to large volume abdominal and pelvic   ascites has reaccumulated.  SKELETAL: No acute bony abnormality. Severe right hip osteoarthritis.  LUNG BASES: Stable small right and moderate left pleural effusion with   bibasilar compressive atelectasis.    IMPRESSION:   No hydronephrosis or obstructive uropathy.  Interval reaccumulation of moderate to large volume abdominal and pelvic   ascites.  Small to moderate bilateral pleural effusions persist.  Diffuse body wall edema persists.  Morphologic changes ofcirrhosis in the liver.    EXAM:  US PELVIC LIMITED OR FOLLOW UP                        PROCEDURE DATE:  2017    INTERPRETATION:  US PELVIC LIMITED OR FOLLOW UP  HISTORY:  No urinary output, obstructive uropathy versus acute renal   failure                                                   COMPARISON: CT abdomen and pelvis 5/10/2017  PROCEDURE/TECHNIQUE: Multiple transverse and longitudinal images of the   pelvis were obtained.    URINARY BLADDER:    Pre void volume:  10 cc  The urinary bladder is under distended limiting evaluation of the wall.   No intraluminal calculus identified.  No ureteral jets were visualized using color Doppler.   Pelvic ascites is again noted.    IMPRESSION:  Collapsed urinary bladder measuring 10 cc in volume. Limited evaluation   of the wall secondary to under distention. No ureteral jets identified.   Pelvic ascites as on prior CT.    PHYSICAL EXAM:  GENERAL: looks pale and ill  HEAD:  Atraumatic, Normocephalic  EYES: EOMI, PERRLA, conjunctiva and sclera clear  HEENT: Moist mucous membranes  NECK: Supple, No JVD  NERVOUS SYSTEM:  Alert & Oriented X3,  CHEST/LUNG: occasional crackles, decrease entry at bases  HEART: Regular rate and rhythm; No murmurs, rubs, or gallops  ABDOMEN: Soft, Nontender, Large ascites  GENITOURINARY- no palpable bladder  EXTREMITIES: anasarca overall  SKIN-several large bruises, one on the left side of the abdomen, few on the legs  CNS- alert, oriented X3       Daily Weight in k.8 (2017 00:00)    Assessment/Plan  #Acute renal failure, likely developing hepato-renal syndrome/acidosis/anuria  Worsening of renal failure  Renal eval appreciated  s/p Bicarb drip  Monitor renal function- so far getting worse  On Sandostatin+midodrine+albumin  Approaching HD- d/w patient and she is agreeable to start HD if necessary  She does not want to be transferred and prefer to have all possible treatment here at [facility], she understands the risks she is taking    #Liver cirrhosis w/ esophageal varices/anasarca from third-spacing   S/p paracenthesis last week- 4 l removed  GI f/u  appreciated    #Bacteremia likely abdominal source- resolved  Repeated cultures are negative  Completed abx treatment    #Hypotension- adrenal insufficiency.  Now likely 2 to volume depletion  Cont PO steroids    #NSTEMI/ CAD s/p recent stent  Card eval DR aRmesh appreciated  S/p card cath- stent patent, no other blockages  Cont aspirin, Plavix, statin.    #Neuropathy/cervical stenosis/lumbar stenosis  Neurosurgery f/u appreciated  Follow up MRI's ordered.  Patient still refusing surgery    #Rheumatoid Arthritis/ adrenal insufficiency due to long-term oral steroids  Rheum f/u appreciated    #LT ankle fracture  Ortho f/u DR Fung appreciated. Xrays reviewed  Remains NWB to LLE in cam boot.  Harmony lift at present    #Anemia/thrombocytopenia likely 2 to liver cirrhosis  Monitor     #Dispo- refusing transfer. Plan as above. Pt aware her condition deteriorates and wants to stay at [facility]. Patient would be very difficult to dyalize given her baseline hypotension that would be even more pronounced with fluid removal.

## 2017-05-13 NOTE — PROGRESS NOTE ADULT - ASSESSMENT
Admission Info: 67 yo F with PMH of liver cirrhosis 2/2 chronic alcohol abuse, macrocytic anemia, HTN, RA, esophageal varices s/p banding, CAD s/p stent, h/o prolonged Qtc, p/w chest pain/abdominal pain. States pain is more like pressure in L chest and abdomen. States pain has diminished a lot now, but is still there mildly. Denies radiation to arm / jaw / neck. Denies nausea / vomiting / diophoresis.  Her hospital course included cardiac cath, stent, G- sepsis, hypotension, monitored bed admission, on pressors, paracentesis of 4 L, was place d on lasix aldactone.  Yesterday was oliguric, noonan placed, hematuria, flushed, but not much UO.  CT w/o contrast was done no hydro.    a/p  Pt had creat 0.88 mg/dl on admission, now up to 3, RADHA, prerenal, doubt HRS  diuretics held, on ivf NS  @ 80 ml/hr  will administer spa 25 g x 2 today, NS bolus 500 ml, then 125 ml/hr..  repeat labs later today..  may become more acidotic as NS infuses, may require bicarb as well.    5/12 5/12  IVF given w/o improvement, due to pts h/o obstr uropathy and incontinence, will get an official janine of bladder size, bedside scanner inaccurate due  to ascitis, volumes of 150 to >525 ml obtained..  radha, creat higher  acidosis  will administer NaHCO3  may need to treat as HRS    5/13  HRS?  minimal UO, did not respond to IVF  Acidosis  check lactic acidosis, may need bicarb  K 5.3,   cont octreotide spa midodrine treatmant

## 2017-05-13 NOTE — PROGRESS NOTE ADULT - SUBJECTIVE AND OBJECTIVE BOX
Addendum:    The bladder sono 5/12 showed no bladder residual and no ureteral jets.  Will treat as Type 1 HRS with SPA 25 g q 8 hr, Octreotide 100 mcg SQ q8h and Midodrine 5mg po q8h.  If this therapy fails will consider hemodialysis.

## 2017-05-13 NOTE — PROGRESS NOTE ADULT - SUBJECTIVE AND OBJECTIVE BOX
NEPHROLOGY INTERVAL HPI/OVERNIGHT EVENTS:    HPI:  65 yo F with PMH of liver cirrhosis 2/2 chronic alcohol abuse, macrocytic anemia, HTN, RA, esophageal varices s/p banding, CAD s/p stent, h/o prolonged Qtc, p/w chest pain/abdominal pain. States pain is more like pressure in L chest and abdomen. States pain has diminished a lot now, but is still there mildly. Denies radiation to arm / jaw / neck. Denies nausea / vomiting / diophoresis. In ED, troponins trended from 0.707 -> 79.3. Called on call cardio Dr. Flannery and discussed case, he recommended calling Dr. Anderson who is the on call interventionlist. Discussed case in detail with Dr. Anderson, including troponins trending up, new ST depressions on EKG and on going residual chest discomfort. Dr. Anderson recommends heparin drip without initial bolus due to esophageal varices / liver cirrhosis and due to patient being at increased risk for bleeding. Also recommends to c/w ASA / plavix / beta blocker / statin. Dr. Anderson believes patient may have had cardiac event earlier, and troponins are just lagging behind. Cardiac cath to be performed in AM.    5/13  yesterday renal sono showed no ureteral jets   as d/w Dr Underwood will treat as HRS w octreotide, spa and midodrine  Had a conversation w Dr Rice and the patient, re her need for transfer to tertiary care and liver transplant for survival.  pt refusing at this time, wants to try the HRS treatment and possible hemodialysis if needed..      PSH: PCI, esophageal varices banding  Social Hx: Former smoker - quit in 2005, h/o heavy etoh use - quit 2/2016, Drugs - h/o marijuana use previously  Family Hx: Father - stomach problems / heart disease, mother - pancreatic cancer (24 Apr 2017 21:23)      PAST MEDICAL & SURGICAL HISTORY:  HTN (hypertension)  Rheumatoid arthritis  No significant past surgical history      FAMILY HISTORY:  No pertinent family history in first degree relatives      MEDICATIONS  (STANDING):  atorvastatin 40milliGRAM(s) Oral at bedtime  aspirin 325milliGRAM(s) Oral daily  gabapentin 300milliGRAM(s) Oral three times a day  clopidogrel Tablet 75milliGRAM(s) Oral daily  rifaximin 550milliGRAM(s) Oral two times a day  pantoprazole    Tablet 40milliGRAM(s) Oral before breakfast  multivitamin 1Tablet(s) Oral daily  folic acid 1milliGRAM(s) Oral daily  thiamine 100milliGRAM(s) Oral daily  hydrocortisone 10milliGRAM(s) Oral at bedtime  hydrocortisone 20milliGRAM(s) Oral daily  heparin  Injectable 5000Unit(s) SubCutaneous every 12 hours  midodrine 5milliGRAM(s) Oral three times a day  albumin human 25% IVPB 100milliLiter(s) IV Intermittent every 8 hours  octreotide  Injectable 100MICROGram(s) SubCutaneous three times a day    MEDICATIONS  (PRN):  naphazoline 0.025%/pheniramine 0.3% Ophthalmic Solution - Peds 1Drop(s) Both EYES two times a day PRN watery eyes.      Allergies    sulfa drugs (Unknown)    Intolerances        I&O's Summary        REVIEW OF SYSTEMS:    CONSTITUTIONAL:  As per HPI.        Vital Signs Last 24 Hrs  T(C): 36.3, Max: 36.4 (05-12 @ 21:17)  T(F): 97.3, Max: 97.5 (05-12 @ 21:17)  HR: 103 (101 - 105)  BP: 89/38 (84/39 - 107/38)  BP(mean): --  RR: 19 (16 - 22)  SpO2: 95% (95% - 95%)  Daily     Daily     PHYSICAL EXAM:    General:  Alert, well-developed ,No acute distress.    Neuro:  Alert and oriented to person, place, and time. Able to communicate  well. Cranial nerves 2-12 grossly intact. 3/5 strength in all  extremities bilaterally. Sensation intact in all extremities.  Appropriate affect.     HEENT:  No JVD, no masses, Eyes anicteric, No carotid bruits.No lymphadenopathy,    Cardiovascular:  Regular rate and rhythm, with normal S1 and S2. No murmurs, rubs,  or gallops. No JVD.    Lungs:  Lungs clear. no rales, no wheezing, .    Abdomen:  + ascitis      Skin:  Warm, dry, well-perfused. No rashes or other lesions.     Extremities:  2+ pulses in upper and lower extremities. No lower extremity pain or  edema; legs are symmetric in appearance.    LABS:                        9.0    9.0   )-----------( 152      ( 12 May 2017 08:03 )             26.4     05-13    131<L>  |  101  |  61<H>  ----------------------------<  116<H>  5.3   |  14<L>  |  3.86<H>    Ca    7.4<L>      13 May 2017 06:55

## 2017-05-13 NOTE — PROGRESS NOTE ADULT - ASSESSMENT
65 yo female with cirrhosis, and now anuric hepatorenal failure. Dialysis and therapies are temporizing. Ultimately, only transplantation will save her life. Patient understands, but does not wish to be transferred.

## 2017-05-14 LAB
ALBUMIN SERPL ELPH-MCNC: 2.3 G/DL — LOW (ref 3.3–5)
ALP SERPL-CCNC: 177 U/L — HIGH (ref 40–120)
ALT FLD-CCNC: 32 U/L — SIGNIFICANT CHANGE UP (ref 12–78)
ANION GAP SERPL CALC-SCNC: 12 MMOL/L — SIGNIFICANT CHANGE UP (ref 5–17)
AST SERPL-CCNC: 54 U/L — HIGH (ref 15–37)
BILIRUB DIRECT SERPL-MCNC: 0.3 MG/DL — HIGH (ref 0–0.2)
BILIRUB INDIRECT FLD-MCNC: 0.3 MG/DL — SIGNIFICANT CHANGE UP (ref 0.2–1)
BILIRUB SERPL-MCNC: 0.6 MG/DL — SIGNIFICANT CHANGE UP (ref 0.2–1.2)
BUN SERPL-MCNC: 65 MG/DL — HIGH (ref 7–23)
CALCIUM SERPL-MCNC: 7.7 MG/DL — LOW (ref 8.5–10.1)
CHLORIDE SERPL-SCNC: 102 MMOL/L — SIGNIFICANT CHANGE UP (ref 96–108)
CO2 SERPL-SCNC: 14 MMOL/L — LOW (ref 22–31)
CREAT SERPL-MCNC: 4.3 MG/DL — HIGH (ref 0.5–1.3)
GLUCOSE SERPL-MCNC: 120 MG/DL — HIGH (ref 70–99)
HCT VFR BLD CALC: 26.6 % — LOW (ref 34.5–45)
HGB BLD-MCNC: 8.5 G/DL — LOW (ref 11.5–15.5)
MCHC RBC-ENTMCNC: 31.8 GM/DL — LOW (ref 32–36)
MCHC RBC-ENTMCNC: 36.2 PG — HIGH (ref 27–34)
MCV RBC AUTO: 113.8 FL — HIGH (ref 80–100)
PLATELET # BLD AUTO: 137 K/UL — LOW (ref 150–400)
POTASSIUM SERPL-MCNC: 5.5 MMOL/L — HIGH (ref 3.5–5.3)
POTASSIUM SERPL-SCNC: 5.5 MMOL/L — HIGH (ref 3.5–5.3)
PROT SERPL-MCNC: 5.6 GM/DL — LOW (ref 6–8.3)
RBC # BLD: 2.34 M/UL — LOW (ref 3.8–5.2)
RBC # FLD: 13.6 % — SIGNIFICANT CHANGE UP (ref 10.3–14.5)
SODIUM SERPL-SCNC: 128 MMOL/L — LOW (ref 135–145)
WBC # BLD: 9.3 K/UL — SIGNIFICANT CHANGE UP (ref 3.8–10.5)
WBC # FLD AUTO: 9.3 K/UL — SIGNIFICANT CHANGE UP (ref 3.8–10.5)

## 2017-05-14 PROCEDURE — 71010: CPT | Mod: 26

## 2017-05-14 RX ORDER — ALBUMIN HUMAN 25 %
100 VIAL (ML) INTRAVENOUS EVERY 12 HOURS
Qty: 0 | Refills: 0 | Status: DISCONTINUED | OUTPATIENT
Start: 2017-05-14 | End: 2017-05-19

## 2017-05-14 RX ADMIN — Medication 10 MILLIGRAM(S): at 22:41

## 2017-05-14 RX ADMIN — MIDODRINE HYDROCHLORIDE 5 MILLIGRAM(S): 2.5 TABLET ORAL at 06:44

## 2017-05-14 RX ADMIN — GABAPENTIN 300 MILLIGRAM(S): 400 CAPSULE ORAL at 18:06

## 2017-05-14 RX ADMIN — ATORVASTATIN CALCIUM 40 MILLIGRAM(S): 80 TABLET, FILM COATED ORAL at 22:40

## 2017-05-14 RX ADMIN — MIDODRINE HYDROCHLORIDE 5 MILLIGRAM(S): 2.5 TABLET ORAL at 14:51

## 2017-05-14 RX ADMIN — Medication 50 MILLILITER(S): at 06:36

## 2017-05-14 RX ADMIN — Medication 325 MILLIGRAM(S): at 11:15

## 2017-05-14 RX ADMIN — Medication 100 MILLIGRAM(S): at 11:15

## 2017-05-14 RX ADMIN — Medication 50 MILLILITER(S): at 18:07

## 2017-05-14 RX ADMIN — Medication 1 MILLIGRAM(S): at 11:15

## 2017-05-14 RX ADMIN — GABAPENTIN 300 MILLIGRAM(S): 400 CAPSULE ORAL at 22:41

## 2017-05-14 RX ADMIN — OCTREOTIDE ACETATE 100 MICROGRAM(S): 200 INJECTION, SOLUTION INTRAVENOUS; SUBCUTANEOUS at 14:51

## 2017-05-14 RX ADMIN — Medication 20 MILLIGRAM(S): at 06:44

## 2017-05-14 RX ADMIN — HEPARIN SODIUM 5000 UNIT(S): 5000 INJECTION INTRAVENOUS; SUBCUTANEOUS at 06:43

## 2017-05-14 RX ADMIN — OCTREOTIDE ACETATE 100 MICROGRAM(S): 200 INJECTION, SOLUTION INTRAVENOUS; SUBCUTANEOUS at 06:43

## 2017-05-14 RX ADMIN — Medication 1 TABLET(S): at 11:15

## 2017-05-14 RX ADMIN — GABAPENTIN 300 MILLIGRAM(S): 400 CAPSULE ORAL at 06:44

## 2017-05-14 RX ADMIN — PANTOPRAZOLE SODIUM 40 MILLIGRAM(S): 20 TABLET, DELAYED RELEASE ORAL at 06:43

## 2017-05-14 RX ADMIN — MIDODRINE HYDROCHLORIDE 5 MILLIGRAM(S): 2.5 TABLET ORAL at 22:41

## 2017-05-14 RX ADMIN — CLOPIDOGREL BISULFATE 75 MILLIGRAM(S): 75 TABLET, FILM COATED ORAL at 11:15

## 2017-05-14 NOTE — PROGRESS NOTE ADULT - SUBJECTIVE AND OBJECTIVE BOX
NEPHROLOGY INTERVAL HPI/OVERNIGHT EVENTS:    HPI:  65 yo F with PMH of liver cirrhosis 2/2 chronic alcohol abuse, macrocytic anemia, HTN, RA, esophageal varices s/p banding, CAD s/p stent, h/o prolonged Qtc, p/w chest pain/abdominal pain. States pain is more like pressure in L chest and abdomen. States pain has diminished a lot now, but is still there mildly. Denies radiation to arm / jaw / neck. Denies nausea / vomiting / diophoresis. In ED, troponins trended from 0.707 -> 79.3. Called on call cardio Dr. Flannery and discussed case, he recommended calling Dr. Anedrson who is the on call interventionlist. Discussed case in detail with Dr. Anderson, including troponins trending up, new ST depressions on EKG and on going residual chest discomfort. Dr. Anderson recommends heparin drip without initial bolus due to esophageal varices / liver cirrhosis and due to patient being at increased risk for bleeding. Also recommends to c/w ASA / plavix / beta blocker / statin. Dr. Anderson believes patient may have had cardiac event earlier, and troponins are just lagging behind. Cardiac cath to be performed in AM.      pt sleeping arousable  the pt on spa midodrine octreotide started yesterday  Her creat is higher today, not making much urine   agrees to be transferred to tertiary [facility] for liver tx eval  agrees for dialysis, stat pt ptt ordered  d/w Dr Gooden    PSH: PCI, esophageal varices banding  Social Hx: Former smoker - quit in , h/o heavy etoh use - quit 2016, Drugs - h/o marijuana use previously  Family Hx: Father - stomach problems / heart disease, mother - pancreatic cancer (2017 21:23)      PAST MEDICAL & SURGICAL HISTORY:  HTN (hypertension)  Rheumatoid arthritis  No significant past surgical history      FAMILY HISTORY:  No pertinent family history in first degree relatives      MEDICATIONS  (STANDING):  atorvastatin 40milliGRAM(s) Oral at bedtime  aspirin 325milliGRAM(s) Oral daily  gabapentin 300milliGRAM(s) Oral three times a day  clopidogrel Tablet 75milliGRAM(s) Oral daily  rifaximin 550milliGRAM(s) Oral two times a day  pantoprazole    Tablet 40milliGRAM(s) Oral before breakfast  multivitamin 1Tablet(s) Oral daily  folic acid 1milliGRAM(s) Oral daily  thiamine 100milliGRAM(s) Oral daily  hydrocortisone 10milliGRAM(s) Oral at bedtime  hydrocortisone 20milliGRAM(s) Oral daily  heparin  Injectable 5000Unit(s) SubCutaneous every 12 hours  midodrine 5milliGRAM(s) Oral three times a day  albumin human 25% IVPB 100milliLiter(s) IV Intermittent every 8 hours  octreotide  Injectable 100MICROGram(s) SubCutaneous three times a day    MEDICATIONS  (PRN):  naphazoline 0.025%/pheniramine 0.3% Ophthalmic Solution - Peds 1Drop(s) Both EYES two times a day PRN watery eyes.      Allergies    sulfa drugs (Unknown)    Intolerances        I&O's Summary    I & Os for current day (as of 14 May 2017 11:20)  =============================================  IN: 50 ml / OUT: 0 ml / NET: 50 ml        REVIEW OF SYSTEMS:    CONSTITUTIONAL:  As per HPI.    HEENT:  Eyes:  No diplopia or blurred vision. ENT:  No earache, sore throat or runny nose.    CARDIOVASCULAR:  No pressure, squeezing, strangling, tightness, heaviness or aching about the chest, neck, axilla or epigastrium.    RESPIRATORY:  No cough, shortness of breath, PND or orthopnea.    GASTROINTESTINAL:  No nausea, vomiting or diarrhea.    GENITOURINARY:  No dysuria, frequency or urgency.    MUSCULOSKELETAL:  As per HPI.    SKIN:  No change in skin, hair or nails.    NEUROLOGIC:  No paresthesias, fasciculations, seizures or weakness.    PSYCHIATRIC:  No disorder of thought or mood.    ENDOCRINE:  No heat or cold intolerance, polyuria or polydipsia.    HEMATOLOGICAL:  No easy bruising or bleeding.        Vital Signs Last 24 Hrs  T(C): 36.2, Max: 36.4 (05-13 @ 11:46)  T(F): 97.1, Max: 97.6 (05-13 @ 11:46)  HR: 98 (98 - 102)  BP: 105/50 (105/50 - 119/66)  BP(mean): --  RR: 19 (17 - 19)  SpO2: 98% (94% - 98%)  Daily     Daily Weight in k (14 May 2017 05:36)    PHYSICAL EXAM:    General:  Alert, well-developed ,No acute distress.    Neuro:  sleeping arousable , no complaints    HEENT:  No JVD, no masses, Eyes anicteric, No carotid bruits.No lymphadenopathy,    Cardiovascular:  Regular rate and rhythm, with normal S1 and S2. No murmurs, rubs,  or gallops. No JVD.    Lungs:  Lungs clear. no rales, no wheezing, .    Abdomen:  Normoactive bowel sounds. Soft, flat, non-tender, and non-distended.  No hepatosplenomegaly, positive bowel sounds    Skin:  Warm, dry, well-perfused. No rashes or other lesions.     Extremities:  3+ edema    LABS:                        8.5    9.3   )-----------( 137      ( 14 May 2017 07:21 )             26.6         128<L>  |  102  |  65<H>  ----------------------------<  120<H>  5.5<H>   |  14<L>  |  4.30<H>    Ca    7.7<L>      14 May 2017 07:21    TPro  5.6<L>  /  Alb  2.3<L>  /  TBili  0.6  /  DBili  0.3<H>  /  AST  54<H>  /  ALT  32  /  AlkPhos  177<H>

## 2017-05-14 NOTE — PROGRESS NOTE ADULT - ASSESSMENT
Admission Info: 67 yo F with PMH of liver cirrhosis 2/2 chronic alcohol abuse, macrocytic anemia, HTN, RA, esophageal varices s/p banding, CAD s/p stent, h/o prolonged Qtc, p/w chest pain/abdominal pain. States pain is more like pressure in L chest and abdomen. States pain has diminished a lot now, but is still there mildly. Denies radiation to arm / jaw / neck. Denies nausea / vomiting / diophoresis.  Her hospital course included cardiac cath, stent, G- sepsis, hypotension, monitored bed admission, on pressors, paracentesis of 4 L, was place d on lasix aldactone.  Yesterday was oliguric, noonan placed, hematuria, flushed, but not much UO.  CT w/o contrast was done no hydro.    a/p  Pt had creat 0.88 mg/dl on admission, now up to 3, RADHA, prerenal, doubt HRS  diuretics held, on ivf NS  @ 80 ml/hr  will administer spa 25 g x 2 today, NS bolus 500 ml, then 125 ml/hr..  repeat labs later today..  may become more acidotic as NS infuses, may require bicarb as well.    5/14  pt did not respond to the ivf, sap, midodrine, octreotide combination  agrees to dialysis and transfer to [facility] liver tx unit for evaluation  R temp cath placed for hd  pt seen on hd  doing well\  HD indicated for anuria, metab acidosis, hyperkalemia   may tolerate fluid removal, bp improved on midodrine  hd am   hep panel done

## 2017-05-14 NOTE — PROGRESS NOTE ADULT - SUBJECTIVE AND OBJECTIVE BOX
NEPHROLOGY CONSULT  HPI:  Admission Info: 67 yo F with PMH of liver cirrhosis 2/2 chronic alcohol abuse, macrocytic anemia, HTN, RA, esophageal varices s/p banding, CAD s/p stent, h/o prolonged Qtc, p/w chest pain/abdominal pain. States pain is more like pressure in L chest and abdomen. States pain has diminished a lot now, but is still there mildly. Denies radiation to arm / jaw / neck. Denies nausea / vomiting / diophoresis.  Her hospital course included cardiac cath, stent, G- sepsis, hypotension, monitored bed admission, on pressors, paracentesis of 4 L, was place d on lasix aldactone.  Yesterday was oliguric, noonan placed, hematuria, flushed, but not much UO.  CT w/o contrast was done no hydro.      pt did not respond to the ivf, sap, midodrine, octreotide combination  agrees to dialysis and transfer to [facility] liver tx unit for evaluation  R temp cath placed for hd  pt seen on hd  doing well\  HD indicated for anuria, metab acidosis, hyperkalemia   may tolerate fluid removal, bp improved on midodrine      PSH: PCI, esophageal varices banding  Social Hx: Former smoker - quit in , h/o heavy etoh use - quit 2016, Drugs - h/o marijuana use previously  Family Hx: Father - stomach problems / heart disease, mother - pancreatic cancer (2017 21:23)      PAST MEDICAL & SURGICAL HISTORY:  HTN (hypertension)  Rheumatoid arthritis  No significant past surgical history      FAMILY HISTORY:  No pertinent family history in first degree relatives      MEDICATIONS  (STANDING):  atorvastatin 40milliGRAM(s) Oral at bedtime  aspirin 325milliGRAM(s) Oral daily  gabapentin 300milliGRAM(s) Oral three times a day  clopidogrel Tablet 75milliGRAM(s) Oral daily  rifaximin 550milliGRAM(s) Oral two times a day  pantoprazole    Tablet 40milliGRAM(s) Oral before breakfast  multivitamin 1Tablet(s) Oral daily  folic acid 1milliGRAM(s) Oral daily  thiamine 100milliGRAM(s) Oral daily  hydrocortisone 10milliGRAM(s) Oral at bedtime  hydrocortisone 20milliGRAM(s) Oral daily  heparin  Injectable 5000Unit(s) SubCutaneous every 12 hours  ciprofloxacin     Tablet 500milliGRAM(s) Oral every 12 hours  sodium chloride 0.9%. 1000milliLiter(s) IV Continuous <Continuous>    MEDICATIONS  (PRN):  naphazoline 0.025%/pheniramine 0.3% Ophthalmic Solution - Peds 1Drop(s) Both EYES two times a day PRN watery eyes.      Allergies    sulfa drugs (Unknown)    Intolerances        I&O's Summary    I & Os for current day (as of 11 May 2017 10:43)  =============================================  IN: 0 ml / OUT: 100 ml / NET: -100 ml        REVIEW OF SYSTEMS:    CONSTITUTIONAL:  As per HPI.    HEENT:  Eyes:  No diplopia or blurred vision. ENT:  No earache, sore throat or runny nose.    CARDIOVASCULAR:  No pressure, squeezing, strangling, tightness, heaviness or aching about the chest, neck, axilla or epigastrium.    RESPIRATORY:  No cough, shortness of breath, PND or orthopnea.    GASTROINTESTINAL:  No nausea, vomiting or diarrhea. + ascitis    GENITOURINARY:  No dysuria, frequency or urgency.    MUSCULOSKELETAL:  As per HPI.    SKIN:  telangectasia noted    NEUROLOGIC:  No paresthesias, fasciculations, seizures or weakness.    PSYCHIATRIC:  No disorder of thought or mood.    ENDOCRINE:  No heat or cold intolerance, polyuria or polydipsia.    HEMATOLOGICAL:  + easy bruising or bleeding.          Vital Signs Last 24 Hrs  T(C): 36.3, Max: 36.4 (05-10 @ 11:47)  T(F): 97.3, Max: 97.6 (05-10 @ 11:47)  HR: 99 (86 - 99)  BP: 86/42 (85/41 - 104/41)  BP(mean): --  RR: 17 (17 - 18)  SpO2: 97% (95% - 97%)  Daily     Daily Weight in k.2 (11 May 2017 07:00)  I&O's Summary    I & Os for current day (as of 11 May 2017 10:43)  =============================================  IN: 0 ml / OUT: 100 ml / NET: -100 ml      PHYSICAL EXAM:    General:  Alert, well-developed ,No acute distress.    Neuro:  Alert and oriented to person, place, and time. Able to communicate  well. Cranial nerves 2-12 grossly intact. 5/5 strength in all  extremities bilaterally. Sensation intact in all extremities.  Appropriate affect.     HEENT:  No JVD, no masses, Eyes anicteric, No carotid bruits.No lymphadenopathy,    Cardiovascular:  Regular rate and rhythm, with normal S1 and S2. No murmurs, rubs,  or gallops. No JVD.     Lungs:  clear. no rales, no wheezing, .    Abdomen:  + ascitis, + bs, non tender, no s/p tenderness or fullness    Skin:  Warm, dry, well-perfused. + telangectasia     Extremities:  3+ edema, R leg wrapped    LABS:                        9.6    10.2  )-----------( 156      ( 11 May 2017 05:35 )             29.8     05-11    129<L>  |  100  |  50<H>  ----------------------------<  106<H>  4.8   |  17<L>  |  3.06<H>    Ca    7.9<L>      11 May 2017 02:20

## 2017-05-14 NOTE — PROGRESS NOTE ADULT - ASSESSMENT
5/14  Cirrhosis, HRS  pt sleeping arousable  the pt on spa midodrine octreotide started yesterday, not responding  Her creat is higher today, not making much urine   agrees to be transferred to [facility] for liver tx eval  agrees for dialysis, stat pt ptt ordered  d/w Dr Gooden

## 2017-05-14 NOTE — PROGRESS NOTE ADULT - ASSESSMENT
Imp:  ESLD with HRS    Rec:  Cont albumin, midodrine and octreotide  High mortality w/o transplant which she declines

## 2017-05-14 NOTE — PROGRESS NOTE ADULT - SUBJECTIVE AND OBJECTIVE BOX
Patient is a 66y old  Female who presents with a chief complaint of chest pain (10 May 2017 14:59)      Subective:  Still declines transplant    PAST MEDICAL & SURGICAL HISTORY:  HTN (hypertension)  Rheumatoid arthritis  No significant past surgical history      MEDICATIONS  (STANDING):  atorvastatin 40milliGRAM(s) Oral at bedtime  aspirin 325milliGRAM(s) Oral daily  gabapentin 300milliGRAM(s) Oral three times a day  clopidogrel Tablet 75milliGRAM(s) Oral daily  rifaximin 550milliGRAM(s) Oral two times a day  pantoprazole    Tablet 40milliGRAM(s) Oral before breakfast  multivitamin 1Tablet(s) Oral daily  folic acid 1milliGRAM(s) Oral daily  thiamine 100milliGRAM(s) Oral daily  hydrocortisone 10milliGRAM(s) Oral at bedtime  hydrocortisone 20milliGRAM(s) Oral daily  heparin  Injectable 5000Unit(s) SubCutaneous every 12 hours  midodrine 5milliGRAM(s) Oral three times a day  albumin human 25% IVPB 100milliLiter(s) IV Intermittent every 8 hours  octreotide  Injectable 100MICROGram(s) SubCutaneous three times a day    MEDICATIONS  (PRN):  naphazoline 0.025%/pheniramine 0.3% Ophthalmic Solution - Peds 1Drop(s) Both EYES two times a day PRN watery eyes.      REVIEW OF SYSTEMS:    RESPIRATORY: No shortness of breath  CARDIOVASCULAR: No chest pain  All other review of systems is negative unless indicated above.    Vital Signs Last 24 Hrs  T(C): 36.2, Max: 36.4 (05-13 @ 11:46)  T(F): 97.1, Max: 97.6 (05-13 @ 11:46)  HR: 98 (98 - 102)  BP: 105/50 (105/50 - 119/66)  BP(mean): --  RR: 19 (17 - 19)  SpO2: 98% (94% - 98%)    PHYSICAL EXAM:    Constitutional: NAD, well-developed  Respiratory: CTAB  Cardiovascular: S1 and S2, RRR  Gastrointestinal: BS+, soft, large ascites, not tense    LABS:                        8.5    9.3   )-----------( 137      ( 14 May 2017 07:21 )             26.6     05-13    131<L>  |  101  |  61<H>  ----------------------------<  116<H>  5.3   |  14<L>  |  3.86<H>    Ca    7.4<L>      13 May 2017 06:55

## 2017-05-14 NOTE — PROCEDURE NOTE - NSPROCDETAILS_GEN_ALL_CORE
lumen(s) aspirated and flushed/sterile dressing applied/guidewire recovered/sterile technique, catheter placed/ultrasound guidance

## 2017-05-14 NOTE — PROGRESS NOTE ADULT - SUBJECTIVE AND OBJECTIVE BOX
PCP: DR Lees    CC-Patient is a 66y old  Female who presents with a chief complaint of chest pain (2017 23:38)    HPI:  65 yo F with PMH of liver cirrhosis 2/2 chronic alcohol abuse, macrocytic anemia, HTN, RA, esophageal varices s/p banding, CAD s/p stent, h/o prolonged Qtc, p/w chest pain/abdominal pain. States pain is more like pressure in L chest and abdomen. States pain has diminished a lot now, but is still there mildly. Denies radiation to arm / jaw / neck. Denies nausea / vomiting / diophoresis. In ED, troponins trended from 0.707 -> 79.3. Called on call cardio Dr. Flannery and discussed case, he recommended calling Dr. Anderson who is the on call interventionlist. Discussed case in detail with Dr. Anderson, including troponins trending up, new ST depressions on EKG and on going residual chest discomfort. Dr. Anderson recommends heparin drip without initial bolus due to esophageal varices / liver cirrhosis and due to patient being at increased risk for bleeding. Also recommends to c/w ASA / plavix / beta blocker / statin. Dr. Anderson believes patient may have had cardiac event earlier, and troponins are just lagging behind. Cardiac cath to be performed in AM.    17 s/p card cath- stent open, no intervention done  17 c/o tingling and numbness of both arms  17 remains hypotensive, bp 70/30's, +rigors but no fever, +chest congestion. States she does not "feel good"  17- started on pressors yesterday- BP improved. No localized signs of infection despite elevtaed lactate an WBC- pancultures taken. Pt feels better today  17 remains on pressors, tapered down a little. For pan scan today  17 off pressors  17 c/o "not feeling her bladder anymore", urinary incontinence  17 no new complaints.  17 Garcia placed yesterday for bladder scan greater then 400cc, however very minimal urine output overnight. BP 86/40's  17 almost anuric, BP improved.   17 no urine output.   17 no urine output    Vital Signs Last 24 Hrs  T(C): 36.2, Max: 36.3 ( @ 21:43)  T(F): 97.1, Max: 97.4 ( @ 21:43)  HR: 95 (95 - 99)  BP: 113/50 (105/50 - 119/66)  BP(mean): --  RR: 16 (16 - 19)  SpO2: 95% (94% - 98%)    LABS:                        8.5    9.3   )-----------( 137      ( 14 May 2017 07:21 )             26.6     14 May 2017 07:21    128    |  102    |  65     ----------------------------<  120    5.5     |  14     |  4.30     Ca    7.7        14 May 2017 07:21    TPro  5.6    /  Alb  2.3    /  TBili  0.6    /  DBili  0.3    /  AST  54     /  ALT  32     /  AlkPhos  177    14 May 2017 07:21  LIVER FUNCTIONS - ( 14 May 2017 07:21 )  Alb: 2.3 g/dL / Pro: 5.6 gm/dL / ALK PHOS: 177 U/L / ALT: 32 U/L / AST: 54 U/L / GGT: x           RADIOLOGY & ADDITIONAL TESTS:  EXAM:  CT ABDOMEN AND PELVIS                        PROCEDURE DATE:  05/10/2017    INTERPRETATION:  CT ABDOMEN AND PELVIS    HISTORY:  No urine output, acute renal failure  Technique: CT of the abdomen and pelvis is performed without oralor   intravenous contrast. Axial images are supplemented with coronal and   sagittal reformations. This study was performed using automatic exposure   control (radiation dose reduction software) to obtain a diagnostic image   quality scan with patient dose as low as reasonably achievable.  Contrast:     None    Comparison: CT abdomen and pelvis 2017    Findings:  LIVER: Cirrhotic morphology.  SPLEEN: Normal.  PANCREAS: Normal.  GALLBLADDER/BILIARY TREE: Nondilated. Gallbladder sludge and stones.  ADRENALS: Normal.  KIDNEYS: No hydronephrosis, urinary tract calculus, or obstructive   uropathy. Retention of previously administered IV contrast within the   renal parenchyma indicating acute renal failure.  LYMPHADENOPATHY/RETROPERITONEUM: No adenopathy.  VASCULATURE: Normal caliber aorta.  BOWEL: No bowel related abnormality. Normal appendix.  PELVIC VISCERA: No uterine or adnexal abnormality.  PELVIC LYMPH NODES: No pelvic adenopathy.  PERITONEUM/ABDOMINAL WALL: Moderate to large volume abdominal and pelvic   ascites has reaccumulated.  SKELETAL: No acute bony abnormality. Severe right hip osteoarthritis.  LUNG BASES: Stable small right and moderate left pleural effusion with   bibasilar compressive atelectasis.    IMPRESSION:   No hydronephrosis or obstructive uropathy.  Interval reaccumulation of moderate to large volume abdominal and pelvic   ascites.  Small to moderate bilateral pleural effusions persist.  Diffuse body wall edema persists.  Morphologic changes ofcirrhosis in the liver.    EXAM:  US PELVIC LIMITED OR FOLLOW UP                        PROCEDURE DATE:  2017    INTERPRETATION:  US PELVIC LIMITED OR FOLLOW UP  HISTORY:  No urinary output, obstructive uropathy versus acute renal   failure                                                   COMPARISON: CT abdomen and pelvis 5/10/2017  PROCEDURE/TECHNIQUE: Multiple transverse and longitudinal images of the   pelvis were obtained.    URINARY BLADDER:    Pre void volume:  10 cc  The urinary bladder is under distended limiting evaluation of the wall.   No intraluminal calculus identified.  No ureteral jets were visualized using color Doppler.   Pelvic ascites is again noted.    IMPRESSION:  Collapsed urinary bladder measuring 10 cc in volume. Limited evaluation   of the wall secondary to under distention. No ureteral jets identified.   Pelvic ascites as on prior CT.    PHYSICAL EXAM:  GENERAL: looks pale and ill  HEAD:  Atraumatic, Normocephalic  EYES: EOMI, PERRLA, conjunctiva and sclera clear  HEENT: Moist mucous membranes  NECK: Supple, No JVD  NERVOUS SYSTEM:  Alert & Oriented X3,  CHEST/LUNG: occasional crackles, decrease entry at bases  HEART: Regular rate and rhythm; No murmurs, rubs, or gallops  ABDOMEN: Soft, Nontender, Large ascites  GENITOURINARY- no palpable bladder  EXTREMITIES: anasarca overall  SKIN-several large bruises, one on the left side of the abdomen, few on the legs  CNS- alert, oriented X3       Daily Weight in k.8 (2017 00:00)    Assessment/Plan  #Acute renal failure, likely developing hepato-renal syndrome/acidosis/anuria  Worsening of renal failure  Renal eval appreciated  s/p Bicarb drip  Will start HD today  On Sandostatin+midodrine+albumin  She does not want to be transferred and prefer to have all possible treatment here at [place], she understands the risks she is taking    #Liver cirrhosis w/ esophageal varices/anasarca from third-spacing   S/p paracenthesis last week- 4 l removed  GI f/u  appreciated  Refusing transfer     #Bacteremia likely abdominal source- resolved  Repeated cultures are negative  Completed abx treatment    #Hypotension- adrenal insufficiency.  Now likely 2 to volume depletion  Cont PO steroids    #NSTEMI/ CAD s/p recent stent  Card eval DR Ramesh appreciated  S/p card cath- stent patent, no other blockages  Cont aspirin, Plavix, statin.    #Neuropathy/cervical stenosis/lumbar stenosis  Neurosurgery f/u appreciated  Follow up MRI's ordered.  Patient still refusing surgery    #Rheumatoid Arthritis/ adrenal insufficiency due to long-term oral steroids  Rheum f/u appreciated    #LT ankle fracture  Ortho f/u DR Fung appreciated. Xrays reviewed  Remains NWB to LLE in cam boot.  Harmony lift at present    #Anemia/thrombocytopenia likely 2 to liver cirrhosis  Monitor HH    #Dispo- refusing transfer. Start HD today. BP maybe an issue to remove fluid with HD

## 2017-05-15 LAB
ALBUMIN SERPL ELPH-MCNC: 2.5 G/DL — LOW (ref 3.3–5)
ANION GAP SERPL CALC-SCNC: 14 MMOL/L — SIGNIFICANT CHANGE UP (ref 5–17)
ANISOCYTOSIS BLD QL: SLIGHT — SIGNIFICANT CHANGE UP
BASOPHILS # BLD AUTO: 0.1 K/UL — SIGNIFICANT CHANGE UP (ref 0–0.2)
BUN SERPL-MCNC: 54 MG/DL — HIGH (ref 7–23)
CALCIUM SERPL-MCNC: 7.6 MG/DL — LOW (ref 8.5–10.1)
CHLORIDE SERPL-SCNC: 102 MMOL/L — SIGNIFICANT CHANGE UP (ref 96–108)
CO2 SERPL-SCNC: 16 MMOL/L — LOW (ref 22–31)
CREAT SERPL-MCNC: 4.1 MG/DL — HIGH (ref 0.5–1.3)
DACRYOCYTES BLD QL SMEAR: SLIGHT — SIGNIFICANT CHANGE UP
EOSINOPHIL # BLD AUTO: 0.1 K/UL — SIGNIFICANT CHANGE UP (ref 0–0.5)
EOSINOPHIL NFR BLD AUTO: 1 % — SIGNIFICANT CHANGE UP (ref 0–6)
GLUCOSE SERPL-MCNC: 124 MG/DL — HIGH (ref 70–99)
HAV IGM SER-ACNC: SIGNIFICANT CHANGE UP
HBV CORE IGM SER-ACNC: SIGNIFICANT CHANGE UP
HBV SURFACE AG SER-ACNC: SIGNIFICANT CHANGE UP
HCT VFR BLD CALC: 24.3 % — LOW (ref 34.5–45)
HCV AB S/CO SERPL IA: 0.35 S/CO — SIGNIFICANT CHANGE UP
HCV AB SERPL-IMP: SIGNIFICANT CHANGE UP
HGB BLD-MCNC: 8.1 G/DL — LOW (ref 11.5–15.5)
HYPOCHROMIA BLD QL: SLIGHT — SIGNIFICANT CHANGE UP
LG PLATELETS BLD QL AUTO: SLIGHT — SIGNIFICANT CHANGE UP
LYMPHOCYTES # BLD AUTO: 1.3 K/UL — SIGNIFICANT CHANGE UP (ref 1–3.3)
LYMPHOCYTES # BLD AUTO: 15 % — SIGNIFICANT CHANGE UP (ref 13–44)
MACROCYTES BLD QL: SIGNIFICANT CHANGE UP
MANUAL DIF COMMENT BLD-IMP: SIGNIFICANT CHANGE UP
MCHC RBC-ENTMCNC: 33.2 GM/DL — SIGNIFICANT CHANGE UP (ref 32–36)
MCHC RBC-ENTMCNC: 37.1 PG — HIGH (ref 27–34)
MCV RBC AUTO: 111.7 FL — HIGH (ref 80–100)
MONOCYTES # BLD AUTO: 1.4 K/UL — HIGH (ref 0–0.9)
MONOCYTES NFR BLD AUTO: 16 % — HIGH (ref 2–14)
NEUTROPHILS # BLD AUTO: 6.9 K/UL — SIGNIFICANT CHANGE UP (ref 1.8–7.4)
NEUTROPHILS NFR BLD AUTO: 68 % — SIGNIFICANT CHANGE UP (ref 43–77)
NRBC # BLD: 2 /100 — HIGH (ref 0–0)
OVALOCYTES BLD QL SMEAR: SLIGHT — SIGNIFICANT CHANGE UP
PHOSPHATE SERPL-MCNC: 7.9 MG/DL — HIGH (ref 2.5–4.5)
PLAT MORPH BLD: NORMAL — SIGNIFICANT CHANGE UP
PLATELET # BLD AUTO: 110 K/UL — LOW (ref 150–400)
POIKILOCYTOSIS BLD QL AUTO: SLIGHT — SIGNIFICANT CHANGE UP
POLYCHROMASIA BLD QL SMEAR: SLIGHT — SIGNIFICANT CHANGE UP
POTASSIUM SERPL-MCNC: 5 MMOL/L — SIGNIFICANT CHANGE UP (ref 3.5–5.3)
POTASSIUM SERPL-SCNC: 5 MMOL/L — SIGNIFICANT CHANGE UP (ref 3.5–5.3)
RBC # BLD: 2.18 M/UL — LOW (ref 3.8–5.2)
RBC # FLD: 13.8 % — SIGNIFICANT CHANGE UP (ref 10.3–14.5)
RBC BLD AUTO: (no result)
SODIUM SERPL-SCNC: 132 MMOL/L — LOW (ref 135–145)
TARGETS BLD QL SMEAR: SLIGHT — SIGNIFICANT CHANGE UP
WBC # BLD: 9.4 K/UL — SIGNIFICANT CHANGE UP (ref 3.8–10.5)
WBC # FLD AUTO: 9.4 K/UL — SIGNIFICANT CHANGE UP (ref 3.8–10.5)

## 2017-05-15 RX ORDER — ALBUMIN HUMAN 25 %
50 VIAL (ML) INTRAVENOUS
Qty: 0 | Refills: 0 | Status: DISCONTINUED | OUTPATIENT
Start: 2017-05-15 | End: 2017-05-16

## 2017-05-15 RX ADMIN — PANTOPRAZOLE SODIUM 40 MILLIGRAM(S): 20 TABLET, DELAYED RELEASE ORAL at 06:19

## 2017-05-15 RX ADMIN — GABAPENTIN 300 MILLIGRAM(S): 400 CAPSULE ORAL at 22:26

## 2017-05-15 RX ADMIN — Medication 1 MILLIGRAM(S): at 11:35

## 2017-05-15 RX ADMIN — MIDODRINE HYDROCHLORIDE 5 MILLIGRAM(S): 2.5 TABLET ORAL at 06:14

## 2017-05-15 RX ADMIN — Medication 100 MILLIGRAM(S): at 11:35

## 2017-05-15 RX ADMIN — Medication 50 MILLILITER(S): at 15:28

## 2017-05-15 RX ADMIN — Medication 20 MILLIGRAM(S): at 06:15

## 2017-05-15 RX ADMIN — Medication 1 TABLET(S): at 11:35

## 2017-05-15 RX ADMIN — CLOPIDOGREL BISULFATE 75 MILLIGRAM(S): 75 TABLET, FILM COATED ORAL at 11:36

## 2017-05-15 RX ADMIN — Medication 325 MILLIGRAM(S): at 11:36

## 2017-05-15 RX ADMIN — ATORVASTATIN CALCIUM 40 MILLIGRAM(S): 80 TABLET, FILM COATED ORAL at 22:27

## 2017-05-15 RX ADMIN — HEPARIN SODIUM 5000 UNIT(S): 5000 INJECTION INTRAVENOUS; SUBCUTANEOUS at 06:19

## 2017-05-15 RX ADMIN — GABAPENTIN 300 MILLIGRAM(S): 400 CAPSULE ORAL at 06:15

## 2017-05-15 RX ADMIN — Medication 10 MILLIGRAM(S): at 22:26

## 2017-05-15 RX ADMIN — HEPARIN SODIUM 5000 UNIT(S): 5000 INJECTION INTRAVENOUS; SUBCUTANEOUS at 18:53

## 2017-05-15 RX ADMIN — GABAPENTIN 300 MILLIGRAM(S): 400 CAPSULE ORAL at 18:52

## 2017-05-15 RX ADMIN — Medication 50 MILLILITER(S): at 06:12

## 2017-05-15 RX ADMIN — MIDODRINE HYDROCHLORIDE 5 MILLIGRAM(S): 2.5 TABLET ORAL at 18:52

## 2017-05-15 NOTE — PROGRESS NOTE ADULT - SUBJECTIVE AND OBJECTIVE BOX
NEPHROLOGY INTERVAL HPI/OVERNIGHT EVENTS:  no c/o states that had 2 "nice" uop yesterday.  non today.  no sob  toelrating hd well yesterday with 1 kg of uf    MEDICATIONS  (STANDING):  atorvastatin 40milliGRAM(s) Oral at bedtime  aspirin 325milliGRAM(s) Oral daily  gabapentin 300milliGRAM(s) Oral three times a day  clopidogrel Tablet 75milliGRAM(s) Oral daily  rifaximin 550milliGRAM(s) Oral two times a day  pantoprazole    Tablet 40milliGRAM(s) Oral before breakfast  multivitamin 1Tablet(s) Oral daily  folic acid 1milliGRAM(s) Oral daily  thiamine 100milliGRAM(s) Oral daily  hydrocortisone 10milliGRAM(s) Oral at bedtime  hydrocortisone 20milliGRAM(s) Oral daily  heparin  Injectable 5000Unit(s) SubCutaneous every 12 hours  midodrine 5milliGRAM(s) Oral three times a day  albumin human 25% IVPB 100milliLiter(s) IV Intermittent every 12 hours    MEDICATIONS  (PRN):  naphazoline 0.025%/pheniramine 0.3% Ophthalmic Solution - Peds 1Drop(s) Both EYES two times a day PRN watery eyes.      Allergies    sulfa drugs (Unknown)    Intolerances        I&O's Detail    I & Os for current day (as of 15 May 2017 11:15)  =============================================  IN:    IV PiggyBack: 100 ml    Total IN: 100 ml  ---------------------------------------------  OUT:    Total OUT: 0 ml  ---------------------------------------------  Total NET: 100 ml      .    Patient was seen and evaluated on dialysis.   Patient is tolerating the procedure well.   Continue dialysis:       Vital Signs Last 24 Hrs  T(C): 36.4, Max: 37.1 (05-14 @ 15:50)  T(F): 97.6, Max: 98.8 ( @ 15:50)  HR: 101 (81 - 104)  BP: 126/57 (87/66 - 126/57)  BP(mean): 68 (68 - 95)  RR: 19 (10 - 19)  SpO2: 95% (93% - 95%)  Daily     Daily Weight in k.2 (15 May 2017 05:22)    PHYSICAL EXAM:  General: alert. awake Ox3  HEENT: MMM  CV: s1s2 rrr  LUNGS: B/L CTA  EXT: generalized anasarca    LABS:                        8.1    9.4   )-----------( 110      ( 15 May 2017 07:33 )             24.3     05-15    132<L>  |  102  |  54<H>  ----------------------------<  124<H>  5.0   |  16<L>  |  4.10<H>    Ca    7.6<L>      15 May 2017 07:33  Phos  7.9     05-15    TPro  x   /  Alb  2.5<L>  /  TBili  x   /  DBili  x   /  AST  x   /  ALT  x   /  AlkPhos  x   05-15        Phosphorus Level, Serum: 7.9 mg/dL (05-15 @ 07:33)

## 2017-05-15 NOTE — PROGRESS NOTE ADULT - ASSESSMENT
Admission Info: 65 yo F with PMH of liver cirrhosis 2/2 chronic alcohol abuse, macrocytic anemia, HTN, RA, esophageal varices s/p banding, CAD s/p stent, h/o prolonged Qtc, p/w chest pain/abdominal pain. States pain is more like pressure in L chest and abdomen. States pain has diminished a lot now, but is still there mildly. Denies radiation to arm / jaw / neck. Denies nausea / vomiting / diophoresis.  Her hospital course included cardiac cath, stent, G- sepsis, hypotension, monitored bed admission, on pressors, paracentesis of 4 L, was place d on lasix aldactone.  Yesterday was oliguric, noonan placed, hematuria, flushed, but not much UO.  CT w/o contrast was done no hydro.    a/p  Pt had creat 0.88 mg/dl on admission, now up to 3, RADHA, prerenal, doubt HRS  diuretics held, on ivf NS  @ 80 ml/hr  will administer spa 25 g x 2 today, NS bolus 500 ml, then 125 ml/hr..  repeat labs later today..  may become more acidotic as NS infuses, may require bicarb as well.    5/14  pt did not respond to the ivf, sap, midodrine, octreotide combination  agrees to dialysis and transfer to [facility] liver tx unit for evaluation  R temp cath placed for hd  pt seen on hd  doing well\  HD indicated for anuria, metab acidosis, hyperkalemia   may tolerate fluid removal, bp improved on midodrine  hd am   hep panel done    5/15 MK  - RADHA/ HRS/ ATN s/p Hd #1 due to acidosis and hyperkalemia with anuria.    Plan for hd today. pt now is aggreeable for transfer to [facility] for OLTx   - Liver cirrhosis with esophageal varices and anasarca  - Bacteremia: resolved due to GI source  - NSTEMI/CAD s/p recent stent, on asa and plavix Admission Info: 67 yo F with PMH of liver cirrhosis 2/2 chronic alcohol abuse, macrocytic anemia, HTN, RA, esophageal varices s/p banding, CAD s/p stent, h/o prolonged Qtc, p/w chest pain/abdominal pain. States pain is more like pressure in L chest and abdomen. States pain has diminished a lot now, but is still there mildly. Denies radiation to arm / jaw / neck. Denies nausea / vomiting / diophoresis.  Her hospital course included cardiac cath, stent, G- sepsis, hypotension, monitored bed admission, on pressors, paracentesis of 4 L, was place d on lasix aldactone.  Yesterday was oliguric, noonan placed, hematuria, flushed, but not much UO.  CT w/o contrast was done no hydro.    a/p  Pt had creat 0.88 mg/dl on admission, now up to 3, RADHA, prerenal, doubt HRS  diuretics held, on ivf NS  @ 80 ml/hr  will administer spa 25 g x 2 today, NS bolus 500 ml, then 125 ml/hr..  repeat labs later today..  may become more acidotic as NS infuses, may require bicarb as well.    5/14  pt did not respond to the ivf, sap, midodrine, octreotide combination  agrees to dialysis and transfer to [facility] liver tx unit for evaluation  R temp cath placed for hd  pt seen on hd  doing well\  HD indicated for anuria, metab acidosis, hyperkalemia   may tolerate fluid removal, bp improved on midodrine  hd am   hep panel done    5/15 MK  - RADHA/ HRS/ ATN s/p Hd #1 due to acidosis and hyperkalemia with anuria.    Plan for hd today. pt now is aggreeable for transfer to [facility] for OLTx   - Liver cirrhosis with esophageal varices and anasarca  - Bacteremia: resolved due to GI source  - NSTEMI/CAD s/p recent stent, on asa and plavix    addnenum at 1:20 pm  seen at hd goal uf of 2 kg with hd higher bicarb setting  bfr of 250

## 2017-05-15 NOTE — PROGRESS NOTE ADULT - SUBJECTIVE AND OBJECTIVE BOX
CHIEF COMPLAINT:    SUBJECTIVE:     REVIEW OF SYSTEMS:    CONSTITUTIONAL: No weakness, fevers or chills  EYES/ENT: No visual changes;  No vertigo or throat pain   NECK: No pain or stiffness  RESPIRATORY: No cough, wheezing, hemoptysis; No shortness of breath  CARDIOVASCULAR: No chest pain or palpitations  GASTROINTESTINAL: No abdominal or epigastric pain. No nausea, vomiting, or hematemesis; No diarrhea or constipation. No melena or hematochezia.  GENITOURINARY: No dysuria, frequency or hematuria  NEUROLOGICAL: No numbness or weakness  SKIN: No itching, burning, rashes, or lesions   All other review of systems is negative unless indicated above    Vital Signs Last 24 Hrs  T(C): 36.1, Max: 37.1 (05-14 @ 15:50)  T(F): 97, Max: 98.8 (05-14 @ 15:50)  HR: 97 (81 - 104)  BP: 91/62 (87/66 - 137/54)  BP(mean): 68 (68 - 95)  RR: 16 (10 - 19)  SpO2: 95% (93% - 95%)    I&O's Summary  I & Os for 24h ending 15 May 2017 07:00  =============================================  IN: 100 ml / OUT: 0 ml / NET: 100 ml    I & Os for current day (as of 15 May 2017 14:32)  =============================================  IN: 240 ml / OUT: 0 ml / NET: 240 ml      CAPILLARY BLOOD GLUCOSE      PHYSICAL EXAM:    Constitutional: NAD, awake and alert  HEENT: EOMI, Normal Hearing, MMM  Neck: Soft and supple, No JVD  Respiratory: Breath sounds are clear bilaterally, No wheezing, rales or rhonchi  Cardiovascular: S1 and S2, regular rate and rhythm, no Murmurs, gallops or rubs  Gastrointestinal: Bowel Sounds present, soft, nontender, nondistended, no guarding, no rebound  Extremities: No peripheral edema  Vascular: 2+ peripheral pulses  Neurological: A/O x 3, no focal deficits  Musculoskeletal: 5/5 strength b/l upper and lower extremities  Skin: No rashes    MEDICATIONS:  MEDICATIONS  (STANDING):  atorvastatin 40milliGRAM(s) Oral at bedtime  aspirin 325milliGRAM(s) Oral daily  gabapentin 300milliGRAM(s) Oral three times a day  clopidogrel Tablet 75milliGRAM(s) Oral daily  rifaximin 550milliGRAM(s) Oral two times a day  pantoprazole    Tablet 40milliGRAM(s) Oral before breakfast  multivitamin 1Tablet(s) Oral daily  folic acid 1milliGRAM(s) Oral daily  thiamine 100milliGRAM(s) Oral daily  hydrocortisone 10milliGRAM(s) Oral at bedtime  hydrocortisone 20milliGRAM(s) Oral daily  heparin  Injectable 5000Unit(s) SubCutaneous every 12 hours  midodrine 5milliGRAM(s) Oral three times a day  albumin human 25% IVPB 100milliLiter(s) IV Intermittent every 12 hours      LABS: All Labs Reviewed:                        8.1    9.4   )-----------( 110      ( 15 May 2017 07:33 )             24.3     05-15    132<L>  |  102  |  54<H>  ----------------------------<  124<H>  5.0   |  16<L>  |  4.10<H>    Ca    7.6<L>      15 May 2017 07:33  Phos  7.9     05-15    TPro  x   /  Alb  2.5<L>  /  TBili  x   /  DBili  x   /  AST  x   /  ALT  x   /  AlkPhos  x   05-15          Blood Culture:     RADIOLOGY/EKG:    DVT PPX:    DISPOSITION: CHIEF COMPLAINT: chest pain     SUBJECTIVE: Chart reviewed.  No complaitns this AM. Now agreeable to transfer to [facility] for liver tx eval.      REVIEW OF SYSTEMS:  All other review of systems is negative unless indicated above    Vital Signs Last 24 Hrs  T(C): 36.1, Max: 37.1 (05-14 @ 15:50)  T(F): 97, Max: 98.8 (05-14 @ 15:50)  HR: 97 (81 - 104)  BP: 91/62 (87/66 - 137/54)  BP(mean): 68 (68 - 95)  RR: 16 (10 - 19)  SpO2: 95% (93% - 95%)    I&O's Summary  I & Os for 24h ending 15 May 2017 07:00  =============================================  IN: 100 ml / OUT: 0 ml / NET: 100 ml    I & Os for current day (as of 15 May 2017 14:32)  =============================================  IN: 240 ml / OUT: 0 ml / NET: 240 ml      CAPILLARY BLOOD GLUCOSE      PHYSICAL EXAM:  GENERAL: looks pale and ill  HEAD:  Atraumatic, Normocephalic  EYES: EOMI, PERRLA, conjunctiva and sclera clear  HEENT: Moist mucous membranes  NECK: Supple, No JVD  NERVOUS SYSTEM:  Alert & Oriented X3,  CHEST/LUNG: occasional crackles, decrease entry at bases  HEART: Regular rate and rhythm; No murmurs, rubs, or gallops  ABDOMEN: Soft, Nontender, Large ascites  GENITOURINARY- no palpable bladder  EXTREMITIES: anasarca overall  SKIN-several large bruises, one on the left side of the abdomen, few on the legs  CNS- alert, oriented X3    MEDICATIONS:  MEDICATIONS  (STANDING):  atorvastatin 40milliGRAM(s) Oral at bedtime  aspirin 325milliGRAM(s) Oral daily  gabapentin 300milliGRAM(s) Oral three times a day  clopidogrel Tablet 75milliGRAM(s) Oral daily  rifaximin 550milliGRAM(s) Oral two times a day  pantoprazole    Tablet 40milliGRAM(s) Oral before breakfast  multivitamin 1Tablet(s) Oral daily  folic acid 1milliGRAM(s) Oral daily  thiamine 100milliGRAM(s) Oral daily  hydrocortisone 10milliGRAM(s) Oral at bedtime  hydrocortisone 20milliGRAM(s) Oral daily  heparin  Injectable 5000Unit(s) SubCutaneous every 12 hours  midodrine 5milliGRAM(s) Oral three times a day  albumin human 25% IVPB 100milliLiter(s) IV Intermittent every 12 hours      LABS: All Labs Reviewed:                        8.1    9.4   )-----------( 110      ( 15 May 2017 07:33 )             24.3     05-15    132<L>  |  102  |  54<H>  ----------------------------<  124<H>  5.0   |  16<L>  |  4.10<H>    Ca    7.6<L>      15 May 2017 07:33  Phos  7.9     05-15    TPro  x   /  Alb  2.5<L>  /  TBili  x   /  DBili  x   /  AST  x   /  ALT  x   /  AlkPhos  x   05-15        Assessment/Plan  #Acute renal failure, likely ATN from hypotensive episode.  Possibly hepato-renal syndrome/acidosis/anuria  Renal eval appreciated  C/w HD  monitor for UOP  On Sandostatin + midodrine + albumin    #Liver cirrhosis w/ esophageal varices/anasarca from third-spacing   S/p paracenthesis last week- 4 l removed  Agreeable to [facility] transfer, appreciate WILFRED assistance    #Bacteremia likely abdominal source- resolved  Repeated cultures are negative  Completed abx treatment    #Hypotension- adrenal insufficiency.  Now likely 2 to volume depletion  Cont PO steroids    #NSTEMI/ CAD s/p recent stent  Card eval DR Ramesh appreciated  S/p card cath- stent patent, no other blockages  Cont aspirin, Plavix, statin.    #Neuropathy/cervical stenosis/lumbar stenosis  Neurosurgery f/u appreciated  Follow up MRI's ordered.  Patient still refusing surgery    #Rheumatoid Arthritis/ adrenal insufficiency due to long-term oral steroids  Rheum f/u appreciated    #LT ankle fracture  Ortho f/u DR Fung appreciated. Xrays reviewed  Remains NWB to LLE in cam boot.  Harmony lift at present    #Anemia/thrombocytopenia likely 2 to liver cirrhosis  Monitor     #Dispo- c/w HD.  [facility] transfer planning.

## 2017-05-15 NOTE — PROGRESS NOTE ADULT - ASSESSMENT
hypotension and recent infection complicated by likely adrenal insuff  on steroid,     Ascites, S/P approx 4 liter tap and reaccumulation adn pleural effusion  low salt diet  hold diuresis due to renal insufficiency          daily weights as well as strict I's and O's discussed with both patient and nurse this morning  Low salt diet        CAD S/P Stent, stent patent with cath  inc risk bleed with anti plt due to EV     delay in esophageal variceal banding secondary to coronary artery disease and being on antiplatelet agents discussed with patient        ARF'  pt did not respond to the ivf, sap, midodrine, octreotide combination  agrees to dialysis and transfer to [facility] liver tx unit for evaluation  R temp cath placed for hd    doing well\parHD indicated for anuria, metab acidosis, hyperkalemia     hd am       will DW Dr Walsh again re transfer  pt is now agreeble, I yolis arranged for transfer on Fr but pt refused

## 2017-05-15 NOTE — PROGRESS NOTE ADULT - SUBJECTIVE AND OBJECTIVE BOX
Patient is a 66y old  Female who presents with a chief complaint of chest pain (10 May 2017 14:59)      HPI:  67 yo F with PMH of liver cirrhosis 2/2 chronic alcohol abuse, macrocytic anemia, HTN, RA, esophageal varices s/p banding, CAD s/p stent, h/o prolonged Qtc, p/w chest pain/abdominal pain. States pain is more like pressure in L chest and abdomen. States pain has diminished a lot now, but is still there mildly. Denies radiation to arm / jaw / neck. Denies nausea / vomiting / diophoresis. In ED, troponins trended from 0.707 -> 79.3. Called on call cardio Dr. Flannery and discussed case, he recommended calling Dr. Anderson who is the on call interventionlist. Discussed case in detail with Dr. Anderson, including troponins trending up, new ST depressions on EKG and on going residual chest discomfort. Dr. Anderson recommends heparin drip without initial bolus due to esophageal varices / liver cirrhosis and due to patient being at increased risk for bleeding. Also recommends to c/w ASA / plavix / beta blocker / statin. Dr. Anderson believes patient may have had cardiac event earlier, and troponins are just lagging behind. Cardiac cath to be performed in AM.      pt comf  co mild diffuse body ache and upper abd discomfort s N V, unchagned with eating  kyle diet      PSH: PCI, esophageal varices banding  Social Hx: Former smoker - quit in 2005, h/o heavy etoh use - quit 2/2016, Drugs - h/o marijuana use previously  Family Hx: Father - stomach problems / heart disease, mother - pancreatic cancer (24 Apr 2017 21:23)      PAST MEDICAL & SURGICAL HISTORY:  HTN (hypertension)  Rheumatoid arthritis  No significant past surgical history      MEDICATIONS  (STANDING):  atorvastatin 40milliGRAM(s) Oral at bedtime  aspirin 325milliGRAM(s) Oral daily  gabapentin 300milliGRAM(s) Oral three times a day  clopidogrel Tablet 75milliGRAM(s) Oral daily  rifaximin 550milliGRAM(s) Oral two times a day  pantoprazole    Tablet 40milliGRAM(s) Oral before breakfast  multivitamin 1Tablet(s) Oral daily  folic acid 1milliGRAM(s) Oral daily  thiamine 100milliGRAM(s) Oral daily  hydrocortisone 10milliGRAM(s) Oral at bedtime  hydrocortisone 20milliGRAM(s) Oral daily  heparin  Injectable 5000Unit(s) SubCutaneous every 12 hours  midodrine 5milliGRAM(s) Oral three times a day  albumin human 25% IVPB 100milliLiter(s) IV Intermittent every 12 hours    MEDICATIONS  (PRN):  naphazoline 0.025%/pheniramine 0.3% Ophthalmic Solution - Peds 1Drop(s) Both EYES two times a day PRN watery eyes.      Allergies    sulfa drugs (Unknown)    Intolerances        SOCIAL HISTORY:unchagned    FAMILY HISTORY:  No pertinent family history in first degree relatives      REVIEW OF SYSTEMS:    CONSTITUTIONAL: No weakness, fevers or chills  EYES/ENT: No visual changes;  No vertigo or throat pain   NECK: No pain or stiffness  RESPIRATORY: No cough, wheezing, hemoptysis; No shortness of breath  CARDIOVASCULAR: No chest pain or palpitations  GENITOURINARY: No dysuria, frequency or hematuria  NEUROLOGICAL: No numbness or weakness  SKIN: No itching, burning, rashes, or lesions       Vital Signs Last 24 Hrs  T(C): 36.4, Max: 37.1 (05-14 @ 15:50)  T(F): 97.6, Max: 98.8 (05-14 @ 15:50)  HR: 101 (81 - 104)  BP: 126/57 (87/66 - 126/57)  BP(mean): 68 (68 - 95)  RR: 19 (10 - 19)  SpO2: 95% (93% - 95%)    PHYSICAL EXAM:    Constitutional: NAD, well-developed  HEENT: EOMI, throat clear  Neck: No LAD, supple  Respiratory: CTA and P  Cardiovascular: S1 and S2, RRR, no M  Gastrointestinal: BS+, soft, NT/ND, neg HSM,  Extremities: pos peripheral edema but with Jennifer boots on, neg clubing, cyanosis  Vascular: 2+ peripheral pulses  Neurological: A/O x 3, no focal deficits  Psychiatric: Normal mood, normal affect  Skin: No rashes    LABS:  CBC Full  -  ( 15 May 2017 07:33 )  WBC Count : 9.4 K/uL  Hemoglobin : 8.1 g/dL  Hematocrit : 24.3 %  Platelet Count - Automated : x  Mean Cell Volume : 111.7 fl  Mean Cell Hemoglobin : 37.1 pg  Mean Cell Hemoglobin Concentration : 33.2 gm/dL  Auto Neutrophil # : x  Auto Lymphocyte # : x  Auto Monocyte # : x  Auto Eosinophil # : x  Auto Basophil # : x  Auto Neutrophil % : x  Auto Lymphocyte % : x  Auto Monocyte % : x  Auto Eosinophil % : x  Auto Basophil % : x    05-15    132<L>  |  102  |  54<H>  ----------------------------<  124<H>  5.0   |  16<L>  |  4.10<H>    Ca    7.6<L>      15 May 2017 07:33  Phos  7.9     05-15    TPro  x   /  Alb  2.5<L>  /  TBili  x   /  DBili  x   /  AST  x   /  ALT  x   /  AlkPhos  x   05-15            RADIOLOGY & ADDITIONAL STUDIES:    EXAM:  CHEST SINGLE VIEW FRONTAL                            PROCEDURE DATE:  05/14/2017        INTERPRETATION:  EXAMINATION DATE: May 14, 2017 at 1323 hours.  CLINICAL INFORMATION: Central line placement.    TECHNIQUE: Frontal view of the chest   COMPARISON: April 28, 2017.    FINDINGS:    LINES/TUBES: New right internal jugular central venous catheter placement   with tip overlying the superior cavoatrial junction.  LUNGS/PLEURA: Unchanged small to moderate left pleural effusion with   basilaratelectasis. No pneumothorax.  MEDIASTINUM: Cardiomediastinal silhouette is unremarkable.  OTHER: None.    IMPRESSION:     Since April 28, 2017, unchanged small to moderate left pleural effusion   with basilar atelectasis.              WERNER GRAHAM   This document has been electronically signed. May 14 2017  3:20PM

## 2017-05-16 LAB
ANION GAP SERPL CALC-SCNC: 12 MMOL/L — SIGNIFICANT CHANGE UP (ref 5–17)
BLD GP AB SCN SERPL QL: SIGNIFICANT CHANGE UP
BUN SERPL-MCNC: 26 MG/DL — HIGH (ref 7–23)
CALCIUM SERPL-MCNC: 7.9 MG/DL — LOW (ref 8.5–10.1)
CHLORIDE SERPL-SCNC: 101 MMOL/L — SIGNIFICANT CHANGE UP (ref 96–108)
CO2 SERPL-SCNC: 26 MMOL/L — SIGNIFICANT CHANGE UP (ref 22–31)
CREAT SERPL-MCNC: 2.29 MG/DL — HIGH (ref 0.5–1.3)
GLUCOSE SERPL-MCNC: 112 MG/DL — HIGH (ref 70–99)
HCT VFR BLD CALC: 21.4 % — LOW (ref 34.5–45)
HCT VFR BLD CALC: 24.2 % — LOW (ref 34.5–45)
HCT VFR BLD CALC: 26.6 % — LOW (ref 34.5–45)
HCT VFR BLD CALC: 28.4 % — LOW (ref 34.5–45)
HGB BLD-MCNC: 7.9 G/DL — LOW (ref 11.5–15.5)
HGB BLD-MCNC: 9.2 G/DL — LOW (ref 11.5–15.5)
HGB BLD-MCNC: 9.5 G/DL — LOW (ref 11.5–15.5)
MAGNESIUM SERPL-MCNC: 2 MG/DL — SIGNIFICANT CHANGE UP (ref 1.6–2.6)
MCHC RBC-ENTMCNC: 32.3 GM/DL — SIGNIFICANT CHANGE UP (ref 32–36)
MCHC RBC-ENTMCNC: 32.5 GM/DL — SIGNIFICANT CHANGE UP (ref 32–36)
MCHC RBC-ENTMCNC: 33.8 PG — SIGNIFICANT CHANGE UP (ref 27–34)
MCHC RBC-ENTMCNC: 35.5 PG — HIGH (ref 27–34)
MCHC RBC-ENTMCNC: 35.6 GM/DL — SIGNIFICANT CHANGE UP (ref 32–36)
MCHC RBC-ENTMCNC: 36.2 PG — HIGH (ref 27–34)
MCV RBC AUTO: 104.6 FL — HIGH (ref 80–100)
MCV RBC AUTO: 111.2 FL — HIGH (ref 80–100)
MCV RBC AUTO: 99.8 FL — SIGNIFICANT CHANGE UP (ref 80–100)
PHOSPHATE SERPL-MCNC: 4.7 MG/DL — HIGH (ref 2.5–4.5)
PLATELET # BLD AUTO: 78 K/UL — LOW (ref 150–400)
PLATELET # BLD AUTO: 78 K/UL — LOW (ref 150–400)
PLATELET # BLD AUTO: 96 K/UL — LOW (ref 150–400)
POTASSIUM SERPL-MCNC: 3.8 MMOL/L — SIGNIFICANT CHANGE UP (ref 3.5–5.3)
POTASSIUM SERPL-SCNC: 3.8 MMOL/L — SIGNIFICANT CHANGE UP (ref 3.5–5.3)
RBC # BLD: 2.17 M/UL — LOW (ref 3.8–5.2)
RBC # BLD: 2.67 M/UL — LOW (ref 3.8–5.2)
RBC # BLD: 2.72 M/UL — LOW (ref 3.8–5.2)
RBC # FLD: 13.6 % — SIGNIFICANT CHANGE UP (ref 10.3–14.5)
RBC # FLD: 17.2 % — HIGH (ref 10.3–14.5)
RBC # FLD: 18.3 % — HIGH (ref 10.3–14.5)
SODIUM SERPL-SCNC: 139 MMOL/L — SIGNIFICANT CHANGE UP (ref 135–145)
TYPE + AB SCN PNL BLD: SIGNIFICANT CHANGE UP
WBC # BLD: 8.7 K/UL — SIGNIFICANT CHANGE UP (ref 3.8–10.5)
WBC # BLD: 9.4 K/UL — SIGNIFICANT CHANGE UP (ref 3.8–10.5)
WBC # BLD: 9.6 K/UL — SIGNIFICANT CHANGE UP (ref 3.8–10.5)
WBC # FLD AUTO: 8.7 K/UL — SIGNIFICANT CHANGE UP (ref 3.8–10.5)
WBC # FLD AUTO: 9.4 K/UL — SIGNIFICANT CHANGE UP (ref 3.8–10.5)
WBC # FLD AUTO: 9.6 K/UL — SIGNIFICANT CHANGE UP (ref 3.8–10.5)

## 2017-05-16 RX ORDER — PANTOPRAZOLE SODIUM 20 MG/1
80 TABLET, DELAYED RELEASE ORAL ONCE
Qty: 0 | Refills: 0 | Status: COMPLETED | OUTPATIENT
Start: 2017-05-16 | End: 2017-05-16

## 2017-05-16 RX ORDER — OCTREOTIDE ACETATE 200 UG/ML
50 INJECTION, SOLUTION INTRAVENOUS; SUBCUTANEOUS
Qty: 500 | Refills: 0 | Status: DISCONTINUED | OUTPATIENT
Start: 2017-05-16 | End: 2017-05-17

## 2017-05-16 RX ORDER — OCTREOTIDE ACETATE 200 UG/ML
100 INJECTION, SOLUTION INTRAVENOUS; SUBCUTANEOUS ONCE
Qty: 0 | Refills: 0 | Status: COMPLETED | OUTPATIENT
Start: 2017-05-16 | End: 2017-05-16

## 2017-05-16 RX ORDER — ALBUMIN HUMAN 25 %
50 VIAL (ML) INTRAVENOUS
Qty: 0 | Refills: 0 | Status: COMPLETED | OUTPATIENT
Start: 2017-05-16 | End: 2017-05-16

## 2017-05-16 RX ORDER — PANTOPRAZOLE SODIUM 20 MG/1
8 TABLET, DELAYED RELEASE ORAL
Qty: 80 | Refills: 0 | Status: DISCONTINUED | OUTPATIENT
Start: 2017-05-16 | End: 2017-05-17

## 2017-05-16 RX ADMIN — Medication 1 TABLET(S): at 18:02

## 2017-05-16 RX ADMIN — GABAPENTIN 300 MILLIGRAM(S): 400 CAPSULE ORAL at 21:22

## 2017-05-16 RX ADMIN — OCTREOTIDE ACETATE 10 MICROGRAM(S)/HR: 200 INJECTION, SOLUTION INTRAVENOUS; SUBCUTANEOUS at 17:51

## 2017-05-16 RX ADMIN — MIDODRINE HYDROCHLORIDE 5 MILLIGRAM(S): 2.5 TABLET ORAL at 21:22

## 2017-05-16 RX ADMIN — OCTREOTIDE ACETATE 10 MICROGRAM(S)/HR: 200 INJECTION, SOLUTION INTRAVENOUS; SUBCUTANEOUS at 08:40

## 2017-05-16 RX ADMIN — PANTOPRAZOLE SODIUM 10 MG/HR: 20 TABLET, DELAYED RELEASE ORAL at 17:52

## 2017-05-16 RX ADMIN — GABAPENTIN 300 MILLIGRAM(S): 400 CAPSULE ORAL at 06:27

## 2017-05-16 RX ADMIN — PANTOPRAZOLE SODIUM 80 MILLIGRAM(S): 20 TABLET, DELAYED RELEASE ORAL at 07:07

## 2017-05-16 RX ADMIN — Medication 50 MILLILITER(S): at 10:47

## 2017-05-16 RX ADMIN — PANTOPRAZOLE SODIUM 10 MG/HR: 20 TABLET, DELAYED RELEASE ORAL at 09:14

## 2017-05-16 RX ADMIN — Medication 10 MILLIGRAM(S): at 23:52

## 2017-05-16 RX ADMIN — ATORVASTATIN CALCIUM 40 MILLIGRAM(S): 80 TABLET, FILM COATED ORAL at 21:22

## 2017-05-16 RX ADMIN — GABAPENTIN 300 MILLIGRAM(S): 400 CAPSULE ORAL at 18:03

## 2017-05-16 RX ADMIN — Medication 1 MILLIGRAM(S): at 18:03

## 2017-05-16 RX ADMIN — Medication 50 MILLILITER(S): at 10:15

## 2017-05-16 RX ADMIN — Medication 20 MILLIGRAM(S): at 06:27

## 2017-05-16 RX ADMIN — Medication 100 MILLIGRAM(S): at 18:02

## 2017-05-16 RX ADMIN — MIDODRINE HYDROCHLORIDE 5 MILLIGRAM(S): 2.5 TABLET ORAL at 06:27

## 2017-05-16 RX ADMIN — MIDODRINE HYDROCHLORIDE 5 MILLIGRAM(S): 2.5 TABLET ORAL at 18:39

## 2017-05-16 NOTE — PROGRESS NOTE ADULT - NSHPATTENDINGPLANDISCUSS_GEN_ALL_CORE
Dr. Rosado, Dr. Hines, Dr. Seymour
Patient and nurse
Patient and nurse
pt
pt, DR Becerra, DR Raygoza
pt, DR Becerra, DR Raygoza
pt, DR Gonzales
pt, DR Hines
pt, DR Hines
pt, DR Kee,
pt, DR Ramesh
pt, message left for  Jorge
Dr Dwayne Mooney

## 2017-05-16 NOTE — PROVIDER CONTACT NOTE (OTHER) - ACTION/TREATMENT ORDERED:
New orders for STAT CBC and hold Heparin in am placed as per DEVIN Case. Will continue to monitor pt.

## 2017-05-16 NOTE — CONSULT NOTE ADULT - CONSULT REQUESTED BY NAME
hospitalist
CCU
Cole BELTRAN
Dr Foreman
Dr. Anderson
Dr. Arrington
Primary team
Dot Foreman
Dr. Isaacs

## 2017-05-16 NOTE — PROGRESS NOTE ADULT - SUBJECTIVE AND OBJECTIVE BOX
SUBJECTIVE: Had maroon colored stools O/N.  Got 2u PRBC at HD.  GI plan for EGD in AM. Was planned for transfer to [facility] however on hold until pt stabilized. D/w Intensivist and pt transferred to ICU.    REVIEW OF SYSTEMS:  All other review of systems is negative unless indicated above    Vital Signs Last 24 Hrs  T(C): 36.1, Max: 36.6 (05-16 @ 11:25)  T(F): 97, Max: 97.8 (05-16 @ 11:25)  HR: 96 (86 - 101)  BP: 97/47 (79/42 - 149/78)  BP(mean): --  RR: 16 (16 - 19)  SpO2: 96% (2% - 96%)    I&O's Summary  I & Os for 24h ending 16 May 2017 07:00  =============================================  IN: 240 ml / OUT: 0 ml / NET: 240 ml    I & Os for current day (as of 16 May 2017 18:04)  =============================================  IN: 689 ml / OUT: 0 ml / NET: 689 ml      PHYSICAL EXAM:  Constitutional:NAD, comfortable  ENMT: shiley catheter in place on right side with no signs of erythema or infection  Respiratory: Crackles at bases difficult to assess due to body habitus  Cardiovascular:s1s2 rrr   Gastrointestinal: soft non tender distended  Extremities: 4+ edema x4. Ulceration of right LE with serous drainage, L and R UE erythema with abrasions b/l  Vascular: pulses dificult to assess due to edema  Neurological: No focal neurologic deficits  Skin: edmea, erythema b/l UE ulcer lower ext.      MEDICATIONS:  MEDICATIONS  (STANDING):  atorvastatin 40milliGRAM(s) Oral at bedtime  gabapentin 300milliGRAM(s) Oral three times a day  rifaximin 550milliGRAM(s) Oral two times a day  multivitamin 1Tablet(s) Oral daily  folic acid 1milliGRAM(s) Oral daily  thiamine 100milliGRAM(s) Oral daily  hydrocortisone 10milliGRAM(s) Oral at bedtime  hydrocortisone 20milliGRAM(s) Oral daily  heparin  Injectable 5000Unit(s) SubCutaneous every 12 hours  midodrine 5milliGRAM(s) Oral three times a day  albumin human 25% IVPB 100milliLiter(s) IV Intermittent every 12 hours  octreotide  Infusion 50MICROgram(s)/Hr IV Continuous <Continuous>  pantoprazole Infusion 8mG/Hr IV Continuous <Continuous>      LABS: All Labs Reviewed:                        9.5    8.7   )-----------( 78       ( 16 May 2017 14:47 )             26.6     05-16    139  |  101  |  26<H>  ----------------------------<  112<H>  3.8   |  26  |  2.29<H>    Ca    7.9<L>      16 May 2017 14:47  Phos  4.7     05-16  Mg     2.0     05-16    TPro  x   /  Alb  2.5<L>  /  TBili  x   /  DBili  x   /  AST  x   /  ALT  x   /  AlkPhos  x   05-15

## 2017-05-16 NOTE — PROGRESS NOTE ADULT - ASSESSMENT
Admission Info: 65 yo F with PMH of liver cirrhosis 2/2 chronic alcohol abuse, macrocytic anemia, HTN, RA, esophageal varices s/p banding, CAD s/p stent, h/o prolonged Qtc, p/w chest pain/abdominal pain. States pain is more lcbcike pressure in L chest and abdomen. States pain has diminished a lot now, but is still there mildly. Denies radiation to arm / jaw / neck. Denies nausea / vomiting / diophoresis.  Her hospital course included cardiac cath, stent, G- sepsis, hypotension, monitored bed admission, on pressors, paracentesis of 4 L, was place d on lasix aldactone.  Yesterday was oliguric, noonan placed, hematuria, flushed, but not much UO.  CT w/o contrast was done no hydro.    a/p  Pt had creat 0.88 mg/dl on admission, now up to 3, RADHA, prerenal, doubt HRS  diuretics held, on ivf NS  @ 80 ml/hr  will administer spa 25 g x 2 today, NS bolus 500 ml, then 125 ml/hr..  repeat labs later today..  may become more acidotic as NS infuses, may require bicarb as well.    5/14  pt did not respond to the ivf, sap, midodrine, octreotide combination  agrees to dialysis and transfer to [facility] liver tx unit for evaluation  R temp cath placed for hd  pt seen on hd  doing well\  HD indicated for anuria, metab acidosis, hyperkalemia   may tolerate fluid removal, bp improved on midodrine  hd am   hep panel done    5/15 MK  - RADHA/ HRS/ ATN s/p Hd #1 due to acidosis and hyperkalemia with anuria.    Plan for hd today. pt now is aggreeable for transfer to [facility] for OLTx   - Liver cirrhosis with esophageal varices and anasarca  - Bacteremia: resolved due to GI source  - NSTEMI/CAD s/p recent stent, on asa and plavix    addendum:   at 1:20 pm  seen at hd goal uf of 2 kg with hd higher bicarb setting  bfr of 250    5/16  seen on hd   tolerating  receiving 2 u prbc  for GI eval in am

## 2017-05-16 NOTE — PROGRESS NOTE ADULT - ASSESSMENT
hypotension and recent infection complicated by likely adrenal insuff  on steroid,  m ore stable    Ascites, S/P approx 4 liter tap and reaccumulation adn pleural effusion  low salt diet  hold diuresis due to renal insufficiency      maroon colored stool  stop plavix and ASA  start protonix and octreotide  cardiology eval for possible EGD  serial Hct and keep HGB>7.5  NPO    MOM left for partner    daily weights as well as strict I's and O's discussed with both patient and nurse this morning  Low salt diet        CAD S/P Stent, stent patent with cath  inc risk bleed with anti plt due to EV, start octreoptide            ARF'  pt did not respond to the ivf, sap, midodrine, octreotide combination  agrees to dialysis, started and transfer to [facility] liver tx unit for evaluation    GIL Walsh and they accepted pt, may need to be delayed due to bleed  R temp cath placed for hd        S/P HD       GIL Walsh again re transfer  pt with cirrhosis likely due to ETOH and MTX use.

## 2017-05-16 NOTE — PROVIDER CONTACT NOTE (OTHER) - DATE AND TIME:
09-May-2017 08:41
11-May-2017 09:04
16-May-2017 11:59
24-Apr-2017 22:00
26-Apr-2017 19:47
05-May-2017 12:23
08-May-2017 21:51
13-May-2017 05:30
16-May-2017 03:00
26-Apr-2017 14:57
26-Apr-2017 19:37
27-Apr-2017 07:29
28-Apr-2017 15:20
29-Apr-2017 07:44

## 2017-05-16 NOTE — CONSULT NOTE ADULT - SUBJECTIVE AND OBJECTIVE BOX
65 yo F with PMH of liver cirrhosis secondary to chronic alcohol abuse, macrocytic anemia, HTN, RA, esophageal varices s/p banding, CAD s/p stent, h/o prolonged Qtc, Presented on this hospitalization with initial complaint of chest pain. In ED, troponins trended from 0.707 -> 79.3 with new ST depressions on EKG.Heparin drip started without initial bolus due to esophageal varices / liver cirrhosis and due to patient being at increased risk for bleeding. Given ASA / plavix / beta blocker / statin. on 4/26 s/p card cath- stent was not ocluded and no intervention done on 4/28 became hypotensive and on pressors and improved throught hospital stay pt became anuric with woresning of renal function requiring dialysis. First dialysis treatment completed 5/14 with no issues. Pt had abdominal 4L tap due ascites and has had transient bactermia during this hospital stay which has since resolved on new blood cultures.  Called to evaluate pt secondary to melena overnight. Pt required two units of PRBC during dialysis. Pt denies any current abdominal pain. Venu noting any bleeding from mouth but unable to quantify how much blood in stool due to lack of mobility but was told by the nurse there was blood in her bm.    PHYSICAL EXAM:      Constitutional:NAD, comfortable    ENMT:    Neck: shiley catheter in place on right side with no signs of erythema or infection    Respiratory: Crackles at bases difficult to assess due to body habitus    Cardiovascular:s1s2 rrr     Gastrointestinal: soft non tender distended    Extremities: 4+ edema x4. Ulceration of right LE with serous drainage, L and R UE erythema with abrasions b/l    Vascular: pulses dificult to assess due to edema    Neurological: No focal neurologic deficits    Skin: edmea, erythema b/l UE ulcer lower ext.    ICU Vital Signs Last 24 Hrs  T(C): 36.1, Max: 36.6 (05-16 @ 11:25)  T(F): 97, Max: 97.8 (05-16 @ 11:25)  HR: 96 (86 - 102)  BP: 97/47 (70/56 - 149/78)  BP(mean): --  ABP: --  ABP(mean): --  RR: 16 (16 - 19)  SpO2: 96% (2% - 96%  T(C): 36.1, Max: 36.6 (05-16 @ 11:25)  HR: 96 (86 - 102)  BP: 97/47 (70/56 - 149/78)  RR: 16 (16 - 19)  SpO2: 96% (2% - 96%)  Wt(kg): --                              x      x     )-----------( x        ( 16 May 2017 07:58 )             21.4     15 May 2017 07:33    132    |  102    |  54     ----------------------------<  124    5.0     |  16     |  4.10     Ca    7.6        15 May 2017 07:33  Phos  7.9       15 May 2017 07:33    TPro  x      /  Alb  2.5    /  TBili  x      /  DBili  x      /  AST  x      /  ALT  x      /  AlkPhos  x      15 May 2017 07:33      CAPILLARY BLOOD GLUCOSE    LIVER FUNCTIONS - ( 15 May 2017 07:33 )  Alb: 2.5 g/dL / Pro: x     / ALK PHOS: x     / ALT: x     / AST: x     / GGT: x 67 yo F with PMH of liver cirrhosis secondary to chronic alcohol abuse, macrocytic anemia, HTN, RA, esophageal varices s/p banding, CAD s/p stent, h/o prolonged Qtc, Presented on this hospitalization with initial complaint of chest pain. In ED, troponins trended from 0.707 -> 79.3 with new ST depressions on EKG.Heparin drip started without initial bolus due to esophageal varices / liver cirrhosis and due to patient being at increased risk for bleeding. Given ASA / plavix / beta blocker / statin. on 4/26 s/p card cath- stent was not ocluded and no intervention done on 4/28 became hypotensive and on pressors and improved throught hospital stay pt became anuric with woresning of renal function requiring dialysis. First dialysis treatment completed 5/14 with no issues. Pt had abdominal 4L tap due ascites and has had transient bactermia during this hospital stay which has since resolved on new blood cultures.  Called to evaluate pt secondary to melena overnight. Pt required two units of PRBC during dialysis. Pt denies any current abdominal pain. Venu noting any bleeding from mouth but unable to quantify how much blood in stool due to lack of mobility but was told by the nurse there was blood in her bm.    ROS: no HA, CP, SOB, Abdominal PAin    PMH: as above  PSH: PCI, Eso. Variceal banding  SH: Ex Smoker, Ex(2016-ETOH abuse)  FH: CAD     PHYSICAL EXAM:      Constitutional:NAD, comfortable    ENMT:    Neck: shiley catheter in place on right side with no signs of erythema or infection    Respiratory: Crackles at bases difficult to assess due to body habitus    Cardiovascular:s1s2 rrr     Gastrointestinal: soft non tender distended    Extremities: 4+ edema x4. Ulceration of right LE with serous drainage, L and R UE erythema with abrasions b/l    Vascular: pulses dificult to assess due to edema    Neurological: No focal neurologic deficits    Skin: edmea, erythema b/l UE ulcer lower ext.    ICU Vital Signs Last 24 Hrs  T(C): 36.1, Max: 36.6 (05-16 @ 11:25)  T(F): 97, Max: 97.8 (05-16 @ 11:25)  HR: 96 (86 - 102)  BP: 97/47 (70/56 - 149/78)  BP(mean): --  ABP: --  ABP(mean): --  RR: 16 (16 - 19)  SpO2: 96% (2% - 96%  T(C): 36.1, Max: 36.6 (05-16 @ 11:25)  HR: 96 (86 - 102)  BP: 97/47 (70/56 - 149/78)  RR: 16 (16 - 19)  SpO2: 96% (2% - 96%)  Wt(kg): --                              x      x     )-----------( x        ( 16 May 2017 07:58 )             21.4     15 May 2017 07:33    132    |  102    |  54     ----------------------------<  124    5.0     |  16     |  4.10     Ca    7.6        15 May 2017 07:33  Phos  7.9       15 May 2017 07:33    TPro  x      /  Alb  2.5    /  TBili  x      /  DBili  x      /  AST  x      /  ALT  x      /  AlkPhos  x      15 May 2017 07:33      CAPILLARY BLOOD GLUCOSE    LIVER FUNCTIONS - ( 15 May 2017 07:33 )  Alb: 2.5 g/dL / Pro: x     / ALK PHOS: x     / ALT: x     / AST: x     / GGT: x

## 2017-05-16 NOTE — PROVIDER CONTACT NOTE (OTHER) - SITUATION
message left with answering service - aware of consult
Spoke with Orthopedic resident regarding consult
CALLED DR CARRILLO SERVICE ON CONSULT.. SERVICE AWARE
Called Dr Raygoza and spoke to her service regarding consult
Called Dr Stacy regarding consult and spoke to her service.
Endocrinology consulted, Dr. Marcelo on call
Patient  has 1 episode of dark red loose bowel movement.
Patient noted hypotensive , BP 73/39 ,also with complaints of rigors
Rheumatology consulted; spoke with Marjorie from answering service
Tele service spoke with  [phone] to request consult.
Temp 100.7 F orally, .

## 2017-05-16 NOTE — CONSULT NOTE ADULT - ASSESSMENT
67 yo F with PMH of liver cirrhosis secondary to chronic alcohol abuse, macrocytic anemia, HTN, RA, esophageal varices s/p banding, CAD s/p stent, h/o prolonged Qtc, Presented on this hospitalization with initial complaint of chest pain. No on dialysis and due renal failure secondary to hypotension from either adrenal insufficiency and vs. hepatorenal syndrome. Pt with recent episode of melena.    Admit to ICU    GIB  Protonix drip  Octreatide  Monitor b/p  Monitor I &O  No dvt prophylaxis due to GI bleed  Pt s/p HD today with 2 U PRBC.  H/H Q6h  GI to take for endoscopy later today tomorrow am.   NPO except meds    Acute renal failure, likely ATN from hypotensive episode.  Possibly hepato-renal syndrome/acidosis/anuria  Renal eval appreciated  C/w HD  On Sandostatin + midodrine + albumin    #Liver cirrhosis w/ esophageal varices/anasarca from third-spacing   S/p paracenthesis last week- 4 l removed  Pt would like transfer to [facility]. Pending GI workup for GIB    #Bacteremia  Completed abx treatment    #Hypotension- adrenal insufficiency.  Now likely 2 to volume depletion  Cont PO steroids    #NSTEMI/ CAD s/p recent stent  S/p card cath- stent patent, no other blockages  Cont aspirin, Plavix, statin. If bleeding continues will have to hold ASA and plavix    #Rheumatoid Arthritis/ adrenal insufficiency due to long-term oral steroids  continue steroids    #LT ankle fracture  Ortho f/u DR Fung appreciated.  Remains NWB to LLE in cam boot.  Harmony lift at present      D/W Dr. Seymour. 67 yo F with PMH of liver cirrhosis secondary to chronic alcohol abuse, macrocytic anemia, HTN, RA, esophageal varices s/p banding, CAD s/p stent, h/o prolonged Qtc, Presented on this hospitalization with initial complaint of chest pain. No on dialysis and due renal failure secondary to hypotension from either adrenal insufficiency and vs. hepatorenal syndrome. Pt with recent episode of melena.    Transfer to ICU    GIB  Protonix drip  Octreatide drip  Monitor b/p  Monitor I &O  No dvt prophylaxis due to GI bleed  Pt s/p HD today with 2 U PRBC.  H/H Q6h  GI to take for endoscopy later today or tomorrow am.   NPO except meds    Acute renal failure, likely ATN from hypotensive episode.  Possibly hepato-renal syndrome/acidosis/anuria  Renal eval appreciated  C/w HD  On Sandostatin + midodrine + albumin    #Liver cirrhosis w/ esophageal varices/anasarca from third-spacing   S/p paracenthesis last week- 4 l removed  Pt would like transfer to [facility]. Pending GI workup for GIB    #Bacteremia  Completed abx treatment    #Hypotension- adrenal insufficiency.  Now likely 2 to volume depletion  Cont PO steroids    #NSTEMI/ CAD s/p recent stent  S/p card cath- stent patent, no other blockages  Cont aspirin, Plavix, statin. If bleeding continues will have to hold ASA and plavix    #Rheumatoid Arthritis/ adrenal insufficiency due to long-term oral steroids  continue steroids    #LT ankle fracture  Ortho f/u DR Fung appreciated.  Remains NWB to LLE in cam boot.  Harmony lift at present      D/W Dr. Seymour.    Time Spent 45 Min

## 2017-05-16 NOTE — PROGRESS NOTE ADULT - ASSESSMENT
66F with multiple medical problems now with ongoing GI bleed in setting of esophageal varices, cirrhosis.     1. CAD s/p PCI- pt had BMS placed to pLAD 4/3/17. Pt to complete 1 month of dual antiplatelet activity with asa/plavix. In setting of GI bleed, can hold antiplatelets for now. Will resume at least one agent when patient is more stable.     2. Pt optimized to proceed with EGD from cardiac standpoint.     3. Anemia/GI bleed- mgmt as per GI, hospitalist. Transfuse as necessary.     4. Awaiting tx to [facility] as per GI note.     5. Mechanical DVT proph, replete lytes as needed.

## 2017-05-16 NOTE — PROVIDER CONTACT NOTE (OTHER) - NAME OF MD/NP/PA/DO NOTIFIED:
Consult for Dr. Gonzales notified; spoke with Kavya.
Consult for Dr. Hines notified; spoke with Ami.
[facility]
DEVIN Galvan
DR CARRILLO
DR Foreman
Dr Gonzales
Dr Raygoza
Dr Stacy
Dr. Chapa
Dr. Jameson
Intensivist notified of consult.
Dr. Anderson - Cardio

## 2017-05-16 NOTE — PROGRESS NOTE ADULT - SUBJECTIVE AND OBJECTIVE BOX
Patient is a 66y old  Female who presents with a chief complaint of chest pain (10 May 2017 14:59)      HPI:  65 yo F with PMH of liver cirrhosis 2/2 chronic alcohol abuse, macrocytic anemia, HTN, RA, esophageal varices s/p banding, CAD s/p stent, h/o prolonged Qtc, p/w chest pain/abdominal pain. States pain is more like pressure in L chest and abdomen. States pain has diminished a lot now, but is still there mildly. Denies radiation to arm / jaw / neck. Denies nausea / vomiting / diophoresis. In ED, troponins trended from 0.707 -> 79.3. Called on call cardio Dr. Flannery and discussed case, he recommended calling Dr. Anderson who is the on call interventionlist. Discussed case in detail with Dr. Anderson, including troponins trending up, new ST depressions on EKG and on going residual chest discomfort. Dr. Anderson recommends heparin drip without initial bolus due to esophageal varices / liver cirrhosis and due to patient being at increased risk for bleeding. Also recommends to c/w ASA / plavix / beta blocker / statin. Dr. Anderson believes patient may have had cardiac event earlier, and troponins are just lagging behind. Cardiac cath to be performed in AM.    pt is awake and alert  had maroon colored stool during the night  neg abd pain x mild upper abd discomfort  neg N V  neg cp or sob      pos fatigue    PSH: PCI, esophageal varices banding  Social Hx: Former smoker - quit in 2005, h/o heavy etoh use - quit 2/2016, Drugs - h/o marijuana use previously  Family Hx: Father - stomach problems / heart disease, mother - pancreatic cancer (24 Apr 2017 21:23)      PAST MEDICAL & SURGICAL HISTORY:  HTN (hypertension)  Rheumatoid arthritis  No significant past surgical history      MEDICATIONS  (STANDING):  atorvastatin 40milliGRAM(s) Oral at bedtime  gabapentin 300milliGRAM(s) Oral three times a day  rifaximin 550milliGRAM(s) Oral two times a day  multivitamin 1Tablet(s) Oral daily  folic acid 1milliGRAM(s) Oral daily  thiamine 100milliGRAM(s) Oral daily  hydrocortisone 10milliGRAM(s) Oral at bedtime  hydrocortisone 20milliGRAM(s) Oral daily  heparin  Injectable 5000Unit(s) SubCutaneous every 12 hours  midodrine 5milliGRAM(s) Oral three times a day  albumin human 25% IVPB 100milliLiter(s) IV Intermittent every 12 hours  octreotide  Injectable 100MICROGram(s) IV Push once  octreotide  Infusion 50MICROgram(s)/Hr IV Continuous <Continuous>  pantoprazole  Injectable 80milliGRAM(s) IV Push once  pantoprazole Infusion 8mG/Hr IV Continuous <Continuous>    MEDICATIONS  (PRN):  naphazoline 0.025%/pheniramine 0.3% Ophthalmic Solution - Peds 1Drop(s) Both EYES two times a day PRN watery eyes.  albumin human 25% IVPB 50milliLiter(s) IV Intermittent every 30 minutes PRN PRN SBP < 110      Allergies    sulfa drugs (Unknown)    Intolerances        SOCIAL HISTORY:unchanged    FAMILY HISTORY:  No pertinent family history in first degree relatives      REVIEW OF SYSTEMS:    CONSTITUTIONAL: No weakness, fevers or chills  EYES/ENT: No visual changes;  No vertigo or throat pain   NECK: No pain or stiffness  RESPIRATORY: No cough, wheezing, hemoptysis; No shortness of breath  CARDIOVASCULAR: No chest pain or palpitations  GENITOURINARY: No dysuria, frequency or hematuria  NEUROLOGICAL: No numbness or weakness  SKIN: No itching, burning, rashes, or lesions   All other review of systems is negative unless indicated above.    Vital Signs Last 24 Hrs  T(C): 36.4, Max: 36.6 (05-15 @ 11:53)  T(F): 97.5, Max: 97.9 (05-15 @ 11:53)  HR: 96 (91 - 102)  BP: 108/53 (70/56 - 149/78)  BP(mean): --  RR: 19 (16 - 19)  SpO2: 96% (2% - 96%)    PHYSICAL EXAM:    Constitutional: NAD, well-developed  HEENT: EOMI, throat clear  Neck: No LAD, supple  Respiratory: CTA and P  Cardiovascular: S1 and S2, RRR, no M  Gastrointestinal: BS+, soft, NT/ND, neg HSM, ascites  Extremities: pos peripheral edema, neg clubing, cyanosis  Vascular: 2+ peripheral pulses  Neurological: A/O x 3, no focal deficits  Psychiatric: Normal mood, normal affect  Skin: No rashes    LABS:  CBC Full  -  ( 16 May 2017 03:45 )  WBC Count : 9.6 K/uL  Hemoglobin : 7.9 g/dL  Hematocrit : 24.2 %  Platelet Count - Automated : 96 K/uL  Mean Cell Volume : 111.2 fl  Mean Cell Hemoglobin : 36.2 pg  Mean Cell Hemoglobin Concentration : 32.5 gm/dL  Auto Neutrophil # : x  Auto Lymphocyte # : x  Auto Monocyte # : x  Auto Eosinophil # : x  Auto Basophil # : x  Auto Neutrophil % : x  Auto Lymphocyte % : x  Auto Monocyte % : x  Auto Eosinophil % : x  Auto Basophil % : x    05-15    132<L>  |  102  |  54<H>  ----------------------------<  124<H>  5.0   |  16<L>  |  4.10<H>    Ca    7.6<L>      15 May 2017 07:33  Phos  7.9     05-15    TPro  x   /  Alb  2.5<L>  /  TBili  x   /  DBili  x   /  AST  x   /  ALT  x   /  AlkPhos  x   05-15        05-14 @ 18:19  Hep A Igm Nonreact  Hep A total ab, IgA and M --  Hep B core Ab total --  Hep B core IgM Nonreact  Hep B DNA PCR --  Hep BSAg --  Hep BSAb --  Hep BSAb --  HCV Ab --  HCV RNA Log --  HCV RNA interp --                RADIOLOGY & ADDITIONAL STUDIES:  EXAM:  CHEST SINGLE VIEW FRONTAL                            PROCEDURE DATE:  05/14/2017        INTERPRETATION:  EXAMINATION DATE: May 14, 2017 at 1323 hours.  CLINICAL INFORMATION: Central line placement.    TECHNIQUE: Frontal view of the chest   COMPARISON: April 28, 2017.    FINDINGS:    LINES/TUBES: New right internal jugular central venous catheter placement   with tip overlying the superior cavoatrial junction.  LUNGS/PLEURA: Unchanged small to moderate left pleural effusion with   basilaratelectasis. No pneumothorax.  MEDIASTINUM: Cardiomediastinal silhouette is unremarkable.  OTHER: None.    IMPRESSION:     Since April 28, 2017, unchanged small to moderate left pleural effusion   with basilar atelectasis.              WERNER GRAHAM   This document has been electronically signed. May 14 2017  3:20PM  EXAM:  CHEST SINGLE VIEW FRONTAL                            PROCEDURE DATE:  05/14/2017        INTERPRETATION:  EXAMINATION DATE: May 14, 2017 at 1323 hours.  CLINICAL INFORMATION: Central line placement.    TECHNIQUE: Frontal view of the chest   COMPARISON: April 28, 2017.    FINDINGS:    LINES/TUBES: New right internal jugular central venous catheter placement   with tip overlying the superior cavoatrial junction.  LUNGS/PLEURA: Unchanged small to moderate left pleural effusion with   basilaratelectasis. No pneumothorax.  MEDIASTINUM: Cardiomediastinal silhouette is unremarkable.  OTHER: None.    IMPRESSION:     Since April 28, 2017, unchanged small to moderate left pleural effusion   with basilar atelectasis.              WERNER GRAHAM   This document has been electronically signed. May 14 2017  3:20PM

## 2017-05-16 NOTE — PROGRESS NOTE ADULT - SUBJECTIVE AND OBJECTIVE BOX
Cardiology Progress Note:    HPI: 65 yo F with PMH of liver cirrhosis 2/2 chronic alcohol abuse, macrocytic anemia, HTN, RA, esophageal varices s/p banding, CAD s/p stent, h/o prolonged Qtc, p/w chest pain/abdominal pain. States pain is more like pressure in L chest and abdomen. States pain has diminished a lot now, but is still there mildly. Denies radiation to arm / jaw / neck. Denies nausea / vomiting / diophoresis. In ED, troponins trended from 0.707 -> 79.3. Called on call cardio Dr. Flannery and discussed case, he recommended calling Dr. Anderson who is the on call interventionlist. Discussed case in detail with Dr. Anderson, including troponins trending up, new ST depressions on EKG and on going residual chest discomfort. Dr. Anderson recommends heparin drip without initial bolus due to esophageal varices / liver cirrhosis and due to patient being at increased risk for bleeding. Also recommends to c/w ASA / plavix / beta blocker / statin. Dr. Anderson believes patient may have had cardiac event earlier, and troponins are just lagging behind. Cardiac cath to be performed in AM.    5/16- Seen/examined in HD. No CP/SOB. Pt now with active GI bleed. Awaiting tx to [facility] liver unit.    PSH: CAD s/p PCI with BMS 4/3/17, esophageal varices banding, HTN, RA    Social Hx: Former smoker - quit in 2005, h/o heavy etoh use - quit 2/2016, Drugs - h/o marijuana use previously    Family Hx: Father - stomach problems / heart disease, mother - pancreatic cancer (24 Apr 2017 21:23)    Allergies  sulfa drugs (Unknown)    MEDICATIONS  (STANDING):  atorvastatin 40milliGRAM(s) Oral at bedtime  gabapentin 300milliGRAM(s) Oral three times a day  rifaximin 550milliGRAM(s) Oral two times a day  multivitamin 1Tablet(s) Oral daily  folic acid 1milliGRAM(s) Oral daily  thiamine 100milliGRAM(s) Oral daily  hydrocortisone 10milliGRAM(s) Oral at bedtime  hydrocortisone 20milliGRAM(s) Oral daily  heparin  Injectable 5000Unit(s) SubCutaneous every 12 hours  midodrine 5milliGRAM(s) Oral three times a day  albumin human 25% IVPB 100milliLiter(s) IV Intermittent every 12 hours  octreotide  Infusion 50MICROgram(s)/Hr IV Continuous <Continuous>  pantoprazole Infusion 8mG/Hr IV Continuous <Continuous>    MEDICATIONS  (PRN):  naphazoline 0.025%/pheniramine 0.3% Ophthalmic Solution - Peds 1Drop(s) Both EYES two times a day PRN watery eyes.    Vital Signs Last 24 Hrs  T(C): 36.3, Max: 36.6 (05-15 @ 11:53)  T(F): 97.4, Max: 97.9 (05-15 @ 11:53)  HR: 86 (86 - 102)  BP: 92/48 (70/56 - 149/78)  BP(mean): --  RR: 16 (16 - 19)  SpO2: 96% (2% - 96%)    REVIEW OF SYSTEMS:    CONSTITUTIONAL:  As per HPI.  HEENT:  Eyes:  No diplopia or blurred vision. ENT:  No earache, sore throat or runny nose.  CARDIOVASCULAR:  No pressure, squeezing, strangling, tightness, heaviness or aching about the chest, neck, axilla or epigastrium.  RESPIRATORY:  No cough, shortness of breath, PND or orthopnea.  GASTROINTESTINAL:  No nausea, vomiting or diarrhea.  GENITOURINARY:  No dysuria, frequency or urgency.  MUSCULOSKELETAL:  As per HPI.  SKIN:  No change in skin, hair or nails.  NEUROLOGIC:  No paresthesias, fasciculations, seizures or weakness.  PSYCHIATRIC:  No disorder of thought or mood.  ENDOCRINE:  No heat or cold intolerance, polyuria or polydipsia.  HEMATOLOGICAL:  No easy bruising or bleedings.     PHYSICAL EXAMINATION:    GENERAL APPEARANCE:  Pt. is not currently dyspneic, in no distress. Pt. is alert, oriented, and pleasant.  HEENT:  Pupils are normal and react normally. No icterus. Mucous membranes well colored.  NECK:  Supple. No lymphadenopathy. Jugular venous pressure not elevated. Carotids equal.   HEART:   The cardiac impulse has a normal quality. There are no murmurs, rubs or gallops noted  CHEST:  Chest is clear to auscultation. Normal respiratory effort.  ABDOMEN:  Soft and nontender.   EXTREMITIES:  There is no edema.   SKIN:  No rash or significant lesions are noted.    I&O's Summary    I & Os for current day (as of 16 May 2017 11:00)  =============================================  IN: 240 ml / OUT: 0 ml / NET: 240 ml      LABS:                        x      x     )-----------( x        ( 16 May 2017 07:58 )             21.4     05-15    132<L>  |  102  |  54<H>  ----------------------------<  124<H>  5.0   |  16<L>  |  4.10<H>    Ca    7.6<L>      15 May 2017 07:33  Phos  7.9     05-15    TPro  x   /  Alb  2.5<L>  /  TBili  x   /  DBili  x   /  AST  x   /  ALT  x   /  AlkPhos  x   05-15    LIVER FUNCTIONS - ( 15 May 2017 07:33 )  Alb: 2.5 g/dL / Pro: x     / ALK PHOS: x     / ALT: x     / AST: x     / GGT: x           EKG: Normal sinus rhythm  ST & T wave abnormality, consider inferior ischemia  ST & T wave abnormality, consider anterolateral ischemia  Prolonged QT    TELEMETRY: Not on tele.     CARDIAC TESTS: LHC- patent BMS in pLAD, nonobstructive CAD. 2Decho-   Limited study to evaluate left ventricular systolic function.   Estimated left ventricular ejection fraction is 55-60 %.    RADIOLOGY & ADDITIONAL STUDIES: CXR- Since April 28, 2017, unchanged small to moderate left pleural effusion   with basilar atelectasis.    ASSESSMENT & PLAN:

## 2017-05-16 NOTE — CONSULT NOTE ADULT - CONSULT REQUESTED DATE/TIME
06-May-2017 13:32
09-May-2017 09:09
29-Apr-2017 12:23
11-May-2017 10:43
16-May-2017 12:39
25-Apr-2017 10:00
26-Apr-2017
27-Apr-2017 08:51
27-Apr-2017 20:38

## 2017-05-16 NOTE — CONSULT NOTE ADULT - CONSULT REASON
cirrhosis and ascites
CP
Pt with history of esophageal variceal bleed and new onset melena
fu for Left foot and ankle fractures
leukocytosis/hypotension r/o infection
oliguria
weakness lower extremities and numbness hands / feet
RA management

## 2017-05-16 NOTE — PROVIDER CONTACT NOTE (OTHER) - REASON
Bloody bowel movement X1 episode
Consult
GASTRO
Hypotension and rigors
Neuro Surgery
Temp 100.7 F orally, 
consult
consult
NSTEMI

## 2017-05-16 NOTE — PROGRESS NOTE ADULT - ASSESSMENT
#Acute renal failure, likely pre-renal or ATN from hypotensive episode.  Possibly hepato-renal syndrome/acidosis/anuria  Renal eval appreciated  C/w HD  monitor for UOP  On Sandostatin + midodrine + albumin    # Melena  - likely variceal bleed  - on PPI ggt and sandostatin ggt.   - ASA/Plvx dc'd  - H/H  - transsfer to ICU  - Planned for upper endoscopy tomorrow- cleared by cardio    #Liver cirrhosis w/ esophageal varices/anasarca from third-spacing   S/p paracenthesis last week- 4 l removed  Agreeable to [facility] transfer after medicalyl stabilized    #Bacteremia likely abdominal source- resolved  Repeated cultures are negative  Completed abx treatment    #Hypotension- adrenal insufficiency.  Now likely 2 to volume depletion  Cont PO steroids    #NSTEMI/ CAD s/p recent stent  Card eval DR Ramesh appreciated  S/p card cath- stent patent, no other blockages  ASA/Plvx on hold d/t GIB    #Neuropathy/cervical stenosis/lumbar stenosis  Neurosurgery f/u appreciated  Follow up MRI's ordered.  Patient still refusing surgery    #Rheumatoid Arthritis/ adrenal insufficiency due to long-term oral steroids  Rheum f/u appreciated    #LT ankle fracture  Ortho f/u DR Fung appreciated. Xrays reviewed  Remains NWB to LLE in cam boot.  Harmony lift at present    #Anemia/thrombocytopenia likely 2 to liver cirrhosis and now with acute blood loss  Monitor HH  transfuse as needed    Dispo: Transferred to ICU.  Mgmt per ICU staff.

## 2017-05-16 NOTE — PROGRESS NOTE ADULT - SUBJECTIVE AND OBJECTIVE BOX
NEPHROLOGY INTERVAL HPI/OVERNIGHT EVENTS:  no c/o states that had 2 "nice" uop yesterday.  non today.  no sob  toelrating hd well yesterday with 1 kg of uf    5/16  Blood in stool  for endoscopy in am  NPO except meds today  awake alert   seen on hd  feels well  feels more awake w hd    MEDICATIONS  (STANDING):  atorvastatin 40milliGRAM(s) Oral at bedtime  aspirin 325milliGRAM(s) Oral daily  gabapentin 300milliGRAM(s) Oral three times a day  clopidogrel Tablet 75milliGRAM(s) Oral daily  rifaximin 550milliGRAM(s) Oral two times a day  pantoprazole    Tablet 40milliGRAM(s) Oral before breakfast  multivitamin 1Tablet(s) Oral daily  folic acid 1milliGRAM(s) Oral daily  thiamine 100milliGRAM(s) Oral daily  hydrocortisone 10milliGRAM(s) Oral at bedtime  hydrocortisone 20milliGRAM(s) Oral daily  heparin  Injectable 5000Unit(s) SubCutaneous every 12 hours  midodrine 5milliGRAM(s) Oral three times a day  albumin human 25% IVPB 100milliLiter(s) IV Intermittent every 12 hours    MEDICATIONS  (PRN):  naphazoline 0.025%/pheniramine 0.3% Ophthalmic Solution - Peds 1Drop(s) Both EYES two times a day PRN watery eyes.      Allergies    sulfa drugs (Unknown)    Intolerances        I&O's Detail    I & Os for current day (as of 15 May 2017 11:15)  =============================================  IN:    IV PiggyBack: 100 ml    Total IN: 100 ml  ---------------------------------------------  OUT:    Total OUT: 0 ml  ---------------------------------------------  Total NET: 100 ml      .    Patient was seen and evaluated on dialysis.   Patient is tolerating the procedure well.   Continue dialysis:       Vital Signs Last 24 Hrs  T(C): 36.4, Max: 37.1 ( @ 15:50)  T(F): 97.6, Max: 98.8 ( @ 15:50)  HR: 101 (81 - 104)  BP: 126/57 (87/66 - 126/57)  BP(mean): 68 (68 - 95)  RR: 19 (10 - 19)  SpO2: 95% (93% - 95%)  Daily     Daily Weight in k.2 (15 May 2017 05:22)    PHYSICAL EXAM:  General: alert. awake Ox3  HEENT: MMM  CV: s1s2 rrr  LUNGS: B/L CTA  EXT: generalized anasarca    LABS:             05-15    132<L>  |  102  |  54<H>  ----------------------------<  124<H>  5.0   |  16<L>  |  4.10<H>    Ca    7.6<L>      15 May 2017 07:33  Phos  7.9     05-15    TPro  x   /  Alb  2.5<L>  /  TBili  x   /  DBili  x   /  AST  x   /  ALT  x   /  AlkPhos  x   05-15    hct 21.4                  Phosphorus Level, Serum: 7.9 mg/dL (05-15 @ 07:33)

## 2017-05-16 NOTE — CONSULT NOTE ADULT - PROVIDER SPECIALTY LIST ADULT
Cardiology
Family Medicine
Infectious Disease
Nephrology
Neurology
Neurosurgery
Orthopedics
Rheumatology
Gastroenterology

## 2017-05-17 LAB
ANION GAP SERPL CALC-SCNC: 9 MMOL/L — SIGNIFICANT CHANGE UP (ref 5–17)
BUN SERPL-MCNC: 28 MG/DL — HIGH (ref 7–23)
CALCIUM SERPL-MCNC: 8.2 MG/DL — LOW (ref 8.5–10.1)
CHLORIDE SERPL-SCNC: 103 MMOL/L — SIGNIFICANT CHANGE UP (ref 96–108)
CO2 SERPL-SCNC: 27 MMOL/L — SIGNIFICANT CHANGE UP (ref 22–31)
CREAT SERPL-MCNC: 2.73 MG/DL — HIGH (ref 0.5–1.3)
GLUCOSE SERPL-MCNC: 102 MG/DL — HIGH (ref 70–99)
HCT VFR BLD CALC: 25.5 % — LOW (ref 34.5–45)
HGB BLD-MCNC: 9.2 G/DL — LOW (ref 11.5–15.5)
MAGNESIUM SERPL-MCNC: 2 MG/DL — SIGNIFICANT CHANGE UP (ref 1.6–2.6)
MCHC RBC-ENTMCNC: 36.1 PG — HIGH (ref 27–34)
MCHC RBC-ENTMCNC: 36.2 GM/DL — HIGH (ref 32–36)
MCV RBC AUTO: 99.7 FL — SIGNIFICANT CHANGE UP (ref 80–100)
PLATELET # BLD AUTO: 70 K/UL — LOW (ref 150–400)
POTASSIUM SERPL-MCNC: 3.9 MMOL/L — SIGNIFICANT CHANGE UP (ref 3.5–5.3)
POTASSIUM SERPL-SCNC: 3.9 MMOL/L — SIGNIFICANT CHANGE UP (ref 3.5–5.3)
RBC # BLD: 2.56 M/UL — LOW (ref 3.8–5.2)
RBC # FLD: 17.7 % — HIGH (ref 10.3–14.5)
SODIUM SERPL-SCNC: 139 MMOL/L — SIGNIFICANT CHANGE UP (ref 135–145)
WBC # BLD: 8 K/UL — SIGNIFICANT CHANGE UP (ref 3.8–10.5)
WBC # FLD AUTO: 8 K/UL — SIGNIFICANT CHANGE UP (ref 3.8–10.5)

## 2017-05-17 RX ORDER — HEPARIN SODIUM 5000 [USP'U]/ML
5000 INJECTION INTRAVENOUS; SUBCUTANEOUS EVERY 8 HOURS
Qty: 0 | Refills: 0 | Status: DISCONTINUED | OUTPATIENT
Start: 2017-05-17 | End: 2017-05-19

## 2017-05-17 RX ORDER — ASPIRIN/CALCIUM CARB/MAGNESIUM 324 MG
81 TABLET ORAL DAILY
Qty: 0 | Refills: 0 | Status: DISCONTINUED | OUTPATIENT
Start: 2017-05-17 | End: 2017-05-19

## 2017-05-17 RX ORDER — PANTOPRAZOLE SODIUM 20 MG/1
40 TABLET, DELAYED RELEASE ORAL
Qty: 0 | Refills: 0 | Status: DISCONTINUED | OUTPATIENT
Start: 2017-05-17 | End: 2017-05-19

## 2017-05-17 RX ADMIN — MIDODRINE HYDROCHLORIDE 5 MILLIGRAM(S): 2.5 TABLET ORAL at 05:14

## 2017-05-17 RX ADMIN — GABAPENTIN 300 MILLIGRAM(S): 400 CAPSULE ORAL at 05:14

## 2017-05-17 RX ADMIN — Medication 81 MILLIGRAM(S): at 11:43

## 2017-05-17 RX ADMIN — Medication 100 MILLIGRAM(S): at 11:43

## 2017-05-17 RX ADMIN — GABAPENTIN 300 MILLIGRAM(S): 400 CAPSULE ORAL at 21:17

## 2017-05-17 RX ADMIN — Medication 50 MILLILITER(S): at 05:14

## 2017-05-17 RX ADMIN — Medication 1 MILLIGRAM(S): at 11:45

## 2017-05-17 RX ADMIN — MIDODRINE HYDROCHLORIDE 5 MILLIGRAM(S): 2.5 TABLET ORAL at 21:17

## 2017-05-17 RX ADMIN — Medication 10 MILLIGRAM(S): at 21:19

## 2017-05-17 RX ADMIN — HEPARIN SODIUM 5000 UNIT(S): 5000 INJECTION INTRAVENOUS; SUBCUTANEOUS at 13:44

## 2017-05-17 RX ADMIN — Medication 20 MILLIGRAM(S): at 05:14

## 2017-05-17 RX ADMIN — GABAPENTIN 300 MILLIGRAM(S): 400 CAPSULE ORAL at 13:44

## 2017-05-17 RX ADMIN — MIDODRINE HYDROCHLORIDE 5 MILLIGRAM(S): 2.5 TABLET ORAL at 13:44

## 2017-05-17 RX ADMIN — ATORVASTATIN CALCIUM 40 MILLIGRAM(S): 80 TABLET, FILM COATED ORAL at 21:17

## 2017-05-17 RX ADMIN — PANTOPRAZOLE SODIUM 40 MILLIGRAM(S): 20 TABLET, DELAYED RELEASE ORAL at 16:34

## 2017-05-17 RX ADMIN — Medication 50 MILLILITER(S): at 18:52

## 2017-05-17 RX ADMIN — HEPARIN SODIUM 5000 UNIT(S): 5000 INJECTION INTRAVENOUS; SUBCUTANEOUS at 21:16

## 2017-05-17 RX ADMIN — Medication 1 TABLET(S): at 11:43

## 2017-05-17 NOTE — PROGRESS NOTE ADULT - SUBJECTIVE AND OBJECTIVE BOX
Cardiology Progress Note:    HPI: 67 yo F with PMH of liver cirrhosis 2/2 chronic alcohol abuse, macrocytic anemia, HTN, RA, esophageal varices s/p banding, CAD s/p stent, h/o prolonged Qtc, p/w chest pain/abdominal pain. States pain is more like pressure in L chest and abdomen. States pain has diminished a lot now, but is still there mildly. Denies radiation to arm / jaw / neck. Denies nausea / vomiting / diophoresis. In ED, troponins trended from 0.707 -> 79.3. Called on call cardio Dr. Flannery and discussed case, he recommended calling Dr. Anderson who is the on call interventionlist. Discussed case in detail with Dr. Anderson, including troponins trending up, new ST depressions on EKG and on going residual chest discomfort. Dr. Anderson recommends heparin drip without initial bolus due to esophageal varices / liver cirrhosis and due to patient being at increased risk for bleeding. Also recommends to c/w ASA / plavix / beta blocker / statin. Dr. Anderson believes patient may have had cardiac event earlier, and troponins are just lagging behind. Cardiac cath to be performed in AM.    5/16- Seen/examined in HD. No CP/SOB. Pt now with active GI bleed. Awaiting tx to [facility] liver unit.    5/17- EGD cancelled as per GI. Pt now awaiting transfer to [facility]. No CP/SOB. Feels tired. Lying flat in bed.    PSH: CAD s/p PCI with BMS 4/3/17, esophageal varices banding, HTN, RA    Social Hx: Former smoker - quit in 2005, h/o heavy etoh use - quit 2/2016, Drugs - h/o marijuana use previously    Family Hx: Father - stomach problems / heart disease, mother - pancreatic cancer (24 Apr 2017 21:23)    Allergies  sulfa drugs (Unknown)    MEDICATIONS  (STANDING):  atorvastatin 40milliGRAM(s) Oral at bedtime  gabapentin 300milliGRAM(s) Oral three times a day  rifaximin 550milliGRAM(s) Oral two times a day  multivitamin 1Tablet(s) Oral daily  folic acid 1milliGRAM(s) Oral daily  thiamine 100milliGRAM(s) Oral daily  hydrocortisone 10milliGRAM(s) Oral at bedtime  hydrocortisone 20milliGRAM(s) Oral daily  heparin  Injectable 5000Unit(s) SubCutaneous every 12 hours  midodrine 5milliGRAM(s) Oral three times a day  albumin human 25% IVPB 100milliLiter(s) IV Intermittent every 12 hours  octreotide  Infusion 50MICROgram(s)/Hr IV Continuous <Continuous>  pantoprazole Infusion 8mG/Hr IV Continuous <Continuous>    MEDICATIONS  (PRN):  naphazoline 0.025%/pheniramine 0.3% Ophthalmic Solution - Peds 1Drop(s) Both EYES two times a day PRN watery eyes.    Vital Signs Last 24 Hrs  T(C): 36.3, Max: 36.6 (05-15 @ 11:53)  T(F): 97.4, Max: 97.9 (05-15 @ 11:53)  HR: 86 (86 - 102)  BP: 92/48 (70/56 - 149/78)  BP(mean): --  RR: 16 (16 - 19)  SpO2: 96% (2% - 96%)    REVIEW OF SYSTEMS:    CONSTITUTIONAL:  As per HPI.  HEENT:  Eyes:  No diplopia or blurred vision. ENT:  No earache, sore throat or runny nose.  CARDIOVASCULAR:  No pressure, squeezing, strangling, tightness, heaviness or aching about the chest, neck, axilla or epigastrium.  RESPIRATORY:  No cough, shortness of breath, PND or orthopnea.  GASTROINTESTINAL:  No nausea, vomiting or diarrhea.  GENITOURINARY:  No dysuria, frequency or urgency.  MUSCULOSKELETAL:  As per HPI.  SKIN:  No change in skin, hair or nails.  NEUROLOGIC:  No paresthesias, fasciculations, seizures or weakness.  PSYCHIATRIC:  No disorder of thought or mood.  ENDOCRINE:  No heat or cold intolerance, polyuria or polydipsia.  HEMATOLOGICAL:  No easy bruising or bleedings.     PHYSICAL EXAMINATION:    GENERAL APPEARANCE:  Pt. is not currently dyspneic, in no distress. Pt. is alert, oriented, and pleasant.  HEENT:  Pupils are normal and react normally. No icterus. Mucous membranes well colored.  NECK:  Supple. No lymphadenopathy. Jugular venous pressure not elevated. Carotids equal.   HEART:   The cardiac impulse has a normal quality. There are no murmurs, rubs or gallops noted  CHEST:  Chest is clear to auscultation. Normal respiratory effort.  ABDOMEN:  Soft and nontender.   EXTREMITIES:  There is no edema.   SKIN:  No rash or significant lesions are noted.    I&O's Summary    I & Os for current day (as of 16 May 2017 11:00)  =============================================  IN: 240 ml / OUT: 0 ml / NET: 240 ml      LABS:                        x      x     )-----------( x        ( 16 May 2017 07:58 )             21.4     05-15    132<L>  |  102  |  54<H>  ----------------------------<  124<H>  5.0   |  16<L>  |  4.10<H>    Ca    7.6<L>      15 May 2017 07:33  Phos  7.9     05-15    TPro  x   /  Alb  2.5<L>  /  TBili  x   /  DBili  x   /  AST  x   /  ALT  x   /  AlkPhos  x   05-15    LIVER FUNCTIONS - ( 15 May 2017 07:33 )  Alb: 2.5 g/dL / Pro: x     / ALK PHOS: x     / ALT: x     / AST: x     / GGT: x           EKG: Normal sinus rhythm  ST & T wave abnormality, consider inferior ischemia  ST & T wave abnormality, consider anterolateral ischemia  Prolonged QT    TELEMETRY: Not on tele.     CARDIAC TESTS: LHC- patent BMS in pLAD, nonobstructive CAD. 2Decho-   Limited study to evaluate left ventricular systolic function.   Estimated left ventricular ejection fraction is 55-60 %.    RADIOLOGY & ADDITIONAL STUDIES: CXR- Since April 28, 2017, unchanged small to moderate left pleural effusion   with basilar atelectasis.    ASSESSMENT & PLAN:

## 2017-05-17 NOTE — PROGRESS NOTE ADULT - SUBJECTIVE AND OBJECTIVE BOX
NEPHROLOGY INTERVAL HPI/OVERNIGHT EVENTS:  5/17 SY  Alert, no apparent distress.  upset re: tx to [facility].  Post HD x3.  No acute complications.    HPI:  65 yo F with PMH of liver cirrhosis 2/2 chronic alcohol abuse, macrocytic anemia, HTN, RA, esophageal varices s/p banding, CAD s/p stent, h/o prolonged Qtc, p/w chest pain/abdominal pain. States pain is more like pressure in L chest and abdomen. States pain has diminished a lot now, but is still there mildly. Denies radiation to arm / jaw / neck. Denies nausea / vomiting / diophoresis. In ED, troponins trended from 0.707 -> 79.3. Called on call cardio Dr. Flannery and discussed case, he recommended calling Dr. Anderson who is the on call interventionlist. Discussed case in detail with Dr. Anderson, including troponins trending up, new ST depressions on EKG and on going residual chest discomfort. Dr. Anderson recommends heparin drip without initial bolus due to esophageal varices / liver cirrhosis and due to patient being at increased risk for bleeding. Also recommends to c/w ASA / plavix / beta blocker / statin. Dr. Anderson believes patient may have had cardiac event earlier, and troponins are just lagging behind. Cardiac cath to be performed in AM.    PSH: PCI, esophageal varices banding  Social Hx: Former smoker - quit in 2005, h/o heavy etoh use - quit 2/2016, Drugs - h/o marijuana use previously  Family Hx: Father - stomach problems / heart disease, mother - pancreatic cancer (24 Apr 2017 21:23)          MEDICATIONS  (STANDING):  atorvastatin 40milliGRAM(s) Oral at bedtime  gabapentin 300milliGRAM(s) Oral three times a day  rifaximin 550milliGRAM(s) Oral two times a day  multivitamin 1Tablet(s) Oral daily  folic acid 1milliGRAM(s) Oral daily  thiamine 100milliGRAM(s) Oral daily  hydrocortisone 10milliGRAM(s) Oral at bedtime  hydrocortisone 20milliGRAM(s) Oral daily  midodrine 5milliGRAM(s) Oral three times a day  albumin human 25% IVPB 100milliLiter(s) IV Intermittent every 12 hours  aspirin  chewable 81milliGRAM(s) Oral daily  pantoprazole    Tablet 40milliGRAM(s) Oral two times a day before meals  heparin  Injectable 5000Unit(s) SubCutaneous every 8 hours    MEDICATIONS  (PRN):  naphazoline 0.025%/pheniramine 0.3% Ophthalmic Solution - Peds 1Drop(s) Both EYES two times a day PRN watery eyes.          Vital Signs Last 24 Hrs  T(C): 36.1, Max: 36.6 (05-16 @ 11:25)  T(F): 97, Max: 97.8 (05-16 @ 11:25)  HR: 82 (69 - 99)  BP: 91/43 (77/43 - 110/46)  BP(mean): 55 (48 - 67)  RR: 8 (6 - 18)  SpO2: 98% (91% - 98%)  Daily     Daily     I & Os for current day (as of 05-17 @ 10:29)  =============================================  IN: 1029 ml / OUT: 50 ml / NET: 979 ml      PHYSICAL EXAM:  Alert and appropriate  GENERAL: No apparent distress  CHEST/LUNG: fair air entry  HEART: S1S2 RRR  ABDOMEN: soft  EXTREMITIES: anasarca.  SKIN:     LABS:                        9.2    8.0   )-----------( 70       ( 17 May 2017 05:12 )             25.5     05-17    139  |  103  |  28<H>  ----------------------------<  102<H>  3.9   |  27  |  2.73<H>    Ca    8.2<L>      17 May 2017 05:12  Phos  4.7     05-16  Mg     2.0     05-17          Magnesium, Serum: 2.0 mg/dL (05-17 @ 05:12)  Magnesium, Serum: 2.0 mg/dL (05-16 @ 14:47)  Phosphorus Level, Serum: 4.7 mg/dL (05-16 @ 14:47)          RADIOLOGY & ADDITIONAL TESTS:

## 2017-05-17 NOTE — PROGRESS NOTE ADULT - SUBJECTIVE AND OBJECTIVE BOX
CC:    HPI:  65 yo F with PMH of liver cirrhosis 2/2 chronic alcohol abuse, macrocytic anemia, HTN, RA, esophageal varices s/p banding, CAD s/p stent, h/o prolonged Qtc, p/w chest pain/abdominal pain. States pain is more like pressure in L chest and abdomen. States pain has diminished a lot now, but is still there mildly. Denies radiation to arm / jaw / neck. Denies nausea / vomiting / diophoresis. In ED, troponins trended from 0.707 -> 79.3. Called on call cardio Dr. Flannery and discussed case, he recommended calling Dr. Anderson who is the on call interventionlist. Discussed case in detail with Dr. Anderson, including troponins trending up, new ST depressions on EKG and on going residual chest discomfort. Dr. Anderson recommends heparin drip without initial bolus due to esophageal varices / liver cirrhosis and due to patient being at increased risk for bleeding. Also recommends to c/w ASA / plavix / beta blocker / statin. Dr. Anderson believes patient may have had cardiac event earlier, and troponins are just lagging behind. Cardiac cath to be performed in AM.    5/17: No bleeding overnight.  GI came in a d she is guaiac today so no EGD.      PSH: PCI, esophageal varices banding  Social Hx: Former smoker - quit in 2005, h/o heavy etoh use - quit 2/2016, Drugs - h/o marijuana use previously  Family Hx: Father - stomach problems / heart disease, mother - pancreatic cancer (24 Apr 2017 21:23)       PAST MEDICAL & SURGICAL HISTORY:  HTN (hypertension)  Rheumatoid arthritis  No significant past surgical history      FAMILY HISTORY:  No pertinent family history in first degree relatives      Social Hx:    Allergies    sulfa drugs (Unknown)    Intolerances            ICU Vital Signs Last 24 Hrs  T(C): 36.1, Max: 36.6 (05-16 @ 11:46)  T(F): 97, Max: 97.8 (05-16 @ 11:46)  HR: 82 (69 - 99)  BP: 91/43 (77/43 - 110/46)  BP(mean): 55 (48 - 67)  ABP: --  ABP(mean): --  RR: 8 (6 - 18)  SpO2: 98% (91% - 98%)          I&O's Summary    I & Os for current day (as of 17 May 2017 11:42)  =============================================  IN: 1029 ml / OUT: 50 ml / NET: 979 ml                            9.2    8.0   )-----------( 70       ( 17 May 2017 05:12 )             25.5       05-17    139  |  103  |  28<H>  ----------------------------<  102<H>  3.9   |  27  |  2.73<H>    Ca    8.2<L>      17 May 2017 05:12  Phos  4.7     05-16  Mg     2.0     05-17                      MEDICATIONS  (STANDING):  atorvastatin 40milliGRAM(s) Oral at bedtime  gabapentin 300milliGRAM(s) Oral three times a day  rifaximin 550milliGRAM(s) Oral two times a day  multivitamin 1Tablet(s) Oral daily  folic acid 1milliGRAM(s) Oral daily  thiamine 100milliGRAM(s) Oral daily  hydrocortisone 10milliGRAM(s) Oral at bedtime  hydrocortisone 20milliGRAM(s) Oral daily  midodrine 5milliGRAM(s) Oral three times a day  albumin human 25% IVPB 100milliLiter(s) IV Intermittent every 12 hours  aspirin  chewable 81milliGRAM(s) Oral daily  pantoprazole    Tablet 40milliGRAM(s) Oral two times a day before meals  heparin  Injectable 5000Unit(s) SubCutaneous every 8 hours    MEDICATIONS  (PRN):  naphazoline 0.025%/pheniramine 0.3% Ophthalmic Solution - Peds 1Drop(s) Both EYES two times a day PRN watery eyes.      DVT Prophylaxis: SQH    Advanced Directives:  Discussed with:    Visit Information:    ** Time is exclusive of billed procedures and/or teaching and/or routine family updates.

## 2017-05-17 NOTE — PROGRESS NOTE ADULT - ASSESSMENT
hypotension and recent infection complicated by likely adrenal insuff  on steroid,  m ore stable    Ascites, S/P approx 4 liter tap and reaccumulation adn pleural effusion  low salt diet        maroon colored stool  stop plavix and ASA  start protonix and octreotide  cardiology eval for possible EGD  serial Hct and keep HGB>7.5  NPO    MOM left for partner    daily weights as well as strict I's and O's discussed with both patient and nurse this morning  Low salt diet        CAD S/P Stent, stent patent with cath  inc risk bleed with anti plt due to EV,                 ARF'  pt did not respond to the ivf, sap, midodrine, octreotide combination  agrees to dialysis, started and transfer to [facility] liver tx unit for evaluation    DW Dr Walsh and they accepted pt,I dw [facility] re moving pt again and they are awaiting bed    R temp cath placed for hd        S/P HD      GIB, stable  doubt variceal as stool is now brown  DW card and ICU  start ASA due to CAD and stop octreotide and protonix gtt, change to Po  all DW team this AM  pt with cirrhosis likely due to ETOH and MTX use.

## 2017-05-17 NOTE — PROGRESS NOTE ADULT - ASSESSMENT
67 yo F with PMH of liver cirrhosis secondary to chronic alcohol abuse, macrocytic anemia, HTN, RA, esophageal varices s/p banding, CAD s/p stent, h/o prolonged Qtc, Presented on this hospitalization with initial complaint of chest pain. No on dialysis and due renal failure secondary to hypotension from either adrenal insufficiency and vs. hepatorenal syndrome. Pt with recent episode of melena.    PLAN:    GI: GI: D/C sandostating and PPi DRIP.  Po PPI.  No EGD today    Acute renal failure, likely ATN from hypotensive episode.  Possibly hepato-renal syndrome/acidosis/anuria  Renal eval appreciated  C/w HD  On Sandostatin + midodrine + albumin    Liver cirrhosis w/ esophageal varices/anasarca from third-spacing   S/p paracenthesis last week- 4 l removed  Pt would like transfer to [facility]. Pending GI workup for GIB    Bacteremia  Completed abx treatment    Hypotension- adrenal insufficiency.  Now likely 2 to volume depletion  Cont PO steroids    NSTEMI/ CAD s/p recent stent  S/p card cath- stent patent, no other blockages  Cont aspirin, Plavix, statin. If bleeding continues will have to hold ASA and plavix    Rheumatoid Arthritis/ adrenal insufficiency due to long-term oral steroids  continue steroids    LT ankle fracture  Ortho f/u DR Fung appreciated.  Remains NWB to E in cam boot.  Harmony lift at present    Transfer back to met-surg  GI arranging transfer to [facility]    Called into hospitalist

## 2017-05-17 NOTE — PROGRESS NOTE ADULT - ASSESSMENT
66F with multiple medical problems now with ongoing GI bleed in setting of esophageal varices, cirrhosis.     1. CAD s/p PCI- pt had Integrity BMS placed to pLAD 4/3/17. Pt completed 1 month of dual antiplatelet activity with asa/plavix. In setting of GI bleed, can hold antiplatelets for now. Will resume ASA 81mg daily when Ok by GI.     2. EGD cancelled, but to proceed with EGD from cardiac standpoint if needed.     3. Anemia/GI bleed- mgmt as per GI, hospitalist. Transfuse as necessary. Awaiting transfer to [facility].    4. Awaiting tx to [facility] as per GI note.     5. Mechanical DVT proph, replete lytes as needed.

## 2017-05-17 NOTE — PROGRESS NOTE ADULT - ASSESSMENT
Admission Info: 67 yo F with PMH of liver cirrhosis 2/2 chronic alcohol abuse, macrocytic anemia, HTN, RA, esophageal varices s/p banding, CAD s/p stent, h/o prolonged Qtc, p/w chest pain/abdominal pain. States pain is more lcbcike pressure in L chest and abdomen. States pain has diminished a lot now, but is still there mildly. Denies radiation to arm / jaw / neck. Denies nausea / vomiting / diophoresis.  Her hospital course included cardiac cath, stent, G- sepsis, hypotension, monitored bed admission, on pressors, paracentesis of 4 L, was place d on lasix aldactone.  Yesterday was oliguric, noonan placed, hematuria, flushed, but not much UO.  CT w/o contrast was done no hydro.    a/p  Pt had creat 0.88 mg/dl on admission, now up to 3, RADHA, prerenal, doubt HRS  diuretics held, on ivf NS  @ 80 ml/hr  will administer spa 25 g x 2 today, NS bolus 500 ml, then 125 ml/hr..  repeat labs later today..  may become more acidotic as NS infuses, may require bicarb as well.    5/14  pt did not respond to the ivf, sap, midodrine, octreotide combination  agrees to dialysis and transfer to [facility] liver tx unit for evaluation  R temp cath placed for hd  pt seen on hd  doing well\  HD indicated for anuria, metab acidosis, hyperkalemia   may tolerate fluid removal, bp improved on midodrine  hd am   hep panel done    5/15 MK  - RADHA/ HRS/ ATN s/p Hd #1 due to acidosis and hyperkalemia with anuria.    Plan for hd today. pt now is aggreeable for transfer to [facility] for OLTx   - Liver cirrhosis with esophageal varices and anasarca  - Bacteremia: resolved due to GI source  - NSTEMI/CAD s/p recent stent, on asa and plavix    addendum:   at 1:20 pm  seen at hd goal uf of 2 kg with hd higher bicarb setting  bfr of 250    5/16  seen on hd   tolerating  receiving 2 u prbc  for GI eval in am    5/17 SY  --RADHA with Cirrhosis  post HD x3 for metabolic derangement.  Pt reporting increased urine output.  Continue to monitor for possible recovery.  However, may need to continue UF /HD due to anasarca.  Plan HD /UF in am.  Will restart on IV lasix to be given with SPA to improve fluid status.  --Cirrhosis : awaiting tx to [facility].

## 2017-05-18 LAB
ANION GAP SERPL CALC-SCNC: 10 MMOL/L — SIGNIFICANT CHANGE UP (ref 5–17)
BUN SERPL-MCNC: 36 MG/DL — HIGH (ref 7–23)
CALCIUM SERPL-MCNC: 8.2 MG/DL — LOW (ref 8.5–10.1)
CHLORIDE SERPL-SCNC: 101 MMOL/L — SIGNIFICANT CHANGE UP (ref 96–108)
CO2 SERPL-SCNC: 25 MMOL/L — SIGNIFICANT CHANGE UP (ref 22–31)
CREAT SERPL-MCNC: 3.24 MG/DL — HIGH (ref 0.5–1.3)
GLUCOSE SERPL-MCNC: 141 MG/DL — HIGH (ref 70–99)
HCT VFR BLD CALC: 28.2 % — LOW (ref 34.5–45)
HGB BLD-MCNC: 9.2 G/DL — LOW (ref 11.5–15.5)
MAGNESIUM SERPL-MCNC: 2.2 MG/DL — SIGNIFICANT CHANGE UP (ref 1.6–2.6)
MCHC RBC-ENTMCNC: 32.6 GM/DL — SIGNIFICANT CHANGE UP (ref 32–36)
MCHC RBC-ENTMCNC: 34.2 PG — HIGH (ref 27–34)
MCV RBC AUTO: 104.9 FL — HIGH (ref 80–100)
PHOSPHATE SERPL-MCNC: 6 MG/DL — HIGH (ref 2.5–4.5)
PLATELET # BLD AUTO: 78 K/UL — LOW (ref 150–400)
POTASSIUM SERPL-MCNC: 4 MMOL/L — SIGNIFICANT CHANGE UP (ref 3.5–5.3)
POTASSIUM SERPL-SCNC: 4 MMOL/L — SIGNIFICANT CHANGE UP (ref 3.5–5.3)
RBC # BLD: 2.68 M/UL — LOW (ref 3.8–5.2)
RBC # FLD: 18.2 % — HIGH (ref 10.3–14.5)
SODIUM SERPL-SCNC: 136 MMOL/L — SIGNIFICANT CHANGE UP (ref 135–145)
WBC # BLD: 8.1 K/UL — SIGNIFICANT CHANGE UP (ref 3.8–10.5)
WBC # FLD AUTO: 8.1 K/UL — SIGNIFICANT CHANGE UP (ref 3.8–10.5)

## 2017-05-18 RX ORDER — ALBUMIN HUMAN 25 %
100 VIAL (ML) INTRAVENOUS
Qty: 0 | Refills: 0 | Status: DISCONTINUED | OUTPATIENT
Start: 2017-05-18 | End: 2017-05-19

## 2017-05-18 RX ADMIN — HEPARIN SODIUM 5000 UNIT(S): 5000 INJECTION INTRAVENOUS; SUBCUTANEOUS at 22:29

## 2017-05-18 RX ADMIN — MIDODRINE HYDROCHLORIDE 5 MILLIGRAM(S): 2.5 TABLET ORAL at 05:48

## 2017-05-18 RX ADMIN — GABAPENTIN 300 MILLIGRAM(S): 400 CAPSULE ORAL at 22:29

## 2017-05-18 RX ADMIN — Medication 100 MILLIGRAM(S): at 17:45

## 2017-05-18 RX ADMIN — MIDODRINE HYDROCHLORIDE 5 MILLIGRAM(S): 2.5 TABLET ORAL at 22:30

## 2017-05-18 RX ADMIN — PANTOPRAZOLE SODIUM 40 MILLIGRAM(S): 20 TABLET, DELAYED RELEASE ORAL at 17:45

## 2017-05-18 RX ADMIN — HEPARIN SODIUM 5000 UNIT(S): 5000 INJECTION INTRAVENOUS; SUBCUTANEOUS at 05:49

## 2017-05-18 RX ADMIN — Medication 1 MILLIGRAM(S): at 15:01

## 2017-05-18 RX ADMIN — Medication 10 MILLIGRAM(S): at 23:39

## 2017-05-18 RX ADMIN — Medication 1 TABLET(S): at 15:00

## 2017-05-18 RX ADMIN — Medication 50 MILLILITER(S): at 11:38

## 2017-05-18 RX ADMIN — Medication 50 MILLILITER(S): at 18:35

## 2017-05-18 RX ADMIN — Medication 20 MILLIGRAM(S): at 05:48

## 2017-05-18 RX ADMIN — Medication 81 MILLIGRAM(S): at 15:01

## 2017-05-18 RX ADMIN — GABAPENTIN 300 MILLIGRAM(S): 400 CAPSULE ORAL at 15:01

## 2017-05-18 RX ADMIN — Medication 50 MILLILITER(S): at 05:46

## 2017-05-18 RX ADMIN — GABAPENTIN 300 MILLIGRAM(S): 400 CAPSULE ORAL at 05:47

## 2017-05-18 RX ADMIN — ATORVASTATIN CALCIUM 40 MILLIGRAM(S): 80 TABLET, FILM COATED ORAL at 22:29

## 2017-05-18 RX ADMIN — MIDODRINE HYDROCHLORIDE 5 MILLIGRAM(S): 2.5 TABLET ORAL at 17:45

## 2017-05-18 RX ADMIN — PANTOPRAZOLE SODIUM 40 MILLIGRAM(S): 20 TABLET, DELAYED RELEASE ORAL at 06:57

## 2017-05-18 NOTE — PROGRESS NOTE ADULT - SUBJECTIVE AND OBJECTIVE BOX
HPI:  65 yo F with PMH of liver cirrhosis 2/2 chronic alcohol abuse, macrocytic anemia, HTN, RA, esophageal varices s/p banding, CAD s/p stent, h/o prolonged Qtc, p/w chest pain/abdominal pain. States pain is more like pressure in L chest and abdomen. States pain has diminished a lot now, but is still there mildly. Denies radiation to arm / jaw / neck. Denies nausea / vomiting / diophoresis. In ED, troponins trended from 0.707 -> 79.3. Called on call cardio Dr. Flannery and discussed case, he recommended calling Dr. Anderson who is the on call interventionlist. Discussed case in detail with Dr. Anderson, including troponins trending up, new ST depressions on EKG and on going residual chest discomfort. Dr. Anderson recommends heparin drip without initial bolus due to esophageal varices / liver cirrhosis and due to patient being at increased risk for bleeding. Also recommends to c/w ASA / plavix / beta blocker / statin. Dr. Anderson believes patient may have had cardiac event earlier, and troponins are just lagging behind. Cardiac cath to be performed in AM.    5/17: No bleeding overnight.  GI came in a d she is guaiac today so no EGD.    5/18: No events.  Awaiting [facility] transfer        PSH: PCI, esophageal varices banding  Social Hx: Former smoker - quit in 2005, h/o heavy etoh use - quit 2/2016, Drugs - h/o marijuana use previously  Family Hx: Father - stomach problems / heart disease, mother - pancreatic cancer (24 Apr 2017 21:23)       PAST MEDICAL & SURGICAL HISTORY:  HTN (hypertension)  Rheumatoid arthritis  No significant past surgical history      FAMILY HISTORY:  No pertinent family history in first degree relatives      Social Hx:    Allergies    sulfa drugs (Unknown)    Intolerances            ICU Vital Signs Last 24 Hrs  T(C): 35.9, Max: 36.7 (05-17 @ 18:00)  T(F): 96.6, Max: 98 (05-17 @ 18:00)  HR: 84 (69 - 86)  BP: 105/55 (87/49 - 115/64)  BP(mean): 67 (55 - 88)  ABP: --  ABP(mean): --  RR: 16 (7 - 19)  SpO2: 92% (83% - 99%)          I&O's Summary    I & Os for current day (as of 18 May 2017 08:01)  =============================================  IN: 1200 ml / OUT: 0 ml / NET: 1200 ml                            9.2    8.1   )-----------( x        ( 18 May 2017 04:45 )             28.2       05-18    136  |  101  |  36<H>  ----------------------------<  141<H>  4.0   |  25  |  3.24<H>    Ca    8.2<L>      18 May 2017 04:45  Phos  6.0     05-18  Mg     2.2     05-18                      MEDICATIONS  (STANDING):  atorvastatin 40milliGRAM(s) Oral at bedtime  gabapentin 300milliGRAM(s) Oral three times a day  rifaximin 550milliGRAM(s) Oral two times a day  multivitamin 1Tablet(s) Oral daily  folic acid 1milliGRAM(s) Oral daily  thiamine 100milliGRAM(s) Oral daily  hydrocortisone 10milliGRAM(s) Oral at bedtime  hydrocortisone 20milliGRAM(s) Oral daily  midodrine 5milliGRAM(s) Oral three times a day  albumin human 25% IVPB 100milliLiter(s) IV Intermittent every 12 hours  aspirin  chewable 81milliGRAM(s) Oral daily  pantoprazole    Tablet 40milliGRAM(s) Oral two times a day before meals  heparin  Injectable 5000Unit(s) SubCutaneous every 8 hours    MEDICATIONS  (PRN):  naphazoline 0.025%/pheniramine 0.3% Ophthalmic Solution - Peds 1Drop(s) Both EYES two times a day PRN watery eyes.      DVT Prophylaxis:    Advanced Directives:  Discussed with:    Visit Information:    ** Time is exclusive of billed procedures and/or teaching and/or routine family updates.

## 2017-05-18 NOTE — PROGRESS NOTE ADULT - SUBJECTIVE AND OBJECTIVE BOX
SUBJECTIVE: No blood in stool.  No complaints presently.  Accepted for transfer, awaiting bed.      REVIEW OF SYSTEMS:  All other review of systems is negative unless indicated above    Vital Signs Last 24 Hrs  T(C): 36.2, Max: 36.3 (05-18 @ 00:00)  T(F): 97.2, Max: 97.4 (05-18 @ 00:00)  HR: 90 (74 - 94)  BP: 107/66 (89/46 - 129/67)  BP(mean): 83 (56 - 83)  RR: 18 (7 - 18)  SpO2: 95% (83% - 99%)      PHYSICAL EXAM:  Constitutional: NAD, awake and alert  HEENT: EOMI, Normal Hearing, MMM  Neck: Soft and supple, No JVD  Respiratory: Breath sounds are clear bilaterally  Cardiovascular: S1 and S2, regular rate and rhythm  Gastrointestinal: Bowel Sounds present, soft, nt, obese  Extremities: Anasarca  Vascular: 2+ peripheral pulses  Neurological: A/O x 3, no focal deficits  Musculoskeletal: 5/5 strength b/l upper and lower extremities  Skin: numerous ecchymotic lesions    MEDICATIONS:  MEDICATIONS  (STANDING):  atorvastatin 40milliGRAM(s) Oral at bedtime  gabapentin 300milliGRAM(s) Oral three times a day  rifaximin 550milliGRAM(s) Oral two times a day  multivitamin 1Tablet(s) Oral daily  folic acid 1milliGRAM(s) Oral daily  thiamine 100milliGRAM(s) Oral daily  hydrocortisone 10milliGRAM(s) Oral at bedtime  hydrocortisone 20milliGRAM(s) Oral daily  midodrine 5milliGRAM(s) Oral three times a day  albumin human 25% IVPB 100milliLiter(s) IV Intermittent every 12 hours  aspirin  chewable 81milliGRAM(s) Oral daily  pantoprazole    Tablet 40milliGRAM(s) Oral two times a day before meals  heparin  Injectable 5000Unit(s) SubCutaneous every 8 hours      LABS: All Labs Reviewed:                        9.2    8.1   )-----------( 78       ( 18 May 2017 04:45 )             28.2     05-18    136  |  101  |  36<H>  ----------------------------<  141<H>  4.0   |  25  |  3.24<H>    Ca    8.2<L>      18 May 2017 04:45  Phos  6.0     05-18  Mg     2.2     05-18

## 2017-05-18 NOTE — PROGRESS NOTE ADULT - ASSESSMENT
65 yo F with PMH of liver cirrhosis secondary to chronic alcohol abuse, macrocytic anemia, HTN, RA, esophageal varices s/p banding, CAD s/p stent, h/o prolonged Qtc, Presented on this hospitalization with initial complaint of chest pain. No on dialysis and due renal failure secondary to hypotension from either adrenal insufficiency and vs. hepatorenal syndrome. Pt with recent episode of melena.    PLAN:    GI: awaiting Transfer to [facility], PPI, PO Diet    Acute renal failure, likely ATN from hypotensive episode.  Possibly hepato-renal syndrome/acidosis/anuria  Renal eval appreciated  C/w HD  On Sandostatin + midodrine + albumin    Liver cirrhosis w/ esophageal varices/anasarca from third-spacing   S/p paracenthesis last week- 4 l removed      Bacteremia  Completed abx treatment    Hypotension- adrenal insufficiency.  Now likely 2 to volume depletion  Cont PO steroids    NSTEMI/ CAD s/p recent stent  S/p card cath- stent patent, no other blockages  Cont aspirin, Plavix, statin. If bleeding continues will have to hold ASA and plavix    Rheumatoid Arthritis/ adrenal insufficiency due to long-term oral steroids  continue steroids    LT ankle fracture  Ortho f/u DR Fung appreciated.  Remains NWB to LLE in cam boot.  Harmony lift at present    Transfer back to met-surg  GI arranging transfer to [facility]    Called into hospitalist

## 2017-05-18 NOTE — PROGRESS NOTE ADULT - ASSESSMENT
#Acute renal failure, likely pre-renal or ATN from hypotensive episode.  Possibly hepato-renal syndrome/acidosis/anuria  Renal eval appreciated  C/w HD  monitor for UOP  On Sandostatin + midodrine + albumin    #Liver cirrhosis w/ esophageal varices/anasarca from third-spacing   S/p paracenthesis last week- 4 l removed  Agreeable to [facility] for liver transplant eval  Transfer paperwork completed and in chart. Awaiting bed.    #Bacteremia likely abdominal source- resolved  Repeated cultures are negative  Completed abx treatment    #Hypotension- adrenal insufficiency.  Now likely 2 to volume depletion  Cont PO steroids    #NSTEMI/ CAD s/p recent stent  Card eval DR Ramesh appreciated  S/p card cath- stent patent, no other blockages  ASA/Plvx on hold d/t GIB    #Neuropathy/cervical stenosis/lumbar stenosis  Neurosurgery f/u appreciated  Follow up MRI's ordered.  Patient still refusing surgery    #Rheumatoid Arthritis/ adrenal insufficiency due to long-term oral steroids  Rheum f/u appreciated    #LT ankle fracture  Ortho f/u DR Fung appreciated. Xrays reviewed  Remains NWB to LLE in cam boot.  Harmony lift at present    #Anemia/thrombocytopenia likely 2 to liver cirrhosis and now with acute blood loss  Monitor HH  transfuse as needed    Dispo: Transferred to [facility] when bed available.

## 2017-05-18 NOTE — PROGRESS NOTE ADULT - RS GEN PE MLT RESP DETAILS PC
no rhonchi/no rales/no wheezes/breath sounds equal
airway patent/respirations non-labored/breath sounds equal/good air movement

## 2017-05-18 NOTE — PROGRESS NOTE ADULT - RESPIRATORY
detailed exam
Breath Sounds equal & clear to percussion & auscultation, no accessory muscle use
Breath Sounds equal & clear to percussion & auscultation, no accessory muscle use
detailed exam

## 2017-05-18 NOTE — PROGRESS NOTE ADULT - SUBJECTIVE AND OBJECTIVE BOX
NEPHROLOGY INTERVAL HPI/OVERNIGHT EVENTS:  5/17 SY  Alert, no apparent distress.  upset re: tx to [facility].  Post HD x3.  No acute complications.    HPI:  65 yo F with PMH of liver cirrhosis 2/2 chronic alcohol abuse, macrocytic anemia, HTN, RA, esophageal varices s/p banding, CAD s/p stent, h/o prolonged Qtc, p/w chest pain/abdominal pain. States pain is more like pressure in L chest and abdomen. States pain has diminished a lot now, but is still there mildly. Denies radiation to arm / jaw / neck. Denies nausea / vomiting / diophoresis. In ED, troponins trended from 0.707 -> 79.3. Called on call cardio Dr. Flannery and discussed case, he recommended calling Dr. Anderson who is the on call interventionlist. Discussed case in detail with Dr. Anderson, including troponins trending up, new ST depressions on EKG and on going residual chest discomfort. Dr. Anderson recommends heparin drip without initial bolus due to esophageal varices / liver cirrhosis and due to patient being at increased risk for bleeding. Also recommends to c/w ASA / plavix / beta blocker / statin. Dr. Anderson believes patient may have had cardiac event earlier, and troponins are just lagging behind. Cardiac cath to be performed in AM.    5/18  seen on HD   orders written  no new events  await transfer for liver transplant    PSH: PCI, esophageal varices banding  Social Hx: Former smoker - quit in 2005, h/o heavy etoh use - quit 2/2016, Drugs - h/o marijuana use previously  Family Hx: Father - stomach problems / heart disease, mother - pancreatic cancer (24 Apr 2017 21:23)          MEDICATIONS  (STANDING):  atorvastatin 40milliGRAM(s) Oral at bedtime  gabapentin 300milliGRAM(s) Oral three times a day  rifaximin 550milliGRAM(s) Oral two times a day  multivitamin 1Tablet(s) Oral daily  folic acid 1milliGRAM(s) Oral daily  thiamine 100milliGRAM(s) Oral daily  hydrocortisone 10milliGRAM(s) Oral at bedtime  hydrocortisone 20milliGRAM(s) Oral daily  midodrine 5milliGRAM(s) Oral three times a day  albumin human 25% IVPB 100milliLiter(s) IV Intermittent every 12 hours  aspirin  chewable 81milliGRAM(s) Oral daily  pantoprazole    Tablet 40milliGRAM(s) Oral two times a day before meals  heparin  Injectable 5000Unit(s) SubCutaneous every 8 hours    MEDICATIONS  (PRN):  naphazoline 0.025%/pheniramine 0.3% Ophthalmic Solution - Peds 1Drop(s) Both EYES two times a day PRN watery eyes.          Vital Signs Last 24 Hrs  T(C): 36.1, Max: 36.6 (05-16 @ 11:25)  T(F): 97, Max: 97.8 (05-16 @ 11:25)  HR: 82 (69 - 99)  BP: 91/43 (77/43 - 110/46)  BP(mean): 55 (48 - 67)  RR: 8 (6 - 18)  SpO2: 98% (91% - 98%)  Daily     Daily     I & Os for current day (as of 05-17 @ 10:29)  =============================================  IN: 1029 ml / OUT: 50 ml / NET: 979 ml      PHYSICAL EXAM:  Alert and appropriate  GENERAL: No apparent distress  CHEST/LUNG: fair air entry  HEART: S1S2 RRR  ABDOMEN: soft  EXTREMITIES: anasarca.  SKIN: ecchymotic    LABS:    Phosphorus Level, Serum: 4.7 mg/dL (05-16 @ 14:47)          RADIOLOGY & ADDITIONAL TESTS:  k: 4

## 2017-05-18 NOTE — PROGRESS NOTE ADULT - ASSESSMENT
Admission Info: 65 yo F with PMH of liver cirrhosis 2/2 chronic alcohol abuse, macrocytic anemia, HTN, RA, esophageal varices s/p banding, CAD s/p stent, h/o prolonged Qtc, p/w chest pain/abdominal pain. States pain is more lcbcike pressure in L chest and abdomen. States pain has diminished a lot now, but is still there mildly. Denies radiation to arm / jaw / neck. Denies nausea / vomiting / diophoresis.  Her hospital course included cardiac cath, stent, G- sepsis, hypotension, monitored bed admission, on pressors, paracentesis of 4 L, was place d on lasix aldactone.  Yesterday was oliguric, noonan placed, hematuria, flushed, but not much UO.  CT w/o contrast was done no hydro.    a/p  Pt had creat 0.88 mg/dl on admission, now up to 3, RADHA, prerenal, doubt HRS  diuretics held, on ivf NS  @ 80 ml/hr  will administer spa 25 g x 2 today, NS bolus 500 ml, then 125 ml/hr..  repeat labs later today..  may become more acidotic as NS infuses, may require bicarb as well.      5/17 SY  --RADHA with Cirrhosis  post HD x3 for metabolic derangement.  Pt reporting increased urine output.  Continue to monitor for possible recovery.  However, may need to continue UF /HD due to anasarca.  Plan HD /UF in am.  Will restart on IV lasix to be given with SPA to improve fluid status.  --Cirrhosis : awaiting tx to [facility].    5/18  seen on hd   cirrhosis, HRS  tolerating dialysis, cont treatment as tolerated  await transfer for liver transplant

## 2017-05-18 NOTE — PROGRESS NOTE ADULT - ASSESSMENT
hypotension and recent infection complicated by likely adrenal insuff  on steroid,  m ore stable    Ascites, S/P approx 4 liter tap and reaccumulation adn pleural effusion  low salt diet        maroon colored stool, resolved  stop plavix and ASA, ASA restarted    serial Hct and keep HGB>7.5      MOM left for partner again    daily weights as well as strict I's and O's discussed with both patient and nurse this morning  Low salt diet        CAD S/P Stent, stent patent with cath  inc risk bleed with anti plt due to EV,   ASA only                ARF'  pt did not respond to the ivf, sap, midodrine, octreotide combination  agrees to dialysis, started and transfer to [facility] liver tx unit for evaluation    DW Dr Hodges and they accepted pt,I dw [facility] re moving pt again and they are awaiting bed    R temp cath placed for hd        S/P HD      GIB, stable  doubt variceal as stool is now brown  DW card and ICU,    ASA due to CAD   all DW team this AM  pt with cirrhosis likely due to ETOH and MTX use.

## 2017-05-18 NOTE — PROGRESS NOTE ADULT - SUBJECTIVE AND OBJECTIVE BOX
Patient is a 66y old  Female who presents with a chief complaint of chest pain (10 May 2017 14:59)      HPI:  65 yo F with PMH of liver cirrhosis 2/2 chronic alcohol abuse, macrocytic anemia, HTN, RA, esophageal varices s/p banding, CAD s/p stent, h/o prolonged Qtc, p/w chest pain/abdominal pain. States pain is more like pressure in L chest and abdomen. States pain has diminished a lot now, but is still there mildly. Denies radiation to arm / jaw / neck. Denies nausea / vomiting / diophoresis. In ED, troponins trended from 0.707 -> 79.3. Called on call cardio Dr. Flannery and discussed case, he recommended calling Dr. Anderson who is the on call interventionlist. Discussed case in detail with Dr. Anderson, including troponins trending up, new ST depressions on EKG and on going residual chest discomfort. Dr. Anderson recommends heparin drip without initial bolus due to esophageal varices / liver cirrhosis and due to patient being at increased risk for bleeding. Also recommends to c/w ASA / plavix / beta blocker / statin. Dr. Anderson believes patient may have had cardiac event earlier, and troponins are just lagging behind. Cardiac cath to be performed in AM.      pt comf and feels fatigues  has some lower abd discomfort s N V, neg radiation, crampy, inc prior to passing gas  neg blood in stool  neg CP or sob  awaiting transfer    PSH: PCI, esophageal varices banding  Social Hx: Former smoker - quit in 2005, h/o heavy etoh use - quit 2/2016, Drugs - h/o marijuana use previously  Family Hx: Father - stomach problems / heart disease, mother - pancreatic cancer (24 Apr 2017 21:23)      PAST MEDICAL & SURGICAL HISTORY:  HTN (hypertension)  Rheumatoid arthritis  No significant past surgical history      MEDICATIONS  (STANDING):  atorvastatin 40milliGRAM(s) Oral at bedtime  gabapentin 300milliGRAM(s) Oral three times a day  rifaximin 550milliGRAM(s) Oral two times a day  multivitamin 1Tablet(s) Oral daily  folic acid 1milliGRAM(s) Oral daily  thiamine 100milliGRAM(s) Oral daily  hydrocortisone 10milliGRAM(s) Oral at bedtime  hydrocortisone 20milliGRAM(s) Oral daily  midodrine 5milliGRAM(s) Oral three times a day  albumin human 25% IVPB 100milliLiter(s) IV Intermittent every 12 hours  aspirin  chewable 81milliGRAM(s) Oral daily  pantoprazole    Tablet 40milliGRAM(s) Oral two times a day before meals  heparin  Injectable 5000Unit(s) SubCutaneous every 8 hours    MEDICATIONS  (PRN):  naphazoline 0.025%/pheniramine 0.3% Ophthalmic Solution - Peds 1Drop(s) Both EYES two times a day PRN watery eyes.      Allergies    sulfa drugs (Unknown)    Intolerances        SOCIAL HISTORY:unchanged    FAMILY HISTORY:  No pertinent family history in first degree relatives      REVIEW OF SYSTEMS:    CONSTITUTIONAL: No weakness, fevers or chills  EYES/ENT: No visual changes;  No vertigo or throat pain   NECK: No pain or stiffness  RESPIRATORY: No cough, wheezing, hemoptysis; No shortness of breath  CARDIOVASCULAR: No chest pain or palpitations  GENITOURINARY: No dysuria, frequency or hematuria  NEUROLOGICAL: No numbness or weakness  SKIN: No itching, burning, rashes, or lesions   All other review of systems is negative unless indicated above.    Vital Signs Last 24 Hrs  T(C): 36.1, Max: 36.7 (05-17 @ 18:00)  T(F): 97, Max: 98 (05-17 @ 18:00)  HR: 85 (69 - 86)  BP: 119/71 (87/49 - 119/71)  BP(mean): 83 (56 - 88)  RR: 16 (7 - 19)  SpO2: 94% (83% - 99%)    PHYSICAL EXAM:    Constitutional: NAD, well-developed  HEENT: EOMI, throat clear  Neck: No LAD, supple  Respiratory: CTA and P  Cardiovascular: S1 and S2, RRR, no M  Gastrointestinal: BS+, soft, NT/ND, neg HSM,  Extremities: 3 plus peripheral edema, neg clubing, cyanosis    Vascular: 2+ peripheral pulses  Neurological: A/O x 2, no focal deficits  Psychiatric: Normal mood, normal affect  Skin: No rashes    LABS:  CBC Full  -  ( 18 May 2017 04:45 )  WBC Count : 8.1 K/uL  Hemoglobin : 9.2 g/dL  Hematocrit : 28.2 %  Platelet Count - Automated : 78 K/uL  Mean Cell Volume : 104.9 fl  Mean Cell Hemoglobin : 34.2 pg  Mean Cell Hemoglobin Concentration : 32.6 gm/dL  Auto Neutrophil # : x  Auto Lymphocyte # : x  Auto Monocyte # : x  Auto Eosinophil # : x  Auto Basophil # : x  Auto Neutrophil % : x  Auto Lymphocyte % : x  Auto Monocyte % : x  Auto Eosinophil % : x  Auto Basophil % : x    05-18    136  |  101  |  36<H>  ----------------------------<  141<H>  4.0   |  25  |  3.24<H>    Ca    8.2<L>      18 May 2017 04:45  Phos  6.0     05-18  Mg     2.2     05-18          05-14 @ 18:19  Hep A Igm Nonreact  Hep A total ab, IgA and M --  Hep B core Ab total --  Hep B core IgM Nonreact  Hep B DNA PCR --  Hep BSAg --  Hep BSAb --  Hep BSAb --  HCV Ab --  HCV RNA Log --  HCV RNA interp --                RADIOLOGY & ADDITIONAL STUDIES:Patient is a 66y old  Female who presents with a chief complaint of chest pain (10 May 2017 14:59)      HPI:  65 yo F with PMH of liver cirrhosis 2/2 chronic alcohol abuse, macrocytic anemia, HTN, RA, esophageal varices s/p banding, CAD s/p stent, h/o prolonged Qtc, p/w chest pain/abdominal pain. States pain is more like pressure in L chest and abdomen. States pain has diminished a lot now, but is still there mildly. Denies radiation to arm / jaw / neck. Denies nausea / vomiting / diophoresis. In ED, troponins trended from 0.707 -> 79.3. Called on call cardio Dr. Flannery and discussed case, he recommended calling Dr. Anderson who is the on call interventionlist. Discussed case in detail with Dr. Anderson, including troponins trending up, new ST depressions on EKG and on going residual chest discomfort. Dr. Anderson recommends heparin drip without initial bolus due to esophageal varices / liver cirrhosis and due to patient being at increased risk for bleeding. Also recommends to c/w ASA / plavix / beta blocker / statin. Dr. Anderson believes patient may have had cardiac event earlier, and troponins are just lagging behind. Cardiac cath to be performed in AM.    PSH: PCI, esophageal varices banding  Social Hx: Former smoker - quit in 2005, h/o heavy etoh use - quit 2/2016, Drugs - h/o marijuana use previously  Family Hx: Father - stomach problems / heart disease, mother - pancreatic cancer (24 Apr 2017 21:23)      PAST MEDICAL & SURGICAL HISTORY:  HTN (hypertension)  Rheumatoid arthritis  No significant past surgical history      MEDICATIONS  (STANDING):  atorvastatin 40milliGRAM(s) Oral at bedtime  gabapentin 300milliGRAM(s) Oral three times a day  rifaximin 550milliGRAM(s) Oral two times a day  multivitamin 1Tablet(s) Oral daily  folic acid 1milliGRAM(s) Oral daily  thiamine 100milliGRAM(s) Oral daily  hydrocortisone 10milliGRAM(s) Oral at bedtime  hydrocortisone 20milliGRAM(s) Oral daily  midodrine 5milliGRAM(s) Oral three times a day  albumin human 25% IVPB 100milliLiter(s) IV Intermittent every 12 hours  aspirin  chewable 81milliGRAM(s) Oral daily  pantoprazole    Tablet 40milliGRAM(s) Oral two times a day before meals  heparin  Injectable 5000Unit(s) SubCutaneous every 8 hours    MEDICATIONS  (PRN):  naphazoline 0.025%/pheniramine 0.3% Ophthalmic Solution - Peds 1Drop(s) Both EYES two times a day PRN watery eyes.      Allergies    sulfa drugs (Unknown)    Intolerances        SOCIAL HISTORY:    FAMILY HISTORY:  No pertinent family history in first degree relatives      REVIEW OF SYSTEMS:    CONSTITUTIONAL: No weakness, fevers or chills  EYES/ENT: No visual changes;  No vertigo or throat pain   NECK: No pain or stiffness  RESPIRATORY: No cough, wheezing, hemoptysis; No shortness of breath  CARDIOVASCULAR: No chest pain or palpitations  GENITOURINARY: No dysuria, frequency or hematuria  NEUROLOGICAL: No numbness or weakness  SKIN: No itching, burning, rashes, or lesions   All other review of systems is negative unless indicated above.    Vital Signs Last 24 Hrs  T(C): 36.1, Max: 36.7 (05-17 @ 18:00)  T(F): 97, Max: 98 (05-17 @ 18:00)  HR: 85 (69 - 86)  BP: 119/71 (87/49 - 119/71)  BP(mean): 83 (56 - 88)  RR: 16 (7 - 19)  SpO2: 94% (83% - 99%)    PHYSICAL EXAM:    Constitutional: NAD, well-developed  HEENT: EOMI, throat clear  Neck: No LAD, supple  Respiratory: CTA and P  Cardiovascular: S1 and S2, RRR, no M  Gastrointestinal: BS+, soft, NT/ND, neg HSM,  Extremities: No peripheral edema, neg clubing, cyanosis  Vascular: 2+ peripheral pulses  Neurological: A/O x 3, no focal deficits  Psychiatric: Normal mood, normal affect  Skin: No rashes    LABS:  CBC Full  -  ( 18 May 2017 04:45 )  WBC Count : 8.1 K/uL  Hemoglobin : 9.2 g/dL  Hematocrit : 28.2 %  Platelet Count - Automated : 78 K/uL  Mean Cell Volume : 104.9 fl  Mean Cell Hemoglobin : 34.2 pg  Mean Cell Hemoglobin Concentration : 32.6 gm/dL  Auto Neutrophil # : x  Auto Lymphocyte # : x  Auto Monocyte # : x  Auto Eosinophil # : x  Auto Basophil # : x  Auto Neutrophil % : x  Auto Lymphocyte % : x  Auto Monocyte % : x  Auto Eosinophil % : x  Auto Basophil % : x    05-18    136  |  101  |  36<H>  ----------------------------<  141<H>  4.0   |  25  |  3.24<H>    Ca    8.2<L>      18 May 2017 04:45  Phos  6.0     05-18  Mg     2.2     05-18          05-14 @ 18:19  Hep A Igm Nonreact  Hep A total ab, IgA and M --  Hep B core Ab total --  Hep B core IgM Nonreact  Hep B DNA PCR --  Hep BSAg --  Hep BSAb --  Hep BSAb --  HCV Ab --  HCV RNA Log --  HCV RNA interp --                RADIOLOGY & ADDITIONAL STUDIES:

## 2017-05-19 VITALS
TEMPERATURE: 98 F | OXYGEN SATURATION: 97 % | RESPIRATION RATE: 16 BRPM | SYSTOLIC BLOOD PRESSURE: 115 MMHG | DIASTOLIC BLOOD PRESSURE: 56 MMHG | HEART RATE: 91 BPM

## 2017-05-19 RX ORDER — ASPIRIN/CALCIUM CARB/MAGNESIUM 324 MG
1 TABLET ORAL
Qty: 0 | Refills: 0 | COMMUNITY
Start: 2017-05-19

## 2017-05-19 RX ORDER — IPRATROPIUM/ALBUTEROL SULFATE 18-103MCG
3 AEROSOL WITH ADAPTER (GRAM) INHALATION ONCE
Qty: 0 | Refills: 0 | Status: COMPLETED | OUTPATIENT
Start: 2017-05-19 | End: 2017-05-19

## 2017-05-19 RX ORDER — MIDODRINE HYDROCHLORIDE 2.5 MG/1
1 TABLET ORAL
Qty: 0 | Refills: 0 | COMMUNITY
Start: 2017-05-19

## 2017-05-19 RX ADMIN — Medication 20 MILLIGRAM(S): at 05:13

## 2017-05-19 RX ADMIN — PANTOPRAZOLE SODIUM 40 MILLIGRAM(S): 20 TABLET, DELAYED RELEASE ORAL at 08:31

## 2017-05-19 RX ADMIN — PANTOPRAZOLE SODIUM 40 MILLIGRAM(S): 20 TABLET, DELAYED RELEASE ORAL at 16:28

## 2017-05-19 RX ADMIN — Medication 81 MILLIGRAM(S): at 12:05

## 2017-05-19 RX ADMIN — GABAPENTIN 300 MILLIGRAM(S): 400 CAPSULE ORAL at 14:26

## 2017-05-19 RX ADMIN — Medication 3 MILLILITER(S): at 06:16

## 2017-05-19 RX ADMIN — GABAPENTIN 300 MILLIGRAM(S): 400 CAPSULE ORAL at 05:12

## 2017-05-19 RX ADMIN — Medication 3 MILLILITER(S): at 02:13

## 2017-05-19 RX ADMIN — MIDODRINE HYDROCHLORIDE 5 MILLIGRAM(S): 2.5 TABLET ORAL at 05:13

## 2017-05-19 RX ADMIN — HEPARIN SODIUM 5000 UNIT(S): 5000 INJECTION INTRAVENOUS; SUBCUTANEOUS at 05:12

## 2017-05-19 RX ADMIN — Medication 1 TABLET(S): at 12:05

## 2017-05-19 RX ADMIN — HEPARIN SODIUM 5000 UNIT(S): 5000 INJECTION INTRAVENOUS; SUBCUTANEOUS at 14:26

## 2017-05-19 RX ADMIN — Medication 50 MILLILITER(S): at 17:44

## 2017-05-19 RX ADMIN — Medication 100 MILLIGRAM(S): at 12:05

## 2017-05-19 RX ADMIN — MIDODRINE HYDROCHLORIDE 5 MILLIGRAM(S): 2.5 TABLET ORAL at 14:26

## 2017-05-19 RX ADMIN — Medication 1 MILLIGRAM(S): at 12:05

## 2017-05-19 RX ADMIN — Medication 50 MILLILITER(S): at 05:13

## 2017-05-19 NOTE — PROGRESS NOTE ADULT - PROVIDER SPECIALTY LIST ADULT
Cardiology
Critical Care
Gastroenterology
Hospitalist
Infectious Disease
Nephrology
Pulmonology
Rheumatology
CTU

## 2017-05-19 NOTE — PROGRESS NOTE ADULT - ASSESSMENT
Admission Info: 65 yo F with PMH of liver cirrhosis 2/2 chronic alcohol abuse, macrocytic anemia, HTN, RA, esophageal varices s/p banding, CAD s/p stent, h/o prolonged Qtc, p/w chest pain/abdominal pain. States pain is more lcbcike pressure in L chest and abdomen. States pain has diminished a lot now, but is still there mildly. Denies radiation to arm / jaw / neck. Denies nausea / vomiting / diophoresis.  Her hospital course included cardiac cath, stent, G- sepsis, hypotension, monitored bed admission, on pressors, paracentesis of 4 L, was place d on lasix aldactone.  Yesterday was oliguric, noonan placed, hematuria, flushed, but not much UO.  CT w/o contrast was done no hydro.    a/p  Pt had creat 0.88 mg/dl on admission, now up to 3, RADHA, prerenal, doubt HRS  diuretics held, on ivf NS  @ 80 ml/hr  will administer spa 25 g x 2 today, NS bolus 500 ml, then 125 ml/hr..  repeat labs later today..  may become more acidotic as NS infuses, may require bicarb as well.      5/17 SY  --RADHA with Cirrhosis  post HD x3 for metabolic derangement.  Pt reporting increased urine output.  Continue to monitor for possible recovery.  However, may need to continue UF /HD due to anasarca.  Plan HD /UF in am.  Will restart on IV lasix to be given with SPA to improve fluid status.  --Cirrhosis : awaiting tx to [facility].    5/18  seen on hd   cirrhosis, HRS  tolerating dialysis, cont treatment as tolerated  await transfer for liver transplant      5/19   hopefully to be transferred to [facility] for liver tx when bed available tonight  if not, HD in am

## 2017-05-19 NOTE — PROGRESS NOTE ADULT - ASSESSMENT
hypotension and recent infection complicated by likely adrenal insuff  on steroid,  more stable    Ascites, S/P approx 4 liter tap and reaccumulation adn pleural effusion  low salt diet        maroon colored stool, resolved  stop plavix and ASA, ASA restarted    serial Hct and keep HGB>7.5      MOM left for partner again    daily weights as well as strict I's and O's discussed with both patient and nurse this morning  Low salt diet        CAD S/P Stent, stent patent with cath  inc risk bleed with anti plt due to EV,   ASA only    ARF'  pt did not respond to the ivf, sap, midodrine, octreotide combination  agrees to dialysis, started and transfer to [facility] liver tx unit for evaluation  likely HRS    DW Dr Hodges and they accepted pt,I dw [facility] re moving pt again and they are awaiting bed, DW her yesterday again and pt notified    R temp cath placed for hd        S/P HD      GIB, stable  doubt variceal as stool is now brown  DW card   ASA due to CAD     pt with cirrhosis likely due to ETOH and MTX use.

## 2017-05-19 NOTE — PROGRESS NOTE ADULT - ASSESSMENT
67 yo F with PMH of liver cirrhosis 2/2 chronic alcohol abuse, macrocytic anemia, HTN, RA, esophageal varices s/p banding, CAD s/p stent, h/o prolonged Qtc, NWB to LLE from previous fall and fractures, p/w chest pain/abdominal pain. In ED, troponins trended from 0.707 -> 79.3.  Diagnosed with NSTEMI.  Cardiology recommended heparin drip without initial bolus due to esophageal varices / liver cirrhosis and due to patient being at increased risk for bleeding. Also recommended to c/w ASA / plavix / beta blocker / statin.  card cath revealed patent stent.  Pts  Hospitalization was complicated by   1. Pt had hypotension and was on pressors, resolved. Despite NSTEMI on admission pt maintained off of BB d/t hypotension.  2. Found to have sepsis 2 to GNR likely abdominal source- completed course of antibiotics. F/u blood c/s negative.   3. Ascites and anasarca- Had paracentesis with 4 liters fluid drained.   4. Acute renal failure- failed midodrine, sandostatin and albumin and required initiation of HD, renal failure was initially concerning for ATN from hypotensive episode/pressors/sepsis however later believed to be due to hepatorenal syndrome.  Despite NSTEMI on admission pt maintained off ACEI d/t renal failure.  5. Pt had episode of BRBPR and there was concern for variceal bleed however pt remained HD stable and prior to upper endoscopy pts stool was guiac negative and pt not having any more blood per rectum so procedure was aborted.  Despite the NSTEMI on admission decision was to discontinue plavix and decrease aspirin to 81mg due to risk of bleeding.  6. Neuropathy, Cervical and Lumbar spinal stenosis, was eval by NSG and pt declined surgery, being maintained on gabapentin  7. Adrenal insufficiency 2 to long-term steroid intake for RA- on Hydrocortisone PO    #Acute renal failure, likely HRS.  Less likely ATN from hypotensive episode/sepsis.   - failed midodrine, sandostatin and albumin and required initiation of HD, renal failure was initially concerning for ATN from hypotensive episode/pressors/sepsis however later believed to be due to hepatorenal syndrome.  Despite NSTEMI on admission pt maintained off ACEI d/t renal failure    #Liver cirrhosis w/ esophageal varices/anasarca from third-spacing   S/p paracenthesis last week- 4 l removed  Agreeable to [facility] for liver transplant eval  Transfer paperwork completed and in chart. Awaiting bed.    #Bacteremia likely abdominal source- resolved  Repeated cultures are negative  Completed abx treatment    #Hypotension- adrenal insufficiency?.  Cont PO steroids.  No ACEI or BB for NSTEMI- can start if bp better.    #NSTEMI/ CAD s/p recent stent  Card eval DR Ramesh appreciated  S/p card cath- stent patent, no other blockages  C/w baby aspirin. No plvx d/t bleeding risk.    #Neuropathy/cervical stenosis/lumbar stenosis  Neurosurgery f/u appreciated  Patient still refusing surgery    #Rheumatoid Arthritis/ adrenal insufficiency due to long-term oral steroids  PO steroids- taper at [facility]    #LT ankle fracture  Ortho f/u DR Fung appreciated  Remains NWB to LLE in cam boot.    #Anemia/thrombocytopenia likely 2 to liver cirrhosis  Monitor HH    Dispo: Transfer to [facility] when bed available.

## 2017-05-19 NOTE — PROGRESS NOTE ADULT - SUBJECTIVE AND OBJECTIVE BOX
CC: Chest pain    SUBJECTIVE: No O/N events. Awaiting bed at [facility].    REVIEW OF SYSTEMS:  All other review of systems is negative unless indicated above    Vital Signs Last 24 Hrs  T(C): 36.3, Max: 36.9 (05-19 @ 01:15)  T(F): 97.4, Max: 98.4 (05-19 @ 01:15)  HR: 85 (76 - 97)  BP: 99/55 (99/52 - 129/63)  BP(mean): --  RR: 16 (16 - 18)  SpO2: 95% (94% - 95%)    PHYSICAL EXAM:  Constitutional: NAD, awake and alert  HEENT: EOMI, Normal Hearing, MMM  Neck: Soft and supple, No JVD  Respiratory: Breath sounds are clear bilaterally  Cardiovascular: S1 and S2, regular rate and rhythm  Gastrointestinal: Bowel Sounds present, soft, nt, obese  Extremities: Anasarca  Vascular: 2+ peripheral pulses  Neurological: A/O x 3, no focal deficits  Musculoskeletal: 5/5 strength b/l upper and lower extremities  Skin: numerous ecchymotic lesions    MEDICATIONS:  MEDICATIONS  (STANDING):  atorvastatin 40milliGRAM(s) Oral at bedtime  gabapentin 300milliGRAM(s) Oral three times a day  rifaximin 550milliGRAM(s) Oral two times a day  multivitamin 1Tablet(s) Oral daily  folic acid 1milliGRAM(s) Oral daily  thiamine 100milliGRAM(s) Oral daily  hydrocortisone 10milliGRAM(s) Oral at bedtime  hydrocortisone 20milliGRAM(s) Oral daily  midodrine 5milliGRAM(s) Oral three times a day  albumin human 25% IVPB 100milliLiter(s) IV Intermittent every 12 hours  aspirin  chewable 81milliGRAM(s) Oral daily  pantoprazole    Tablet 40milliGRAM(s) Oral two times a day before meals  heparin  Injectable 5000Unit(s) SubCutaneous every 8 hours      LABS: All Labs Reviewed:                        9.2    8.1   )-----------( 78       ( 18 May 2017 04:45 )             28.2     05-18    136  |  101  |  36<H>  ----------------------------<  141<H>  4.0   |  25  |  3.24<H>    Ca    8.2<L>      18 May 2017 04:45  Phos  6.0     05-18  Mg     2.2     05-18

## 2017-05-19 NOTE — CHART NOTE - NSCHARTNOTEFT_GEN_A_CORE
Resident notified by nurse that patient being transferred to [facility] for liver transplant eval per day team plan    Was asked to speak to Dr. Santos @ [facility] [phone]    Chart reviewed and Hospital Course & Current Medication list conveyed to Dr. Santos. [employer] phone number left in case there are additional questions
MOM left for spouse re consent
Nurse Practitioner Progress note:     HPI:  67 yo F with PMH of liver cirrhosis 2/2 chronic alcohol abuse, macrocytic anemia, HTN, RA, esophageal varices s/p banding, CAD s/p stent, h/o prolonged Qtc, p/w chest pain/abdominal pain. States pain is more like pressure in L chest and abdomen. States pain has diminished a lot now, but is still there mildly. Denies radiation to arm / jaw / neck. Denies nausea / vomiting / diophoresis. In ED, troponins trended from 0.707 -> 79.3. Called on call cardio Dr. Flannery and discussed case, he recommended calling Dr. Anderson who is the on call interventionlist. Discussed case in detail with Dr. Anderson, including troponins trending up, new ST depressions on EKG and on going residual chest discomfort. Cardiac cath to be performed in AM.    PHYSICAL EXAM:  Neurologic: Non-focal, AxOx3.  No neuro deficits  Vascular: Peripheral pulses palpable 2+ bilaterally  Procedure Site: Rt. femoral sheath pulled manual pressure applied x15 minutes site benign soft no bleeding no hematoma +1PP      PROCEDURE RESULTS: S/P LHC patent stents    ASSESSMENT/PLAN: 67 yo F with PMH of liver cirrhosis 2/2 chronic alcohol abuse, macrocytic anemia, HTN, RA, esophageal varices s/p banding, CAD s/p stent, h/o prolonged Qtc, p/w chest pain/abdominal pain. In ED, troponins trended from 0.707 -> 79.3. new ST depressions on EKG and on going residual chest discomfort. S/P LHC patent stents  	  -VS, labs, diet, activity as per post cath orders  -IV hydration  -Encourage PO fluids  -Medical management   -Continue current medications  -Plan of care D/W pt. and MD  -Post cath instructions reviewed with pt., pt. verbalizes and understands instructions  -Follow-up with attending

## 2017-05-19 NOTE — PROGRESS NOTE ADULT - SUBJECTIVE AND OBJECTIVE BOX
NEPHROLOGY INTERVAL HPI/OVERNIGHT EVENTS:  5/17 SY  Alert, no apparent distress.  upset re: tx to [facility].  Post HD x3.  No acute complications.    HPI:  65 yo F with PMH of liver cirrhosis 2/2 chronic alcohol abuse, macrocytic anemia, HTN, RA, esophageal varices s/p banding, CAD s/p stent, h/o prolonged Qtc, p/w chest pain/abdominal pain. States pain is more like pressure in L chest and abdomen. States pain has diminished a lot now, but is still there mildly. Denies radiation to arm / jaw / neck. Denies nausea / vomiting / diophoresis. In ED, troponins trended from 0.707 -> 79.3. Called on call cardio Dr. Flannery and discussed case, he recommended calling Dr. Anderson who is the on call interventionlist. Discussed case in detail with Dr. Anderson, including troponins trending up, new ST depressions on EKG and on going residual chest discomfort. Dr. Anderson recommends heparin drip without initial bolus due to esophageal varices / liver cirrhosis and due to patient being at increased risk for bleeding. Also recommends to c/w ASA / plavix / beta blocker / statin. Dr. Anderson believes patient may have had cardiac event earlier, and troponins are just lagging behind. Cardiac cath to be performed in AM.    5/18  seen on HD   orders written  no new events  await transfer for liver transplant    5/19  for transfer to [facility] hopefully tonight  await a bed  feels well   good appetite    PSH: PCI, esophageal varices banding  Social Hx: Former smoker - quit in 2005, h/o heavy etoh use - quit 2/2016, Drugs - h/o marijuana use previously  Family Hx: Father - stomach problems / heart disease, mother - pancreatic cancer (24 Apr 2017 21:23)          MEDICATIONS  (STANDING):  atorvastatin 40milliGRAM(s) Oral at bedtime  gabapentin 300milliGRAM(s) Oral three times a day  rifaximin 550milliGRAM(s) Oral two times a day  multivitamin 1Tablet(s) Oral daily  folic acid 1milliGRAM(s) Oral daily  thiamine 100milliGRAM(s) Oral daily  hydrocortisone 10milliGRAM(s) Oral at bedtime  hydrocortisone 20milliGRAM(s) Oral daily  midodrine 5milliGRAM(s) Oral three times a day  albumin human 25% IVPB 100milliLiter(s) IV Intermittent every 12 hours  aspirin  chewable 81milliGRAM(s) Oral daily  pantoprazole    Tablet 40milliGRAM(s) Oral two times a day before meals  heparin  Injectable 5000Unit(s) SubCutaneous every 8 hours    MEDICATIONS  (PRN):  naphazoline 0.025%/pheniramine 0.3% Ophthalmic Solution - Peds 1Drop(s) Both EYES two times a day PRN watery eyes.        ICU Vital Signs Last 24 Hrs  T(C): 36.6, Max: 36.9 (05-19 @ 01:15)  T(F): 97.9, Max: 98.4 (05-19 @ 01:15)  HR: 91 (85 - 97)  BP: 115/56 (99/52 - 122/63)  BP(mean): --  ABP: --  ABP(mean): --  RR: 16 (16 - 18)  SpO2: 97% (94% - 97%)        PHYSICAL EXAM:  Alert and appropriate  GENERAL: No apparent distress  CHEST/LUNG: fair air entry  HEART: S1S2 RRR  ABDOMEN: soft  EXTREMITIES: anasarca.  SKIN: ecchymotic    LABS:                          9.2    8.1   )-----------( 78       ( 18 May 2017 04:45 )             28.2       05-18    136  |  101  |  36<H>  ----------------------------<  141<H>  4.0   |  25  |  3.24<H>    Ca    8.2<L>      18 May 2017 04:45  Phos  6.0     05-18  Mg     2.2     05-18

## 2017-05-19 NOTE — PROGRESS NOTE ADULT - SUBJECTIVE AND OBJECTIVE BOX
Patient is a 66y old  Female who presents with a chief complaint of chest pain (10 May 2017 14:59)      HPI:  67 yo F with PMH of liver cirrhosis 2/2 chronic alcohol abuse, macrocytic anemia, HTN, RA, esophageal varices s/p banding, CAD s/p stent, h/o prolonged Qtc, p/w chest pain/abdominal pain. States pain is more like pressure in L chest and abdomen. States pain has diminished a lot now, but is still there mildly. Denies radiation to arm / jaw / neck. Denies nausea / vomiting / diophoresis. In ED, troponins trended from 0.707 -> 79.3. Called on call cardio Dr. Flannery and discussed case, he recommended calling Dr. Anderson who is the on call interventionlist. Discussed case in detail with Dr. Anderson, including troponins trending up, new ST depressions on EKG and on going residual chest discomfort. Dr. Anderson recommends heparin drip without initial bolus due to esophageal varices / liver cirrhosis and due to patient being at increased risk for bleeding. Also recommends to c/w ASA / plavix / beta blocker / statin. Dr. Anderson believes patient may have had cardiac event earlier, and troponins are just lagging behind. Cardiac cath to be performed in AM.    pt fatigued  CO mild le pain and reflux  reflux and heart burn mild and inc with laying flat  neg dysphagia      PSH: PCI, esophageal varices banding  Social Hx: Former smoker - quit in 2005, h/o heavy etoh use - quit 2/2016, Drugs - h/o marijuana use previously  Family Hx: Father - stomach problems / heart disease, mother - pancreatic cancer (24 Apr 2017 21:23)      PAST MEDICAL & SURGICAL HISTORY:  HTN (hypertension)  Rheumatoid arthritis  No significant past surgical history      MEDICATIONS  (STANDING):  atorvastatin 40milliGRAM(s) Oral at bedtime  gabapentin 300milliGRAM(s) Oral three times a day  rifaximin 550milliGRAM(s) Oral two times a day  multivitamin 1Tablet(s) Oral daily  folic acid 1milliGRAM(s) Oral daily  thiamine 100milliGRAM(s) Oral daily  hydrocortisone 10milliGRAM(s) Oral at bedtime  hydrocortisone 20milliGRAM(s) Oral daily  midodrine 5milliGRAM(s) Oral three times a day  albumin human 25% IVPB 100milliLiter(s) IV Intermittent every 12 hours  aspirin  chewable 81milliGRAM(s) Oral daily  pantoprazole    Tablet 40milliGRAM(s) Oral two times a day before meals  heparin  Injectable 5000Unit(s) SubCutaneous every 8 hours    MEDICATIONS  (PRN):  naphazoline 0.025%/pheniramine 0.3% Ophthalmic Solution - Peds 1Drop(s) Both EYES two times a day PRN watery eyes.  albumin human 25% IVPB 100milliLiter(s) IV Intermittent every 1 hour PRN for sbp<,= 100mmHg for up to 2 doses      Allergies    sulfa drugs (Unknown)    Intolerances        SOCIAL HISTORY:unchanged    FAMILY HISTORY:  No pertinent family history in first degree relatives      REVIEW OF SYSTEMS:    CONSTITUTIONAL: pos weakness, fevers or chills  EYES/ENT: No visual changes;  No vertigo or throat pain   NECK: No pain or stiffness  RESPIRATORY: No cough, wheezing, hemoptysis; No shortness of breath  CARDIOVASCULAR: No chest pain or palpitations  GENITOURINARY: No dysuria, frequency or hematuria  NEUROLOGICAL: No numbness  SKIN: No itching, burning,g, rashes, or lesions   All other review of systems is negative unless indicated above.    Vital Signs Last 24 Hrs  T(C): 36.3, Max: 36.9 (05-19 @ 01:15)  T(F): 97.4, Max: 98.4 (05-19 @ 01:15)  HR: 85 (76 - 97)  BP: 99/55 (99/52 - 129/67)  BP(mean): 83 (78 - 83)  RR: 16 (7 - 18)  SpO2: 95% (94% - 97%)    PHYSICAL EXAM:    Constitutional: NAD, well-developed, anasarca  HEENT: EOMI, throat clear  Neck: No LAD, supple  Respiratory: CTA and P, dec BS at bses  Cardiovascular: S1 and S2, RRR, no M  Gastrointestinal: BS+, soft, NT/ND, neg HSM,pos ascites  Extremities: pos peripheral edema, neg clubing, cyanosis    Neurological: A/O x 3, no focal deficits  Psychiatric: Normal mood, normal affect  Skin: No rashes    LABS:  CBC Full  -  ( 18 May 2017 04:45 )  WBC Count : 8.1 K/uL  Hemoglobin : 9.2 g/dL  Hematocrit : 28.2 %  Platelet Count - Automated : 78 K/uL  Mean Cell Volume : 104.9 fl  Mean Cell Hemoglobin : 34.2 pg  Mean Cell Hemoglobin Concentration : 32.6 gm/dL  Auto Neutrophil # : x  Auto Lymphocyte # : x  Auto Monocyte # : x  Auto Eosinophil # : x  Auto Basophil # : x  Auto Neutrophil % : x  Auto Lymphocyte % : x  Auto Monocyte % : x  Auto Eosinophil % : x  Auto Basophil % : x    05-18    136  |  101  |  36<H>  ----------------------------<  141<H>  4.0   |  25  |  3.24<H>    Ca    8.2<L>      18 May 2017 04:45  Phos  6.0     05-18  Mg     2.2     05-18          05-14 @ 18:19  Hep A Igm Nonreact  Hep A total ab, IgA and M --  Hep B core Ab total --  Hep B core IgM Nonreact  Hep B DNA PCR --  Hep BSAg --  Hep BSAb --  Hep BSAb --  HCV Ab --  HCV RNA Log --  HCV RNA interp --                RADIOLOGY & ADDITIONAL STUDIES:

## 2017-05-24 DIAGNOSIS — D53.9 NUTRITIONAL ANEMIA, UNSPECIFIED: ICD-10-CM

## 2017-05-24 DIAGNOSIS — I95.2 HYPOTENSION DUE TO DRUGS: ICD-10-CM

## 2017-05-24 DIAGNOSIS — R32 UNSPECIFIED URINARY INCONTINENCE: ICD-10-CM

## 2017-05-24 DIAGNOSIS — R53.83 OTHER FATIGUE: ICD-10-CM

## 2017-05-24 DIAGNOSIS — K76.6 PORTAL HYPERTENSION: ICD-10-CM

## 2017-05-24 DIAGNOSIS — E27.3 DRUG-INDUCED ADRENOCORTICAL INSUFFICIENCY: ICD-10-CM

## 2017-05-24 DIAGNOSIS — S30.1XXA CONTUSION OF ABDOMINAL WALL, INITIAL ENCOUNTER: ICD-10-CM

## 2017-05-24 DIAGNOSIS — B95.2 ENTEROCOCCUS AS THE CAUSE OF DISEASES CLASSIFIED ELSEWHERE: ICD-10-CM

## 2017-05-24 DIAGNOSIS — I85.00 ESOPHAGEAL VARICES WITHOUT BLEEDING: ICD-10-CM

## 2017-05-24 DIAGNOSIS — S80.12XA CONTUSION OF LEFT LOWER LEG, INITIAL ENCOUNTER: ICD-10-CM

## 2017-05-24 DIAGNOSIS — M48.02 SPINAL STENOSIS, CERVICAL REGION: ICD-10-CM

## 2017-05-24 DIAGNOSIS — J90 PLEURAL EFFUSION, NOT ELSEWHERE CLASSIFIED: ICD-10-CM

## 2017-05-24 DIAGNOSIS — D69.59 OTHER SECONDARY THROMBOCYTOPENIA: ICD-10-CM

## 2017-05-24 DIAGNOSIS — Z79.52 LONG TERM (CURRENT) USE OF SYSTEMIC STEROIDS: ICD-10-CM

## 2017-05-24 DIAGNOSIS — I85.10 SECONDARY ESOPHAGEAL VARICES WITHOUT BLEEDING: ICD-10-CM

## 2017-05-24 DIAGNOSIS — Z95.5 PRESENCE OF CORONARY ANGIOPLASTY IMPLANT AND GRAFT: ICD-10-CM

## 2017-05-24 DIAGNOSIS — D64.9 ANEMIA, UNSPECIFIED: ICD-10-CM

## 2017-05-24 DIAGNOSIS — G62.9 POLYNEUROPATHY, UNSPECIFIED: ICD-10-CM

## 2017-05-24 DIAGNOSIS — R07.9 CHEST PAIN, UNSPECIFIED: ICD-10-CM

## 2017-05-24 DIAGNOSIS — A41.50 GRAM-NEGATIVE SEPSIS, UNSPECIFIED: ICD-10-CM

## 2017-05-24 DIAGNOSIS — K92.2 GASTROINTESTINAL HEMORRHAGE, UNSPECIFIED: ICD-10-CM

## 2017-05-24 DIAGNOSIS — T38.0X5A ADVERSE EFFECT OF GLUCOCORTICOIDS AND SYNTHETIC ANALOGUES, INITIAL ENCOUNTER: ICD-10-CM

## 2017-05-24 DIAGNOSIS — E87.2 ACIDOSIS: ICD-10-CM

## 2017-05-24 DIAGNOSIS — I10 ESSENTIAL (PRIMARY) HYPERTENSION: ICD-10-CM

## 2017-05-24 DIAGNOSIS — E27.40 UNSPECIFIED ADRENOCORTICAL INSUFFICIENCY: ICD-10-CM

## 2017-05-24 DIAGNOSIS — D69.6 THROMBOCYTOPENIA, UNSPECIFIED: ICD-10-CM

## 2017-05-24 DIAGNOSIS — R20.0 ANESTHESIA OF SKIN: ICD-10-CM

## 2017-05-24 DIAGNOSIS — R65.21 SEVERE SEPSIS WITH SEPTIC SHOCK: ICD-10-CM

## 2017-05-24 DIAGNOSIS — S80.11XA CONTUSION OF RIGHT LOWER LEG, INITIAL ENCOUNTER: ICD-10-CM

## 2017-05-24 DIAGNOSIS — K76.7 HEPATORENAL SYNDROME: ICD-10-CM

## 2017-05-24 DIAGNOSIS — I25.10 ATHEROSCLEROTIC HEART DISEASE OF NATIVE CORONARY ARTERY WITHOUT ANGINA PECTORIS: ICD-10-CM

## 2017-05-24 DIAGNOSIS — K70.31 ALCOHOLIC CIRRHOSIS OF LIVER WITH ASCITES: ICD-10-CM

## 2017-05-24 DIAGNOSIS — N17.9 ACUTE KIDNEY FAILURE, UNSPECIFIED: ICD-10-CM

## 2017-05-24 DIAGNOSIS — I21.4 NON-ST ELEVATION (NSTEMI) MYOCARDIAL INFARCTION: ICD-10-CM

## 2017-05-24 DIAGNOSIS — Z87.891 PERSONAL HISTORY OF NICOTINE DEPENDENCE: ICD-10-CM

## 2017-05-24 DIAGNOSIS — M06.9 RHEUMATOID ARTHRITIS, UNSPECIFIED: ICD-10-CM

## 2017-05-24 DIAGNOSIS — L97.919 NON-PRESSURE CHRONIC ULCER OF UNSPECIFIED PART OF RIGHT LOWER LEG WITH UNSPECIFIED SEVERITY: ICD-10-CM

## 2017-05-24 DIAGNOSIS — N39.0 URINARY TRACT INFECTION, SITE NOT SPECIFIED: ICD-10-CM

## 2017-05-24 DIAGNOSIS — Z79.02 LONG TERM (CURRENT) USE OF ANTITHROMBOTICS/ANTIPLATELETS: ICD-10-CM

## 2018-04-17 NOTE — ED PROVIDER NOTE - CARDIAC, MLM
Medications refilled at today's visit:  Valium 10 mg #30 with 2 refills  Soma 350 mg #120 with 2 refills   Normal rate, regular rhythm.  Heart sounds S1, S2.  No murmurs, rubs or gallops.

## 2018-05-09 NOTE — DIETITIAN INITIAL EVALUATION ADULT. - PHYSICAL APPEARANCE
Called and informed patient that stool studies came back negative and that he should follow up with his pcp if his symptoms continue.    well nourished

## 2018-05-09 NOTE — PHYSICAL THERAPY INITIAL EVALUATION ADULT - STANDING BALANCE: DYNAMIC, REHAB EVAL
with RW/fair balance
For information on Fall & Injury Prevention, visit [email or web address]

## 2018-07-08 NOTE — PROGRESS NOTE ADULT - GASTROINTESTINAL
Problem: Patient Care Overview  Goal: Plan of Care Review  Outcome: Ongoing (interventions implemented as appropriate)  Plan of care reviewed and updated . Pt AA+O . Pt's pain is managed with medication ordered at this timed . Pt's V/S are as charted . No falls this shift. . Pt is oriented to room and call system . Will continue to monitor .          Soft, non-tender, no hepatosplenomegaly, normal bowel sounds

## 2019-02-15 NOTE — PHYSICAL THERAPY INITIAL EVALUATION ADULT - PHYSICAL ASSIST/NONPHYSICAL ASSIST: SUPINE/SIT, REHAB EVAL
Is This A New Presentation, Or A Follow-Up?: Nail Discoloration
How Severe Is It?: moderate
Additional History: She gets pedicures but no nail polish x 10 months. She uses a natural oil on the nails twice a day. She states it has helped a little
1 person assist/tactile cues/verbal cues

## 2019-11-05 NOTE — PATIENT PROFILE ADULT. - SLEEP, NORMAL REST SCHEDULE, PROFILE
Referred by: Brandy Sanchez PA-C; Medical Diagnosis (from order):      Physical Therapy -  Daily Treatment Note    Visit: 4    SUBJECTIVE                                                                                                             Patient  felt good after last session. Patient states he is able sleep pretty good.     Pain / Symptoms:  Pain rating (out of 10): Current: 2   Quality / Description: sore.    OBJECTIVE                                                                                                                     Range of Motion (ROM) (norms in parentheses, measurement in degrees unless noted):   Shoulder Flexion (180): Right: Passive: 120   Shoulder Abduction (180):Right: Passive: 130   Shoulder Internal Rotation at 0°: Right: Passive: 20  Shoulder External Rotation at 0°: Right: Passive: 25        TREATMENT                                                                                                                  Therapeutic Exercise:   instructed patient on following exercises on correct form and technique.   1. Passive range of motion shoulder x5 reps; x3 sets with rests  2.Elbow flexion /extension : x10; x2 sets  3. Table slides flexion : x10   4. Bicep curl: 1#x10  5. 4 way shoulder  isometrics : x5 each  Ice x10'     HEP:  1. Codman circles: X 10  2. Scapular retraction: X5  3. Elbow flexion/extension: X 10  4. Table slides flexion x10  5. Biceps curls x10 1#  6. 4 way  isometrics x5      Manual Therapy:  Gentle grade 1 glenohumeral joint mobilizations multi directional  Therapeutic Activity:  Patient education: Instructed patient to continue to perform icing after exercises. Encourage patient to discontinue sling.   Patient states he understands. Discuss patient may want to begin icing more again if he does have any increased soreness with his exercises.  Patient verbalizes understanding and demonstrates understanding of education.    Skilled input: Patient was instructed  in home exercise program with tactile and verbal cueing for proper muscle contractions, modalities at home, positions for comfort, and activity modification.  Patient has been informed and consents to treatment by myself and aides as needed.       ASSESSMENT                                                                                                                 Patient overall is doing well with his home exercises and weaning out of the sling. Patient does have some superior shoulder pain but is able to manage with use of ice. Patient is beginning to make good progress with passive range of motion which is mostly limited by muscle guarding.    Pain/symptoms after session: 2  Patient Education:   Results of above outlined education: Verbalizes understanding and Demonstrates understanding    GOALS                                                                                                                       Long Term Goals: To be met by end of plan of care:      Home Exercise Program: Independent with progressed and modified home exercise program (HEP)    Task Modification: Independent with instructed task modifications      Pain: Decrease pain/symptoms to 0   Strength: Improve involved strength to  4, shoulder flexion/abduction  Range of Motion: Improve involved range of motion (ROM) to   shoulder flexion 130/ abduction 130    Dressing: Complete upper body dressing: without reported difficulty   Grooming/Hygiene: Complete grooming/hygiene tasks without reported difficulty   Reaching: Reach at and above shoulder height and behind back     Sleep: Sleep 6 hours without disruption from pain   Bed Mobility: Complete all aspects of bed mobility     Patient Reported Outcome Measure: Improvement in function /disability/impairment as indicated by Quick DASH (minimal clinically important difference = 15.91) < or =   15       Procedures and total treatment time documented Time Entry flowsheet.     pt states "I havent slept in 4 days"

## 2020-01-08 NOTE — PATIENT PROFILE ADULT. - SOCIAL CONCERNS
carvedilol 3.125 mg oral tablet: 1 tab(s) orally every 12 hours  clopidogrel 75 mg oral tablet: 1 tab(s) orally once a day  Ecotrin Adult Low Strength 81 mg oral delayed release tablet: 1 tab(s) orally once a day  Flomax 0.4 mg oral capsule: 1 cap(s) orally every other day  sulfamethoxazole-trimethoprim 800 mg-160 mg oral tablet: 1 tab(s) orally 2 times a day MDD:No more than 2 pills in 24hrs None

## 2020-09-23 NOTE — PROGRESS NOTE ADULT - SUBJECTIVE AND OBJECTIVE BOX
Paronychia of the Finger or Toe  Paronychia is an infection near a fingernail or toenail. It usually occurs when an opening in the cuticle or an ingrown toenail lets bacteria under the skin.  The infection will need to be drained if pus is present. If the infection has been caught early, you may need only antibiotic treatment. Healing will take about 1 to 2 weeks.  Home care  Follow these guidelines when caring for yourself at home:  · Clean and soak the toe or finger. Do this 2 times a day for the first 3 days. To do so:  ¨ Soak your foot or hand in a tub of warm water for 5 minutes. Or hold your toe or finger under a faucet of warm running water for 5 minutes.  ¨ Clean any crust away with soap and water using a cotton swab.  ¨ Put antibiotic ointment on the infected area.  · Change the dressing daily or any time it gets dirty.  · If you were given antibiotics, take them as directed until they are all gone.  · If your infection is on a toe, wear comfortable shoes with a lot of toe room. You can also wear open-toed sandals while your toe heals.  · You may use over-the-counter medicine (acetaminophen or ibuprofen to help with pain, unless another medicine was prescribed. If you have chronic liver or kidney disease, talk with your healthcare provider before using these medicines. Also talk with your provider if you've had a stomach ulcer or GI (gastrointestinal) bleeding.  Prevention  The following can prevent paronychia:  · Avoid cutting or playing with your cuticles at home.  · Don't bite your nails.  · Don't suck on your thumbs or fingers.  Follow-up care  Follow up with your healthcare provider, or as advised.  When to seek medical advice  Call your healthcare provider right away if any of these occur:  · Redness, pain, or swelling of the finger or toe gets worse  · Red streaks in the skin leading away from the wound  · Pus or fluid draining from the nail area  · Fever of 100.4ºF (38ºC) or higher, or as directed  Patient is a 66y old  Female who presents with a chief complaint of chest pain (10 May 2017 14:59)      HPI:  65 yo F with PMH of liver cirrhosis 2/2 chronic alcohol abuse, macrocytic anemia, HTN, RA, esophageal varices s/p banding, CAD s/p stent, h/o prolonged Qtc, p/w chest pain/abdominal pain. States pain is more like pressure in L chest and abdomen. States pain has diminished a lot now, but is still there mildly. Denies radiation to arm / jaw / neck. Denies nausea / vomiting / diophoresis. In ED, troponins trended from 0.707 -> 79.3. Called on call cardio Dr. Flannery and discussed case, he recommended calling Dr. Anderson who is the on call interventionlist. Discussed case in detail with Dr. Anderson, including troponins trending up, new ST depressions on EKG and on going residual chest discomfort. Dr. Anderson recommends heparin drip without initial bolus due to esophageal varices / liver cirrhosis and due to patient being at increased risk for bleeding. Also recommends to c/w ASA / plavix / beta blocker / statin. Dr. Anderson believes patient may have had cardiac event earlier, and troponins are just lagging behind. Cardiac cath to be performed in AM.    pt comf   no farther bleeding   had brown BM and on my rectal also had brown stool  feels fatigued and tired  neg abd pain   pos LE crampy pain, unchanged      PSH: PCI, esophageal varices banding  Social Hx: Former smoker - quit in 2005, h/o heavy etoh use - quit 2/2016, Drugs - h/o marijuana use previously  Family Hx: Father - stomach problems / heart disease, mother - pancreatic cancer (24 Apr 2017 21:23)      PAST MEDICAL & SURGICAL HISTORY:  HTN (hypertension)  Rheumatoid arthritis  No significant past surgical history      MEDICATIONS  (STANDING):  atorvastatin 40milliGRAM(s) Oral at bedtime  gabapentin 300milliGRAM(s) Oral three times a day  rifaximin 550milliGRAM(s) Oral two times a day  multivitamin 1Tablet(s) Oral daily  folic acid 1milliGRAM(s) Oral daily  thiamine 100milliGRAM(s) Oral daily  hydrocortisone 10milliGRAM(s) Oral at bedtime  hydrocortisone 20milliGRAM(s) Oral daily  midodrine 5milliGRAM(s) Oral three times a day  albumin human 25% IVPB 100milliLiter(s) IV Intermittent every 12 hours  aspirin  chewable 81milliGRAM(s) Oral daily  pantoprazole    Tablet 40milliGRAM(s) Oral two times a day before meals  heparin  Injectable 5000Unit(s) SubCutaneous every 8 hours    MEDICATIONS  (PRN):  naphazoline 0.025%/pheniramine 0.3% Ophthalmic Solution - Peds 1Drop(s) Both EYES two times a day PRN watery eyes.      Allergies    sulfa drugs (Unknown)    Intolerances        SOCIAL HISTORY:unchanged    FAMILY HISTORY:  No pertinent family history in first degree relatives      REVIEW OF SYSTEMS:    CONSTITUTIONAL: No weakness, fevers or chills  EYES/ENT: No visual changes;  No vertigo or throat pain   NECK: No pain or stiffness  RESPIRATORY: No cough, wheezing, hemoptysis; No shortness of breath  CARDIOVASCULAR: No chest pain or palpitations  GENITOURINARY: No dysuria, frequency or hematuria  NEUROLOGICAL: No numbness or weakness  SKIN: No itching, burning, rashes, or lesions   All other review of systems is negative unless indicated above.    Vital Signs Last 24 Hrs  T(C): 36.7, Max: 36.7 (05-17 @ 18:00)  T(F): 98, Max: 98 (05-17 @ 18:00)  HR: 84 (69 - 92)  BP: 105/62 (77/43 - 107/62)  BP(mean): 73 (48 - 88)  RR: 9 (6 - 19)  SpO2: 99% (95% - 99%)    PHYSICAL EXAM:    Constitutional: NAD, well-developed  HEENT: EOMI, throat clear  Neck: No LAD, supple  Respiratory: CTA and P  Cardiovascular: S1 and S2, RRR, no M  Gastrointestinal: BS+, soft, NT/ND, neg HSM,  Extremities: 2 p,ls peripheral edema, neg clubing, cyanosis  Vascular: 2+ peripheral pulses  Neurological: A/O x 3, no focal deficits  Psychiatric: Normal mood, normal affect  anasarca    LABS:  CBC Full  -  ( 17 May 2017 05:12 )  WBC Count : 8.0 K/uL  Hemoglobin : 9.2 g/dL  Hematocrit : 25.5 %  Platelet Count - Automated : 70 K/uL  Mean Cell Volume : 99.7 fl  Mean Cell Hemoglobin : 36.1 pg  Mean Cell Hemoglobin Concentration : 36.2 gm/dL  Auto Neutrophil # : x  Auto Lymphocyte # : x  Auto Monocyte # : x  Auto Eosinophil # : x  Auto Basophil # : x  Auto Neutrophil % : x  Auto Lymphocyte % : x  Auto Monocyte % : x  Auto Eosinophil % : x  Auto Basophil % : x    05-17    139  |  103  |  28<H>  ----------------------------<  102<H>  3.9   |  27  |  2.73<H>    Ca    8.2<L>      17 May 2017 05:12  Phos  4.7     05-16  Mg     2.0     05-17          05-14 @ 18:19  Hep A Igm Nonreact  Hep A total ab, IgA and M --  Hep B core Ab total --  Hep B core IgM Nonreact  Hep B DNA PCR --  Hep BSAg --  Hep BSAb --  Hep BSAb --  HCV Ab --  HCV RNA Log --  HCV RNA interp --                RADIOLOGY & ADDITIONAL STUDIES: by your provider  Date Last Reviewed: 8/1/2016  © 5534-4198 [employer]. [address] [place] [address], [place] [address]. All rights reserved. This information is not intended as a substitute for professional medical care. Always follow your healthcare professional's instructions.

## 2020-12-22 NOTE — PATIENT PROFILE ADULT. - NAME OF PERSON WHO ASSISTED
[facility] [address] [place] is the pharmacy patient would like prescription sent to.  Any additional questions Please call patient Jorge Brennan

## 2021-05-14 NOTE — ED PROVIDER NOTE - CARDIAC, MLM
Name:  Tiff Service  Age:80 y.o. Sex:female   :  1940    MRN:  [id number]   PCP:  Presley Glass MD      Admit Date:  2021 10:41 AM   Chief Complaint: falls    Reason for Admission:   Anemia [D64.9]    Assessment & Plan:     Acute anemia  -Significant drop in hemoglobin and WBC in last 1 month. Status post 1 unit PRBC. Holding anticoagulation and antiplatelet therapy. Gastroenterology and hematology on board. Work-up for anemia and leukopenia ordered. Appreciate subspecialty recommendation. Neutropenic precaution and fall precautions. May 14: Hemoglobin stable. Leukopenia  In setting of anemia with teardrop cells and basophilic stippling seen on smear  -As patient is on methotrexate at home so started patient on folic acid. Appreciate hematology-recommendation.  -We will check procalcitonin and blood cultures. May 14: Procalcitonin negative. Follow-up culture. Appreciate hematology recommendation. Plan for bone marrow biopsy today. NZAIA on CKD 3  Baseline Cr ~1.6-1.7. Cr up to 2.61 on adm. avoid nephrotoxic agent. Will monitor. Patient has congenital agenesis of left kidney. May 14: Creatinine 1.4. Will monitor. Mild transaminitis  -improving. May 14: Resolved. Rib fractures  R flank hematoma  - IS  - pain control    Frequent falls  Pt reports long standing falls related to medications per her report.  - PT/OT    Afib  - hold Eliquis  - cont Amio    RA  - MTX every     MDD  - cont home meds    Disposition/Expected LOS: > 2 midnights, pt previously living at home alone, PPD, PT/OT  Diet: DIET NUTRITIONAL SUPPLEMENTS  DIET NPO  VTE ppx: SCDs  GI ppx: PPI BID  Code status: full code  Surrogate decision-maker: friend Saundra Magaña    patient is AOx3. She would like to update her friends by herself today. I have encouraged the patient to asked the nurse to page me if she or her friend has any questions.   Advised understanding that her condition may deteriorate in spite of treatment. History of Presenting Illness:     Juanis Bobo is a 68 y.o. female with medical history of recently dx'd Afib (on Eliquis), CKD3, RA, h/o breast CA 2008, hypothyroidism who presented to ED with falls, weakness, nausea. Pt reports not feeling well since she was discharged March 2021 after being diagnosed with Afib and started on Eliquis. She endorses increased fatigue, generalized weakness. Endorses frequent falls even prior to last hospitalization which has continued, with a fall approx 1-2 weeks ago, landing on R side and bruising R flank and face. States stools have been \"dark\" since she was started on \"that new medicine\" which I believe is the Eliquis. She also reports nausea with occasional emesis, nonbloody, for about 1 week. Decreased appetite, keeping down liquids but very little food. Denies weight loss. She ultimately saw her PCP today who directed her to the ED for further evaluation. In ED, VSS, afebrile. Hgb 3.7, confirmed on repeat. WBC 1.1, Cr 2.61. She was heme + in ED. She was transfused 1 unit pRBCs. XR revealed R 5,6,7 rib fracture. Cased discussed with Dr. Janice Nageotte. May 13: As per patient she is feeling all right. Denies any pain anywhere. Denies any shortness of breath, palpitations. She is feeling generalized weakness. May 14: Patient is AOx3. As per patient she is feeling better and wants to know what test she will get it done today. As any chest pain, shortness of breath, nausea or vomiting. Review of Systems:  A 14 point review of systems was taken and pertinent positive as per HPI.         Past Medical History:   Diagnosis Date    Acquired hypothyroidism 1/16/2016    Breast cancer ([facility] 2008    Depression 8/18/2016    Endocrine disease     hypothyroid    Fungal dermatitis 8/18/2016    Hyperlipidemia 1/16/2016    Hypertension, essential, benign 8/18/2016    Hypokalemia 8/18/2016    Inflamed sebaceous cyst 8/18/2016    Major depressive disorder, recurrent, moderate ([facility] 8/18/2016    Nicotine dependence, cigarettes, uncomplicated 5/92/4322    Osteopenia 8/18/2016    Osteoporosis 8/18/2016    Radiation therapy complication 5067    Rheumatoid arthritis ([facility] 1/16/2016    Right carotid bruit 1/16/2016    TIA (transient ischemic attack) 1/16/2016    Tobacco abuse 8/18/2016       Past Surgical History:   Procedure Laterality Date    HX ADENOIDECTOMY      HX BREAST LUMPECTOMY Right 2008    with lymph nodes    HX TONSILLECTOMY         Family History : reviewed  Family History   Problem Relation Age of Onset    Stroke Mother     Cancer Father         Brain    HIV/AIDS Brother         Social History     Tobacco Use    Smoking status: Current Some Day Smoker    Smokeless tobacco: Never Used    Tobacco comment: Only on pay day-1/2 pack   Substance Use Topics    Alcohol use: No       No Known Allergies    Immunization History   Administered Date(s) Administered    Influenza High Dose Vaccine PF 10/28/2015, 09/21/2016, 09/15/2017    Pneumococcal Polysaccharide (PPSV-23) 01/18/2016    TB Skin Test (PPD) Intradermal 04/22/2019, 05/12/2021         PTA Medications:  Current Outpatient Medications   Medication Instructions    amiodarone (CORDARONE) 200 mg, Oral, DAILY    apixaban (ELIQUIS) 5 mg, Oral, 2 TIMES DAILY    cholecalciferol (VITAMIN D3) 1,000 Units, Oral, DAILY    diclofenac EC (VOLTAREN) 75 mg, Oral, 2 TIMES DAILY    DULoxetine (CYMBALTA) 60 mg, Oral, DAILY    famotidine (PEPCID) 20 mg, Oral, DAILY    folic acid (FOLVITE) 1 mg, Oral, DAILY    levothyroxine (SYNTHROID) 25 mcg, Oral, DAILY BEFORE BREAKFAST    methotrexate (RHEUMATREX) 2.5 mg tablet 5 tabs every Sunday    ondansetron (ZOFRAN ODT) 4 mg, Oral, EVERY 8 HOURS AS NEEDED    predniSONE (DELTASONE) 30 mg, Oral, DAILY WITH BREAKFAST    rosuvastatin (CRESTOR) 10 mg, Oral, EVERY BEDTIME    spironolactone (ALDACTONE) 25 mg, Oral, DAILY       Objective: Patient Vitals for the past 24 hrs:   Temp Pulse Resp BP SpO2   05/14/21 1116 98 °F (36.7 °C) 85 18 (!) 142/61 96 %   05/14/21 0847     97 %   05/14/21 0738 98.4 °F (36.9 °C) 77 18 (!) 126/53 94 %   05/14/21 0415 98.4 °F (36.9 °C) 84 18 138/71 98 %   05/13/21 2236 98.6 °F (37 °C) 82 18 (!) 166/64 96 %   05/13/21 2046     100 %   05/13/21 1913 98.4 °F (36.9 °C) 78 18 131/72 99 %   05/13/21 1508 98.4 °F (36.9 °C) 77 18 132/63 100 %       Oxygen Therapy  O2 Sat (%): 96 % (05/14/21 1116)  Pulse via Oximetry: 82 beats per minute (05/14/21 0847)  O2 Device: Nasal cannula(weaned to room air) (05/14/21 0847)  Skin Assessment: Clean, dry, & intact (05/14/21 7080)  Skin Protection for O2 Device: No (05/14/21 0339)  O2 Flow Rate (L/min): 1 l/min (05/14/21 0847)    Body mass index is 29.8 kg/m². Physical Exam:    General:  No acute distress, speaking in full sentences,   HEENT:  EOMI, oropharynx is clear, resolving hematoma to R eye  Neck:   Supple, no lymphadenopathy, no JVD   Lungs:  Clear to auscultation bilaterally   CV:   Distant HS, Regular rate and rhythm with normal S1 and S2   Abdomen:  Soft, nontender, nondistended, normoactive bowel sounds   Extremities:  Trace edema to BL LE, chronic per pt  Skin:  Hematoma face and abdomen/back  Neuro:  AOx3, generalized weakness, moving all 4 extremities, speech normal  Psych:  Normal mood and affect       Data Reviewed: I have reviewed all labs, meds, and studies.       Recent Results (from the past 24 hour(s))   HGB & HCT    Collection Time: 05/13/21  1:38 PM   Result Value Ref Range    HGB 9.6 (L) 11.7 - 15.4 g/dL    HCT 28.3 (L) 35.8 - 46.3 %   CULTURE, BLOOD    Collection Time: 05/13/21  4:13 PM    Specimen: Blood   Result Value Ref Range    Special Requests: LEFT  Antecubital        Culture result: NO GROWTH AFTER 12 HOURS     CULTURE, BLOOD    Collection Time: 05/13/21  4:16 PM    Specimen: Blood   Result Value Ref Range    Special Requests: LEFT  HAND Culture result: NO GROWTH AFTER 12 HOURS     PLEASE READ & DOCUMENT PPD TEST IN 24 HRS    Collection Time: 05/13/21  7:30 PM   Result Value Ref Range    PPD Negative Negative    mm Induration 0 0 - 5 mm   PROCALCITONIN    Collection Time: 05/13/21  7:57 PM   Result Value Ref Range    Procalcitonin 0.19 ng/mL   BILIRUBIN, DIRECT    Collection Time: 05/13/21  7:57 PM   Result Value Ref Range    Bilirubin, direct 0.4 (H) <0.4 MG/DL   HGB & HCT    Collection Time: 05/13/21 10:02 PM   Result Value Ref Range    HGB 10.4 (L) 11.7 - 15.4 g/dL    HCT 32.6 (L) 35.8 - 72.9 %   METABOLIC PANEL, COMPREHENSIVE    Collection Time: 05/14/21  7:05 AM   Result Value Ref Range    Sodium 140 136 - 145 mmol/L    Potassium 4.3 3.5 - 5.1 mmol/L    Chloride 111 (H) 98 - 107 mmol/L    CO2 21 21 - 32 mmol/L    Anion gap 8 7 - 16 mmol/L    Glucose 86 65 - 100 mg/dL    BUN 17 8 - 23 MG/DL    Creatinine 1.44 (H) 0.6 - 1.0 MG/DL    GFR est AA 45 (L) >60 ml/min/1.73m2    GFR est non-AA 37 (L) >60 ml/min/1.73m2    Calcium 7.8 (L) 8.3 - 10.4 MG/DL    Bilirubin, total 1.0 0.2 - 1.1 MG/DL    ALT (SGPT) 40 12 - 65 U/L    AST (SGOT) 31 15 - 37 U/L    Alk. phosphatase 65 50 - 136 U/L    Protein, total 5.0 (L) 6.3 - 8.2 g/dL    Albumin 1.9 (L) 3.2 - 4.6 g/dL    Globulin 3.1 2.3 - 3.5 g/dL    A-G Ratio 0.6 (L) 1.2 - 3.5     CBC WITH AUTOMATED DIFF    Collection Time: 05/14/21  7:05 AM   Result Value Ref Range    WBC 0.5 (LL) 4.3 - 11.1 K/uL    RBC 2.96 (L) 4.05 - 5.2 M/uL    HGB 9.1 (L) 11.7 - 15.4 g/dL    HCT 27.6 (L) 35.8 - 46.3 %    MCV 93.2 79.6 - 97.8 FL    MCH 30.7 26.1 - 32.9 PG    MCHC 33.0 31.4 - 35.0 g/dL    RDW 17.1 (H) 11.9 - 14.6 %    PLATELET 850 (L) 512 - 450 K/uL    MPV 11.6 9.4 - 12.3 FL    ABSOLUTE NRBC 0.02 0.0 - 0.2 K/uL    NEUTROPHILS 23 (L) 43 - 78 %    LYMPHOCYTES 51 (H) 13 - 44 %    MONOCYTES 6 4.0 - 12.0 %    EOSINOPHILS 8 (H) 0.5 - 7.8 %    BASOPHILS 2 0.0 - 2.0 %    IMMATURE GRANULOCYTES 10 (H) 0.0 - 5.0 %    ABS.  NEUTROPHILS 0.1 (L) 1.7 - 8.2 K/UL    ABS. LYMPHOCYTES 0.3 (L) 0.5 - 4.6 K/UL    ABS. MONOCYTES 0.0 (L) 0.1 - 1.3 K/UL    ABS. EOSINOPHILS 0.0 0.0 - 0.8 K/UL    ABS. BASOPHILS 0.0 0.0 - 0.2 K/UL    ABS. IMM. GRANS. 0.1 0.0 - 0.5 K/UL    RBC COMMENTS MODERATE  ANISOCYTOSIS + POIKILOCYTOSIS        RBC COMMENTS MACROCYTOSIS      WBC COMMENTS Result Confirmed By Smear      PLATELET COMMENTS SLIGHT      DF AUTOMATED         EKG Results     Procedure 720 Value Units Date/Time    EKG [[id number]] Collected: 05/12/21 1032    Order Status: Completed Updated: 05/12/21 1333     Ventricular Rate 78 BPM      Atrial Rate 78 BPM      P-R Interval 166 ms      QRS Duration 132 ms      Q-T Interval 422 ms      QTC Calculation (Bezet) 481 ms      Calculated P Axis 63 degrees      Calculated R Axis 76 degrees      Calculated T Axis 79 degrees      Diagnosis --     Normal sinus rhythm  Right bundle branch block  T wave abnormality, consider inferolateral ischemia  Abnormal ECG  When compared with ECG of 08-APR-2021 15:29,  Right bundle branch block is now Present  Confirmed by [facility]  MD ()KATIA ([id number]) on 5/12/2021 1:32:48 PM            All Micro Results     Procedure Component Value Units Date/Time    CULTURE, BLOOD [[id number]] Collected: 05/13/21 1616    Order Status: Completed Specimen: Blood Updated: 05/14/21 0702     Special Requests: --        LEFT  HAND       Culture result: NO GROWTH AFTER 12 HOURS       CULTURE, BLOOD [[id number]] Collected: 05/13/21 1613    Order Status: Completed Specimen: Blood Updated: 05/14/21 0702     Special Requests: --        LEFT  Antecubital       Culture result: NO GROWTH AFTER 12 HOURS             Other Studies:  No results found.       Medications:  Medications Administered      Medications Administered     pantoprazole (PROTONIX) 40 mg in 0.9% sodium chloride 10 mL injection     Admin Date  05/12/2021 Action  Given Dose  40 mg Route  IntraVENous Administered By  Abner Trejo RN          sodium chloride 0.9 % bolus infusion 500 mL     Admin Date  05/12/2021 Action  New Bag Dose  500 mL Rate  500 mL/hr Route  IntraVENous Administered By  Adela Larson RN                      Problem List:     Hospital Problems as of 5/14/2021 Date Reviewed: 4/13/2021          Codes Class Noted - Resolved POA    * (Principal) Anemia ICD-10-CM: D64.9  ICD-9-CM: 285.9  5/12/2021 - Present Unknown        Leukopenia ICD-10-CM: D72.819  ICD-9-CM: 288.50  5/12/2021 - Present Unknown        NAZIA (acute kidney injury) ([facility] ICD-10-CM: N17.9  ICD-9-CM: 584.9  5/12/2021 - Present Unknown        Rib fracture ICD-10-CM: S22.39XA  ICD-9-CM: 807.00  5/12/2021 - Present Unknown        Hypertension, essential, benign ICD-10-CM: I10  ICD-9-CM: 401.1  8/18/2016 - Present Yes        Major depressive disorder, recurrent, moderate ([facility] ICD-10-CM: F33.1  ICD-9-CM: 296.32  8/18/2016 - Present Yes        Acquired hypothyroidism ICD-10-CM: E03.9  ICD-9-CM: 244.9  1/16/2016 - Present Yes        Hyperlipidemia ICD-10-CM: E78.5  ICD-9-CM: 272.4  1/16/2016 - Present Yes        Rheumatoid arthritis ([facility] ICD-10-CM: M06.9  ICD-9-CM: 714.0  1/16/2016 - Present Yes                 Signed By: Lucius Evans MD   [employer] [facility]    May 14, 2021 Normal rate, regular rhythm.  Heart sounds S1, S2.  No murmurs, rubs or gallops.

## 2021-05-23 NOTE — DIETITIAN INITIAL EVALUATION ADULT. - PHYSICAL APPEARANCE
EMERGENCY DEPARTMENT HISTORY AND PHYSICAL EXAM      Please note that this dictation was completed with the assistance of \"Dragon\", the computer voice recognition software. Quite often unanticipated grammatical, syntax, homophones, and other interpretive errors are inadvertently transcribed by the computer software. Please disregard these errors and any errors that have escaped final proofreading. Thank you. Patient Name: Ariana Perez  : 1971  MRN: [id number]  History of Presenting Illness     Chief Complaint   Patient presents with    Foot Swelling     pain and swelling in bilateral feet for several days that is worse today. History Provided By: Patient    HPI: Ariana Perez, 54 y.o. male with past medical history as documented below presents to the ED with c/o of several days of moderate bilateral lower extremity swelling and pain. States pain is worse with movement and while walking. Denies fall or injury. Denies SOB or chest pain. Pt reports hx of pyoderma gangrenosum. Denies any new skin lesions. Denies hx of CKD or CHF. Pt denies any other alleviating or exacerbating factors. Additionally, pt specifically denies any recent fever, chills, headache, nausea, vomiting, abdominal pain, CP, SOB, lightheadedness, dizziness, numbness, weakness, heart palpitations, urinary sxs, diarrhea, constipation, melena, hematochezia, cough, or congestion. There are no other complaints, changes or physical findings pertinent to the HPI at this time.     PCP: Leslie Jimenez MD    Past History   Past Medical History:  Past Medical History:   Diagnosis Date    Arthritis     all joints and spine    C. difficile colitis 2019    Chest pain 2013    as of 10/14/14 pt denies any CP since     Crohn's colitis ([facility] [address]     Dr. Peter Medina H/O Clostridium difficile infection 2017    as of 3/30/17 pt denies abd pain, diarrhea > 1 week    Iron deficiency anemia 2013    Kidney infection 2018    Liver disease     Pancreatitis     Polyarteritis nodosa ([facility] [address]     Pouchitis ([facility] [address] 12/08/2011    PSC (primary sclerosing cholangitis)     Dr Mertha Seip [phone]    Pyoderma gangrenosum        Past Surgical History:  Past Surgical History:   Procedure Laterality Date    ABDOMEN SURGERY PROC UNLISTED  08/2002    colostomy reversal & J Pouch    ABDOMEN SURGERY PROC UNLISTED  06/2002    colostomy    COLONOSCOPY N/A 4/4/2017    COLONOSCOPY performed by Sola Heredia MD at hospitals AMBULATORY OR    COLONOSCOPY N/A 1/15/2018    COLONOSCOPY performed by Sola Heredia MD at hospitals ENDOSCOPY    COLONOSCOPY N/A 9/13/2018    COLONOSCOPY performed by Sola Heredia MD at hospitals ENDOSCOPY    COLONOSCOPY N/A 1/21/2019    COLONOSCOPY W/ FECAL TRANSPLANT performed by Tiffany Hicks MD at hospitals ENDOSCOPY    COLONOSCOPY N/A 6/13/2019    COLONOSCOPY performed by Tiffany Hicks MD at hospitals ENDOSCOPY    COLONOSCOPY,DIAGNOSTIC  1/15/2018         COLONOSCOPY,DIAGNOSTIC  9/13/2018         COLONOSCOPY,DIAGNOSTIC  1/21/2019         COLONOSCOPY,DIAGNOSTIC  6/13/2019         FECAL TRANSPLANT  1/21/2019         HX GI      ercp    HX ROTATOR CUFF REPAIR Right 1999    LA COLONOSCOPY W/BIOPSY SINGLE/MULTIPLE  12/8/2011         LA EGD TRANSORAL BIOPSY SINGLE/MULTIPLE  2/18/2013            Family History:  Family History   Problem Relation Age of Onset    Diabetes Mother     Hypertension Mother     Arthritis-osteo Mother     Diabetes Father     Hypertension Father     Stroke Father     Arthritis-osteo Father     COPD Father         smoker    No Known Problems Sister     No Known Problems Brother     No Known Problems Maternal Aunt     No Known Problems Maternal Uncle     No Known Problems Paternal Aunt     No Known Problems Paternal Uncle     No Known Problems Maternal Grandmother     No Known Problems Maternal Grandfather     No Known Problems Paternal Grandmother     No Known Problems Paternal Grandfather        Social History:  Social History     Tobacco Use    Smoking status: Never Smoker    Smokeless tobacco: Never Used   Substance Use Topics    Alcohol use: No     Alcohol/week: 0.0 standard drinks    Drug use: No       Allergies: Allergies   Allergen Reactions    Cafergot [Ergotamine-Caffeine] Hives       Current Medications:  No current facility-administered medications on file prior to encounter. Current Outpatient Medications on File Prior to Encounter   Medication Sig Dispense Refill    adalimumab (HUMIRA) 40 mg/0.8 mL injection by SubCUTAneous route once.  ondansetron (ZOFRAN ODT) 4 mg disintegrating tablet Take 1 Tab by mouth every six (6) hours as needed for Nausea. 20 Tab 0    mesalamine (CANASA) 1,000 mg suppository Insert 1 Suppository into rectum nightly. 30 Suppository 5    halobetasol (ULTRAVATE) 0.05 % topical cream Apply  to affected area two (2) times a day. use thin layer. Apply to ulcers. 15 g 0     Review of Systems   Review of Systems   Constitutional: Negative. Negative for chills and fever. HENT: Negative. Negative for congestion and sore throat. Eyes: Negative. Respiratory: Negative. Negative for cough, chest tightness, shortness of breath and wheezing. Cardiovascular: Positive for leg swelling. Negative for chest pain and palpitations. Gastrointestinal: Negative. Negative for abdominal distention, abdominal pain, blood in stool, constipation, diarrhea, nausea and vomiting. Endocrine: Negative. Genitourinary: Negative. Negative for dysuria, flank pain, frequency, hematuria and urgency. Musculoskeletal: Positive for myalgias. Negative for arthralgias and back pain. Skin: Negative. Negative for color change and rash. Neurological: Negative. Negative for dizziness, syncope, speech difficulty, weakness, light-headedness, numbness and headaches. Hematological: Negative. Psychiatric/Behavioral: Negative. Negative for confusion and self-injury.  The patient is not nervous/anxious. All other systems reviewed and are negative. Physical Exam   Physical Exam  Vitals and nursing note reviewed. Constitutional:       Appearance: He is well-developed. He is not toxic-appearing. HENT:      Head: Normocephalic and atraumatic. Mouth/Throat:      Pharynx: No posterior oropharyngeal erythema. Eyes:      Conjunctiva/sclera: Conjunctivae normal.   Cardiovascular:      Rate and Rhythm: Normal rate and regular rhythm. Heart sounds: Normal heart sounds. No murmur heard. No friction rub. No gallop. Pulmonary:      Effort: Pulmonary effort is normal. No respiratory distress. Breath sounds: Normal breath sounds. No wheezing or rales. Chest:      Chest wall: No tenderness. Abdominal:      General: Bowel sounds are normal. There is no distension. Palpations: Abdomen is soft. There is no mass. Tenderness: There is no abdominal tenderness. There is no guarding or rebound. Musculoskeletal:         General: Tenderness present. Normal range of motion. Cervical back: Normal range of motion. Right lower leg: Edema present. Left lower leg: Edema present. Comments: Comparments soft  No overlying skin erythema or warmth  NVI distally   Skin:     General: Skin is warm. Neurological:      General: No focal deficit present. Mental Status: He is alert and oriented to person, place, and time. Motor: No abnormal muscle tone. Psychiatric:         Behavior: Behavior is cooperative. Diagnostic Study Results     Labs -   I have personally reviewed and interpreted all available laboratory results.    Recent Results (from the past 24 hour(s))   EKG, 12 LEAD, INITIAL    Collection Time: 05/22/21 10:39 PM   Result Value Ref Range    Ventricular Rate 87 BPM    Atrial Rate 87 BPM    P-R Interval 164 ms    QRS Duration 90 ms    Q-T Interval 356 ms    QTC Calculation (Bezet) 428 ms    Calculated P Axis 61 degrees    Calculated R Axis 20 degrees    Calculated T Axis 11 degrees    Diagnosis       Normal sinus rhythm  Normal ECG  When compared with ECG of 22-APR-2020 15:24,  No significant change was found     CBC WITH AUTOMATED DIFF    Collection Time: 05/23/21 12:48 AM   Result Value Ref Range    WBC 5.2 4.1 - 11.1 K/uL    RBC 3.57 (L) 4.10 - 5.70 M/uL    HGB 10.4 (L) 12.1 - 17.0 g/dL    HCT 31.1 (L) 36.6 - 50.3 %    MCV 87.1 80.0 - 99.0 FL    MCH 29.1 26.0 - 34.0 PG    MCHC 33.4 30.0 - 36.5 g/dL    RDW 16.0 (H) 11.5 - 14.5 %    PLATELET 83 (L) 776 - 400 K/uL    MPV 11.7 8.9 - 12.9 FL    NRBC 0.0 0  WBC    ABSOLUTE NRBC 0.00 0.00 - 0.01 K/uL    NEUTROPHILS 45 32 - 75 %    LYMPHOCYTES 38 12 - 49 %    MONOCYTES 9 5 - 13 %    EOSINOPHILS 7 0 - 7 %    BASOPHILS 1 0 - 1 %    IMMATURE GRANULOCYTES 0 0.0 - 0.5 %    ABS. NEUTROPHILS 2.2 1.8 - 8.0 K/UL    ABS. LYMPHOCYTES 2.0 0.8 - 3.5 K/UL    ABS. MONOCYTES 0.5 0.0 - 1.0 K/UL    ABS. EOSINOPHILS 0.4 0.0 - 0.4 K/UL    ABS. BASOPHILS 0.1 0.0 - 0.1 K/UL    ABS. IMM. GRANS. 0.0 0.00 - 0.04 K/UL    DF SMEAR SCANNED      PLATELET COMMENTS DECREASED PLATELETS      RBC COMMENTS ANISOCYTOSIS  1+       METABOLIC PANEL, COMPREHENSIVE    Collection Time: 05/23/21 12:48 AM   Result Value Ref Range    Sodium 139 136 - 145 mmol/L    Potassium 3.0 (L) 3.5 - 5.1 mmol/L    Chloride 107 97 - 108 mmol/L    CO2 28 21 - 32 mmol/L    Anion gap 4 (L) 5 - 15 mmol/L    Glucose 95 65 - 100 mg/dL    BUN 10 6 - 20 MG/DL    Creatinine 0.81 0.70 - 1.30 MG/DL    BUN/Creatinine ratio 12 12 - 20      GFR est AA >60 >60 ml/min/1.73m2    GFR est non-AA >60 >60 ml/min/1.73m2    Calcium 7.8 (L) 8.5 - 10.1 MG/DL    Bilirubin, total 1.0 0.2 - 1.0 MG/DL    ALT (SGPT) 31 12 - 78 U/L    AST (SGOT) 60 (H) 15 - 37 U/L    Alk.  phosphatase 195 (H) 45 - 117 U/L    Protein, total 7.0 6.4 - 8.2 g/dL    Albumin 2.3 (L) 3.5 - 5.0 g/dL    Globulin 4.7 (H) 2.0 - 4.0 g/dL    A-G Ratio 0.5 (L) 1.1 - 2.2     NT-PRO BNP    Collection Time: 05/23/21 12:48 AM   Result Value Ref Range    NT pro-BNP <5 <125 PG/ML       Radiologic Studies -   I have personally reviewed and interpreted all available imaging studies and agree with radiology interpretation and report. No orders to display     CT Results  (Last 48 hours)    None        CXR Results  (Last 48 hours)    None        Reason for Exam  Priority: STAT  bilateral leg swelling, r/o DVT   Procedure Technique    Duplex images were obtained using 2-D gray scale, color flow, and spectral Doppler analysis. Vitals    Weight Height BSA (calculated - sq m) BP Pulse (Heart Rate)          Interpretation Summary    · No evidence of acute deep vein thrombosis in the right common femoral, superficial femoral, popliteal, posterior tibial, and peroneal veins. The veins were imaged in the transverse and longitudinal planes. The vessels showed normal color filling and compressibility. Doppler interrogation showed phasic and spontaneous flow. · No evidence of deep vein thrombosis in the right lower extremity. · No evidence of acute deep vein thrombosis in the left common femoral, superficial femoral, popliteal, posterior tibial, and peroneal veins. The veins were imaged in the transverse and longitudinal planes. The vessels showed normal color filling and compressibility. Doppler interrogation showed phasic and spontaneous flow. · No evidence of deep vein thrombosis in the left lower extremity. Medical Decision Making   I reviewed the vital signs, available nursing notes, past medical history, past surgical history, family history and social history. Vital Signs-Reviewed the patient's vital signs.   Patient Vitals for the past 24 hrs:   Temp Pulse Resp BP SpO2   05/22/21 2234 98.3 °F (36.8 °C) 85 18 136/74 99 %     Pulse Oximetry Analysis - 99% on RA    Cardiac Monitor:   Rate: 85 bpm  The cardiac monitor revealed the following rhythm as interpreted by me: Normal Sinus Rhythm    The cardiac monitor was ordered overweight secondary to the patient's history of edema and to monitor the patient for dysrhythmia    ED EKG interpretation:  Rhythm: normal sinus rhythm; and regular . Rate (approx.): 87; Axis: normal; P wave: normal; QRS interval: normal ; ST/T wave: normal; Other findings: normal. This EKG was interpreted by Nilton Lauren M.D. Records Reviewed: Nursing Notes, Old Medical Records, Previous electrocardiograms, Previous Radiology Studies and Previous Laboratory Studies    Provider Notes (Medical Decision Making):   Pt presents with acute leg swelling; stable vitals; PMS intact in affected limb. DDx: lymphedema, muscle strain vs sprain. The pt is unlikely to have DVT. There is no recent travel, h/o PE/DVT, neg mikie, no hormone use, non-smoker. Will rule out with duplex U/S. There is no trauma, deformity to warrant x-ray. The leg is not cold to touch, there is strong peripheral pulse, no paralysis or parasthesia, no pallor to suggest compartment syndrome. There are no rashes, excessive warmth, fever, induration of skin to suggest cellulitis/osteo. The pt is motor and sensory intact. Unlikely to be strong or compressed nerve. Will provide symptomatic treatment and reassess. Anticipate discharge home with close PCP follow-up. ED Course:   I am the first provider for this patient's ED visit today. Initial assessment performed. I discussed presenting problems, concerns and my formulated plan for today's visit with the patient and any available family members at bedside. I encouraged them to ask questions as they arise throughout the visit. I reviewed our electronic medical record system for any past medical records that were available that may contribute to the patient's current condition, the nursing notes and vital signs from today's visit.       ED Orders Placed :  Orders Placed This Encounter    CBC WITH AUTOMATED DIFF    METABOLIC PANEL, COMPREHENSIVE    NT-PRO BNP    EKG, 12 LEAD, INITIAL    potassium chloride SR (KLOR-CON 10) tablet 40 mEq    ketorolac (TORADOL) injection 30 mg    furosemide (LASIX) injection 20 mg    furosemide (Lasix) 40 mg tablet    naproxen (NAPROSYN) 500 mg tablet       ED Medications Administered:  Medications   potassium chloride SR (KLOR-CON 10) tablet 40 mEq (40 mEq Oral Given 5/23/21 0251)   ketorolac (TORADOL) injection 30 mg (30 mg IntraVENous Given 5/23/21 0251)   furosemide (LASIX) injection 20 mg (20 mg IntraVENous Given 5/23/21 0251)        Progress Note:  Patient has been reassessed and reports feeling better and symptoms have improved significantly after ED treatment. Ava Reece's final labs and imaging have been reviewed with him and available family and/or caregiver. They have been counseled regarding his diagnosis. He verbally conveys understanding and agreement of the signs, symptoms, diagnosis, treatment and prognosis and additionally agrees to follow up as recommended with Dr. Shahla Elliott MD and/or specialist in 24 - 48 hours. He also agrees with the care-plan we created together and conveys that all of his questions have been answered. I have also put together a packet of discharge instructions for him that include: 1) educational information regarding their diagnosis, 2) how to care for their diagnosis at home, as well a 3) list of reasons why they would want to return to the ED prior to their follow-up appointment should the patient's condition change or symptoms worsen. I have answered all questions to the patient's satisfaction. Strict return precautions given. He both understood and agreed with plan as discussed. Vital signs stable for discharge. Disposition:   DISCHARGE  The pt is ready for discharge. The pt's signs, symptoms, diagnosis, and discharge instructions have been discussed and pt has conveyed their understanding. The pt is to follow up as recommended or return to ER should their symptoms worsen.  Plan has been discussed and pt is in agreement. Plan:  1. Return precautions as discussed with patient and available family and/or caregiver. 2.   Discharge Medication List as of 5/23/2021  3:14 AM      START taking these medications    Details   furosemide (Lasix) 40 mg tablet Take 1 Tablet by mouth daily for 20 days. , Normal, Disp-20 Tablet, R-0      naproxen (NAPROSYN) 500 mg tablet Take 1 Tablet by mouth two (2) times daily (with meals). , Normal, Disp-20 Tablet, R-0         CONTINUE these medications which have NOT CHANGED    Details   adalimumab (HUMIRA) 40 mg/0.8 mL injection by SubCUTAneous route once., Historical Med      ondansetron (ZOFRAN ODT) 4 mg disintegrating tablet Take 1 Tab by mouth every six (6) hours as needed for Nausea., Normal, Disp-20 Tab, R-0      mesalamine (CANASA) 1,000 mg suppository Insert 1 Suppository into rectum nightly. , Print, Disp-30 Suppository, R-5      halobetasol (ULTRAVATE) 0.05 % topical cream Apply  to affected area two (2) times a day. use thin layer. Apply to ulcers. , Print, Disp-15 g, R-0           3. Follow-up Information     Follow up With Specialties Details Why Contact Info    [facility] EMERGENCY DEPT Emergency Medicine  As needed, If symptoms worsen [address] [place] [address]  [phone]          Instructed to return to ED if worse  Diagnosis   Clinical Impression:  1. Bilateral edema of lower extremity    2. Acute hypokalemia    3. Lymphedema    4. Bilateral leg pain        Attestation:  Estelle Canada MD, am the attending of record for this patient. I personally performed the services described in this documentation on this date, 5/22/2021 for patient, Debbie Fontaine. I have reviewed the chart and verified that the record is accurate and complete.

## 2022-04-11 NOTE — PATIENT PROFILE ADULT. - FUNCTIONAL SCREEN CURRENT LEVEL: SWALLOWING (IF SCORE 2 OR MORE FOR ANY ITEM, CONSULT REHAB SERVICES), MLM)
Group Topic: BH Activity Group    Date: 4/11/2022  Start Time: 10:00 AM  End Time: 11:00 AM  Facilitators: URIEL Lauren    Focus: Self-awareness  Number in attendance: 8  Discussion, written work and experiential learning activity.    Method: Group   Attendance: Late- joined at 10:30  Participation: Minimal  Patient Response: Indifferent and Passive  Mood: Normal  Affect: Type: Euthymic (normal mood)   Range: Full (normal)   Behavior/Socialization: Cooperative and Passive  Task Performance: Follows directions and Needs cueing  Patient Evaluation: Encouragement - needs prompts         
(0) swallows foods/liquids without difficulty

## 2022-04-28 NOTE — PROGRESS NOTE ADULT - SUBJECTIVE AND OBJECTIVE BOX
4/3: s/p PCI and LAD stent      PHYSICAL EXAM:    Daily     Daily     ICU Vital Signs Last 24 Hrs  T(C): 36.9, Max: 36.9 (04-03 @ 10:33)  T(F): 98.4, Max: 98.4 (04-03 @ 10:33)  HR: 99 (77 - 103)  BP: 96/70 (87/57 - 111/51)  BP(mean): --  ABP: --  ABP(mean): --  RR: 16 (16 - 18)  SpO2: 99% (96% - 100%)      Constitutional: Well appearing  HEENT: Atraumatic, STEPHANY, Normal, No congestion  Respiratory: Breath Sounds normal, no rhonchi/wheeze  Cardiovascular: N S1S2; SHERI present  Gastrointestinal: Abdomen soft, non tender, Bowel Sounds present  Extremities: 1+ edema, peripheral pulses present  Neurological: AAO x 3, no gross focal motor deficits  Skin: Non cellulitic, no rash, ulcers  Lymph Nodes: No lymphadenopathy noted  Back: No CVA tenderness   Musculoskeletal: non tender  Breasts: Deferred  Genitourinary: deferred  Rectal: Deferred                          11.6   10.1  )-----------( 188      ( 02 Apr 2017 11:22 )             32.5       CBC Full  -  ( 02 Apr 2017 11:22 )  WBC Count : 10.1 K/uL  Hemoglobin : 11.6 g/dL  Hematocrit : 32.5 %  Platelet Count - Automated : 188 K/uL  Mean Cell Volume : 114.9 fl  Mean Cell Hemoglobin : 41.2 pg  Mean Cell Hemoglobin Concentration : 35.8 gm/dL  Auto Neutrophil # : 6.7 K/uL  Auto Lymphocyte # : 2.5 K/uL  Auto Monocyte # : 0.9 K/uL  Auto Eosinophil # : 0.0 K/uL  Auto Basophil # : 0.1 K/uL  Auto Neutrophil % : 66.0 %  Auto Lymphocyte % : 24.5 %  Auto Monocyte % : 8.7 %  Auto Eosinophil % : 0.2 %  Auto Basophil % : 0.6 %      03 Apr 2017 04:56    144    |  107    |  10     ----------------------------<  100    3.4     |  26     |  0.79     Ca    8.0        03 Apr 2017 04:56  Mg     1.7       03 Apr 2017 11:33    TPro  7.9    /  Alb  2.2    /  TBili  2.6    /  DBili  x      /  AST  85     /  ALT  21     /  AlkPhos  141    02 Apr 2017 10:01      LIVER FUNCTIONS - ( 02 Apr 2017 10:01 )  Alb: 2.2 g/dL / Pro: 7.9 gm/dL / ALK PHOS: 141 U/L / ALT: 21 U/L / AST: 85 U/L / GGT: x             PT/INR - ( 02 Apr 2017 11:22 )   PT: 17.5 sec;   INR: 1.60 ratio         PTT - ( 02 Apr 2017 11:22 )  PTT:40.3 sec    CARDIAC MARKERS ( 03 Apr 2017 08:34 )  0.094 ng/mL / x     / x     / x     / x      CARDIAC MARKERS ( 03 Apr 2017 04:56 )  0.130 ng/mL / x     / x     / x     / x      CARDIAC MARKERS ( 03 Apr 2017 02:19 )  0.128 ng/mL / x     / x     / x     / x      CARDIAC MARKERS ( 02 Apr 2017 10:01 )  0.166 ng/mL / x     / 56 U/L / x     / x                    MEDICATIONS  (STANDING):  multivitamin 1Tablet(s) Oral daily  aspirin 325milliGRAM(s) Oral daily  predniSONE   Tablet 5milliGRAM(s) Oral daily  propranolol 10milliGRAM(s) Oral three times a day  potassium chloride    Tablet ER 10milliEquivalent(s) Oral daily  pantoprazole    Tablet 40milliGRAM(s) Oral before breakfast  furosemide    Tablet 20milliGRAM(s) Oral daily  spironolactone 50milliGRAM(s) Oral daily  sodium chloride 0.9%. 1000milliLiter(s) IV Continuous <Continuous>  magnesium sulfate  IVPB 1Gram(s) IV Intermittent once weight-bearing as tolerated

## 2022-05-01 NOTE — CONSULT NOTE ADULT - SUBJECTIVE AND OBJECTIVE BOX
This is a historical note converted from [facility]. Formatting and pictures may have been removed.  Please reference Saji for original formatting and attached multimedia. Pt seen and examined at bedside. No interval changes from clinic H&P documented below.  ?  ?  ?  ?   Chief Complaint  scalp lesion (3)  History of Present Illness  38yoF who presents for evaluation of 3 scalp lesions. Patient complains of painful mass on R-parietal scalp for approximately 1.5 years. States she [facility] in March 2016 when it was aproximately the size of an acorn and states since that time it has grown increasingly more painful and grown4-5 times the size prior to removal. Patient had CT head on 2/10/17 which noted multiple partially calcified scalp lesions likely representing sebaceous cysts when undergoing evaluation of HAs. Patient denies history of heart disease including MI and stent placements. She states she is able to walk up a flight of stairs without difficulty.  Review of Systems  10pt ROS performed as per HPI otherwise negative  Physical Exam  ???Vitals & Measurements  ??T:?36.8? ?C ?(Oral)? HR:?93?(Peripheral)? RR:?18? BP:?135/87? WT:?102.8?kg? WT:?102.8?kg?  NAD, AAO  non-labored breathing, good respiratory effort  approx 5cmx3.5cm fluctuant mass on R-parietal scalp that is tender to palpation; there is an additional 2cm mass on L-parietal scalp and 1cm diameter mass on R-parietal scalp just posterior to the largest mass  Assessment/Plan  ?  38F with 3 sebaceous cysts on scalp  -RBA to surgery discussed with patient including infection, bleeding, and recurrence  -consents signed  -plan to go to OR 12/4/17 for removal of sebaceous cysts x3 from scalp  ?  Robin Peterson MD PGY-1 Problem List/Past Medical History  ??Ongoing  ??Anxiety  ??Depression  ??Obesity  ?Historical  ??Cholecystectomy  ??Hernia of abdominal cavity  ??Tubal ligation  Procedure/Surgical  History  ??Cholecystectomy  ??Primary repair of umbilical hernia  ??Tubal ligation  Medications  ?Inpatient  ??No active inpatient medications  ?Home  ??Flonase 50 mcg/inh nasal spray, 2 spray(s), Nasal, Daily  ??Zoloft 25 mg oral tablet, 25 mg= 1 tab(s), Oral, Daily, 1 refills  ??Zyrtec 10 mg oral tablet, 10 mg= 1 tab(s), Oral, At Bedtime, PRN  Allergies  No Known Allergies  Social History  ??Alcohol - No Risk, 05/26/2016  ???Current, Wine  ??Employment/School - 05/26/2016  ???Employed, 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 Highest education level: Some college. Operates hazardous equipment: No.  ??Exercise - 05/26/2016  ???Self assessment: Fair condition.  ??Home/Environment - 05/26/2016  ???Lives with Alone, Children. Living situation: Home/Independent. Alcohol abuse in household: No. Substance abuse in household: No. Smoker in household: No. Injuries/Abuse/Neglect in household: No. Feels unsafe at home: No. Family/Friends available for support: Yes. Concern for family members at home: No. Major illness in household: No. Financial concerns: No. TV/Computer concerns: No.  ??Nutrition/Health - 05/26/2016  ???Regular  ??Substance Abuse - No Risk, 05/26/2016  ???Never, IV drug use: No.  ??Tobacco - No Risk, 05/26/2016  ???Never smoker  Family History  ??Hypertension.: Mother and Father.    66F presented to [facility] ED c/o Chest pain. Had previously been admitted for falls and treated nonoperatvely for multiple left ankle and foot fractures. Denies any new trauma/pain. Denies any new injuries.     VS: avss  Gen: NAD    LLE: no ttp bony prominences, ankle/foot, CAM boot in place, no ttp bony prominences knee/hip, able to SLR, EHL/FHL intact, SILT L3-S1, compartments soft, +DP, CR Brisk    RLE/BLUE: No ttp bony prominences, SILT, palpable pulses, full/painless ROM

## 2022-08-02 NOTE — PATIENT PROFILE ADULT. - [facility] NUTRITION
Sodium admission 130, serum osmolarity 285  - Likely secondary to hypovolemia  - Small fluid boluses given reduced ejection fraction   (3) adequate

## 2023-09-05 NOTE — PROGRESS NOTE ADULT - PROBLEM/PLAN-6
DISPLAY PLAN FREE TEXT
04-Sep-2023 16:00

## 2024-03-22 NOTE — ED ADULT NURSE NOTE - TEMPLATE LIST FOR HEAD TO TOE ASSESSMENT
Rx: Prednisone 20 mg  - twice daily  - eat first  - 5 days    Rx: Tizanidine 4 mg  - twice daily as needed  - not before driving    Physical Therapy and home exercises    Over-the-counter vitamins:    - Turmeric vitamin at least 1000 mg daily    - Tart cherry vitamin at least 1000 mg daily          
Abdominal Pain, N/V/D

## 2024-11-14 NOTE — BEDSIDE PROCEDURE TIME OUT CHECKLIST - NSTIMECONSENTSD_GEN_ALL_CORE
Subjective   Patient ID: Daryl Gramajo is a 80 y.o. male who presents for Follow-up.  HPI  Patient with history of seropositive positive RF/CCP rheumatoid arthritis.  Has history of DVT.  In the past we had gotten Enbrel but then had deferred for it to be filled.    Patient was last seen in August and at that time we had him continue on Orencia, methotrexate, sulfasalazine.  Today he feels that he has been doing okay and not having much flare.  He does not really do too much during his day.  He lives with his granddaughter and great granddaughter.  He may visit other family members during the day.  Does not do any yard work.  He denies any falls or infections.  He had some pain in his lower buttocks area.  His knees do not really swell too much.  No shortness of breath.  Denies any enlargement of glands or nodes  Review of Systems   HENT:          Hard of hearing   Cardiovascular:         H/o pacemaker with h/o av block    Musculoskeletal:         Per HPI       Objective   Physical Exam  GEN: NAD A&O x3 appropriate affect hard of hearing.  SKIN: no rashes  PULSES: +radials  MSK:  Has decrease in fullness in MCPs and PIPs and no current warmth or swelling in the wrists  Significant decreased range of motion of left shoulder compared to right  Decrease swelling in the knees though right more than left  Some mild fullness in the right ankle more than left    Assessment/Plan     rheumatoid arthritis +RF/CCP -  -currently on Orencia, methotrexate, sulfasalazine.  I do feel that he has had positive improvement with Orencia and we will have him continue.  -Will have him do blood work  -Will consider in the future for him to decrease medicine such as methotrexate or sulfasalazine  -Lymphadenopathy status postbiopsy in May 2024 that was negative for lymphoma.  May have to do with his rheumatoid arthritis    Will have him come back again in 4 to 5 months or as needed       Vicky Sin MD 11/14/24 11:26 AM    done

## 2025-07-16 NOTE — ED ADULT TRIAGE NOTE - HEIGHT IN INCHES
Stop taking all NSAIDs (ibuprofen, aleve, advil). Do not drink alcohol.     Start taking Pantoprazole twice a day. The morning dose should be taken on an empty stomach at least 30 minutes prior to eating. The second dose should be taken at bedtime.     Continue your antibiotic as prescribed, but take this with food to prevent stomach upset.     You should be called in the next 48 hours to schedule an upper endoscopy. This needs to be done to evaluate for a bleeding ulcer. I also recommend scheduling a close follow up with primary care.    Return to the ER immediately if you develop lightheadedness, shortness of breath, vomiting, abdominal pain, profuse  black stools, or if other concerning symptoms develop.   4